# Patient Record
Sex: FEMALE | Race: WHITE | NOT HISPANIC OR LATINO | Employment: OTHER | URBAN - METROPOLITAN AREA
[De-identification: names, ages, dates, MRNs, and addresses within clinical notes are randomized per-mention and may not be internally consistent; named-entity substitution may affect disease eponyms.]

---

## 2017-01-19 ENCOUNTER — OUTPATIENT INFUSION SERVICES (OUTPATIENT)
Dept: ONCOLOGY | Facility: MEDICAL CENTER | Age: 72
End: 2017-01-19
Payer: MEDICARE

## 2017-01-19 VITALS
OXYGEN SATURATION: 91 % | SYSTOLIC BLOOD PRESSURE: 154 MMHG | TEMPERATURE: 98.3 F | WEIGHT: 102.07 LBS | RESPIRATION RATE: 18 BRPM | HEIGHT: 58 IN | BODY MASS INDEX: 21.43 KG/M2 | DIASTOLIC BLOOD PRESSURE: 66 MMHG | HEART RATE: 94 BPM

## 2017-01-19 LAB
CA-I BLD ISE-SCNC: 1.22 MMOL/L (ref 1.1–1.3)
CREAT BLD-MCNC: 0.8 MG/DL (ref 0.5–1.4)

## 2017-01-19 PROCEDURE — 96401 CHEMO ANTI-NEOPL SQ/IM: CPT

## 2017-01-19 PROCEDURE — 700111 HCHG RX REV CODE 636 W/ 250 OVERRIDE (IP): Performed by: INTERNAL MEDICINE

## 2017-01-19 PROCEDURE — 82330 ASSAY OF CALCIUM: CPT

## 2017-01-19 PROCEDURE — 82565 ASSAY OF CREATININE: CPT

## 2017-01-19 RX ADMIN — DENOSUMAB 60 MG: 60 INJECTION SUBCUTANEOUS at 13:50

## 2017-01-19 NOTE — PROGRESS NOTES
Pharmacy note  Cr = 0.8 mg/dL, CrCl ~ 46 ml/min  Ionized Ca = 1.22  Last Prolia 07/21/16   OK for denosumab (Prolia) 60 mg SQ today      Precious Ventura, PharmD

## 2017-01-19 NOTE — PROGRESS NOTES
"Pt arrived to [facility], ambulatory, for Prolia injection. Pt tearful today, stating \"my cat is at home and she's dying.\" Emotional support provided. Pt denies any recent or upcoming dental surgeries. Labs drawn with 23g butterfly needle in the R-AC, pt within parameters to treat today. Prolia injected into the R-upper arm with no s/sx of adverse reaction. Pt left [facility] with no s/sx of distress. Follow up appointment scheduled and confirmed.   "

## 2017-01-20 ENCOUNTER — HOSPITAL ENCOUNTER (OUTPATIENT)
Dept: RADIOLOGY | Facility: MEDICAL CENTER | Age: 72
End: 2017-01-20
Attending: INTERNAL MEDICINE
Payer: COMMERCIAL

## 2017-01-20 DIAGNOSIS — R94.30 ABNORMAL CARDIAC FUNCTION TEST: ICD-10-CM

## 2017-01-20 DIAGNOSIS — E78.5 DYSLIPIDEMIA: ICD-10-CM

## 2017-01-20 PROCEDURE — 4410556 CT-CARDIAC SCORING

## 2017-01-23 ENCOUNTER — TELEPHONE (OUTPATIENT)
Dept: MEDICAL GROUP | Facility: CLINIC | Age: 72
End: 2017-01-23

## 2017-01-23 NOTE — TELEPHONE ENCOUNTER
----- Message from Abdon Hilario D.O. sent at 1/20/2017  5:02 PM PST -----  Please call Olga I want her to see a cardiologist.   Regards, Abdon Hilario, DO

## 2017-02-19 ENCOUNTER — PATIENT OUTREACH (OUTPATIENT)
Dept: HEALTH INFORMATION MANAGEMENT | Facility: OTHER | Age: 72
End: 2017-02-19

## 2017-02-19 NOTE — PROGRESS NOTES
Attempt #:1     Annual Wellness Visit Scheduling  1. Scheduling Status:Scheduled          Care Gap Scheduling (Attempt to Schedule EACH Overdue Care Gap!)     Health Maintenance Due   Topic Date Due   • IMM DTaP/Tdap/Td Vaccine (1 - Tdap) SCHEDULED    • IMM ZOSTER VACCINE  SCHEDULED         MyChart Activation: declined

## 2017-02-24 ENCOUNTER — TELEPHONE (OUTPATIENT)
Dept: MEDICAL GROUP | Facility: CLINIC | Age: 72
End: 2017-02-24

## 2017-02-24 NOTE — Clinical Note
[facility]  Abdon Hilario D.O.  [address]  [place] [address]  Fax: [phone]   Authorization for Release/Disclosure of   Protected Health Information   Name: OLGA WATTS : 1945 SSN: [id number]   Address: [address] [place] [address] [address]  [place] [address] Phone:    [phone] (home)    I authorize the entity listed below to release/disclose the PHI below to:   [facility]/Abdon Hilario D.O. and Abdon Hilario D.O.   Provider or Entity Name: Vic Vargas M.D.        Address   City, [facility], [facility]   Phone:      Fax: [phone]     Reason for request: continuity of care   Information to be released:    [  ] LAST COLONOSCOPY,  including any PATH REPORT and follow-up  [  ] LAST FIT/COLOGUARD RESULT [  ] LAST DEXA  [  ] LAST MAMMOGRAM  [  ] LAST PAP  [  ] LAST LABS [ X ] RETINA EXAM REPORT  [  ] IMMUNIZATION RECORDS  [  ] Release all info      [  ] Check here and initial the line next to each item to release ALL health information INCLUDING  _____ Care and treatment for drug and / or alcohol abuse  _____ HIV testing, infection status, or AIDS  _____ Genetic Testing    DATES OF SERVICE OR TIME PERIOD TO BE DISCLOSED: Recent eye exam  I understand and acknowledge that:  * This Authorization may be revoked at any time by you in writing, except if your health information has already been used or disclosed.  * Your health information that will be used or disclosed as a result of you signing this authorization could be re-disclosed by the recipient. If this occurs, your re-disclosed health information may no longer be protected by State or Federal laws.  * You may refuse to sign this Authorization. Your refusal will not affect your ability to obtain treatment.  * This Authorization becomes effective upon signing and will  on (date) __________.      If no date is indicated, this Authorization will  one (1) year from the signature date.    Name: Olga AGUILERA  Hilario    Signature:   Date:     2/27/2017       PLEASE FAX REQUESTED RECORDS BACK TO: [phone]

## 2017-02-24 NOTE — Clinical Note
Request for Medical Records    Patient Name: Olga Rich    : 1945      Dear Doctor Vic Vargas M.D.      The above named patient receives primary care at the [facility] by Abdon Hilario D.O..  The patient informs us that you are her eye care Provider.    Please fax a copy of the most recent eye exam to [phone] or answer the  questions below and fax this sheet back to us at the above number.  Attached is a signed Release of Information.      Date of last eye exam: _____________    Retinal eye exam summary:        Please select the choice(s) that apply.    ____ No diabetic retinopathy    ____    Diabetic retinopathy present      Printed Name and Credentials: __________________________________    Signature of Eye Care Provider: _________________________________    We appreciate your assistance and collaboration in providing efficient patient care!    Kindest Regards,    [address] [place] [address] [phone]

## 2017-02-25 NOTE — TELEPHONE ENCOUNTER
ANNUAL WELLNESS VISIT PRE-VISIT PLANNING     1.  Reviewed last PCP office visit assessment and plan notes: Yes    2.  If any orders were placed last visit do we have Results/Consult Notes?        •  Labs? Yes Completed        •  Imaging? Yes Cardiac scoring       •  Referrals? Yes Cardiology    3.  Patient Care Coordination Note was updated with diagnosis information:  No comments    4.  Patient is due for these Health Maintenance Topics:   Health Maintenance Due   Topic Date Due   • IMM DTaP/Tdap/Td Vaccine (1 - Tdap) 03/07/1962   • IMM ZOSTER VACCINE  03/07/2003          •  ALKA letter was faxed to:  Vic Vargas M.D.   for Retinal exam records    5.  Immunizations were updated in [employer] using WebIZ?: Yes       •  Web Iz Recommendations:  Yes       •  Is patient due for Tdap/Shingles? Yes.  If yes, was patient alerted of copay? Yes    6.  Patient has No chronic diseases           7.  Updated Care Team with [employer] and all specialists?        •   Gait devices, O2, CPAP, etc: yes        •   Eye professional: yes       •   Other specialists (GYN, cardiology, endo, etc): yes    8.  Is patient in need of any refills prior to office visit? No       •    Separate refill encounter created?: N\A    9.  Patient was informed to arrive 15 min prior to their scheduled appointment and bring in their medication bottles? yes    10.  Patient was advised: “This is a free wellness visit. The provider will screen for medical conditions to help you stay healthy. If you have other concerns to address you may be asked to discuss these at a separate visit or there may be an additional fee.”  Yes    N/A L/M 2/24/17 4:35

## 2017-03-01 ENCOUNTER — OFFICE VISIT (OUTPATIENT)
Dept: MEDICAL GROUP | Facility: CLINIC | Age: 72
End: 2017-03-01
Payer: MEDICARE

## 2017-03-01 VITALS
TEMPERATURE: 98.7 F | DIASTOLIC BLOOD PRESSURE: 80 MMHG | OXYGEN SATURATION: 95 % | SYSTOLIC BLOOD PRESSURE: 140 MMHG | RESPIRATION RATE: 16 BRPM | HEART RATE: 73 BPM | WEIGHT: 99 LBS | HEIGHT: 57 IN | BODY MASS INDEX: 21.36 KG/M2

## 2017-03-01 DIAGNOSIS — E03.9 HYPOTHYROIDISM, UNSPECIFIED TYPE: ICD-10-CM

## 2017-03-01 DIAGNOSIS — Z91.09 ENVIRONMENTAL ALLERGIES: ICD-10-CM

## 2017-03-01 DIAGNOSIS — M81.0 OSTEOPOROSIS: ICD-10-CM

## 2017-03-01 DIAGNOSIS — G47.09 OTHER INSOMNIA: ICD-10-CM

## 2017-03-01 DIAGNOSIS — Z23 NEED FOR SHINGLES VACCINE: ICD-10-CM

## 2017-03-01 DIAGNOSIS — Z23 NEED FOR TDAP VACCINATION: ICD-10-CM

## 2017-03-01 DIAGNOSIS — E78.5 DYSLIPIDEMIA: ICD-10-CM

## 2017-03-01 DIAGNOSIS — J45.909 UNCOMPLICATED ASTHMA, UNSPECIFIED ASTHMA SEVERITY: ICD-10-CM

## 2017-03-01 DIAGNOSIS — R94.30 ABNORMAL CARDIAC FUNCTION TEST: ICD-10-CM

## 2017-03-01 PROCEDURE — 90736 HZV VACCINE LIVE SUBQ: CPT | Performed by: INTERNAL MEDICINE

## 2017-03-01 PROCEDURE — 1036F TOBACCO NON-USER: CPT | Performed by: INTERNAL MEDICINE

## 2017-03-01 PROCEDURE — G0439 PPPS, SUBSEQ VISIT: HCPCS | Mod: 25 | Performed by: INTERNAL MEDICINE

## 2017-03-01 PROCEDURE — G8432 DEP SCR NOT DOC, RNG: HCPCS | Performed by: INTERNAL MEDICINE

## 2017-03-01 PROCEDURE — 90472 IMMUNIZATION ADMIN EACH ADD: CPT | Performed by: INTERNAL MEDICINE

## 2017-03-01 PROCEDURE — 90715 TDAP VACCINE 7 YRS/> IM: CPT | Performed by: INTERNAL MEDICINE

## 2017-03-01 PROCEDURE — 90471 IMMUNIZATION ADMIN: CPT | Performed by: INTERNAL MEDICINE

## 2017-03-01 RX ORDER — ATORVASTATIN CALCIUM 10 MG/1
10 TABLET, FILM COATED ORAL
Qty: 90 TAB | Refills: 3 | Status: SHIPPED | OUTPATIENT
Start: 2017-03-01 | End: 2017-04-07

## 2017-03-01 ASSESSMENT — PAIN SCALES - GENERAL: PAINLEVEL: NO PAIN

## 2017-03-01 ASSESSMENT — PATIENT HEALTH QUESTIONNAIRE - PHQ9: CLINICAL INTERPRETATION OF PHQ2 SCORE: 0

## 2017-03-01 NOTE — PROGRESS NOTES
Chief Complaint   Patient presents with   • Annual Wellness Visit         HPI:  Ogla is a 71 y.o. female here for Medicare Annual Wellness Visit         Patient Active Problem List    Diagnosis Date Noted   • Insomnia 08/19/2016   • Dyslipidemia 04/26/2016   • Osteoporosis 07/18/2014   • Asthma 07/18/2014   • Hypothyroid 04/18/2014   • Screening for breast cancer 03/14/2014   • Environmental allergies 03/14/2014       Current Outpatient Prescriptions   Medication Sig Dispense Refill   • Plant Sterols and Stanols (CHOLEST OFF PO) Take  by mouth.     • fluticasone-salmeterol (ADVAIR) 250-50 MCG/DOSE AEROSOL POWDER, BREATH ACTIVATED Inhale 1 Puff by mouth every 12 hours. 1 Inhaler 11   • montelukast (SINGULAIR) 10 MG Tab Take 1 Tab by mouth every day. 30 Tab 11   • temazepam (RESTORIL) 15 MG Cap Take 2 Caps by mouth at bedtime as needed. 60 Cap 7   • magnesium oxide (MAG-OX) 400 MG Tab Take 400 mg by mouth every day.     • levothyroxine (SYNTHROID) 75 MCG Tab TAKE ONE TABLET BY MOUTH ONE TIME DAILY 30 Tab 11   • Cholecalciferol (VITAMIN D) 2000 UNITS CAPS Take  by mouth.     • denosumab (PROLIA) 60 MG/ML SOLN Inject 60 mg as instructed Once.     • calcium carbonate (OS-DARIEN 500) 1250 MG TABS Take 1,250 mg by mouth every day.     • multivitamin (THERAGRAN) TABS Take 1 Tab by mouth every day.     • vitamin e (VITAMIN E) 400 UNIT Cap Take 400 Units by mouth every day.       No current facility-administered medications for this visit.        The patient reports adherence to this regimen      Current supplements as per medication list.   Chronic narcotic pain medicines: no    Allergies: Demerol    Current social contact/activities: Olga visits with her son,goes out to lunch,  interacts with neighbors      Is patient current with immunizations?  no    If no, due for Tdap and Shingles, would like to receive both today     She  reports that she quit smoking about 3 years ago. Her smoking use included Cigarettes. She has a  10 pack-year smoking history. She has never used smokeless tobacco. She reports that she does not drink alcohol or use illicit drugs.  Counseling given: Not Answered        DPA/Advanced Directive:  Patient does have an advanced directive.      ROS:    Gait: Uses no assistive device    Ostomy: no    Other tubes: no    Amputations: no    Chronic oxygen use no    Last eye exam 2/2013    : Denies incontinence.         Depression Screening    Little interest or pleasure in doing things?  0 - not at all  Feeling down, depressed, or hopeless?  0 - not at all  Patient Health Questionnaire Score: 0    If depressive symptoms identified deferred to follow up visit unless specifically addressed in assessment and plan.    Screening for Cognitive Impairment    Three Minute Recall (banana, sunrise, fence)  3/3    Draw clock face with all 12 numbers set to the hand to show 10 minutes past 11 o'clock  1 5/5  Cognitive concerns identified deferred for follow up unless specifically addressed in assessment and plan.    Fall Risk Assessment    Has the patient had two or more falls in the last year or any fall with injury in the last year?  No    Safety Assessment    Throw rugs on floor.  Yes  Handrails on all stairs.  Yes  Good lighting in all hallways.  Yes  Difficulty hearing.  No  Patient counseled about all safety risks that were identified.    Functional Assessment ADLs    Are there any barriers preventing you from cooking for yourself or meeting nutritional needs?  No.    Are there any barriers preventing you from driving safely or obtaining transportation?  No.    Are there any barriers preventing you from using a telephone or calling for help?  No.    Are there any barriers preventing you from shopping?  No.    Are there any barriers preventing you from taking care of your own finances?  No.    Are there any barriers preventing you from managing your medications?  No.    Are currently engaging any exercise or physical  "activity?  Yes.  Stretching several times daily, jumping jacks    Health Maintenance Summary                IMM DTaP/Tdap/Td Vaccine Overdue 3/7/1962     IMM ZOSTER VACCINE Overdue 3/7/2003     MAMMOGRAM Next Due 4/14/2017      Done 4/14/2016 MA-SCREEN MAMMO W/CAD-BILAT     Patient has more history with this topic...    COLONOSCOPY Next Due 4/18/2019      Done 4/18/2009 [facility] GI consult    BONE DENSITY Next Due 7/21/2021      Done 7/21/2016 DS-BONE DENSITY STUDY (DEXA)     Patient has more history with this topic...          Patient Care Team:  Abdon Hilario D.O. as PCP - General (Internal Medicine)  PRUDENCE Foster as Consulting Physician (Family Medicine)  Rahel Thompson M.D. as Consulting Physician (Rheumatology)  Vic Vargas M.D. as Consulting Physician (Ophthalmology)  Bruno Hood M.D. as Consulting Physician (Cardiology)    Social History   Substance Use Topics   • Smoking status: Former Smoker -- 0.25 packs/day for 40 years     Types: Cigarettes     Quit date: 03/01/2014   • Smokeless tobacco: Never Used   • Alcohol Use: No      Comment: very rarely     Family History   Problem Relation Age of Onset   • Other Mother 97     old age.    • Heart Attack Father 56     MI   • Heart Disease Father    • Other Son      ETOH   • Other Son      ETOH   • Heart Disease Sister 53   • Heart Attack Sister 85     She  has a past medical history of Osteoporosis, unspecified; Hypothyroid; Asthma in adult; Hypothyroid (4/18/2014); Hypothyroid (4/18/2014); Dyslipidemia (4/26/2016); and Cataract (2/2012).   Past Surgical History   Procedure Laterality Date   • Cataract phaco with iol       Dr. Vargas   • Femur orif       titamium Dr. Mares.            Exam:     Blood pressure 140/80, pulse 73, temperature 37.1 °C (98.7 °F), resp. rate 16, height 1.46 m (4' 9.48\"), weight 44.906 kg (99 lb), SpO2 95 %. Body mass index is 21.07 kg/(m^2).    Hearing fair.    Dentition fair  Alert, " oriented in no acute distress.  Eye contact is good, speech goal directed, affect calm       Assessment and Plan. The following treatment and monitoring plan is recommended:      1. Other insomnia  Patient is doing well with current medical therapy recommended sleep hygiene    2. Dyslipidemia  No change in medical therapy continue Lipitor  - atorvastatin (LIPITOR) 10 MG Tab; Take 1 Tab by mouth every bedtime.  Dispense: 90 Tab; Refill: 3    3. Osteoporosis  History of osteoporosis stable    4. Uncomplicated asthma, unspecified asthma severity  Stable    5. Hypothyroidism, unspecified type  No change in medical therapy    6. Environmental allergies  Stable    7. Abnormal cardiac function test  Recommend patient undergo a cardiac stress test because of elevated cardiac calcium scoring  - CARDIAC STRESS TEST TREADMILL ONLY    8. Need for Tdap vaccination  Vaccination given  - TDAP VACCINE =>6YO IM    9. Need for shingles vaccine  Vaccination given  - VARICELLA ZOSTER VACCINE SQ      Services needed: No services needed at this time  Health Care Screening recommendations as per orders if indicated.  Referrals offered: PT/OT/Nutrition counseling/Behavioral Health/Smoking cessation as per orders if indicated.    Discussion today about general wellness and lifestyle habits:    · Prevent falls and reduce trip hazards; Cautioned about securing or removing rugs.  · Have a working fire alarm and carbon monoxide detector;   · Engage in regular physical activity and social activities   ·   · (goals)      Follow-up: No Follow-up on file.

## 2017-03-01 NOTE — MR AVS SNAPSHOT
"        Olga Rich   3/1/2017 3:00 PM   Office Visit   MRN: [id number]    Department:  [facility]   Dept Phone:  [phone]    Description:  Female : 1945   Provider[facility]  Abdon Hilario D.O.; [facility]            Reason for Visit     Annual Wellness Visit           Allergies as of 3/1/2017     Allergen Noted Reactions    Demerol 2014   Anaphylaxis      You were diagnosed with     Other insomnia   [364131]       Dyslipidemia   [127063]       Osteoporosis   [5751166]       Uncomplicated asthma, unspecified asthma severity   [8335874]       Hypothyroidism, unspecified type   [6039544]       Environmental allergies   [682676]         Vital Signs     Blood Pressure Pulse Temperature Respirations Height Weight    140/80 mmHg 73 37.1 °C (98.7 °F) 16 1.46 m (4' 9.48\") 44.906 kg (99 lb)    Body Mass Index Oxygen Saturation Smoking Status             21.07 kg/m2 95% Former Smoker         Basic Information     Date Of Birth Sex Race Ethnicity Preferred Language    1945 Female White Non- English      Your appointments     2017  3:45 PM   NEW PATIENT with Bruno Hood M.D.   [facility] (--)    [address], [address]  [place] [address] [phone]            2017 10:15 AM   Follow Up Visit with Rahel Thompson M.D.   [facility] (--)    [address], [address]  [place] [address] [phone]           You will be receiving a confirmation call a few days before your appointment from our automated call confirmation system.            2017 10:20 AM   Established Patient with Abdon Hilario D.O.   [facility]    [address]  [place] [address] [phone]           You will be receiving a confirmation call a few days before your appointment from our automated call confirmation system.            2017  1:30 PM   Est Injection with " INFUSION QUICK INJECT   [facility] ([facility])    [address]  [place] [address]   [phone]            Aug 21, 2017  9:00 AM   Established Patient Pul with PRUDENCE Foster   [facility] (--)    [address]  [address]  [place] [address]   [phone]              Problem List              ICD-10-CM Priority Class Noted - Resolved    Screening for breast cancer Z12.39   3/14/2014 - Present    Environmental allergies Z91.09   3/14/2014 - Present    Hypothyroid E03.9   4/18/2014 - Present    Osteoporosis M81.0   7/18/2014 - Present    Asthma J45.909   7/18/2014 - Present    Dyslipidemia E78.5   4/26/2016 - Present    Insomnia G47.00   8/19/2016 - Present      Health Maintenance        Date Due Completion Dates    IMM DTaP/Tdap/Td Vaccine (1 - Tdap) 3/7/1962 ---    IMM ZOSTER VACCINE 3/7/2003 ---    MAMMOGRAM 4/14/2017 4/14/2016, 7/11/2014, 7/10/2014    COLONOSCOPY 4/18/2019 4/18/2009 (Done)    Override on 4/18/2009: Done ([facility] GI consult)    BONE DENSITY 7/21/2021 7/21/2016, 7/11/2014            Current Immunizations     13-VALENT PCV PREVNAR 3/21/2016 10:58 AM    Influenza Vaccine Adult HD 12/16/2016, 11/2/2015  9:31 AM    Pneumococcal polysaccharide vaccine (PPSV-23) 4/1/2015      Below and/or attached are the medications your provider expects you to take. Review all of your home medications and newly ordered medications with your provider and/or pharmacist. Follow medication instructions as directed by your provider and/or pharmacist. Please keep your medication list with you and share with your provider. Update the information when medications are discontinued, doses are changed, or new medications (including over-the-counter products) are added; and carry medication information at all times in the event of emergency situations     Allergies:  DEMEROL - Anaphylaxis               Medications  Valid as of: March 01, 2017 -  3:51 PM    Generic  Name Brand Name Tablet Size Instructions for use    Calcium Carbonate (Tab) OS-DARIEN 500 1250 (500 CA) MG Take 1,250 mg by mouth every day.        Cholecalciferol (Cap) Vitamin D 2000 UNITS Take  by mouth.        Denosumab (Solution) PROLIA 60 MG/ML Inject 60 mg as instructed Once.        Fluticasone-Salmeterol (AEROSOL POWDER, BREATH ACTIVATED) ADVAIR 250-50 MCG/DOSE Inhale 1 Puff by mouth every 12 hours.        Levothyroxine Sodium (Tab) SYNTHROID 75 MCG TAKE ONE TABLET BY MOUTH ONE TIME DAILY        Magnesium Oxide (Tab) MAG- MG Take 400 mg by mouth every day.        Montelukast Sodium (Tab) SINGULAIR 10 MG Take 1 Tab by mouth every day.        Multiple Vitamin (Tab) THERAGRAN  Take 1 Tab by mouth every day.        Plant Sterols and Stanols   Take  by mouth.        Temazepam (Cap) RESTORIL 15 MG Take 2 Caps by mouth at bedtime as needed.        Vitamin E (Cap) VITAMIN E 400 UNIT Take 400 Units by mouth every day.        .                 Medicines prescribed today were sent to:     [id number] - [place], [place] - [address] [place] [address]    Phone: [phone] Fax: [phone]    Open 24 Hours?: No      Medication refill instructions:       If your prescription bottle indicates you have medication refills left, it is not necessary to call your provider’s office. Please contact your pharmacy and they will refill your medication.    If your prescription bottle indicates you do not have any refills left, you may request refills at any time through one of the following ways: The online [employer]Tat system (except Urgent Care), by calling your provider’s office, or by asking your pharmacy to contact your provider’s office with a refill request. Medication refills are processed only during regular business hours and may not be available until the next business day. Your provider may request additional information or to have a follow-up visit with you prior to refilling your  medication.   *Please Note: Medication refills are assigned a new Rx number when refilled electronically. Your pharmacy may indicate that no refills were authorized even though a new prescription for the same medication is available at the pharmacy. Please request the medicine by name with the pharmacy before contacting your provider for a refill.        Other Notes About Your Plan     This patient has an appointment on 03/21/2016. There are no new queries.           MyChart Status: Patient Declined

## 2017-03-03 DIAGNOSIS — Z23 NEED FOR SHINGLES VACCINE: ICD-10-CM

## 2017-04-07 ENCOUNTER — OFFICE VISIT (OUTPATIENT)
Dept: CARDIOLOGY | Facility: MEDICAL CENTER | Age: 72
End: 2017-04-07
Payer: MEDICARE

## 2017-04-07 ENCOUNTER — HOSPITAL ENCOUNTER (OUTPATIENT)
Dept: LAB | Facility: MEDICAL CENTER | Age: 72
End: 2017-04-07
Attending: INTERNAL MEDICINE
Payer: MEDICARE

## 2017-04-07 VITALS
HEART RATE: 96 BPM | DIASTOLIC BLOOD PRESSURE: 80 MMHG | HEIGHT: 57 IN | OXYGEN SATURATION: 86 % | WEIGHT: 102 LBS | SYSTOLIC BLOOD PRESSURE: 120 MMHG | BODY MASS INDEX: 22.01 KG/M2

## 2017-04-07 DIAGNOSIS — Z82.49 FAMILY HISTORY OF ATHEROSCLEROSIS: ICD-10-CM

## 2017-04-07 DIAGNOSIS — I25.10 ATHEROSCLEROSIS OF NATIVE CORONARY ARTERY OF NATIVE HEART WITHOUT ANGINA PECTORIS: ICD-10-CM

## 2017-04-07 DIAGNOSIS — E78.5 DYSLIPIDEMIA: ICD-10-CM

## 2017-04-07 LAB
ALBUMIN SERPL BCP-MCNC: 4.3 G/DL (ref 3.2–4.9)
ALBUMIN/GLOB SERPL: 1.2 G/DL
ALP SERPL-CCNC: 64 U/L (ref 30–99)
ALT SERPL-CCNC: 11 U/L (ref 2–50)
ANION GAP SERPL CALC-SCNC: 7 MMOL/L (ref 0–11.9)
AST SERPL-CCNC: 17 U/L (ref 12–45)
BILIRUB SERPL-MCNC: 0.5 MG/DL (ref 0.1–1.5)
BUN SERPL-MCNC: 16 MG/DL (ref 8–22)
CALCIUM SERPL-MCNC: 10 MG/DL (ref 8.5–10.5)
CHLORIDE SERPL-SCNC: 104 MMOL/L (ref 96–112)
CHOLEST SERPL-MCNC: 195 MG/DL (ref 100–199)
CO2 SERPL-SCNC: 28 MMOL/L (ref 20–33)
CREAT SERPL-MCNC: 0.87 MG/DL (ref 0.5–1.4)
GFR SERPL CREATININE-BSD FRML MDRD: >60 ML/MIN/1.73 M 2
GLOBULIN SER CALC-MCNC: 3.5 G/DL (ref 1.9–3.5)
GLUCOSE SERPL-MCNC: 91 MG/DL (ref 65–99)
HDLC SERPL-MCNC: 75 MG/DL
LDLC SERPL CALC-MCNC: 97 MG/DL
POTASSIUM SERPL-SCNC: 4.3 MMOL/L (ref 3.6–5.5)
PROT SERPL-MCNC: 7.8 G/DL (ref 6–8.2)
SODIUM SERPL-SCNC: 139 MMOL/L (ref 135–145)
TRIGL SERPL-MCNC: 114 MG/DL (ref 0–149)

## 2017-04-07 PROCEDURE — 80053 COMPREHEN METABOLIC PANEL: CPT

## 2017-04-07 PROCEDURE — 36415 COLL VENOUS BLD VENIPUNCTURE: CPT

## 2017-04-07 PROCEDURE — 1101F PT FALLS ASSESS-DOCD LE1/YR: CPT | Performed by: INTERNAL MEDICINE

## 2017-04-07 PROCEDURE — 80061 LIPID PANEL: CPT

## 2017-04-07 PROCEDURE — 99204 OFFICE O/P NEW MOD 45 MIN: CPT | Performed by: INTERNAL MEDICINE

## 2017-04-07 PROCEDURE — G8420 CALC BMI NORM PARAMETERS: HCPCS | Performed by: INTERNAL MEDICINE

## 2017-04-07 RX ORDER — ATORVASTATIN CALCIUM 40 MG/1
40 TABLET, FILM COATED ORAL DAILY
Qty: 90 TAB | Refills: 3 | Status: SHIPPED | OUTPATIENT
Start: 2017-04-07 | End: 2018-03-27 | Stop reason: SDUPTHER

## 2017-04-07 RX ORDER — LISINOPRIL 5 MG/1
5 TABLET ORAL DAILY
Qty: 90 TAB | Refills: 3 | Status: SHIPPED | OUTPATIENT
Start: 2017-04-07 | End: 2018-03-27 | Stop reason: SDUPTHER

## 2017-04-07 RX ORDER — METOPROLOL SUCCINATE 25 MG/1
25 TABLET, EXTENDED RELEASE ORAL DAILY
Qty: 90 TAB | Refills: 3 | Status: SHIPPED | OUTPATIENT
Start: 2017-04-07 | End: 2018-03-27 | Stop reason: SDUPTHER

## 2017-04-07 ASSESSMENT — ENCOUNTER SYMPTOMS
PND: 0
SENSORY CHANGE: 0
VOMITING: 0
COUGH: 0
ABDOMINAL PAIN: 0
NAUSEA: 0
SHORTNESS OF BREATH: 0
BLURRED VISION: 0
FEVER: 0
CLAUDICATION: 0
PALPITATIONS: 0
MYALGIAS: 0
EYE PAIN: 0
CHILLS: 0
FALLS: 0
HEADACHES: 0
BRUISES/BLEEDS EASILY: 0
WEIGHT LOSS: 0
DEPRESSION: 0
DOUBLE VISION: 0
DIZZINESS: 0
EYE DISCHARGE: 0
HALLUCINATIONS: 0
SPEECH CHANGE: 0
LOSS OF CONSCIOUSNESS: 0
BLOOD IN STOOL: 0
ORTHOPNEA: 0

## 2017-04-07 NOTE — MR AVS SNAPSHOT
"        Olga Rich   2017 9:00 AM   Office Visit   MRN: [id number]    Department:  [facility]   Dept Phone:  [phone]    Description:  Female : 1945   Provider:  Bruno Hood M.D.           Reason for Visit     New Patient           Allergies as of 2017     Allergen Noted Reactions    Demerol 2014   Anaphylaxis      You were diagnosed with     Atherosclerosis of native coronary artery of native heart without angina pectoris   [5098417]       Dyslipidemia   [143653]       Family history of atherosclerosis   [5569883]         Vital Signs     Blood Pressure Pulse Height Weight Body Mass Index Oxygen Saturation    120/80 mmHg 96 1.46 m (4' 9.48\") 46.267 kg (102 lb) 21.71 kg/m2 86%    Smoking Status                   Former Smoker           Basic Information     Date Of Birth Sex Race Ethnicity Preferred Language    1945 Female White Non- English      Your appointments     2017  9:00 AM   NEW PATIENT with Bruno Hood M.D.   [facility] (--)    [address], [address]  [place] [address]   [phone]            2017 10:15 AM   Follow Up Visit with Rahel Thompson M.D.   [facility] (--)    [address], [address]  [place] [id number] [phone]           You will be receiving a confirmation call a few days before your appointment from our automated call confirmation system.            2017 10:20 AM   Established Patient with Abdon Hilario D.O.   [facility] ([facility])    [address]  [place] [id number] [phone]           You will be receiving a confirmation call a few days before your appointment from our automated call confirmation system.            2017  1:30 PM   Est Injection with INFUSION QUICK INJECT   [facility] ([facility])    [address]  [place] [address]   [phone]            Aug 21, 2017  9:00 AM "   Established Patient Pul with PRUDENCE Foster   [facility] (--)    [address]  [address]  [place] [id number] [phone]              Problem List              ICD-10-CM Priority Class Noted - Resolved    Screening for breast cancer Z12.39   3/14/2014 - Present    Environmental allergies Z91.09   3/14/2014 - Present    Hypothyroid E03.9   4/18/2014 - Present    Osteoporosis M81.0   7/18/2014 - Present    Asthma J45.909   7/18/2014 - Present    Dyslipidemia E78.5   4/26/2016 - Present    Insomnia G47.00   8/19/2016 - Present      Health Maintenance        Date Due Completion Dates    MAMMOGRAM 4/14/2017 4/14/2016, 7/11/2014, 7/10/2014    COLONOSCOPY 4/18/2019 4/18/2009 (Done)    Override on 4/18/2009: Done ([facility] consult)    BONE DENSITY 7/21/2021 7/21/2016, 7/11/2014    IMM DTaP/Tdap/Td Vaccine (2 - Td) 3/1/2027 3/1/2017            Results       Current Immunizations     13-VALENT PCV PREVNAR 3/21/2016 10:58 AM    Influenza Vaccine Adult HD 12/16/2016, 11/2/2015  9:31 AM    Pneumococcal polysaccharide vaccine (PPSV-23) 4/1/2015    SHINGLES VACCINE 3/1/2017    Tdap Vaccine 3/1/2017  4:35 PM      Below and/or attached are the medications your provider expects you to take. Review all of your home medications and newly ordered medications with your provider and/or pharmacist. Follow medication instructions as directed by your provider and/or pharmacist. Please keep your medication list with you and share with your provider. Update the information when medications are discontinued, doses are changed, or new medications (including over-the-counter products) are added; and carry medication information at all times in the event of emergency situations     Allergies:  DEMEROL - Anaphylaxis               Medications  Valid as of: April 07, 2017 -  8:59 AM    Generic Name Brand Name Tablet Size Instructions for use    Atorvastatin Calcium (Tab) LIPITOR 40 MG Take 1 Tab by  mouth every day.        Calcium Carbonate (Tab) OS-DARIEN 500 1250 (500 CA) MG Take 1,250 mg by mouth every day.        Cholecalciferol (Cap) Vitamin D 2000 UNITS Take  by mouth.        Denosumab (Solution) PROLIA 60 MG/ML Inject 60 mg as instructed Once.        Fluticasone-Salmeterol (AEROSOL POWDER, BREATH ACTIVATED) ADVAIR 250-50 MCG/DOSE Inhale 1 Puff by mouth every 12 hours.        Levothyroxine Sodium (Tab) SYNTHROID 75 MCG TAKE ONE TABLET BY MOUTH ONE TIME DAILY        Lisinopril (Tab) PRINIVIL 5 MG Take 1 Tab by mouth every day.        Magnesium Oxide (Tab) MAG- MG Take 400 mg by mouth every day.        Metoprolol Succinate (TABLET SR 24 HR) TOPROL XL 25 MG Take 1 Tab by mouth every day.        Montelukast Sodium (Tab) SINGULAIR 10 MG Take 1 Tab by mouth every day.        Multiple Vitamin (Tab) THERAGRAN  Take 1 Tab by mouth every day.        Plant Sterols and Stanols   Take  by mouth.        Temazepam (Cap) RESTORIL 15 MG Take 2 Caps by mouth at bedtime as needed.        Vitamin E (Cap) VITAMIN E 400 UNIT Take 400 Units by mouth every day.        .                 Medicines prescribed today were sent to:     [id number] - [place], [address] [address]    Phone: [phone] Fax: [phone]    Open 24 Hours?: No      Medication refill instructions:       If your prescription bottle indicates you have medication refills left, it is not necessary to call your provider’s office. Please contact your pharmacy and they will refill your medication.    If your prescription bottle indicates you do not have any refills left, you may request refills at any time through one of the following ways: The online [employer] system (except Urgent Care), by calling your provider’s office, or by asking your pharmacy to contact your provider’s office with a refill request. Medication refills are processed only during regular business hours and may not be available until the next  business day. Your provider may request additional information or to have a follow-up visit with you prior to refilling your medication.   *Please Note: Medication refills are assigned a new Rx number when refilled electronically. Your pharmacy may indicate that no refills were authorized even though a new prescription for the same medication is available at the pharmacy. Please request the medicine by name with the pharmacy before contacting your provider for a refill.        Your To Do List     Future Labs/Procedures Complete By Expires    COMP METABOLIC PANEL  As directed 4/8/2018    ECHOCARDIOGRAM COMP W/O CONT  As directed 4/8/2018      Instructions    Will increase Atorvastatin to 40 mg once a day.    Start Toprol XL 25 mg once a day and Lisinopril 5 mg once a day.       Other Notes About Your Plan     This patient has an appointment on 03/21/2016. There are no new queries.           MyChart Status: Patient Declined

## 2017-04-07 NOTE — PATIENT INSTRUCTIONS
Will increase Atorvastatin to 40 mg once a day.    Start Toprol XL 25 mg once a day and Lisinopril 5 mg once a day.

## 2017-04-07 NOTE — Clinical Note
[facility]   [address]  [place] [address]  Phone: [phone]  Fax: [phone]              Olga Rich  1945    Encounter Date: 4/7/2017    Bruno Hood M.D.          PROGRESS NOTE:  Subjective:   Olga Rich is a 72 y.o. female who presents today for cardiac care and evaluation because of an abnormal calcium scan. Patient denies having any symptoms whatsoever. Olga Rich does not have any history of heart attack arrhythmias in the past. she never had transthoracic echocardiogram, cardiac catheterization or ablations procedure in the past. At this time, she denies having chest pain shortness of breath presyncopal syncopal episodes. she is able to exercise with walking for one to 2 miles without having problems of chest pain or shortness of breath. Patient is also able to climb up at least 2 flights of stairs without having symptoms.    I have reviewed patient's ECG, which shows normal sinus rhythm, normal IL, QT intervals. No evidence of acute coronary syndrome.    The calcium score is elevated. At this time she is asymptomatic.    Past Medical History   Diagnosis Date   • Osteoporosis, unspecified    • Hypothyroid    • Asthma in adult    • Hypothyroid 4/18/2014   • Hypothyroid 4/18/2014   • Dyslipidemia 4/26/2016   • Cataract 2/2012     Bilat      Past Surgical History   Procedure Laterality Date   • Cataract phaco with iol       Dr. Vargas   • Femur orif       titamium Dr. Mares.      Family History   Problem Relation Age of Onset   • Other Mother 97     old age.    • Heart Attack Father 56     MI   • Heart Disease Father    • Other Son      ETOH   • Other Son      ETOH   • Heart Disease Sister 53   • Heart Attack Sister 85     History   Smoking status   • Former Smoker -- 0.25 packs/day for 40 years   • Types: Cigarettes   • Quit date: 03/01/2014   Smokeless tobacco   • Never Used     Allergies      Allergen Reactions   • Demerol Anaphylaxis     Outpatient Encounter Prescriptions as of 4/7/2017   Medication Sig Dispense Refill   • atorvastatin (LIPITOR) 40 MG Tab Take 1 Tab by mouth every day. 90 Tab 3   • metoprolol SR (TOPROL XL) 25 MG TABLET SR 24 HR Take 1 Tab by mouth every day. 90 Tab 3   • lisinopril (PRINIVIL) 5 MG Tab Take 1 Tab by mouth every day. 90 Tab 3   • Plant Sterols and Stanols (CHOLEST OFF PO) Take  by mouth.     • fluticasone-salmeterol (ADVAIR) 250-50 MCG/DOSE AEROSOL POWDER, BREATH ACTIVATED Inhale 1 Puff by mouth every 12 hours. 1 Inhaler 11   • montelukast (SINGULAIR) 10 MG Tab Take 1 Tab by mouth every day. 30 Tab 11   • temazepam (RESTORIL) 15 MG Cap Take 2 Caps by mouth at bedtime as needed. 60 Cap 7   • magnesium oxide (MAG-OX) 400 MG Tab Take 400 mg by mouth every day.     • levothyroxine (SYNTHROID) 75 MCG Tab TAKE ONE TABLET BY MOUTH ONE TIME DAILY 30 Tab 11   • vitamin e (VITAMIN E) 400 UNIT Cap Take 400 Units by mouth every day.     • Cholecalciferol (VITAMIN D) 2000 UNITS CAPS Take  by mouth.     • denosumab (PROLIA) 60 MG/ML SOLN Inject 60 mg as instructed Once.     • calcium carbonate (OS-DARIEN 500) 1250 MG TABS Take 1,250 mg by mouth every day.     • multivitamin (THERAGRAN) TABS Take 1 Tab by mouth every day.     • [DISCONTINUED] atorvastatin (LIPITOR) 10 MG Tab Take 1 Tab by mouth every bedtime. 90 Tab 3     No facility-administered encounter medications on file as of 4/7/2017.     Review of Systems   Constitutional: Negative for fever, chills, weight loss and malaise/fatigue.   HENT: Negative for ear discharge, ear pain, hearing loss and nosebleeds.    Eyes: Negative for blurred vision, double vision, pain and discharge.   Respiratory: Negative for cough and shortness of breath.    Cardiovascular: Negative for chest pain, palpitations, orthopnea, claudication, leg swelling and PND.   Gastrointestinal: Negative for nausea, vomiting, abdominal pain, blood in stool and  "melena.   Genitourinary: Negative for dysuria and hematuria.   Musculoskeletal: Negative for myalgias, joint pain and falls.   Skin: Negative for itching and rash.   Neurological: Negative for dizziness, sensory change, speech change, loss of consciousness and headaches.   Endo/Heme/Allergies: Negative for environmental allergies. Does not bruise/bleed easily.   Psychiatric/Behavioral: Negative for depression, suicidal ideas and hallucinations.        Objective:   /80 mmHg  Pulse 96  Ht 1.46 m (4' 9.48\")  Wt 46.267 kg (102 lb)  BMI 21.71 kg/m2  SpO2 86%    Physical Exam   Constitutional: She is oriented to person, place, and time. She appears well-developed and well-nourished.   HENT:   Head: Normocephalic and atraumatic.   Eyes: EOM are normal.   Neck: No JVD present.   Cardiovascular: Normal rate, regular rhythm, normal heart sounds and intact distal pulses.  Exam reveals no gallop and no friction rub.    No murmur heard.  Pulmonary/Chest: No respiratory distress. She has no wheezes. She has no rales. She exhibits no tenderness.   Abdominal: She exhibits no distension. There is no tenderness. There is no rebound and no guarding.   Musculoskeletal: She exhibits no edema or tenderness.   Lymphadenopathy:     She has no cervical adenopathy.   Neurological: She is alert and oriented to person, place, and time.   Skin: Skin is dry.   Psychiatric: She has a normal mood and affect.   Nursing note and vitals reviewed.      Assessment:     1. Atherosclerosis of native coronary artery of native heart without angina pectoris  atorvastatin (LIPITOR) 40 MG Tab    ECHOCARDIOGRAM COMP W/O CONT    metoprolol SR (TOPROL XL) 25 MG TABLET SR 24 HR    lisinopril (PRINIVIL) 5 MG Tab    COMP METABOLIC PANEL    LIPID PANEL   2. Dyslipidemia  atorvastatin (LIPITOR) 40 MG Tab    ECHOCARDIOGRAM COMP W/O CONT    metoprolol SR (TOPROL XL) 25 MG TABLET SR 24 HR    lisinopril (PRINIVIL) 5 MG Tab    COMP METABOLIC PANEL    LIPID " PANEL   3. Family history of atherosclerosis  atorvastatin (LIPITOR) 40 MG Tab    ECHOCARDIOGRAM COMP W/O CONT    metoprolol SR (TOPROL XL) 25 MG TABLET SR 24 HR    lisinopril (PRINIVIL) 5 MG Tab    COMP METABOLIC PANEL    LIPID PANEL       Medical Decision Making:  Today's Assessment / Status / Plan:     At this time I will obtain a transthoracic echocardiogram to further assess cardiac functions and valvular functions.  Because of her asymptomatic nature, I am not inclined to do further invasive cardiac workup.  We did want to optimize primary prevention however. I will increase her atorvastatin to 40 mg by mouth once a day. I will start on Toprol-XL 25 mg by mouth once a day along with lisinopril 5 mg by mouth once a day.    I will see patient back in clinic with lab tests and studies results in 3 months.    I thank you Dr. Hilario for referring patient to our Cardiology Clinic today.        Abdon Hilario, D.O.  [address]  [place] [address]  VIA In Basket

## 2017-04-07 NOTE — PROGRESS NOTES
Subjective:   Olga Rich is a 72 y.o. female who presents today for cardiac care and evaluation because of an abnormal calcium scan. Patient denies having any symptoms whatsoever. Olga Rich does not have any history of heart attack arrhythmias in the past. she never had transthoracic echocardiogram, cardiac catheterization or ablations procedure in the past. At this time, she denies having chest pain shortness of breath presyncopal syncopal episodes. she is able to exercise with walking for one to 2 miles without having problems of chest pain or shortness of breath. Patient is also able to climb up at least 2 flights of stairs without having symptoms.    I have reviewed patient's ECG, which shows normal sinus rhythm, normal ME, QT intervals. No evidence of acute coronary syndrome.    The calcium score is elevated. At this time she is asymptomatic.    Past Medical History   Diagnosis Date   • Osteoporosis, unspecified    • Hypothyroid    • Asthma in adult    • Hypothyroid 4/18/2014   • Hypothyroid 4/18/2014   • Dyslipidemia 4/26/2016   • Cataract 2/2012     Bilat      Past Surgical History   Procedure Laterality Date   • Cataract phaco with iol       Dr. Vargas   • Femur orif       titamium Dr. Mares.      Family History   Problem Relation Age of Onset   • Other Mother 97     old age.    • Heart Attack Father 56     MI   • Heart Disease Father    • Other Son      ETOH   • Other Son      ETOH   • Heart Disease Sister 53   • Heart Attack Sister 85     History   Smoking status   • Former Smoker -- 0.25 packs/day for 40 years   • Types: Cigarettes   • Quit date: 03/01/2014   Smokeless tobacco   • Never Used     Allergies   Allergen Reactions   • Demerol Anaphylaxis     Outpatient Encounter Prescriptions as of 4/7/2017   Medication Sig Dispense Refill   • atorvastatin (LIPITOR) 40 MG Tab Take 1 Tab by mouth every day. 90 Tab 3   • metoprolol SR (TOPROL XL) 25 MG TABLET SR 24 HR Take 1 Tab by  mouth every day. 90 Tab 3   • lisinopril (PRINIVIL) 5 MG Tab Take 1 Tab by mouth every day. 90 Tab 3   • Plant Sterols and Stanols (CHOLEST OFF PO) Take  by mouth.     • fluticasone-salmeterol (ADVAIR) 250-50 MCG/DOSE AEROSOL POWDER, BREATH ACTIVATED Inhale 1 Puff by mouth every 12 hours. 1 Inhaler 11   • montelukast (SINGULAIR) 10 MG Tab Take 1 Tab by mouth every day. 30 Tab 11   • temazepam (RESTORIL) 15 MG Cap Take 2 Caps by mouth at bedtime as needed. 60 Cap 7   • magnesium oxide (MAG-OX) 400 MG Tab Take 400 mg by mouth every day.     • levothyroxine (SYNTHROID) 75 MCG Tab TAKE ONE TABLET BY MOUTH ONE TIME DAILY 30 Tab 11   • vitamin e (VITAMIN E) 400 UNIT Cap Take 400 Units by mouth every day.     • Cholecalciferol (VITAMIN D) 2000 UNITS CAPS Take  by mouth.     • denosumab (PROLIA) 60 MG/ML SOLN Inject 60 mg as instructed Once.     • calcium carbonate (OS-DARIEN 500) 1250 MG TABS Take 1,250 mg by mouth every day.     • multivitamin (THERAGRAN) TABS Take 1 Tab by mouth every day.     • [DISCONTINUED] atorvastatin (LIPITOR) 10 MG Tab Take 1 Tab by mouth every bedtime. 90 Tab 3     No facility-administered encounter medications on file as of 4/7/2017.     Review of Systems   Constitutional: Negative for fever, chills, weight loss and malaise/fatigue.   HENT: Negative for ear discharge, ear pain, hearing loss and nosebleeds.    Eyes: Negative for blurred vision, double vision, pain and discharge.   Respiratory: Negative for cough and shortness of breath.    Cardiovascular: Negative for chest pain, palpitations, orthopnea, claudication, leg swelling and PND.   Gastrointestinal: Negative for nausea, vomiting, abdominal pain, blood in stool and melena.   Genitourinary: Negative for dysuria and hematuria.   Musculoskeletal: Negative for myalgias, joint pain and falls.   Skin: Negative for itching and rash.   Neurological: Negative for dizziness, sensory change, speech change, loss of consciousness and headaches.  "  Endo/Heme/Allergies: Negative for environmental allergies. Does not bruise/bleed easily.   Psychiatric/Behavioral: Negative for depression, suicidal ideas and hallucinations.        Objective:   /80 mmHg  Pulse 96  Ht 1.46 m (4' 9.48\")  Wt 46.267 kg (102 lb)  BMI 21.71 kg/m2  SpO2 86%    Physical Exam   Constitutional: She is oriented to person, place, and time. She appears well-developed and well-nourished.   HENT:   Head: Normocephalic and atraumatic.   Eyes: EOM are normal.   Neck: No JVD present.   Cardiovascular: Normal rate, regular rhythm, normal heart sounds and intact distal pulses.  Exam reveals no gallop and no friction rub.    No murmur heard.  Pulmonary/Chest: No respiratory distress. She has no wheezes. She has no rales. She exhibits no tenderness.   Abdominal: She exhibits no distension. There is no tenderness. There is no rebound and no guarding.   Musculoskeletal: She exhibits no edema or tenderness.   Lymphadenopathy:     She has no cervical adenopathy.   Neurological: She is alert and oriented to person, place, and time.   Skin: Skin is dry.   Psychiatric: She has a normal mood and affect.   Nursing note and vitals reviewed.      Assessment:     1. Atherosclerosis of native coronary artery of native heart without angina pectoris  atorvastatin (LIPITOR) 40 MG Tab    ECHOCARDIOGRAM COMP W/O CONT    metoprolol SR (TOPROL XL) 25 MG TABLET SR 24 HR    lisinopril (PRINIVIL) 5 MG Tab    COMP METABOLIC PANEL    LIPID PANEL   2. Dyslipidemia  atorvastatin (LIPITOR) 40 MG Tab    ECHOCARDIOGRAM COMP W/O CONT    metoprolol SR (TOPROL XL) 25 MG TABLET SR 24 HR    lisinopril (PRINIVIL) 5 MG Tab    COMP METABOLIC PANEL    LIPID PANEL   3. Family history of atherosclerosis  atorvastatin (LIPITOR) 40 MG Tab    ECHOCARDIOGRAM COMP W/O CONT    metoprolol SR (TOPROL XL) 25 MG TABLET SR 24 HR    lisinopril (PRINIVIL) 5 MG Tab    COMP METABOLIC PANEL    LIPID PANEL       Medical Decision Making:  Today's " Assessment / Status / Plan:     At this time I will obtain a transthoracic echocardiogram to further assess cardiac functions and valvular functions.  Because of her asymptomatic nature, I am not inclined to do further invasive cardiac workup.  We did want to optimize primary prevention however. I will increase her atorvastatin to 40 mg by mouth once a day. I will start on Toprol-XL 25 mg by mouth once a day along with lisinopril 5 mg by mouth once a day.    I will see patient back in clinic with lab tests and studies results in 3 months.    I thank you Dr. Hilario for referring patient to our Cardiology Clinic today.

## 2017-04-08 LAB — EKG IMPRESSION: NORMAL

## 2017-04-12 ENCOUNTER — OFFICE VISIT (OUTPATIENT)
Dept: RHEUMATOLOGY | Facility: PHYSICIAN GROUP | Age: 72
End: 2017-04-12
Payer: MEDICARE

## 2017-04-12 VITALS
BODY MASS INDEX: 19.79 KG/M2 | HEART RATE: 70 BPM | RESPIRATION RATE: 16 BRPM | OXYGEN SATURATION: 95 % | TEMPERATURE: 98.8 F | SYSTOLIC BLOOD PRESSURE: 120 MMHG | DIASTOLIC BLOOD PRESSURE: 78 MMHG | WEIGHT: 93 LBS

## 2017-04-12 DIAGNOSIS — E03.9 HYPOTHYROIDISM, UNSPECIFIED TYPE: ICD-10-CM

## 2017-04-12 DIAGNOSIS — J45.909 UNCOMPLICATED ASTHMA, UNSPECIFIED ASTHMA SEVERITY: ICD-10-CM

## 2017-04-12 DIAGNOSIS — M81.0 OSTEOPOROSIS: ICD-10-CM

## 2017-04-12 DIAGNOSIS — E78.5 DYSLIPIDEMIA: ICD-10-CM

## 2017-04-12 PROCEDURE — 99214 OFFICE O/P EST MOD 30 MIN: CPT | Performed by: INTERNAL MEDICINE

## 2017-04-12 PROCEDURE — 4040F PNEUMOC VAC/ADMIN/RCVD: CPT | Performed by: INTERNAL MEDICINE

## 2017-04-12 PROCEDURE — 1101F PT FALLS ASSESS-DOCD LE1/YR: CPT | Performed by: INTERNAL MEDICINE

## 2017-04-12 PROCEDURE — G8432 DEP SCR NOT DOC, RNG: HCPCS | Performed by: INTERNAL MEDICINE

## 2017-04-12 PROCEDURE — G8420 CALC BMI NORM PARAMETERS: HCPCS | Performed by: INTERNAL MEDICINE

## 2017-04-12 PROCEDURE — 3014F SCREEN MAMMO DOC REV: CPT | Performed by: INTERNAL MEDICINE

## 2017-04-12 PROCEDURE — 1036F TOBACCO NON-USER: CPT | Performed by: INTERNAL MEDICINE

## 2017-04-12 PROCEDURE — G8599 NO ASA/ANTIPLAT THER USE RNG: HCPCS | Performed by: INTERNAL MEDICINE

## 2017-04-12 PROCEDURE — 3017F COLORECTAL CA SCREEN DOC REV: CPT | Performed by: INTERNAL MEDICINE

## 2017-04-12 NOTE — Clinical Note
[facility]   [address], [address]  [place] [address]  Phone: [phone]  Fax: [phone]              Encounter Date: 4/12/2017    Dear Dr. Brothers ref. provider found,    It was a pleasure seeing your patient, Olga Rich, on 4/12/2017. Diagnoses of Osteoporosis, Hypothyroidism, unspecified type, Uncomplicated asthma, unspecified asthma severity, and Dyslipidemia were pertinent to this visit.     Please find attached progress note which includes the history I obtained from Ms. Rich, my physical examination findings, my impression and recommendations.      Once again, it was a pleasure participating in your patient's care.  Please feel free to contact me if you have any questions or if I can be of any further assistance to your patients.      Sincerely,    Rahel Thompson M.D.  Electronically Signed          PROGRESS NOTE:  No notes on file

## 2017-04-12 NOTE — MR AVS SNAPSHOT
Olga Rich   2017 10:15 AM   Office Visit   MRN: [id number]    Department:  Rheumatology [facility]   Dept Phone:  [phone]    Description:  Female : 1945   Provider:  Rahel Thompson M.D.           Reason for Visit     Follow-Up           Allergies as of 2017     Allergen Noted Reactions    Demerol 2014   Anaphylaxis      You were diagnosed with     Osteoporosis   [6435163]       Hypothyroidism, unspecified type   [7137610]       Uncomplicated asthma, unspecified asthma severity   [6224043]       Dyslipidemia   [759362]         Vital Signs     Blood Pressure Pulse Temperature Respirations Weight Oxygen Saturation    120/78 mmHg 70 37.1 °C (98.8 °F) 16 42.185 kg (93 lb) 95%    Smoking Status                   Former Smoker           Basic Information     Date Of Birth Sex Race Ethnicity Preferred Language    1945 Female White Non- English      Your appointments     May 03, 2017  9:15 AM   ECHO with ECHO [facility], V EXAM 12   ECHOCARDIOLOGY [facility] ([facility])    [address] [place] [address]   [phone]           No prep            2017 10:20 AM   Established Patient with Abdon Hilario D.O.   [facility] ([facility])    [address]  [place] [address]   [phone]           You will be receiving a confirmation call a few days before your appointment from our automated call confirmation system.            Jul 10, 2017  9:40 AM   PREVIOUS PATIENT with PRUDENCE Bailey   [facility] (--)    [address], [address]  [place] [address]   [phone]            2017  1:30 PM   Est Injection with INFUSION QUICK INJECT   Infusion Services ([facility])    [address] [facility]  [place] [address]   [phone]            Aug 21, 2017  9:00 AM   Established Patient Pul with PRUDENCE Foster   [facility] (--)    [address]  [address]  [place]  [place] [address]   [phone]            Jan 03, 2018 10:45 AM   Follow Up Visit with Rahel Thompson M.D.   [facility] (--)    [address], [address]  [place] [address]   [phone]           You will be receiving a confirmation call a few days before your appointment from our automated call confirmation system.              Problem List              ICD-10-CM Priority Class Noted - Resolved    Screening for breast cancer Z12.39   3/14/2014 - Present    Environmental allergies Z91.09   3/14/2014 - Present    Hypothyroid E03.9   4/18/2014 - Present    Osteoporosis M81.0   7/18/2014 - Present    Asthma J45.909   7/18/2014 - Present    Dyslipidemia E78.5   4/26/2016 - Present    Insomnia G47.00   8/19/2016 - Present      Health Maintenance        Date Due Completion Dates    MAMMOGRAM 4/14/2017 4/14/2016, 7/11/2014, 7/10/2014    COLONOSCOPY 4/18/2019 4/18/2009 (Done)    Override on 4/18/2009: Done ([facility] consult)    BONE DENSITY 7/21/2021 7/21/2016, 7/11/2014    IMM DTaP/Tdap/Td Vaccine (2 - Td) 3/1/2027 3/1/2017            Current Immunizations     13-VALENT PCV PREVNAR 3/21/2016 10:58 AM    Influenza Vaccine Adult HD 12/16/2016, 11/2/2015  9:31 AM    Pneumococcal polysaccharide vaccine (PPSV-23) 4/1/2015    SHINGLES VACCINE 3/1/2017    Tdap Vaccine 3/1/2017  4:35 PM      Below and/or attached are the medications your provider expects you to take. Review all of your home medications and newly ordered medications with your provider and/or pharmacist. Follow medication instructions as directed by your provider and/or pharmacist. Please keep your medication list with you and share with your provider. Update the information when medications are discontinued, doses are changed, or new medications (including over-the-counter products) are added; and carry medication information at all times in the event of emergency situations     Allergies:  DEMEROL - Anaphylaxis                  Medications  Valid as of: April 12, 2017 - 10:31 AM    Generic Name Brand Name Tablet Size Instructions for use    Atorvastatin Calcium (Tab) LIPITOR 40 MG Take 1 Tab by mouth every day.        Calcium Carbonate (Tab) OS-DARIEN 500 1250 (500 CA) MG Take 1,250 mg by mouth every day.        Cholecalciferol (Cap) Vitamin D 2000 UNITS Take  by mouth.        Denosumab (Solution) PROLIA 60 MG/ML Inject 60 mg as instructed Once.        Fluticasone-Salmeterol (AEROSOL POWDER, BREATH ACTIVATED) ADVAIR 250-50 MCG/DOSE Inhale 1 Puff by mouth every 12 hours.        Levothyroxine Sodium (Tab) SYNTHROID 75 MCG TAKE ONE TABLET BY MOUTH ONE TIME DAILY        Lisinopril (Tab) PRINIVIL 5 MG Take 1 Tab by mouth every day.        Magnesium Oxide (Tab) MAG- MG Take 400 mg by mouth every day.        Metoprolol Succinate (TABLET SR 24 HR) TOPROL XL 25 MG Take 1 Tab by mouth every day.        Montelukast Sodium (Tab) SINGULAIR 10 MG Take 1 Tab by mouth every day.        Multiple Vitamin (Tab) THERAGRAN  Take 1 Tab by mouth every day.        Plant Sterols and Stanols   Take  by mouth.        Temazepam (Cap) RESTORIL 15 MG Take 2 Caps by mouth at bedtime as needed.        Vitamin E (Cap) VITAMIN E 400 UNIT Take 400 Units by mouth every day.        .                 Medicines prescribed today were sent to:     [id number] - [place], [place] - [address] [address]    Phone: [phone] Fax: [phone]    Open 24 Hours?: No      Medication refill instructions:       If your prescription bottle indicates you have medication refills left, it is not necessary to call your provider’s office. Please contact your pharmacy and they will refill your medication.    If your prescription bottle indicates you do not have any refills left, you may request refills at any time through one of the following ways: The online [employer] system (except Urgent Care), by calling your provider’s office, or by asking your  pharmacy to contact your provider’s office with a refill request. Medication refills are processed only during regular business hours and may not be available until the next business day. Your provider may request additional information or to have a follow-up visit with you prior to refilling your medication.   *Please Note: Medication refills are assigned a new Rx number when refilled electronically. Your pharmacy may indicate that no refills were authorized even though a new prescription for the same medication is available at the pharmacy. Please request the medicine by name with the pharmacy before contacting your provider for a refill.        Your To Do List     Future Labs/Procedures Complete By Expires    COMP METABOLIC PANEL  As directed 4/12/2018    VITAMIN D,25 HYDROXY  As directed 4/12/2018      Other Notes About Your Plan     This patient has an appointment on 03/21/2016. There are no new queries.           MyChart Status: Patient Declined

## 2017-04-12 NOTE — PROGRESS NOTES
Chief Complaint- joint pain    Subjective:   Olga Rich is a 72 y.o. female here today for follow up of rheumatological issues    This is a follow-up visit for this patient for osteoporosis, patient states her osteoporosis was discovered in 2008 when she suffered a right femur fracture, currently on Prolia every 6 months first dose December 2014, patient states she felt better within 2 weeks of getting the Prolia, states her back aches have improved significantly and continued to improve. Patient also continues to take calcium and vitamin D, patient also suffers from hypothyroidism for which she is managed by her primary care doctor, also has asthma, patient denies any unexplained weight loss, denies any FUO, denies any new neurological symptoms, denies any recurrent infections. Recent bone density scan done July 2016 indicates improvement of bone density. Patient denies any recent fractures      S/p fosamax for 4 years     DEXA 4/2014 T scores -4.4, -3.9  DEXA 7/2016 T scores -3.7, -4.0  CTX 6/2015 50 normal  Last Prolia 1/19/2017     Current medicines (including changes today)  Current Outpatient Prescriptions   Medication Sig Dispense Refill   • atorvastatin (LIPITOR) 40 MG Tab Take 1 Tab by mouth every day. 90 Tab 3   • metoprolol SR (TOPROL XL) 25 MG TABLET SR 24 HR Take 1 Tab by mouth every day. 90 Tab 3   • lisinopril (PRINIVIL) 5 MG Tab Take 1 Tab by mouth every day. 90 Tab 3   • Plant Sterols and Stanols (CHOLEST OFF PO) Take  by mouth.     • fluticasone-salmeterol (ADVAIR) 250-50 MCG/DOSE AEROSOL POWDER, BREATH ACTIVATED Inhale 1 Puff by mouth every 12 hours. 1 Inhaler 11   • montelukast (SINGULAIR) 10 MG Tab Take 1 Tab by mouth every day. 30 Tab 11   • temazepam (RESTORIL) 15 MG Cap Take 2 Caps by mouth at bedtime as needed. 60 Cap 7   • magnesium oxide (MAG-OX) 400 MG Tab Take 400 mg by mouth every day.     • levothyroxine (SYNTHROID) 75 MCG Tab TAKE ONE TABLET BY MOUTH ONE TIME DAILY 30 Tab  11   • vitamin e (VITAMIN E) 400 UNIT Cap Take 400 Units by mouth every day.     • Cholecalciferol (VITAMIN D) 2000 UNITS CAPS Take  by mouth.     • denosumab (PROLIA) 60 MG/ML SOLN Inject 60 mg as instructed Once.     • calcium carbonate (OS-DARIEN 500) 1250 MG TABS Take 1,250 mg by mouth every day.     • multivitamin (THERAGRAN) TABS Take 1 Tab by mouth every day.       No current facility-administered medications for this visit.     She  has a past medical history of Osteoporosis, unspecified; Hypothyroid; Asthma in adult; Hypothyroid (4/18/2014); Hypothyroid (4/18/2014); Dyslipidemia (4/26/2016); and Cataract (2/2012).    ROS   Other than what is mentioned in HPI or physical exam, there is no history of headaches, double vision or blurred vision. No temporal tenderness or jaw claudication. No history of cataracts or glaucoma. No trouble swallowing difficulties or sore throats. No history of thyroid disease. No chest complaints including chest pain, cough or sputum production. No shortness of breath. No GI complaints including nausea, vomiting, change in bowel habits, or past peptic ulcer disease. No history of blood in the stools. No urinary complaints including dysuria or frequency. No history of rash including psoriasis. No history of alopecia, photosensitivity, oral ulcerations, Raynaud's phenomena, or swollen joints. No history of gout. No back complaints. No history of low blood counts.       Objective:     Blood pressure 120/78, pulse 70, temperature 37.1 °C (98.8 °F), resp. rate 16, weight 42.185 kg (93 lb), SpO2 95 %. Body mass index is 19.79 kg/(m^2).   Physical Exam:  General:Alert, oriented X 3 in no acute distress, small and thin framed  Eye: contact is good, speech goal directed, affect calm  HEENT: conjunctiva non-injected, sclera non-icteric.  Pinna normal. TM pearly gray.   Oral mucous membranes pink and moist with no lesions.  Neck: No thyromegaly, trachea midline  Lymph: No supraclavicular  lymphadenopathy, no cervical lymphadenopathy, no axillary lymphadenopathy  Lungs: clear to auscultation bilaterally with good excursion, the patient has slight increased expiration compatible with asthma although no wheezes, patient does have moderate kyphosis of upper back  CV: regular rate and rhythm, did not appreciate any murmurs rubs or gallops  Abdomen: soft, nontender, No CVAT, no HSM  Neuro Cranial nerves 2-12 are grossly intact  Skin: No vasculitic lesions, no bruising, no petechial lesions  Ext: There are mild Heberden nodes on bilateral index DIP joints with mild valgus deformity. But no swan-neck, no bruit ears, no ulnar deviation, there are or mild bony hypertrophy bilateral knees, but without valgus or varus deformity, there is crepitus bilateral knees without redness without effusions. No Achilles tendon inflammation     Lab Results   Component Value Date/Time    SODIUM 139 04/07/2017 10:12 AM    POTASSIUM 4.3 04/07/2017 10:12 AM    CHLORIDE 104 04/07/2017 10:12 AM    CO2 28 04/07/2017 10:12 AM    GLUCOSE 91 04/07/2017 10:12 AM    BUN 16 04/07/2017 10:12 AM    CREATININE 0.87 04/07/2017 10:12 AM      Lab Results   Component Value Date/Time    WBC 7.0 04/20/2016 10:09 AM    RBC 4.99 04/20/2016 10:09 AM    HEMOGLOBIN 15.1 04/20/2016 10:09 AM    HEMATOCRIT 46.0 04/20/2016 10:09 AM    MCV 92.2 04/20/2016 10:09 AM    MCH 30.3 04/20/2016 10:09 AM    MCHC 32.8* 04/20/2016 10:09 AM    MPV 9.4 04/20/2016 10:09 AM    NEUTROPHILS-POLYS 59.30 04/20/2016 10:09 AM    LYMPHOCYTES 26.80 04/20/2016 10:09 AM    MONOCYTES 7.70 04/20/2016 10:09 AM    EOSINOPHILS 4.70 04/20/2016 10:09 AM    BASOPHILS 0.90 04/20/2016 10:09 AM      Lab Results   Component Value Date/Time    CALCIUM 10.0 04/07/2017 10:12 AM    AST(SGOT) 17 04/07/2017 10:12 AM    ALT(SGPT) 11 04/07/2017 10:12 AM    ALKALINE PHOSPHATASE 64 04/07/2017 10:12 AM    TOTAL BILIRUBIN 0.5 04/07/2017 10:12 AM    ALBUMIN 4.3 04/07/2017 10:12 AM    TOTAL PROTEIN 7.8  04/07/2017 10:12 AM     Lab Results   Component Value Date/Time    SED RATE ANDRE 22 12/04/2014 03:10 PM     Lab Results   Component Value Date/Time    C. TELOPEPTIDE B CROSS LINKED 50 06/09/2015 01:15 PM     Lab Results   Component Value Date/Time    PTH, INTACT 48.8 12/09/2016 10:20 AM     Results for orders placed during the hospital encounter of 07/21/16   DS-BONE DENSITY STUDY (DEXA)    Impression 1.  According to the World Health Organization classification, bone mineral density of this patient is osteoporotic.    2.  Slight increase in lumbar spine bone density. No statistically significant change in left proximal femur bone density.        10-year Probability of Fracture:  Major Osteoporotic     28.9%  Hip     11.1%  Population      [place] ()    Based on left femur neck BMD          INTERPRETING LOCATION:  [address] [place] [address], [id number]       Assessment and Plan:     1. Osteoporosis  Continue Prolene 60 mg subcutaneous every 6 months, last Prolia a injection January 19, 2017, patient scheduled with the infusion center to get a next Prolia injection July 2017. Today we wrote orders for a creatinine and calcium level to be done within 2 weeks of the next Prolia injection will also check a vitamin D level as well.  - COMP METABOLIC PANEL; Future  - VITAMIN D,25 HYDROXY; Future    2. Hypothyroidism, unspecified type  Can exacerbate joint pain, I recommend monitoring thyroid on a regular basis to assure it is not contributing to the patient's joint pain  - COMP METABOLIC PANEL; Future  - VITAMIN D,25 HYDROXY; Future    3. Uncomplicated asthma, unspecified asthma severity    4. Dyslipidemia  Followed by her primary care doctor    Followup: Return in about 9 months (around 1/12/2018). or sooner prn    Patient was seen 30 minutes face-to-face of which more than 50% of the time was spent counseling the patient (excluding time for procedures)  regarding  rheumatological condition and care. Therapy was  discussed in detail.    Please note that this dictation was created using voice recognition software. I have made every reasonable attempt to correct obvious errors, but I expect that there are errors of grammar and possibly content that I did not discover before finalizing the note.

## 2017-05-03 ENCOUNTER — HOSPITAL ENCOUNTER (OUTPATIENT)
Dept: CARDIOLOGY | Facility: MEDICAL CENTER | Age: 72
End: 2017-05-03
Attending: INTERNAL MEDICINE
Payer: MEDICARE

## 2017-05-03 DIAGNOSIS — E78.5 DYSLIPIDEMIA: ICD-10-CM

## 2017-05-03 DIAGNOSIS — I25.10 ATHEROSCLEROSIS OF NATIVE CORONARY ARTERY OF NATIVE HEART WITHOUT ANGINA PECTORIS: ICD-10-CM

## 2017-05-03 DIAGNOSIS — Z82.49 FAMILY HISTORY OF ATHEROSCLEROSIS: ICD-10-CM

## 2017-05-03 PROCEDURE — 93306 TTE W/DOPPLER COMPLETE: CPT | Mod: 26 | Performed by: INTERNAL MEDICINE

## 2017-05-03 PROCEDURE — 93306 TTE W/DOPPLER COMPLETE: CPT

## 2017-05-04 LAB
LV EJECT FRACT  99904: 65
LV EJECT FRACT MOD 2C 99903: 75.61
LV EJECT FRACT MOD 4C 99902: 66.59
LV EJECT FRACT MOD BP 99901: 67.51

## 2017-05-08 NOTE — PROGRESS NOTES
Quick Note:    Dear Roxanne,    Can you please let Olga Rich know that result is not entirely normal and I need to see patient ASAP to discuss for possible SANGEETA to look at the aortic vavle?    Thanks Vic Oliveira.    ______

## 2017-05-10 ENCOUNTER — TELEPHONE (OUTPATIENT)
Dept: CARDIOLOGY | Facility: MEDICAL CENTER | Age: 72
End: 2017-05-10

## 2017-05-11 NOTE — TELEPHONE ENCOUNTER
MIKE Brown L.P.N. to Paula                ECHOCARDIOGRAM COMP W/O CONT   Status: Final result     Visible to patient:  Not Released     Dx:  Dyslipidemia; Family history of ather...               Notes Recorded by Sanaz Emmanuel R.N. on 5/10/2017 at 11:00 AM  to Roxanne  Notes Recorded by Bruno Hood M.D. on 5/8/2017 at 11:45 AM  Dear Roxanne,    Can you please let [facility] know that result is not entirely normal and I need to see patient ASAP to discuss for possible SANGEETA to look at the aortic vavle?    Thanks Vic Oliveira.    Notes Recorded by Roxanne Frazier L.P.N. on 5/4/2017 at 6:15 PM  FU 7/10/17 with jason Arteaga Dr.     5/10/17:  Pt notified and scheduled with Dr. Hood for Friday 5/12/17 @ 3pm  ESTEPHANIA Frazier

## 2017-05-12 ENCOUNTER — OFFICE VISIT (OUTPATIENT)
Dept: CARDIOLOGY | Facility: MEDICAL CENTER | Age: 72
End: 2017-05-12
Payer: MEDICARE

## 2017-05-12 VITALS
DIASTOLIC BLOOD PRESSURE: 70 MMHG | BODY MASS INDEX: 20.57 KG/M2 | SYSTOLIC BLOOD PRESSURE: 130 MMHG | HEART RATE: 70 BPM | OXYGEN SATURATION: 94 % | WEIGHT: 98 LBS | HEIGHT: 58 IN

## 2017-05-12 DIAGNOSIS — E78.5 DYSLIPIDEMIA: ICD-10-CM

## 2017-05-12 PROCEDURE — 3017F COLORECTAL CA SCREEN DOC REV: CPT | Performed by: INTERNAL MEDICINE

## 2017-05-12 PROCEDURE — 1101F PT FALLS ASSESS-DOCD LE1/YR: CPT | Performed by: INTERNAL MEDICINE

## 2017-05-12 PROCEDURE — G8599 NO ASA/ANTIPLAT THER USE RNG: HCPCS | Performed by: INTERNAL MEDICINE

## 2017-05-12 PROCEDURE — 1036F TOBACCO NON-USER: CPT | Performed by: INTERNAL MEDICINE

## 2017-05-12 PROCEDURE — 3014F SCREEN MAMMO DOC REV: CPT | Performed by: INTERNAL MEDICINE

## 2017-05-12 PROCEDURE — G8420 CALC BMI NORM PARAMETERS: HCPCS | Performed by: INTERNAL MEDICINE

## 2017-05-12 PROCEDURE — 99214 OFFICE O/P EST MOD 30 MIN: CPT | Performed by: INTERNAL MEDICINE

## 2017-05-12 PROCEDURE — 4040F PNEUMOC VAC/ADMIN/RCVD: CPT | Performed by: INTERNAL MEDICINE

## 2017-05-12 PROCEDURE — G8432 DEP SCR NOT DOC, RNG: HCPCS | Performed by: INTERNAL MEDICINE

## 2017-05-12 RX ORDER — ATORVASTATIN CALCIUM 10 MG/1
TABLET, FILM COATED ORAL
COMMUNITY
Start: 2017-03-01 | End: 2017-05-18

## 2017-05-12 NOTE — Clinical Note
[facility]   [address]  [place] [address]  Phone: [phone]  Fax: [phone]              Olga Rich  1945    Encounter Date: 5/12/2017    Bruno Hood M.D.          PROGRESS NOTE:  Subjective:   Olga Rich is a 72 y.o. female who presents today for cardiac care and evaluation because of an abnormal calcium scan in the past. At the last visit, patient was deemed to be asymptomatic and no further cardiac workup was entertained. We optimized her medical therapy.     In the interim, patient has been doing well without having any symptoms. Patient denies having chest pain, dyspnea, palpitation, presyncope, syncope episodes. Able to climb up at least 2 flights of stairs.    Past Medical History   Diagnosis Date   • Osteoporosis, unspecified    • Hypothyroid    • Asthma in adult    • Hypothyroid 4/18/2014   • Hypothyroid 4/18/2014   • Dyslipidemia 4/26/2016   • Cataract 2/2012     Bilat      Past Surgical History   Procedure Laterality Date   • Cataract phaco with iol       Dr. Vargas   • Femur orif       titamium Dr. Mares.      Family History   Problem Relation Age of Onset   • Other Mother 97     old age.    • Heart Attack Father 56     MI   • Heart Disease Father    • Other Son      ETOH   • Other Son      ETOH   • Heart Disease Sister 53   • Heart Attack Sister 85     History   Smoking status   • Former Smoker -- 0.25 packs/day for 40 years   • Types: Cigarettes   • Quit date: 03/01/2014   Smokeless tobacco   • Never Used     Allergies   Allergen Reactions   • Demerol Anaphylaxis     Outpatient Encounter Prescriptions as of 5/12/2017   Medication Sig Dispense Refill   • atorvastatin (LIPITOR) 40 MG Tab Take 1 Tab by mouth every day. 90 Tab 3   • metoprolol SR (TOPROL XL) 25 MG TABLET SR 24 HR Take 1 Tab by mouth every day. 90 Tab 3   • lisinopril (PRINIVIL) 5 MG Tab Take 1 Tab by mouth every day. 90 Tab 3   •  Plant Sterols and Stanols (CHOLEST OFF PO) Take  by mouth.     • fluticasone-salmeterol (ADVAIR) 250-50 MCG/DOSE AEROSOL POWDER, BREATH ACTIVATED Inhale 1 Puff by mouth every 12 hours. 1 Inhaler 11   • montelukast (SINGULAIR) 10 MG Tab Take 1 Tab by mouth every day. 30 Tab 11   • magnesium oxide (MAG-OX) 400 MG Tab Take 400 mg by mouth every day.     • vitamin e (VITAMIN E) 400 UNIT Cap Take 400 Units by mouth every day.     • Cholecalciferol (VITAMIN D) 2000 UNITS CAPS Take  by mouth.     • denosumab (PROLIA) 60 MG/ML SOLN Inject 60 mg as instructed Once.     • calcium carbonate (OS-DARIEN 500) 1250 MG TABS Take 1,250 mg by mouth every day.     • atorvastatin (LIPITOR) 10 MG Tab      • temazepam (RESTORIL) 15 MG Cap Take 2 Caps by mouth at bedtime as needed. 60 Cap 7   • levothyroxine (SYNTHROID) 75 MCG Tab TAKE ONE TABLET BY MOUTH ONE TIME DAILY 30 Tab 11   • multivitamin (THERAGRAN) TABS Take 1 Tab by mouth every day.       No facility-administered encounter medications on file as of 5/12/2017.     Review of Systems   Constitutional: Negative for fever, chills, weight loss and malaise/fatigue.   HENT: Negative for ear discharge, ear pain, hearing loss and nosebleeds.    Eyes: Negative for blurred vision, double vision, pain and discharge.   Respiratory: Negative for cough and shortness of breath.    Cardiovascular: Negative for chest pain, palpitations, orthopnea, claudication, leg swelling and PND.   Gastrointestinal: Negative for nausea, vomiting, abdominal pain, blood in stool and melena.   Genitourinary: Negative for dysuria and hematuria.   Musculoskeletal: Negative for myalgias, joint pain and falls.   Skin: Negative for itching and rash.   Neurological: Negative for dizziness, sensory change, speech change, loss of consciousness and headaches.   Endo/Heme/Allergies: Negative for environmental allergies. Does not bruise/bleed easily.   Psychiatric/Behavioral: Negative for depression, suicidal ideas and  "hallucinations.        Objective:   /70 mmHg  Pulse 70  Ht 1.473 m (4' 9.99\")  Wt 44.453 kg (98 lb)  BMI 20.49 kg/m2  SpO2 94%    Physical Exam   Constitutional: She is oriented to person, place, and time. She appears well-developed and well-nourished.   HENT:   Head: Normocephalic and atraumatic.   Eyes: EOM are normal.   Neck: No JVD present.   Cardiovascular: Normal rate, regular rhythm, normal heart sounds and intact distal pulses.  Exam reveals no gallop and no friction rub.    No murmur heard.  Pulmonary/Chest: No respiratory distress. She has no wheezes. She has no rales. She exhibits no tenderness.   Abdominal: She exhibits no distension. There is no tenderness. There is no rebound and no guarding.   Musculoskeletal: She exhibits no edema or tenderness.   Lymphadenopathy:     She has no cervical adenopathy.   Neurological: She is alert and oriented to person, place, and time.   Skin: Skin is dry.   Psychiatric: She has a normal mood and affect.   Nursing note and vitals reviewed.      Assessment:     1. Dyslipidemia         Medical Decision Making:  Today's Assessment / Status / Plan:     At this time patient is clinically stable in terms of her cardiac standpoint.  Blood pressure is well controlled.  Cont current medications at current dose.     I will see patient back in clinic with lab tests and studies results in 12 months.    I thank you Dr. Hilario for referring patient to our Cardiology Clinic today.        Abdon Hilario D.O.  [address]  [place] [address]  VIA In Basket                   "

## 2017-05-12 NOTE — MR AVS SNAPSHOT
"Olga Rich   2017 3:00 PM   Office Visit   MRN: [id number]    Department:  [facility]   Dept Phone:  [phone]    Description:  Female : 1945   Provider:  Bruno Hood M.D.           Reason for Visit     Follow-Up           Allergies as of 2017     Allergen Noted Reactions    Demerol 2014   Anaphylaxis      Vital Signs     Blood Pressure Pulse Height Weight Body Mass Index Oxygen Saturation    130/70 mmHg 70 1.473 m (4' 9.99\") 44.453 kg (98 lb) 20.49 kg/m2 94%    Smoking Status                   Former Smoker           Basic Information     Date Of Birth Sex Race Ethnicity Preferred Language    1945 Female White Non- English      Your appointments     2017 10:20 AM   Established Patient with Abdon Hilario D.O.   [id number] [facility] [address]  [place] [id number] [phone]           You will be receiving a confirmation call a few days before your appointment from our automated call confirmation system.            Jul 10, 2017  9:20 AM   PREVIOUS PATIENT with PRUDENCE Bailey   [facility] (--)    [address], [address]  [place] [id number] [phone]            2017  1:30 PM   Est Injection with INFUSION QUICK INJECT   Infusion Services ([facility])    [address] [facility] [address]  [place] [id number] [phone]            Aug 21, 2017  9:00 AM   Established Patient Pul with PRUDENCE Foster   [facility] (--)    [address]  [address]  [place] [id number] [phone]            2018 10:45 AM   Follow Up Visit with Rahel Thompson M.D.   [facility] (--)    [address]  [place] [address] [phone]           You will be receiving a confirmation call a few days before your appointment from our automated call confirmation system.              Problem List          "    ICD-10-CM Priority Class Noted - Resolved    Screening for breast cancer Z12.39   3/14/2014 - Present    Environmental allergies Z91.09   3/14/2014 - Present    Hypothyroid E03.9   4/18/2014 - Present    Osteoporosis M81.0   7/18/2014 - Present    Asthma J45.909   7/18/2014 - Present    Dyslipidemia E78.5   4/26/2016 - Present    Insomnia G47.00   8/19/2016 - Present      Health Maintenance        Date Due Completion Dates    MAMMOGRAM 4/14/2017 4/14/2016, 7/11/2014    COLONOSCOPY 4/18/2019 4/18/2009 (Done)    Override on 4/18/2009: Done ([facility] GI consult)    BONE DENSITY 7/21/2021 7/21/2016, 7/11/2014    IMM DTaP/Tdap/Td Vaccine (2 - Td) 3/1/2027 3/1/2017            Current Immunizations     13-VALENT PCV PREVNAR 3/21/2016 10:58 AM    Influenza Vaccine Adult HD 12/16/2016, 11/2/2015  9:31 AM    Pneumococcal polysaccharide vaccine (PPSV-23) 4/1/2015    SHINGLES VACCINE 3/1/2017    Tdap Vaccine 3/1/2017  4:35 PM      Below and/or attached are the medications your provider expects you to take. Review all of your home medications and newly ordered medications with your provider and/or pharmacist. Follow medication instructions as directed by your provider and/or pharmacist. Please keep your medication list with you and share with your provider. Update the information when medications are discontinued, doses are changed, or new medications (including over-the-counter products) are added; and carry medication information at all times in the event of emergency situations     Allergies:  DEMEROL - Anaphylaxis               Medications  Valid as of: May 12, 2017 -  3:26 PM    Generic Name Brand Name Tablet Size Instructions for use    Atorvastatin Calcium (Tab) LIPITOR 40 MG Take 1 Tab by mouth every day.        Atorvastatin Calcium (Tab) LIPITOR 10 MG         Calcium Carbonate (Tab) OS-DARIEN 500 1250 (500 CA) MG Take 1,250 mg by mouth every day.        Cholecalciferol (Cap) Vitamin D 2000 UNITS Take  by mouth.         Denosumab (Solution) PROLIA 60 MG/ML Inject 60 mg as instructed Once.        Fluticasone-Salmeterol (AEROSOL POWDER, BREATH ACTIVATED) ADVAIR 250-50 MCG/DOSE Inhale 1 Puff by mouth every 12 hours.        Levothyroxine Sodium (Tab) SYNTHROID 75 MCG TAKE ONE TABLET BY MOUTH ONE TIME DAILY        Lisinopril (Tab) PRINIVIL 5 MG Take 1 Tab by mouth every day.        Magnesium Oxide (Tab) MAG- MG Take 400 mg by mouth every day.        Metoprolol Succinate (TABLET SR 24 HR) TOPROL XL 25 MG Take 1 Tab by mouth every day.        Montelukast Sodium (Tab) SINGULAIR 10 MG Take 1 Tab by mouth every day.        Multiple Vitamin (Tab) THERAGRAN  Take 1 Tab by mouth every day.        Plant Sterols and Stanols   Take  by mouth.        Temazepam (Cap) RESTORIL 15 MG Take 2 Caps by mouth at bedtime as needed.        Vitamin E (Cap) VITAMIN E 400 UNIT Take 400 Units by mouth every day.        .                 Medicines prescribed today were sent to:     [id number] - [place], [place] - [address] [place] [id number]    Phone: [phone] Fax: [phone]    Open 24 Hours?: No      Medication refill instructions:       If your prescription bottle indicates you have medication refills left, it is not necessary to call your provider’s office. Please contact your pharmacy and they will refill your medication.    If your prescription bottle indicates you do not have any refills left, you may request refills at any time through one of the following ways: The online [employer] system (except Urgent Care), by calling your provider’s office, or by asking your pharmacy to contact your provider’s office with a refill request. Medication refills are processed only during regular business hours and may not be available until the next business day. Your provider may request additional information or to have a follow-up visit with you prior to refilling your medication.   *Please Note: Medication refills are  assigned a new Rx number when refilled electronically. Your pharmacy may indicate that no refills were authorized even though a new prescription for the same medication is available at the pharmacy. Please request the medicine by name with the pharmacy before contacting your provider for a refill.        Other Notes About Your Plan     This patient has an appointment on 03/21/2016. There are no new queries.           MyChart Status: Patient Declined

## 2017-05-12 NOTE — PROGRESS NOTES
Subjective:   Olga Rich is a 72 y.o. female who presents today for cardiac care and evaluation because of an abnormal calcium scan in the past. At the last visit, patient was deemed to be asymptomatic and no further cardiac workup was entertained. We optimized her medical therapy.     In the interim, patient has been doing well without having any symptoms. Patient denies having chest pain, dyspnea, palpitation, presyncope, syncope episodes. Able to climb up at least 2 flights of stairs.    Past Medical History   Diagnosis Date   • Osteoporosis, unspecified    • Hypothyroid    • Asthma in adult    • Hypothyroid 4/18/2014   • Hypothyroid 4/18/2014   • Dyslipidemia 4/26/2016   • Cataract 2/2012     Bilat      Past Surgical History   Procedure Laterality Date   • Cataract phaco with iol       Dr. Vargas   • Femur orif       titamium Dr. Mares.      Family History   Problem Relation Age of Onset   • Other Mother 97     old age.    • Heart Attack Father 56     MI   • Heart Disease Father    • Other Son      ETOH   • Other Son      ETOH   • Heart Disease Sister 53   • Heart Attack Sister 85     History   Smoking status   • Former Smoker -- 0.25 packs/day for 40 years   • Types: Cigarettes   • Quit date: 03/01/2014   Smokeless tobacco   • Never Used     Allergies   Allergen Reactions   • Demerol Anaphylaxis     Outpatient Encounter Prescriptions as of 5/12/2017   Medication Sig Dispense Refill   • atorvastatin (LIPITOR) 40 MG Tab Take 1 Tab by mouth every day. 90 Tab 3   • metoprolol SR (TOPROL XL) 25 MG TABLET SR 24 HR Take 1 Tab by mouth every day. 90 Tab 3   • lisinopril (PRINIVIL) 5 MG Tab Take 1 Tab by mouth every day. 90 Tab 3   • Plant Sterols and Stanols (CHOLEST OFF PO) Take  by mouth.     • fluticasone-salmeterol (ADVAIR) 250-50 MCG/DOSE AEROSOL POWDER, BREATH ACTIVATED Inhale 1 Puff by mouth every 12 hours. 1 Inhaler 11   • montelukast (SINGULAIR) 10 MG Tab Take 1 Tab by mouth every day. 30 Tab  "11   • magnesium oxide (MAG-OX) 400 MG Tab Take 400 mg by mouth every day.     • vitamin e (VITAMIN E) 400 UNIT Cap Take 400 Units by mouth every day.     • Cholecalciferol (VITAMIN D) 2000 UNITS CAPS Take  by mouth.     • denosumab (PROLIA) 60 MG/ML SOLN Inject 60 mg as instructed Once.     • calcium carbonate (OS-DARIEN 500) 1250 MG TABS Take 1,250 mg by mouth every day.     • atorvastatin (LIPITOR) 10 MG Tab      • temazepam (RESTORIL) 15 MG Cap Take 2 Caps by mouth at bedtime as needed. 60 Cap 7   • levothyroxine (SYNTHROID) 75 MCG Tab TAKE ONE TABLET BY MOUTH ONE TIME DAILY 30 Tab 11   • multivitamin (THERAGRAN) TABS Take 1 Tab by mouth every day.       No facility-administered encounter medications on file as of 5/12/2017.     Review of Systems   Constitutional: Negative for fever, chills, weight loss and malaise/fatigue.   HENT: Negative for ear discharge, ear pain, hearing loss and nosebleeds.    Eyes: Negative for blurred vision, double vision, pain and discharge.   Respiratory: Negative for cough and shortness of breath.    Cardiovascular: Negative for chest pain, palpitations, orthopnea, claudication, leg swelling and PND.   Gastrointestinal: Negative for nausea, vomiting, abdominal pain, blood in stool and melena.   Genitourinary: Negative for dysuria and hematuria.   Musculoskeletal: Negative for myalgias, joint pain and falls.   Skin: Negative for itching and rash.   Neurological: Negative for dizziness, sensory change, speech change, loss of consciousness and headaches.   Endo/Heme/Allergies: Negative for environmental allergies. Does not bruise/bleed easily.   Psychiatric/Behavioral: Negative for depression, suicidal ideas and hallucinations.        Objective:   /70 mmHg  Pulse 70  Ht 1.473 m (4' 9.99\")  Wt 44.453 kg (98 lb)  BMI 20.49 kg/m2  SpO2 94%    Physical Exam   Constitutional: She is oriented to person, place, and time. She appears well-developed and well-nourished.   HENT:   Head: " Normocephalic and atraumatic.   Eyes: EOM are normal.   Neck: No JVD present.   Cardiovascular: Normal rate, regular rhythm, normal heart sounds and intact distal pulses.  Exam reveals no gallop and no friction rub.    No murmur heard.  Pulmonary/Chest: No respiratory distress. She has no wheezes. She has no rales. She exhibits no tenderness.   Abdominal: She exhibits no distension. There is no tenderness. There is no rebound and no guarding.   Musculoskeletal: She exhibits no edema or tenderness.   Lymphadenopathy:     She has no cervical adenopathy.   Neurological: She is alert and oriented to person, place, and time.   Skin: Skin is dry.   Psychiatric: She has a normal mood and affect.   Nursing note and vitals reviewed.      Assessment:     1. Dyslipidemia         Medical Decision Making:  Today's Assessment / Status / Plan:     At this time patient is clinically stable in terms of her cardiac standpoint.  Blood pressure is well controlled.  Cont current medications at current dose.     I will see patient back in clinic with lab tests and studies results in 12 months.    I thank you Dr. Hilario for referring patient to our Cardiology Clinic today.

## 2017-05-15 ASSESSMENT — ENCOUNTER SYMPTOMS
FALLS: 0
CHILLS: 0
HALLUCINATIONS: 0
COUGH: 0
BLURRED VISION: 0
PND: 0
SPEECH CHANGE: 0
DEPRESSION: 0
SHORTNESS OF BREATH: 0
EYE DISCHARGE: 0
VOMITING: 0
CLAUDICATION: 0
ABDOMINAL PAIN: 0
HEADACHES: 0
DIZZINESS: 0
WEIGHT LOSS: 0
PALPITATIONS: 0
DOUBLE VISION: 0
EYE PAIN: 0
SENSORY CHANGE: 0
BLOOD IN STOOL: 0
MYALGIAS: 0
LOSS OF CONSCIOUSNESS: 0
BRUISES/BLEEDS EASILY: 0
ORTHOPNEA: 0
FEVER: 0
NAUSEA: 0

## 2017-05-16 ENCOUNTER — TELEPHONE (OUTPATIENT)
Dept: CARDIOLOGY | Facility: MEDICAL CENTER | Age: 72
End: 2017-05-16

## 2017-05-16 NOTE — TELEPHONE ENCOUNTER
Patient scheduled for SANGEETA on 5-23-17 at [facility] with Dr. Hood at 2:00. SARAH Hood.

## 2017-05-18 ENCOUNTER — APPOINTMENT (OUTPATIENT)
Dept: ADMISSIONS | Facility: MEDICAL CENTER | Age: 72
End: 2017-05-18
Attending: INTERNAL MEDICINE
Payer: MEDICARE

## 2017-05-18 RX ORDER — ASPIRIN 325 MG
324 TABLET ORAL
COMMUNITY
End: 2022-04-06

## 2017-05-23 ENCOUNTER — HOSPITAL ENCOUNTER (OUTPATIENT)
Facility: MEDICAL CENTER | Age: 72
End: 2017-05-23
Attending: INTERNAL MEDICINE | Admitting: INTERNAL MEDICINE
Payer: MEDICARE

## 2017-05-23 VITALS
RESPIRATION RATE: 20 BRPM | SYSTOLIC BLOOD PRESSURE: 159 MMHG | WEIGHT: 93.25 LBS | TEMPERATURE: 97.4 F | HEART RATE: 65 BPM | OXYGEN SATURATION: 97 % | DIASTOLIC BLOOD PRESSURE: 70 MMHG | BODY MASS INDEX: 19.58 KG/M2 | HEIGHT: 58 IN

## 2017-05-23 PROBLEM — R93.1 ABNORMAL ECHOCARDIOGRAM: Status: ACTIVE | Noted: 2017-05-23

## 2017-05-23 PROCEDURE — 700111 HCHG RX REV CODE 636 W/ 250 OVERRIDE (IP)

## 2017-05-23 PROCEDURE — 93325 DOPPLER ECHO COLOR FLOW MAPG: CPT

## 2017-05-23 PROCEDURE — 700101 HCHG RX REV CODE 250

## 2017-05-23 PROCEDURE — 93312 ECHO TRANSESOPHAGEAL: CPT

## 2017-05-23 PROCEDURE — 160002 HCHG RECOVERY MINUTES (STAT)

## 2017-05-23 ASSESSMENT — PAIN SCALES - GENERAL
PAINLEVEL_OUTOF10: 0

## 2017-05-23 NOTE — PROGRESS NOTES
Report received and Pt admitted to [facility] s/p SANGEETA. Pt attached to all leads and monitors. VSS with no complaints of pain or nausea. Fluids and food offered. Orders reviewed.

## 2017-05-23 NOTE — PROGRESS NOTES
Pt. Meets discharge criteria. Pt. And responsible adult given discharge instructions. Pt. And responsible adult educated and all questions answered. Signed copy on chart. All lines and drains DC'd. Pt. Belongings with Pt and Pt. Transported via wheel chair to car with escort.

## 2017-05-23 NOTE — DISCHARGE INSTRUCTIONS
ACTIVITY: Rest and take it easy for the first 24 hours.  A responsible adult is recommended to remain with you during that time.  It is normal to feel sleepy.  We encourage you to not do anything that requires balance, judgment or coordination.    MILD FLU-LIKE SYMPTOMS ARE NORMAL. YOU MAY EXPERIENCE GENERALIZED MUSCLE ACHES, THROAT IRRITATION, HEADACHE AND/OR SOME NAUSEA.    FOR 24 HOURS DO NOT:  Drive, operate machinery or run household appliances.  Drink beer or alcoholic beverages.   Make important decisions or sign legal documents.    SPECIAL INSTRUCTIONS: Refer to SANGEETA information.     DIET: To avoid nausea, slowly advance diet as tolerated, avoiding spicy or greasy foods for the first day.  Add more substantial food to your diet according to your physician's instructions.  Babies can be fed formula or breast milk as soon as they are hungry.  INCREASE FLUIDS AND FIBER TO AVOID CONSTIPATION.    FOLLOW-UP APPOINTMENT:  A follow-up appointment should be arranged with your doctor; call to schedule.    You should CALL YOUR PHYSICIAN if you develop:  Fever greater than 101 degrees F.  Pain not relieved by medication, or persistent nausea or vomiting.  Excessive bleeding (blood soaking through dressing) or unexpected drainage from the wound.  Extreme redness or swelling around the incision site, drainage of pus or foul smelling drainage.  Inability to urinate or empty your bladder within 8 hours.  Problems with breathing or chest pain.    You should call 911 if you develop problems with breathing or chest pain.  If you are unable to contact your doctor or surgical center, you should go to the nearest emergency room or urgent care center.  Physician's telephone #: [phone]    If any questions arise, call your doctor.  If your doctor is not available, please feel free to call the Surgical Center at [phone].  The Center is open Monday through Friday from 7AM to 7PM.  You can also call the HEALTH HOTLINE open 13  hours/day, 7 days/week and speak to a nurse at [phone], or toll free at [phone].    A registered nurse may call you a few days after your surgery to see how you are doing after your procedure.    MEDICATIONS: Resume taking daily medication.  Take prescribed pain medication with food.  If no medication is prescribed, you may take non-aspirin pain medication if needed.  PAIN MEDICATION CAN BE VERY CONSTIPATING.  Take a stool softener or laxative such as senokot, pericolace, or milk of magnesia if needed.    If your physician has prescribed pain medication that includes Acetaminophen (Tylenol), do not take additional Acetaminophen (Tylenol) while taking the prescribed medication.    Depression / Suicide Risk    As you are discharged from this [facility] facility, it is important to learn how to keep safe from harming yourself.    Recognize the warning signs:  · Abrupt changes in personality, positive or negative- including increase in energy   · Giving away possessions  · Change in eating patterns- significant weight changes-  positive or negative  · Change in sleeping patterns- unable to sleep or sleeping all the time   · Unwillingness or inability to communicate  · Depression  · Unusual sadness, discouragement and loneliness  · Talk of wanting to die  · Neglect of personal appearance   · Rebelliousness- reckless behavior  · Withdrawal from people/activities they love  · Confusion- inability to concentrate     If you or a loved one observes any of these behaviors or has concerns about self-harm, here's what you can do:  · Talk about it- your feelings and reasons for harming yourself  · Remove any means that you might use to hurt yourself (examples: pills, rope, extension cords, firearm)  · Get professional help from the community (Mental Health, Substance Abuse, psychological counseling)  · Do not be alone:Call your Safe Contact- someone whom you trust who will be there for you.  · Call your local  CRISIS HOTLINE 717-9276 or 678-012-0169  · Call your local Children's Mobile Crisis Response Team [place] (282) 728-1291 or www.edo  · Call the toll free National Suicide Prevention Hotlines   · National Suicide Prevention Lifeline 381-911-POOC (1963)  · [place] [employer] [facility] 800-SUICIDE (170-1078)    Transesophageal Echocardiogram  Transesophageal echocardiography (SANGEETA) is a picture test of your heart using sound waves. The pictures taken can give very detailed pictures of your heart. This can help your doctor see if there are problems with your heart. SANGEETA can check:  · If your heart has blood clots in it.  · How well your heart valves are working.  · If you have an infection on the inside of your heart.  · Some of the major arteries of your heart.  · If your heart valve is working after a repair.  · Your heart before a procedure that uses a shock to your heart to get the rhythm back to normal.  BEFORE THE PROCEDURE  · Do not eat or drink for 6 hours before the procedure or as told by your doctor.  · Make plans to have someone drive you home after the procedure. Do not drive yourself home.  · An IV tube will be put in your arm.  PROCEDURE  · You will be given a medicine to help you relax (sedative). It will be given through the IV tube.  · A numbing medicine will be sprayed or gargled in the back of your throat to help numb it.  · The tip of the probe is placed into the back of your mouth. You will be asked to swallow. This helps to pass the probe into your esophagus.  · Once the tip of the probe is in the right place, your doctor can take pictures of your heart.  · You may feel pressure at the back of your throat.  AFTER THE PROCEDURE  · You will be taken to a recovery area so the sedative can wear off.  · Your throat may be sore and scratchy. This will go away slowly over time.  · You will go home when you are fully awake and able to swallow liquids.  · You should have someone stay with  you for the next 24 hours.  · Do not drive or operate machinery for the next 24 hours.     This information is not intended to replace advice given to you by your health care provider. Make sure you discuss any questions you have with your health care provider.     Document Released: 10/15/2010 Document Revised: 12/23/2014 Document Reviewed: 06/19/2014  [employer] Interactive Patient Education ©2016 [employer]

## 2017-05-23 NOTE — IP AVS SNAPSHOT
5/23/2017    Olga Argueta [address]  [place] [address]    Dear Olga:    [facility] wants to ensure your discharge home is safe and you or your loved ones have had all of your questions answered regarding your care after you leave the hospital.    Below is a list of resources and contact information should you have any questions regarding your hospital stay, follow-up instructions, or active medical symptoms.    Questions or Concerns Regarding… Contact   Medical Questions Related to Your Discharge  (7 days a week, 8am-5pm) Contact a Nurse Care Coordinator   [phone]   Medical Questions Not Related to Your Discharge  (24 hours a day / 7 days a week)  Contact the Nurse Health Line   [phone]    Medications or Discharge Instructions Refer to your discharge packet   or contact your [facility] Primary Care Provider   [phone]   Follow-up Appointment(s) Schedule your appointment via [employer]   or contact Scheduling [phone]   Billing Review your statement via [employer]  or contact Billing [phone]   Medical Records Review your records via [employer]   or contact Medical Records [phone]     You may receive a telephone call within two days of discharge. This call is to make certain you understand your discharge instructions and have the opportunity to have any questions answered. You can also easily access your medical information, test results and upcoming appointments via the [employer] free online health management tool. You can learn more and sign up at [employer]. For assistance setting up your [employer] account, please call [phone].    Once again, we want to ensure your discharge home is safe and that you have a clear understanding of any next steps in your care. If you have any questions or concerns, please do not hesitate to contact us, we are here for you. Thank you for choosing [facility] for your healthcare needs.    Sincerely,    Your [facility] Healthcare Team

## 2017-05-23 NOTE — IP AVS SNAPSHOT
" Home Care Instructions                                                                                                                Name:Olga Rich  Medical Record Number:[id number]  CSN: [id number]    YOB: 1945   Age: 72 y.o.  Sex: female  HT:1.473 m (4' 10\") WT: 42.3 kg (93 lb 4.1 oz)          Admit Date: 5/23/2017     Discharge Date:   Today's Date: 5/23/2017  Attending Doctor:  Bruno Hood M.D.                  Allergies:  Demerol                Discharge Instructions         ACTIVITY: Rest and take it easy for the first 24 hours.  A responsible adult is recommended to remain with you during that time.  It is normal to feel sleepy.  We encourage you to not do anything that requires balance, judgment or coordination.    MILD FLU-LIKE SYMPTOMS ARE NORMAL. YOU MAY EXPERIENCE GENERALIZED MUSCLE ACHES, THROAT IRRITATION, HEADACHE AND/OR SOME NAUSEA.    FOR 24 HOURS DO NOT:  Drive, operate machinery or run household appliances.  Drink beer or alcoholic beverages.   Make important decisions or sign legal documents.    SPECIAL INSTRUCTIONS: Refer to SANGEETA information.     DIET: To avoid nausea, slowly advance diet as tolerated, avoiding spicy or greasy foods for the first day.  Add more substantial food to your diet according to your physician's instructions.  Babies can be fed formula or breast milk as soon as they are hungry.  INCREASE FLUIDS AND FIBER TO AVOID CONSTIPATION.    FOLLOW-UP APPOINTMENT:  A follow-up appointment should be arranged with your doctor; call to schedule.    You should CALL YOUR PHYSICIAN if you develop:  Fever greater than 101 degrees F.  Pain not relieved by medication, or persistent nausea or vomiting.  Excessive bleeding (blood soaking through dressing) or unexpected drainage from the wound.  Extreme redness or swelling around the incision site, drainage of pus or foul smelling drainage.  Inability to urinate or empty your bladder within 8 hours.  Problems with " breathing or chest pain.    You should call 911 if you develop problems with breathing or chest pain.  If you are unable to contact your doctor or surgical center, you should go to the nearest emergency room or urgent care center.  Physician's telephone #: [phone]    If any questions arise, call your doctor.  If your doctor is not available, please feel free to call the Surgical Center at [phone].  The Center is open Monday through Friday from 7AM to 7PM.  You can also call the HEALTH HOTLINE open 24 hours/day, 7 days/week and speak to a nurse at [phone], or toll free at [phone].    A registered nurse may call you a few days after your surgery to see how you are doing after your procedure.    MEDICATIONS: Resume taking daily medication.  Take prescribed pain medication with food.  If no medication is prescribed, you may take non-aspirin pain medication if needed.  PAIN MEDICATION CAN BE VERY CONSTIPATING.  Take a stool softener or laxative such as senokot, pericolace, or milk of magnesia if needed.    If your physician has prescribed pain medication that includes Acetaminophen (Tylenol), do not take additional Acetaminophen (Tylenol) while taking the prescribed medication.    Depression / Suicide Risk    As you are discharged from this [facility] facility, it is important to learn how to keep safe from harming yourself.    Recognize the warning signs:  · Abrupt changes in personality, positive or negative- including increase in energy   · Giving away possessions  · Change in eating patterns- significant weight changes-  positive or negative  · Change in sleeping patterns- unable to sleep or sleeping all the time   · Unwillingness or inability to communicate  · Depression  · Unusual sadness, discouragement and loneliness  · Talk of wanting to die  · Neglect of personal appearance   · Rebelliousness- reckless behavior  · Withdrawal from people/activities they love  · Confusion- inability to  concentrate     If you or a loved one observes any of these behaviors or has concerns about self-harm, here's what you can do:  · Talk about it- your feelings and reasons for harming yourself  · Remove any means that you might use to hurt yourself (examples: pills, rope, extension cords, firearm)  · Get professional help from the community (Mental Health, Substance Abuse, psychological counseling)  · Do not be alone:Call your Safe Contact- someone whom you trust who will be there for you.  · Call your local CRISIS HOTLINE 171-5108 or 634-995-3566  · Call your local Children's Mobile Crisis Response Team [place] (656) 188-9420 or www.[employer] [place]  · Call the toll free National Suicide Prevention Hotlines   · National Suicide Prevention Lifeline 360-640-SPSF (2703)  · Protection [employer] Line Network 800-SUICIDE (184-0432)    Transesophageal Echocardiogram  Transesophageal echocardiography (SANGEETA) is a picture test of your heart using sound waves. The pictures taken can give very detailed pictures of your heart. This can help your doctor see if there are problems with your heart. SANGEETA can check:  · If your heart has blood clots in it.  · How well your heart valves are working.  · If you have an infection on the inside of your heart.  · Some of the major arteries of your heart.  · If your heart valve is working after a repair.  · Your heart before a procedure that uses a shock to your heart to get the rhythm back to normal.  BEFORE THE PROCEDURE  · Do not eat or drink for 6 hours before the procedure or as told by your doctor.  · Make plans to have someone drive you home after the procedure. Do not drive yourself home.  · An IV tube will be put in your arm.  PROCEDURE  · You will be given a medicine to help you relax (sedative). It will be given through the IV tube.  · A numbing medicine will be sprayed or gargled in the back of your throat to help numb it.  · The tip of the probe is placed into the back of your  mouth. You will be asked to swallow. This helps to pass the probe into your esophagus.  · Once the tip of the probe is in the right place, your doctor can take pictures of your heart.  · You may feel pressure at the back of your throat.  AFTER THE PROCEDURE  · You will be taken to a recovery area so the sedative can wear off.  · Your throat may be sore and scratchy. This will go away slowly over time.  · You will go home when you are fully awake and able to swallow liquids.  · You should have someone stay with you for the next 24 hours.  · Do not drive or operate machinery for the next 24 hours.     This information is not intended to replace advice given to you by your health care provider. Make sure you discuss any questions you have with your health care provider.     Document Released: 10/15/2010 Document Revised: 12/23/2014 Document Reviewed: 06/19/2014  [employer] Interactive Patient Education ©2016 [employer]         Medication List      ASK your doctor about these medications        Instructions    Morning Afternoon Evening Bedtime    aspirin 325 MG Tabs   Commonly known as:  ASA        Take 650 mg by mouth at bedtime as needed.   Dose:  650 mg                        atorvastatin 40 MG Tabs   Commonly known as:  LIPITOR        Take 1 Tab by mouth every day.   Dose:  40 mg                        calcium carbonate 500 MG Tabs   Commonly known as:  OS-DARIEN 500        Take 1,250 mg by mouth every day.   Dose:  1250 mg                        CHOLEST OFF PO        Take  by mouth.                        fluticasone-salmeterol 250-50 MCG/DOSE Aepb   Commonly known as:  ADVAIR        Inhale 1 Puff by mouth every 12 hours.   Dose:  1 Puff                        levothyroxine 75 MCG Tabs   Commonly known as:  SYNTHROID        TAKE ONE TABLET BY MOUTH ONE TIME DAILY                        lisinopril 5 MG Tabs   Commonly known as:  PRINIVIL        Take 1 Tab by mouth every day.   Dose:  5 mg                         magnesium oxide 400 MG Tabs   Commonly known as:  MAG-OX        Take 400 mg by mouth every day.   Dose:  400 mg                        metoprolol SR 25 MG Tb24   Commonly known as:  TOPROL XL        Take 1 Tab by mouth every day.   Dose:  25 mg                        montelukast 10 MG Tabs   Commonly known as:  SINGULAIR        Take 1 Tab by mouth every day.   Dose:  10 mg                        multivitamin Tabs        Take 1 Tab by mouth every day.   Dose:  1 Tab                        temazepam 15 MG Caps   Commonly known as:  RESTORIL        Take 2 Caps by mouth at bedtime as needed.   Dose:  30 mg                        Vitamin D 2000 UNITS Caps        Take  by mouth.                                Medication Information     Above and/or attached are the medications your physician expects you to take upon discharge. Review all of your home medications and newly ordered medications with your doctor and/or pharmacist. Follow medication instructions as directed by your doctor and/or pharmacist. Please keep your medication list with you and share with your physician. Update the information when medications are discontinued, doses are changed, or new medications (including over-the-counter products) are added; and carry medication information at all times in the event of emergency situations.        Resources     Quit Smoking / Tobacco Use:    I understand the use of any tobacco products increases my chance of suffering from future heart disease or stroke and could cause other illnesses which may shorten my life. Quitting the use of tobacco products is the single most important thing I can do to improve my health. For further information on smoking / tobacco cessation call a Toll Free Quit Line at 1-608.275.2687 (*National Cancer [place]) or 1-480.230.5371 (American Lung Association) or you can access the web based program at www.lungusa.org.    [place] Tobacco Users Help Line:  (795) 467-7543       Toll Free:  1-584-634-2844  Quit Tobacco Program [facility] (596)804-9329    Crisis Hotline:    National Crisis Hotline:  8-763-AQERSKX or 1-888.109.9440    [place] Crisis Hotline:    1-811.983.8242 or 291-421-7243    Discharge Survey:   Thank you for choosing [facility]. We hope we did everything we could to make your hospital stay a pleasant one. You may be receiving a survey and we would appreciate your time and participation in answering the questions. Your input is very valuable to us in our efforts to improve our service to our patients and their families.            Signatures     My signature on this form indicates that:    1. I acknowledge receipt and understanding of these Home Care Instruction.  2. My questions regarding this information have been answered to my satisfaction.  3. I have formulated a plan with my discharge nurse to obtain my prescribed medications for home.    __________________________________      __________________________________                   Patient Signature                                 Guardian/Responsible Adult Signature      __________________________________                 __________       ________                       Nurse Signature                                               Date                 Time

## 2017-06-16 ENCOUNTER — OFFICE VISIT (OUTPATIENT)
Dept: MEDICAL GROUP | Facility: CLINIC | Age: 72
End: 2017-06-16
Payer: MEDICARE

## 2017-06-16 VITALS
HEIGHT: 58 IN | TEMPERATURE: 97.9 F | OXYGEN SATURATION: 95 % | HEART RATE: 73 BPM | RESPIRATION RATE: 18 BRPM | DIASTOLIC BLOOD PRESSURE: 52 MMHG | WEIGHT: 94.2 LBS | BODY MASS INDEX: 19.77 KG/M2 | SYSTOLIC BLOOD PRESSURE: 96 MMHG

## 2017-06-16 DIAGNOSIS — E03.9 HYPOTHYROIDISM, UNSPECIFIED TYPE: ICD-10-CM

## 2017-06-16 DIAGNOSIS — Z12.39 SCREENING FOR BREAST CANCER: ICD-10-CM

## 2017-06-16 DIAGNOSIS — Z01.89 VISIT FOR LABORATORY TEST: ICD-10-CM

## 2017-06-16 PROCEDURE — 99212 OFFICE O/P EST SF 10 MIN: CPT | Performed by: INTERNAL MEDICINE

## 2017-06-16 RX ORDER — LEVOTHYROXINE SODIUM 0.07 MG/1
TABLET ORAL
Qty: 30 TAB | Refills: 11 | Status: SHIPPED | OUTPATIENT
Start: 2017-06-16 | End: 2017-12-15 | Stop reason: SDUPTHER

## 2017-06-16 NOTE — MR AVS SNAPSHOT
"Olga Abernathyer   2017 10:20 AM   Office Visit   MRN: [id number]    Department:  [facility]   Dept Phone:  [phone]    Description:  Female : 1945   Provider:  Abdon Hilario D.O.           Reason for Visit     Follow-Up Imaging and labwork     Medication Refill levothyroxine       Allergies as of 2017     Allergen Noted Reactions    Demerol 2014   Anaphylaxis      You were diagnosed with     Screening for breast cancer   [[id number]]         Vital Signs     Blood Pressure Pulse Temperature Respirations Height Weight    96/52 mmHg 73 36.6 °C (97.9 °F) 18 1.473 m (4' 9.99\") 42.729 kg (94 lb 3.2 oz)    Body Mass Index Oxygen Saturation Breastfeeding? Smoking Status          19.69 kg/m2 95% No Former Smoker        Basic Information     Date Of Birth Sex Race Ethnicity Preferred Language    1945 Female White Non- English      Your appointments     Jul 10, 2017  9:20 AM   PREVIOUS PATIENT with PRUDENCE Bailey   [facility] (--)    [address], [address]  [place] [address] [phone]            2017  1:30 PM   Est Injection with INFUSION QUICK INJECT   [facility] ([facility])    [address]  [place] [address] [phone]            Aug 21, 2017  9:20 AM   Established Patient Pul with PRUDENCE Foster   [facility] (--)    [address]  [address]  [place] [address] [phone]            Sep 18, 2017  9:20 AM   Established Patient with Abdon Hilario D.O.   [facility])    [address]  [place] [id number] [phone]           You will be receiving a confirmation call a few days before your appointment from our automated call confirmation system.            2018 10:45 AM   Follow Up Visit with Rahel Thompson M.D.   [facility] (--)    [address]  [place] " [address]   [phone]           You will be receiving a confirmation call a few days before your appointment from our automated call confirmation system.              Problem List              ICD-10-CM Priority Class Noted - Resolved    Screening for breast cancer Z12.39   3/14/2014 - Present    Environmental allergies Z91.09   3/14/2014 - Present    Hypothyroid E03.9   4/18/2014 - Present    Osteoporosis M81.0   7/18/2014 - Present    Asthma J45.909   7/18/2014 - Present    Dyslipidemia E78.5   4/26/2016 - Present    Insomnia G47.00   8/19/2016 - Present    Abnormal echocardiogram R93.1   5/23/2017 - Present      Health Maintenance        Date Due Completion Dates    MAMMOGRAM 4/14/2017 4/14/2016, 7/11/2014    COLONOSCOPY 4/18/2019 4/18/2009 (Done)    Override on 4/18/2009: Done ([facility] consult)    BONE DENSITY 7/21/2021 7/21/2016, 7/11/2014    IMM DTaP/Tdap/Td Vaccine (2 - Td) 3/1/2027 3/1/2017            Current Immunizations     13-VALENT PCV PREVNAR 3/21/2016 10:58 AM    Influenza Vaccine Adult HD 12/16/2016, 11/2/2015  9:31 AM    Pneumococcal polysaccharide vaccine (PPSV-23) 4/1/2015    SHINGLES VACCINE 3/1/2017    Tdap Vaccine 3/1/2017  4:35 PM      Below and/or attached are the medications your provider expects you to take. Review all of your home medications and newly ordered medications with your provider and/or pharmacist. Follow medication instructions as directed by your provider and/or pharmacist. Please keep your medication list with you and share with your provider. Update the information when medications are discontinued, doses are changed, or new medications (including over-the-counter products) are added; and carry medication information at all times in the event of emergency situations     Allergies:  DEMEROL - Anaphylaxis               Medications  Valid as of: June 16, 2017 - 10:37 AM    Generic Name Brand Name Tablet Size Instructions for use    Aspirin (Tab)  MG Take 650  mg by mouth at bedtime as needed.        Atorvastatin Calcium (Tab) LIPITOR 40 MG Take 1 Tab by mouth every day.        Calcium Carbonate (Tab) OS-DARIEN 500 1250 (500 CA) MG Take 1,250 mg by mouth every day.        Cholecalciferol (Cap) Vitamin D 2000 UNITS Take  by mouth.        Fluticasone-Salmeterol (AEROSOL POWDER, BREATH ACTIVATED) ADVAIR 250-50 MCG/DOSE Inhale 1 Puff by mouth every 12 hours.        Levothyroxine Sodium (Tab) SYNTHROID 75 MCG TAKE ONE TABLET BY MOUTH ONE TIME DAILY        Lisinopril (Tab) PRINIVIL 5 MG Take 1 Tab by mouth every day.        Magnesium Oxide (Tab) MAG- MG Take 400 mg by mouth every day.        Metoprolol Succinate (TABLET SR 24 HR) TOPROL XL 25 MG Take 1 Tab by mouth every day.        Montelukast Sodium (Tab) SINGULAIR 10 MG Take 1 Tab by mouth every day.        Multiple Vitamin (Tab) THERAGRAN  Take 1 Tab by mouth every day.        Plant Sterols and Stanols   Take  by mouth.        Temazepam (Cap) RESTORIL 15 MG Take 2 Caps by mouth at bedtime as needed.        .                 Medicines prescribed today were sent to:     [id number] - [place], [place] - [address] [address]    Phone: [phone] Fax: [phone]    Open 24 Hours?: No      Medication refill instructions:       If your prescription bottle indicates you have medication refills left, it is not necessary to call your provider’s office. Please contact your pharmacy and they will refill your medication.    If your prescription bottle indicates you do not have any refills left, you may request refills at any time through one of the following ways: The online [employer] system (except Urgent Care), by calling your provider’s office, or by asking your pharmacy to contact your provider’s office with a refill request. Medication refills are processed only during regular business hours and may not be available until the next business day. Your provider may request additional  information or to have a follow-up visit with you prior to refilling your medication.   *Please Note: Medication refills are assigned a new Rx number when refilled electronically. Your pharmacy may indicate that no refills were authorized even though a new prescription for the same medication is available at the pharmacy. Please request the medicine by name with the pharmacy before contacting your provider for a refill.        Other Notes About Your Plan     This patient has an appointment on 03/21/2016. There are no new queries.           SynGen Access Code: [email or web address]  Expires: 7/14/2017  4:15 AM    SynGen  A secure, online tool to manage your health information     [employer]’s SynGen® is a secure, online tool that connects you to your personalized health information from the privacy of your home -- day or night - making it very easy for you to manage your healthcare. Once the activation process is completed, you can even access your medical information using the SynGen eleanor, which is available for free in the Apple Eleanor store or Google Play store.     SynGen provides the following levels of access (as shown below):   My Chart Features   [facility] Primary [facility] Doctor [facility]  Specialists [facility]  Urgent  Care Non-[facility]  Primary Care  Doctor   Email your healthcare team securely and privately 24/7 X X X    Manage appointments: schedule your next appointment; view details of past/upcoming appointments X      Request prescription refills. X      View recent personal medical records, including lab and immunizations X X X X   View health record, including health history, allergies, medications X X X X   Read reports about your outpatient visits, procedures, consult and ER notes X X X X   See your discharge summary, which is a recap of your hospital and/or ER visit that includes your diagnosis, lab results, and care plan. X X       How to register for SynGen:  1. Go to  [email or web address].  2. Click  on the Sign Up Now box, which takes you to the New Member Sign Up page. You will need to provide the following information:  a. Enter your [employer] Access Code exactly as it appears at the top of this page. (You will not need to use this code after you’ve completed the sign-up process. If you do not sign up before the expiration date, you must request a new code.)   b. Enter your date of birth.   c. Enter your home email address.   d. Click Submit, and follow the next screen’s instructions.  3. Create a Independa ID. This will be your Independa login ID and cannot be changed, so think of one that is secure and easy to remember.  4. Create a Independa password. You can change your password at any time.  5. Enter your Password Reset Question and Answer. This can be used at a later time if you forget your password.   6. Enter your e-mail address. This allows you to receive e-mail notifications when new information is available in Independa.  7. Click Sign Up. You can now view your health information.    For assistance activating your Independa account, call [phone]

## 2017-06-17 NOTE — PROGRESS NOTES
CC: Olga Rich is a 72 y.o. female is suffering from   Chief Complaint   Patient presents with   • Follow-Up     Imaging and labwork    • Medication Refill     levothyroxine          SUBJECTIVE:  1. Visit for laboratory test  Olga is here for follow-up, we've discussed her lab work which has been done to date.     2. Screening for breast cancer  Patient's in need of screening for breast cancer orders written    3. Hypothyroidism, unspecified type  Patient with a history of hypothyroidism medication was rewritten        Past social, family, history:   Social History   Substance Use Topics   • Smoking status: Former Smoker -- 0.25 packs/day for 40 years     Types: Cigarettes     Quit date: 03/01/2014   • Smokeless tobacco: Never Used   • Alcohol Use: No      Comment: very rarely         MEDICATIONS:    Current outpatient prescriptions:   •  aspirin (ASA) 325 MG Tab, Take 650 mg by mouth at bedtime as needed., Disp: , Rfl:   •  atorvastatin (LIPITOR) 40 MG Tab, Take 1 Tab by mouth every day., Disp: 90 Tab, Rfl: 3  •  metoprolol SR (TOPROL XL) 25 MG TABLET SR 24 HR, Take 1 Tab by mouth every day., Disp: 90 Tab, Rfl: 3  •  lisinopril (PRINIVIL) 5 MG Tab, Take 1 Tab by mouth every day., Disp: 90 Tab, Rfl: 3  •  fluticasone-salmeterol (ADVAIR) 250-50 MCG/DOSE AEROSOL POWDER, BREATH ACTIVATED, Inhale 1 Puff by mouth every 12 hours., Disp: 1 Inhaler, Rfl: 11  •  montelukast (SINGULAIR) 10 MG Tab, Take 1 Tab by mouth every day., Disp: 30 Tab, Rfl: 11  •  temazepam (RESTORIL) 15 MG Cap, Take 2 Caps by mouth at bedtime as needed. (Patient taking differently: Take 15 mg by mouth at bedtime as needed.), Disp: 60 Cap, Rfl: 7  •  magnesium oxide (MAG-OX) 400 MG Tab, Take 400 mg by mouth every day., Disp: , Rfl:   •  levothyroxine (SYNTHROID) 75 MCG Tab, TAKE ONE TABLET BY MOUTH ONE TIME DAILY, Disp: 30 Tab, Rfl: 11  •  Cholecalciferol (VITAMIN D) 2000 UNITS CAPS, Take  by mouth., Disp: , Rfl:   •  calcium  "carbonate (OS-DARIEN 500) 1250 MG TABS, Take 1,250 mg by mouth every day., Disp: , Rfl:   •  multivitamin (THERAGRAN) TABS, Take 1 Tab by mouth every day., Disp: , Rfl:   •  Plant Sterols and Stanols (CHOLEST OFF PO), Take  by mouth., Disp: , Rfl:     PROBLEMS:  Patient Active Problem List    Diagnosis Date Noted   • Abnormal echocardiogram 05/23/2017   • Insomnia 08/19/2016   • Dyslipidemia 04/26/2016   • Osteoporosis 07/18/2014   • Asthma 07/18/2014   • Hypothyroid 04/18/2014   • Screening for breast cancer 03/14/2014   • Environmental allergies 03/14/2014       REVIEW OF SYSTEMS:  Gen.:  No Nausea, Vomiting, fever, Chills.  Heart: No chest pain.  Lungs:  No shortness of Breath.  Psychological: Vinay unusual Anxiety depression     PHYSICAL EXAM   Constitutional: Alert, cooperative, not in acute distress.  Cardiovascular:  Rate Rhythm is regular without murmurs rubs clicks.     Thorax & Lungs: Clear to auscultation, no wheezing, rhonchi, or rales  HENT: Normocephalic, Atraumatic.  Eyes: PERRLA, EOMI, Conjunctiva normal.   Neck: Trachia is midline no swelling of the thyroid.   Neurologic: Alert & oriented x 3, cranial nerves II through XII are intact, Normal motor function, Normal sensory function, No focal deficits noted.   Psychiatric: Affect normal, Judgment normal, Mood normal.     VITAL SIGNS:BP 96/52 mmHg  Pulse 73  Temp(Src) 36.6 °C (97.9 °F)  Resp 18  Ht 1.473 m (4' 9.99\")  Wt 42.729 kg (94 lb 3.2 oz)  BMI 19.69 kg/m2  SpO2 95%  Breastfeeding? No    Labs: Reviewed    Assessment:                                                     Plan:  1. Visit for laboratory test  We have reviewed today her laboratory tests which included an echocardiogram and SANGEETA which do show a small aortic valve, patient is asymptomatic is 4 feet 10 inches, my suspicion is that were using standardized values to assess for heart valve size which may be related to her small size and not relying on Doppler values to assess for true " aortic stenosis. Patient is being followed by cardiology    2. Screening for breast cancer  Patient need of screening for breast cancer orders written  - MA-SCREEN MAMMO W/CAD-BILAT    3. Hypothyroidism, unspecified type  Patient with a history of hypothyroidism medication refilled

## 2017-07-10 ENCOUNTER — HOSPITAL ENCOUNTER (OUTPATIENT)
Dept: LAB | Facility: MEDICAL CENTER | Age: 72
End: 2017-07-10
Attending: INTERNAL MEDICINE
Payer: MEDICARE

## 2017-07-10 DIAGNOSIS — M81.0 OSTEOPOROSIS: ICD-10-CM

## 2017-07-10 DIAGNOSIS — E03.9 HYPOTHYROIDISM, UNSPECIFIED TYPE: ICD-10-CM

## 2017-07-10 LAB
25(OH)D3 SERPL-MCNC: 65 NG/ML (ref 30–100)
ALBUMIN SERPL BCP-MCNC: 4.3 G/DL (ref 3.2–4.9)
ALBUMIN/GLOB SERPL: 1.4 G/DL
ALP SERPL-CCNC: 66 U/L (ref 30–99)
ALT SERPL-CCNC: 19 U/L (ref 2–50)
ANION GAP SERPL CALC-SCNC: 8 MMOL/L (ref 0–11.9)
AST SERPL-CCNC: 21 U/L (ref 12–45)
BILIRUB SERPL-MCNC: 0.6 MG/DL (ref 0.1–1.5)
BUN SERPL-MCNC: 17 MG/DL (ref 8–22)
CALCIUM SERPL-MCNC: 9.7 MG/DL (ref 8.5–10.5)
CHLORIDE SERPL-SCNC: 106 MMOL/L (ref 96–112)
CO2 SERPL-SCNC: 26 MMOL/L (ref 20–33)
CREAT SERPL-MCNC: 0.82 MG/DL (ref 0.5–1.4)
GFR SERPL CREATININE-BSD FRML MDRD: >60 ML/MIN/1.73 M 2
GLOBULIN SER CALC-MCNC: 3.1 G/DL (ref 1.9–3.5)
GLUCOSE SERPL-MCNC: 84 MG/DL (ref 65–99)
POTASSIUM SERPL-SCNC: 4.4 MMOL/L (ref 3.6–5.5)
PROT SERPL-MCNC: 7.4 G/DL (ref 6–8.2)
SODIUM SERPL-SCNC: 140 MMOL/L (ref 135–145)

## 2017-07-10 PROCEDURE — 82306 VITAMIN D 25 HYDROXY: CPT

## 2017-07-10 PROCEDURE — 36415 COLL VENOUS BLD VENIPUNCTURE: CPT

## 2017-07-10 PROCEDURE — 80053 COMPREHEN METABOLIC PANEL: CPT

## 2017-07-18 ENCOUNTER — OFFICE VISIT (OUTPATIENT)
Dept: CARDIOLOGY | Facility: MEDICAL CENTER | Age: 72
End: 2017-07-18
Payer: MEDICARE

## 2017-07-18 VITALS
SYSTOLIC BLOOD PRESSURE: 118 MMHG | OXYGEN SATURATION: 95 % | HEIGHT: 57 IN | HEART RATE: 72 BPM | WEIGHT: 95 LBS | DIASTOLIC BLOOD PRESSURE: 68 MMHG | BODY MASS INDEX: 20.49 KG/M2

## 2017-07-18 DIAGNOSIS — E78.5 DYSLIPIDEMIA: ICD-10-CM

## 2017-07-18 DIAGNOSIS — I25.10 ATHEROSCLEROSIS OF NATIVE CORONARY ARTERY OF NATIVE HEART WITHOUT ANGINA PECTORIS: ICD-10-CM

## 2017-07-18 DIAGNOSIS — I35.0 AORTIC VALVE STENOSIS, MODERATE: ICD-10-CM

## 2017-07-18 PROCEDURE — 99214 OFFICE O/P EST MOD 30 MIN: CPT | Performed by: NURSE PRACTITIONER

## 2017-07-18 ASSESSMENT — ENCOUNTER SYMPTOMS
CLAUDICATION: 0
COUGH: 0
PALPITATIONS: 0
ORTHOPNEA: 0
PND: 0
ABDOMINAL PAIN: 0
FEVER: 0
SHORTNESS OF BREATH: 0
DIZZINESS: 0
MYALGIAS: 0

## 2017-07-18 NOTE — PROGRESS NOTES
Subjective:   Olga Rich is a 72 y.o. female who presents today for follow up after testing    Patient of Dr. Hood. Patient was last seen 5/12/17. Pt has Moderate to severe Aortic stenosis without symptoms. Pt also has a coronary calcium score testing which pt has high risk CAD. Patient feels well, denies chest pain, shortness of breath, palpitations, dizziness/lightheadedness, orthopnea, PND, syncope, presyncope or Edema. Pt has been taking Toprol XL, lisinopril and Atorvastatin without problems.    Pt is able to verbalize understanding to report symptoms to the office if present.     Additonally, patient has the following medical problems:    -CAD and DLD: last LDL was 97 on 4/7/17. Atovastatin was increased to 40 mg nightly.    -Hypothyroid: on levothyroxine    -Arthritis and osteoporosis: followed by Dr. Thompson, taking Prolia, Calcium and Vit D and Aspirin for pain    -Asthma: On Advair and Singulair  Past Medical History   Diagnosis Date   • Osteoporosis, unspecified    • Hypothyroid    • Asthma in adult    • Hypothyroid 4/18/2014   • Hypothyroid 4/18/2014   • Dyslipidemia 4/26/2016   • Hypertension    • High cholesterol    • Pneumonia 2009   • Cataract 2/2012     Bilat    • Arthritis      fingers   • Dental disorder      partial upper     Past Surgical History   Procedure Laterality Date   • Cataract phaco with iol       Dr. Vargas   • Femur orif       titamium Dr. Mares.      Family History   Problem Relation Age of Onset   • Other Mother 97     old age.    • Heart Attack Father 56     MI   • Heart Disease Father    • Other Son      ETOH   • Other Son      ETOH   • Heart Disease Sister 53   • Heart Attack Sister 85   • No Known Problems Brother    • No Known Problems Sister    • No Known Problems Sister      History   Smoking status   • Former Smoker -- 0.25 packs/day for 40 years   • Types: Cigarettes   • Quit date: 03/01/2014   Smokeless tobacco   • Never Used     Allergies   Allergen  "Reactions   • Demerol Anaphylaxis     Outpatient Encounter Prescriptions as of 7/18/2017   Medication Sig Dispense Refill   • Denosumab (PROLIA SC) Inject 1 Dose as instructed every 6 months.     • levothyroxine (SYNTHROID) 75 MCG Tab TAKE ONE TABLET BY MOUTH ONE TIME DAILY 30 Tab 11   • aspirin (ASA) 325 MG Tab Take 650 mg by mouth at bedtime as needed.     • atorvastatin (LIPITOR) 40 MG Tab Take 1 Tab by mouth every day. 90 Tab 3   • metoprolol SR (TOPROL XL) 25 MG TABLET SR 24 HR Take 1 Tab by mouth every day. 90 Tab 3   • lisinopril (PRINIVIL) 5 MG Tab Take 1 Tab by mouth every day. 90 Tab 3   • fluticasone-salmeterol (ADVAIR) 250-50 MCG/DOSE AEROSOL POWDER, BREATH ACTIVATED Inhale 1 Puff by mouth every 12 hours. 1 Inhaler 11   • montelukast (SINGULAIR) 10 MG Tab Take 1 Tab by mouth every day. 30 Tab 11   • magnesium oxide (MAG-OX) 400 MG Tab Take 400 mg by mouth every day.     • Cholecalciferol (VITAMIN D) 2000 UNITS CAPS Take  by mouth.     • calcium carbonate (OS-DARIEN 500) 1250 MG TABS Take 1,250 mg by mouth every day.     • multivitamin (THERAGRAN) TABS Take 1 Tab by mouth every day.     • [DISCONTINUED] Plant Sterols and Stanols (CHOLEST OFF PO) Take  by mouth.     • temazepam (RESTORIL) 15 MG Cap Take 2 Caps by mouth at bedtime as needed. (Patient taking differently: Take 15 mg by mouth at bedtime as needed.) 60 Cap 7     No facility-administered encounter medications on file as of 7/18/2017.     Review of Systems   Constitutional: Negative for fever and malaise/fatigue.   Respiratory: Negative for cough and shortness of breath.    Cardiovascular: Negative for chest pain, palpitations, orthopnea, claudication, leg swelling and PND.   Gastrointestinal: Negative for abdominal pain.   Musculoskeletal: Negative for myalgias.   Neurological: Negative for dizziness.   All other systems reviewed and are negative.       Objective:   /68 mmHg  Pulse 72  Ht 1.448 m (4' 9.01\")  Wt 43.092 kg (95 lb)  BMI " 20.55 kg/m2  SpO2 95%    Physical Exam   Constitutional: She is oriented to person, place, and time. She appears well-developed and well-nourished.   HENT:   Head: Normocephalic and atraumatic.   Eyes: EOM are normal.   Neck: Normal range of motion. Neck supple. No JVD present.   Cardiovascular: Normal rate, regular rhythm and intact distal pulses.    Murmur heard.   Systolic murmur is present with a grade of 2/6   Pulmonary/Chest: Effort normal and breath sounds normal. No respiratory distress. She has no wheezes. She has no rales.   Abdominal: Soft. Bowel sounds are normal.   Musculoskeletal: Normal range of motion. She exhibits no edema.   Neurological: She is alert and oriented to person, place, and time.   Skin: Skin is warm and dry.   Psychiatric: She has a normal mood and affect.   Nursing note and vitals reviewed.        Lab Results   Component Value Date/Time    CHOLESTEROL, 04/07/2017 10:12 AM    LDL 97 04/07/2017 10:12 AM    HDL 75 04/07/2017 10:12 AM    TRIGLYCERIDES 114 04/07/2017 10:12 AM       Lab Results   Component Value Date/Time    SODIUM 140 07/10/2017 09:46 AM    POTASSIUM 4.4 07/10/2017 09:46 AM    CHLORIDE 106 07/10/2017 09:46 AM    CO2 26 07/10/2017 09:46 AM    GLUCOSE 84 07/10/2017 09:46 AM    BUN 17 07/10/2017 09:46 AM    CREATININE 0.82 07/10/2017 09:46 AM     Lab Results   Component Value Date/Time    ALKALINE PHOSPHATASE 66 07/10/2017 09:46 AM    AST(SGOT) 21 07/10/2017 09:46 AM    ALT(SGPT) 19 07/10/2017 09:46 AM    TOTAL BILIRUBIN 0.6 07/10/2017 09:46 AM      CT-Cardiac Scoring 1/20/17  FINDINGS:  Coronary calcification:  LMA - 179  LCX - 214  LAD - 233  RCA - 9  PDA - 14    Calcium score:  648    1.  There is extensive atherosclerotic plaque burden.    2.  There is a high likelihood (greater than 90%) of at least one significant coronary stenosis.    3.  The patient's cardiovascular risk is high.    4.  Aggressive risk factor reduction is strongly suggested.    5.  Exercise or  pharmacologic nuclear medicine stress testing is strongly suggested to evaluate for inducible ischemia.    6. Small pericardial effusion    Calcium score is worse than the 90th percentile for the patient's age and sex.      Transthoracic Echo Report 5/3/17  1. Suspect low gradient, severe AS.  Visually the aortic valve appears   severely stenotic with low gradients.  Recommend dobutamine stress echo   if clinical suspicious.     2. Left ventricular ejection fraction is visually estimated to be 65%.   Normal regional wall motion. Grade I diastolic dysfunction.    3.  Estimated right ventricular systolic pressure  is 30 mmHg.      Transesophageal Echo Report 5/23/17  There is evidence of restricted motion of the aortic leaflets. Based on   planimetry, there is is evidence of moderate to severe aortic stenosis   with valve area traced out from 0.7 to 1.7 cm2. Of note, there are   certainly limitations intrinsic to the study of SANGEETA and clinical   correlation has to be taken into consideration.      Assessment:     1. Dyslipidemia     2. Atherosclerosis of native coronary artery of native heart without angina pectoris     3. Aortic valve stenosis, moderate         Medical Decision Making:  Today's Assessment / Status / Plan:   1. DLD: Last LDL was 97 from 4/7/17.  -Continue Atorvastatin 40 mg daily  -Repeat Lipid panel in few months    2. Atherosclerosis of coronary arteries:  -Continue atorvastatin    3. Aortic Stenosis, Moderate to severe, asymptomatic:  -Continue to monitor symptoms  -RTC if present, s/sx reviewed with patient. She is able to verbalize understanding    FU in clinic in 10 months with Dr. Hood. Sooner if needed.    Patient verbalizes understanding and agrees with the plan of care.     Collaborating MD: Ronald Thorne MD

## 2017-07-18 NOTE — MR AVS SNAPSHOT
"        Olga Rich   2017 9:20 AM   Office Visit   MRN: [id number]    Department:  [facility]   Dept Phone:  [phone]    Description:  Female : 1945   Provider:  PRUDENCE Bailey           Reason for Visit     Follow-Up PP of TT       Allergies as of 2017     Allergen Noted Reactions    Demerol 2014   Anaphylaxis      You were diagnosed with     Dyslipidemia   [985644]       Atherosclerosis of native coronary artery of native heart without angina pectoris   [2908700]         Vital Signs     Blood Pressure Pulse Height Weight Body Mass Index Oxygen Saturation    118/68 mmHg 72 1.448 m (4' 9.01\") 43.092 kg (95 lb) 20.55 kg/m2 95%    Smoking Status                   Former Smoker           Basic Information     Date Of Birth Sex Race Ethnicity Preferred Language    1945 Female White Non- English      Your appointments     2017  1:30 PM   Est Injection with INFUSION QUICK INJECT   [facility] ([facility])    [address] [facility] [address]  [place] [id number] [phone]            Aug 21, 2017  9:20 AM   Established Patient Pul with PRUDENCE Foster   [facility] (--)    [address]  [address]  [place] [id number] [phone]            Sep 18, 2017  9:20 AM   Established Patient with Abdon Hilario D.O.   [facility] ([facility])    [address] [facility] [address]  [place] [id number] [phone]           You will be receiving a confirmation call a few days before your appointment from our automated call confirmation system.            2018 10:45 AM   Follow Up Visit with Rahel Thompson M.D.   [facility] (--)    [address]  [place] [id number] [phone]           You will be receiving a confirmation call a few days before your appointment from our automated call confirmation system.              Problem List              ICD-10-CM Priority Class Noted - " Resolved    Screening for breast cancer Z12.39   3/14/2014 - Present    Environmental allergies Z91.09   3/14/2014 - Present    Hypothyroid E03.9   4/18/2014 - Present    Osteoporosis M81.0   7/18/2014 - Present    Asthma J45.909   7/18/2014 - Present    Dyslipidemia E78.5   4/26/2016 - Present    Insomnia G47.00   8/19/2016 - Present    Abnormal echocardiogram R93.1   5/23/2017 - Present      Health Maintenance        Date Due Completion Dates    MAMMOGRAM 4/14/2017 4/14/2016, 7/11/2014    IMM INFLUENZA (1) 9/1/2017 12/16/2016, 11/2/2015    COLONOSCOPY 4/18/2019 4/18/2009 (Done)    Override on 4/18/2009: Done ([facility])    BONE DENSITY 7/21/2021 7/21/2016, 7/11/2014    IMM DTaP/Tdap/Td Vaccine (2 - Td) 3/1/2027 3/1/2017            Current Immunizations     13-VALENT PCV PREVNAR 3/21/2016 10:58 AM    Influenza Vaccine Adult HD 12/16/2016, 11/2/2015  9:31 AM    Pneumococcal polysaccharide vaccine (PPSV-23) 4/1/2015    SHINGLES VACCINE 3/1/2017    Tdap Vaccine 3/1/2017  4:35 PM      Below and/or attached are the medications your provider expects you to take. Review all of your home medications and newly ordered medications with your provider and/or pharmacist. Follow medication instructions as directed by your provider and/or pharmacist. Please keep your medication list with you and share with your provider. Update the information when medications are discontinued, doses are changed, or new medications (including over-the-counter products) are added; and carry medication information at all times in the event of emergency situations     Allergies:  DEMEROL - Anaphylaxis               Medications  Valid as of: July 18, 2017 -  9:49 AM    Generic Name Brand Name Tablet Size Instructions for use    Aspirin (Tab)  MG Take 650 mg by mouth at bedtime as needed.        Atorvastatin Calcium (Tab) LIPITOR 40 MG Take 1 Tab by mouth every day.        Calcium Carbonate (Tab) OS-DARIEN 500 1250 (500 CA) MG Take  1,250 mg by mouth every day.        Cholecalciferol (Cap) Vitamin D 2000 UNITS Take  by mouth.        Denosumab   Inject 1 Dose as instructed every 6 months.        Fluticasone-Salmeterol (AEROSOL POWDER, BREATH ACTIVATED) ADVAIR 250-50 MCG/DOSE Inhale 1 Puff by mouth every 12 hours.        Levothyroxine Sodium (Tab) SYNTHROID 75 MCG TAKE ONE TABLET BY MOUTH ONE TIME DAILY        Lisinopril (Tab) PRINIVIL 5 MG Take 1 Tab by mouth every day.        Magnesium Oxide (Tab) MAG- MG Take 400 mg by mouth every day.        Metoprolol Succinate (TABLET SR 24 HR) TOPROL XL 25 MG Take 1 Tab by mouth every day.        Montelukast Sodium (Tab) SINGULAIR 10 MG Take 1 Tab by mouth every day.        Multiple Vitamin (Tab) THERAGRAN  Take 1 Tab by mouth every day.        Temazepam (Cap) RESTORIL 15 MG Take 2 Caps by mouth at bedtime as needed.        .                 Medicines prescribed today were sent to:     [id number] - [place], [address] [address]    Phone: [phone] Fax: [phone]    Open 24 Hours?: No      Medication refill instructions:       If your prescription bottle indicates you have medication refills left, it is not necessary to call your provider’s office. Please contact your pharmacy and they will refill your medication.    If your prescription bottle indicates you do not have any refills left, you may request refills at any time through one of the following ways: The online [employer]acts innovations system (except Urgent Care), by calling your provider’s office, or by asking your pharmacy to contact your provider’s office with a refill request. Medication refills are processed only during regular business hours and may not be available until the next business day. Your provider may request additional information or to have a follow-up visit with you prior to refilling your medication.   *Please Note: Medication refills are assigned a new Rx number when refilled  electronically. Your pharmacy may indicate that no refills were authorized even though a new prescription for the same medication is available at the pharmacy. Please request the medicine by name with the pharmacy before contacting your provider for a refill.        Other Notes About Your Plan     This patient has an appointment on 03/21/2016. There are no new queries.           GreenDot Trans Access Code: [email or web address]  Expires: 8/17/2017  9:48 AM    GreenDot Trans  A secure, online tool to manage your health information     [employer]’s GreenDot Trans® is a secure, online tool that connects you to your personalized health information from the privacy of your home -- day or night - making it very easy for you to manage your healthcare. Once the activation process is completed, you can even access your medical information using the GreenDot Trans eleanor, which is available for free in the Apple Eleanor store or Google Play store.     GreenDot Trans provides the following levels of access (as shown below):   My Chart Features   [facility] Primary Care Doctor [facility]  Specialists [facility]  Urgent  Care Non-[facility]  Primary Care  Doctor   Email your healthcare team securely and privately 24/7 X X X    Manage appointments: schedule your next appointment; view details of past/upcoming appointments X      Request prescription refills. X      View recent personal medical records, including lab and immunizations X X X X   View health record, including health history, allergies, medications X X X X   Read reports about your outpatient visits, procedures, consult and ER notes X X X X   See your discharge summary, which is a recap of your hospital and/or ER visit that includes your diagnosis, lab results, and care plan. X X       How to register for GreenDot Trans:  1. Go to  [email or web address].  2. Click on the Sign Up Now box, which takes you to the New Member Sign Up page. You will need to provide the following information:  a. Enter your GreenDot Trans Access Code  exactly as it appears at the top of this page. (You will not need to use this code after you’ve completed the sign-up process. If you do not sign up before the expiration date, you must request a new code.)   b. Enter your date of birth.   c. Enter your home email address.   d. Click Submit, and follow the next screen’s instructions.  3. Create a Pruffi ID. This will be your Pruffi login ID and cannot be changed, so think of one that is secure and easy to remember.  4. Create a Pruffi password. You can change your password at any time.  5. Enter your Password Reset Question and Answer. This can be used at a later time if you forget your password.   6. Enter your e-mail address. This allows you to receive e-mail notifications when new information is available in Pruffi.  7. Click Sign Up. You can now view your health information.    For assistance activating your Pruffi account, call [phone]

## 2017-07-20 ENCOUNTER — OUTPATIENT INFUSION SERVICES (OUTPATIENT)
Dept: ONCOLOGY | Facility: MEDICAL CENTER | Age: 72
End: 2017-07-20
Payer: MEDICARE

## 2017-07-20 VITALS
BODY MASS INDEX: 20.04 KG/M2 | HEIGHT: 58 IN | HEART RATE: 70 BPM | DIASTOLIC BLOOD PRESSURE: 68 MMHG | SYSTOLIC BLOOD PRESSURE: 141 MMHG | RESPIRATION RATE: 18 BRPM | OXYGEN SATURATION: 95 % | WEIGHT: 95.46 LBS | TEMPERATURE: 97 F

## 2017-07-20 PROCEDURE — 700111 HCHG RX REV CODE 636 W/ 250 OVERRIDE (IP): Performed by: INTERNAL MEDICINE

## 2017-07-20 PROCEDURE — 96401 CHEMO ANTI-NEOPL SQ/IM: CPT

## 2017-07-20 RX ADMIN — DENOSUMAB 60 MG: 60 INJECTION SUBCUTANEOUS at 13:55

## 2017-07-20 ASSESSMENT — PAIN SCALES - GENERAL: PAINLEVEL: NO PAIN

## 2017-07-20 NOTE — PROGRESS NOTES
Pt arrived to [facility], ambulatory, for Prolia injection. Pt denies any recent or upcoming dental surgeries. Labs reviewed from 7/10, pt within parameters to treat today. Prolia injected into the L-upper arm with no s/sx of adverse reaction. Pt left [facility] with no s/sx of distress. Follow up appointment scheduled and confirmed.

## 2017-07-20 NOTE — PROGRESS NOTES
Pharmacy Prolia Note  Labs 7/10/17  Calcium = 9.7  Cr = 0.82 est CrCl~ 41 ml/min  Last Prolia dose = 1/19/17  Ritika Edgar, LeleD

## 2017-08-14 VITALS
BODY MASS INDEX: 20.57 KG/M2 | WEIGHT: 98 LBS | SYSTOLIC BLOOD PRESSURE: 102 MMHG | HEART RATE: 97 BPM | RESPIRATION RATE: 15 BRPM | DIASTOLIC BLOOD PRESSURE: 54 MMHG | TEMPERATURE: 97.7 F | HEIGHT: 58 IN

## 2017-08-18 ENCOUNTER — OFFICE VISIT (OUTPATIENT)
Dept: PULMONOLOGY | Facility: HOSPICE | Age: 72
End: 2017-08-18
Payer: MEDICARE

## 2017-08-18 VITALS
HEIGHT: 58 IN | BODY MASS INDEX: 19.73 KG/M2 | SYSTOLIC BLOOD PRESSURE: 110 MMHG | WEIGHT: 94 LBS | DIASTOLIC BLOOD PRESSURE: 70 MMHG | RESPIRATION RATE: 16 BRPM | TEMPERATURE: 97.5 F | HEART RATE: 71 BPM | OXYGEN SATURATION: 92 %

## 2017-08-18 DIAGNOSIS — G47.00 INSOMNIA, UNSPECIFIED TYPE: ICD-10-CM

## 2017-08-18 DIAGNOSIS — J44.9 CHRONIC OBSTRUCTIVE PULMONARY DISEASE, UNSPECIFIED COPD TYPE (HCC): ICD-10-CM

## 2017-08-18 DIAGNOSIS — R06.02 SOB (SHORTNESS OF BREATH): ICD-10-CM

## 2017-08-18 PROCEDURE — 99213 OFFICE O/P EST LOW 20 MIN: CPT | Performed by: NURSE PRACTITIONER

## 2017-08-18 RX ORDER — TEMAZEPAM 15 MG/1
15 CAPSULE ORAL NIGHTLY PRN
Qty: 30 CAP | Refills: 4 | Status: SHIPPED | OUTPATIENT
Start: 2017-08-18 | End: 2018-07-30 | Stop reason: SDUPTHER

## 2017-08-18 NOTE — PATIENT INSTRUCTIONS
1) Continue Advair 250/50 daily. Dulera sample as substitute provided  2) Continue Singulair at night  3) Vaccines: Up to date with Prevnar 13 and Pneumococcal 23  4) Continue Restoril 15mg nightly as needed. 1/2 pill encouraged  5) Return in about 1 year (around 8/18/2018) for review of symptoms, if not sooner, follow up with [facility]ATEL[facility] Cleaning.

## 2017-08-18 NOTE — MR AVS SNAPSHOT
"        Olga Rich   2017 9:40 AM   Office Visit   MRN: [id number]    Department:  [facility]   Dept Phone:  [phone]    Description:  Female : 1945   Provider:  PRUDENCE Foster           Reason for Visit     Asthma Last seen 16      Allergies as of 2017     Allergen Noted Reactions    Demerol 2014   Anaphylaxis      You were diagnosed with     Insomnia, unspecified type   [6577782]       SOB (shortness of breath)   [820459]         Vital Signs     Blood Pressure Pulse Temperature Respirations Height Weight    110/70 mmHg 71 36.4 °C (97.5 °F) 16 1.473 m (4' 9.99\") 42.638 kg (94 lb)    Body Mass Index Oxygen Saturation Smoking Status             19.65 kg/m2 92% Former Smoker         Basic Information     Date Of Birth Sex Race Ethnicity Preferred Language    1945 Female White Non- English      Your appointments     Sep 18, 2017  9:20 AM   Established Patient with Abdon Hilario D.O.   [address]  [facility] [id number] [phone]           You will be receiving a confirmation call a few days before your appointment from our automated call confirmation system.            2018 10:45 AM   Follow Up Visit with Rahel Thompson M.D.   [facility] (--)    [address], [address]  [place] [id number] [phone]           You will be receiving a confirmation call a few days before your appointment from our automated call confirmation system.            2018  1:30 PM   Est Injection with INFUSION QUICK INJECT   [facility] ([facility])    [address] [facility] [address]  [place] [id number] [phone]              Problem List              ICD-10-CM Priority Class Noted - Resolved    Screening for breast cancer Z12.39   3/14/2014 - Present    Environmental allergies Z91.09   3/14/2014 - Present    Hypothyroid E03.9   2014 - Present    Osteoporosis M81.0   " 7/18/2014 - Present    Asthma J45.909   7/18/2014 - Present    Dyslipidemia E78.5   4/26/2016 - Present    Insomnia G47.00   8/19/2016 - Present    Abnormal echocardiogram R93.1   5/23/2017 - Present    Aortic valve stenosis, moderate I35.0   7/18/2017 - Present      Health Maintenance        Date Due Completion Dates    MAMMOGRAM 4/14/2017 4/14/2016, 7/11/2014    IMM INFLUENZA (1) 9/1/2017 12/16/2016, 11/2/2015    COLONOSCOPY 4/18/2019 4/18/2009 (Done)    Override on 4/18/2009: Done ([facility] GI consult)    BONE DENSITY 7/21/2021 7/21/2016, 7/11/2014    IMM DTaP/Tdap/Td Vaccine (2 - Td) 3/1/2027 3/1/2017            Current Immunizations     13-VALENT PCV PREVNAR 3/21/2016 10:58 AM    Influenza Vaccine Adult HD 12/16/2016, 11/2/2015  9:31 AM    Pneumococcal polysaccharide vaccine (PPSV-23) 4/1/2015    SHINGLES VACCINE 3/1/2017    Tdap Vaccine 3/1/2017  4:35 PM      Below and/or attached are the medications your provider expects you to take. Review all of your home medications and newly ordered medications with your provider and/or pharmacist. Follow medication instructions as directed by your provider and/or pharmacist. Please keep your medication list with you and share with your provider. Update the information when medications are discontinued, doses are changed, or new medications (including over-the-counter products) are added; and carry medication information at all times in the event of emergency situations     Allergies:  DEMEROL - Anaphylaxis               Medications  Valid as of: August 18, 2017 - 10:21 AM    Generic Name Brand Name Tablet Size Instructions for use    Aspirin (Tab)  MG Take 650 mg by mouth at bedtime as needed.        Atorvastatin Calcium (Tab) LIPITOR 40 MG Take 1 Tab by mouth every day.        Calcium Carbonate (Tab) OS-DARIEN 500 1250 (500 Ca) MG Take 1,250 mg by mouth every day.        Cholecalciferol (Cap) Vitamin D 2000 units Take  by mouth.        Denosumab   Inject 1 Dose  as instructed every 6 months. Indications: twice a year        Fluticasone-Salmeterol (AEROSOL POWDER, BREATH ACTIVATED) ADVAIR 250-50 MCG/DOSE Inhale 1 Puff by mouth every 12 hours.        Levothyroxine Sodium (Tab) SYNTHROID 75 MCG TAKE ONE TABLET BY MOUTH ONE TIME DAILY        Lisinopril (Tab) PRINIVIL 5 MG Take 1 Tab by mouth every day.        Magnesium Oxide (Tab) MAG- MG Take 400 mg by mouth every day.        Metoprolol Succinate (TABLET SR 24 HR) TOPROL XL 25 MG Take 1 Tab by mouth every day.        Montelukast Sodium (Tab) SINGULAIR 10 MG Take 1 Tab by mouth every day.        Multiple Vitamin (Tab) THERAGRAN  Take 1 Tab by mouth every day.        Temazepam (Cap) RESTORIL 15 MG Take 1 Cap by mouth at bedtime as needed for Sleep.        .                 Medicines prescribed today were sent to:     [id number] - [address] [address]    Phone: [phone] Fax: [phone]    Open 24 Hours?: No    [facility] - [address]    [address] [place] [address]    Phone: [phone] Fax: [phone]    Open 24 Hours?: No      Medication refill instructions:       If your prescription bottle indicates you have medication refills left, it is not necessary to call your provider’s office. Please contact your pharmacy and they will refill your medication.    If your prescription bottle indicates you do not have any refills left, you may request refills at any time through one of the following ways: The online [employer] system (except Urgent Care), by calling your provider’s office, or by asking your pharmacy to contact your provider’s office with a refill request. Medication refills are processed only during regular business hours and may not be available until the next business day. Your provider may request additional information or to have a follow-up visit with you prior to refilling your medication.   *Please Note: Medication  refills are assigned a new Rx number when refilled electronically. Your pharmacy may indicate that no refills were authorized even though a new prescription for the same medication is available at the pharmacy. Please request the medicine by name with the pharmacy before contacting your provider for a refill.        Instructions    1) Continue Advair 250/50   2) Continue Singulair at night  3) Vaccines: Up to date with Prevnar 13 and Pneumococcal 23  4) Continue Restoril 15mg nightly as needed. 1/2 pill encouraged  5) Return in about 1 year (around 8/18/2018) for review of symptoms, if not sooner, follow up with KATELIN Cleaning.           Other Notes About Your Plan     This patient has an appointment on 03/21/2016. There are no new queries.           MyChart Status: Patient Declined

## 2017-08-18 NOTE — PROGRESS NOTES
CC:  Here for f/u pulmonary issues as listed below    HPI:   Ms. Rich comes in today for her one-year followup of her COPD and asthma. Her last PFTs in 2007 indicate FVC 79% predicted FEV1 55% predicted FEV1/FVC C 57% predicted DLCO 99% without a bronchodilator response. She wishes not to repeat PFTs again. She is compliant taking Advair discus 250-50 microgram per dose, 1 puff, twice daily only if she plans to attend events the following day for appx 1x/week. Rinsing mouth after each use. Singulair 10 mg tablets one tablet at bedtime, Restoril 15 mg caps one at bedtime when necessary for insomnia approximately 2-3 times per week. She has a hx of severe insomnia for 48-72 hours and thus Restoril is beneficial. She has been doing well this past year without exacerbation, respiratory infection or hospital visit. She occasionally has sinus pain with seasonal allergies but denies any current symptoms. Denies SOB or dyspnea, wheezing, chest pain or pressure, fever or chills. She reports she has 2 birds a cockatiel and cockatoo that give her allergies, in her home over 20 years. She uses a mask to clean cage. She has tried over-the-counter allergy medications in the past without improvement.       Patient Active Problem List    Diagnosis Date Noted   • SOB (shortness of breath) 08/18/2017   • Chronic obstructive pulmonary disease (CMS-[facility]) 08/18/2017   • Aortic valve stenosis, moderate 07/18/2017   • Abnormal echocardiogram 05/23/2017   • Insomnia 08/19/2016   • Dyslipidemia 04/26/2016   • Osteoporosis 07/18/2014   • Asthma 07/18/2014   • Hypothyroid 04/18/2014   • Screening for breast cancer 03/14/2014   • Environmental allergies 03/14/2014       Past Medical History   Diagnosis Date   • Osteoporosis, unspecified    • Hypothyroid    • Asthma in adult    • Hypothyroid 4/18/2014   • Hypothyroid 4/18/2014   • Dyslipidemia 4/26/2016   • Hypertension    • High cholesterol    • Pneumonia 2009   • Cataract 2/2012     Bilat     • Arthritis      fingers   • Dental disorder      partial upper   • Allergic rhinitis        Past Surgical History   Procedure Laterality Date   • Cataract phaco with iol       Dr. Vargas   • Femur orif       titamium Dr. Mares.        Family History   Problem Relation Age of Onset   • Other Mother 97     old age.    • Heart Attack Father 56     MI   • Heart Disease Father    • Other Son      ETOH   • Other Son      ETOH   • Heart Disease Sister 53   • Heart Attack Sister 85   • No Known Problems Brother    • No Known Problems Sister    • No Known Problems Sister        Social History   Substance Use Topics   • Smoking status: Former Smoker -- 0.25 packs/day for 40 years     Types: Cigarettes     Quit date: 03/01/2014   • Smokeless tobacco: Never Used   • Alcohol Use: No      Comment: very rarely       Current Outpatient Prescriptions   Medication Sig Dispense Refill   • temazepam (RESTORIL) 15 MG Cap Take 1 Cap by mouth at bedtime as needed for Sleep. 30 Cap 4   • fluticasone-salmeterol (ADVAIR) 250-50 MCG/DOSE AEROSOL POWDER, BREATH ACTIVATED Inhale 1 Puff by mouth every 12 hours. 1 Inhaler 11   • Mometasone Furo-Formoterol Fum (DULERA) 100-5 MCG/ACT Aerosol Inhale 2 Puffs by mouth 2 Times a Day. Use spacer. Rinse mouth after use. 1 Inhaler 0   • Denosumab (PROLIA SC) Inject 1 Dose as instructed every 6 months. Indications: twice a year     • levothyroxine (SYNTHROID) 75 MCG Tab TAKE ONE TABLET BY MOUTH ONE TIME DAILY 30 Tab 11   • aspirin (ASA) 325 MG Tab Take 650 mg by mouth at bedtime as needed.     • atorvastatin (LIPITOR) 40 MG Tab Take 1 Tab by mouth every day. 90 Tab 3   • metoprolol SR (TOPROL XL) 25 MG TABLET SR 24 HR Take 1 Tab by mouth every day. 90 Tab 3   • lisinopril (PRINIVIL) 5 MG Tab Take 1 Tab by mouth every day. 90 Tab 3   • montelukast (SINGULAIR) 10 MG Tab Take 1 Tab by mouth every day. 30 Tab 11   • magnesium oxide (MAG-OX) 400 MG Tab Take 400 mg by mouth every day.     •  "Cholecalciferol (VITAMIN D) 2000 UNITS CAPS Take  by mouth.     • calcium carbonate (OS-DARIEN 500) 1250 MG TABS Take 1,250 mg by mouth every day.     • multivitamin (THERAGRAN) TABS Take 1 Tab by mouth every day.       No current facility-administered medications for this visit.          Allergies: Demerol          ROS   Gen: Denies fever, chills, unintentional weight loss, fatigue, night sweats  E/N/T: Denies nasal congestion, ear pain  Resp:Denies Dyspnea, wheezing, cough, sputum production, hemoptysis  CV: Denies chest pain, chest tightness, palpitations  Sleep:Denies morning headache  Neuro: Denies frequent headaches, weakness, dizziness  GI: Denies acid reflux, N/V  See HPI.  All other systems reviewed and negative          Vital signs for this encounter:  Filed Vitals:    08/18/17 0930   Height: 1.473 m (4' 9.99\")   Weight: 42.638 kg (94 lb)   Weight % change since last entry.: 0 %   BP: 110/70   Pulse: 71   BMI (Calculated): 19.65   Resp: 16   Temp: 36.4 °C (97.5 °F)   O2 sat % room air: 92 %                 Physical Exam:   Appearance: well developed, well nourished, no acute distress.   Eyes: PERRL, EOM intact, sclere white, conjunctiva moist.  Ears: no lesions or deformities.  Hearing: grossly intact.  Nose: no lesions or deformities.  Dentition: good dentition.   Oropharynx: tongue normal, posterior pharynx without erythema or exudate.  Neck: supple, trachea midline, no masses.  Respiratory effort: no intercostal retractions or use of accessory muscles.  Lung auscultation: Bilateral diminished   Heart auscultation: no murmur, rub, or gallop   Extremities: no cyanosis or edema.  Abdomen: soft, non-tender, no masses.  Gait and station: without difficulty   Digits and Nails: no clubbing, cyanosis, petechiae, or nodes.  Cranial nerves: grossly normal.  Motor: no focal deficits observed.   Skin: no rashes, lesions, or ulcers noted.  Orientation: oriented to time, place, and person.  Mood and affect: mood and " affect appropriate, normal interaction with examiner.      Assessment   1. Insomnia, unspecified type  temazepam (RESTORIL) 15 MG Cap    Mometasone Furo-Formoterol Fum (DULERA) 100-5 MCG/ACT Aerosol   2. SOB (shortness of breath)  fluticasone-salmeterol (ADVAIR) 250-50 MCG/DOSE AEROSOL POWDER, BREATH ACTIVATED    Mometasone Furo-Formoterol Fum (DULERA) 100-5 MCG/ACT Aerosol   3. Chronic obstructive pulmonary disease, unspecified COPD type (CMS-HCC)  Mometasone Furo-Formoterol Fum (DULERA) 100-5 MCG/ACT Aerosol       Patient was seen for 25 minutes, more than 50% of time spent in face to face review, counseling, and arranging future evaluation and follow up of medical conditions and care.     PLAN:     Patient Instructions   1) Continue Advair 250/50 daily. Dulera sample as substitute provided as requested  2) Continue Singulair at night  3) Vaccines: Up to date with Prevnar 13 and Pneumococcal 23  4) Continue Restoril 15mg nightly as needed. 1/2 pill encouraged  5) Return in about 1 year (around 8/18/2018) for review of symptoms, if not sooner, follow up with KATELIN Cleaning.

## 2017-08-21 ENCOUNTER — APPOINTMENT (OUTPATIENT)
Dept: PULMONOLOGY | Facility: HOSPICE | Age: 72
End: 2017-08-21
Payer: MEDICARE

## 2017-08-29 DIAGNOSIS — R06.02 SOB (SHORTNESS OF BREATH): ICD-10-CM

## 2017-08-30 RX ORDER — MONTELUKAST SODIUM 10 MG/1
TABLET ORAL
Qty: 30 TAB | Refills: 10 | Status: SHIPPED | OUTPATIENT
Start: 2017-08-30 | End: 2020-07-08 | Stop reason: SDUPTHER

## 2017-09-18 ENCOUNTER — OFFICE VISIT (OUTPATIENT)
Dept: MEDICAL GROUP | Facility: CLINIC | Age: 72
End: 2017-09-18
Payer: MEDICARE

## 2017-09-18 VITALS
RESPIRATION RATE: 18 BRPM | HEIGHT: 58 IN | TEMPERATURE: 97.7 F | WEIGHT: 94.1 LBS | BODY MASS INDEX: 19.75 KG/M2 | HEART RATE: 64 BPM | SYSTOLIC BLOOD PRESSURE: 117 MMHG | DIASTOLIC BLOOD PRESSURE: 65 MMHG | OXYGEN SATURATION: 95 %

## 2017-09-18 DIAGNOSIS — R06.02 SOB (SHORTNESS OF BREATH): ICD-10-CM

## 2017-09-18 DIAGNOSIS — Z23 NEED FOR IMMUNIZATION AGAINST INFLUENZA: ICD-10-CM

## 2017-09-18 PROCEDURE — 90662 IIV NO PRSV INCREASED AG IM: CPT | Performed by: INTERNAL MEDICINE

## 2017-09-18 PROCEDURE — 99213 OFFICE O/P EST LOW 20 MIN: CPT | Mod: 25 | Performed by: INTERNAL MEDICINE

## 2017-09-18 PROCEDURE — G0008 ADMIN INFLUENZA VIRUS VAC: HCPCS | Performed by: INTERNAL MEDICINE

## 2017-09-19 NOTE — PROGRESS NOTES
CC: Olga Rich is a 72 y.o. female is suffering from   Chief Complaint   Patient presents with   • Follow-Up         SUBJECTIVE:  1. SOB (shortness of breath)  Olga is here for follow-up, continues to struggle with her asthma, is doing well otherwise..     2. Need for immunization against influenza  Patient's Tessa that her influenza vaccination which was given today        Past social, family, history:   Social History   Substance Use Topics   • Smoking status: Former Smoker     Packs/day: 0.25     Years: 40.00     Types: Cigarettes     Quit date: 3/1/2014   • Smokeless tobacco: Never Used   • Alcohol use No      Comment: very rarely         MEDICATIONS:    Current Outpatient Prescriptions:   •  montelukast (SINGULAIR) 10 MG Tab, TAKE ONE TABLET BY MOUTH ONE TIME DAILY, Disp: 30 Tab, Rfl: 10  •  fluticasone-salmeterol (ADVAIR) 250-50 MCG/DOSE AEROSOL POWDER, BREATH ACTIVATED, Inhale 1 Puff by mouth every 12 hours., Disp: 1 Inhaler, Rfl: 11  •  levothyroxine (SYNTHROID) 75 MCG Tab, TAKE ONE TABLET BY MOUTH ONE TIME DAILY, Disp: 30 Tab, Rfl: 11  •  aspirin (ASA) 325 MG Tab, Take 650 mg by mouth at bedtime as needed., Disp: , Rfl:   •  atorvastatin (LIPITOR) 40 MG Tab, Take 1 Tab by mouth every day., Disp: 90 Tab, Rfl: 3  •  metoprolol SR (TOPROL XL) 25 MG TABLET SR 24 HR, Take 1 Tab by mouth every day., Disp: 90 Tab, Rfl: 3  •  lisinopril (PRINIVIL) 5 MG Tab, Take 1 Tab by mouth every day., Disp: 90 Tab, Rfl: 3  •  magnesium oxide (MAG-OX) 400 MG Tab, Take 400 mg by mouth every day., Disp: , Rfl:   •  Cholecalciferol (VITAMIN D) 2000 UNITS CAPS, Take  by mouth., Disp: , Rfl:   •  calcium carbonate (OS-DARIEN 500) 1250 MG TABS, Take 1,250 mg by mouth every day., Disp: , Rfl:   •  multivitamin (THERAGRAN) TABS, Take 1 Tab by mouth every day., Disp: , Rfl:   •  temazepam (RESTORIL) 15 MG Cap, Take 1 Cap by mouth at bedtime as needed for Sleep., Disp: 30 Cap, Rfl: 4  •  Mometasone Furo-Formoterol Fum  "(DULERA) 100-5 MCG/ACT Aerosol, Inhale 2 Puffs by mouth 2 Times a Day. Use spacer. Rinse mouth after use., Disp: 1 Inhaler, Rfl: 0  •  Denosumab (PROLIA SC), Inject 1 Dose as instructed every 6 months. Indications: twice a year, Disp: , Rfl:     PROBLEMS:  Patient Active Problem List    Diagnosis Date Noted   • SOB (shortness of breath) 08/18/2017   • Chronic obstructive pulmonary disease (CMS-HCC) 08/18/2017   • Aortic valve stenosis, moderate 07/18/2017   • Abnormal echocardiogram 05/23/2017   • Insomnia 08/19/2016   • Dyslipidemia 04/26/2016   • Osteoporosis 07/18/2014   • Asthma 07/18/2014   • Hypothyroid 04/18/2014   • Screening for breast cancer 03/14/2014   • Environmental allergies 03/14/2014       REVIEW OF SYSTEMS:  Gen.:  No Nausea, Vomiting, fever, Chills.  Heart: No chest pain.  Lungs:  No shortness of Breath.  Psychological: Vinay unusual Anxiety depression     PHYSICAL EXAM   Constitutional: Alert, cooperative, not in acute distress.  Cardiovascular:  Rate Rhythm is regular without murmurs rubs clicks.     Thorax & Lungs: Clear to auscultationPoor inspiratory expiratory excursion, no wheezing, rhonchi, or rales  HENT: Normocephalic, Atraumatic.  Eyes: PERRLA, EOMI, Conjunctiva normal.   Neck: Trachia is midline no swelling of the thyroid.   Lymphatic: No lymphadenopathy noted.   Neurologic: Alert & oriented x 3, cranial nerves II through XII are intact, Normal motor function, Normal sensory function, No focal deficits noted.   Psychiatric: Affect normal, Judgment normal, Mood normal.     VITAL SIGNS:/65   Pulse 64   Temp 36.5 °C (97.7 °F)   Resp 18   Ht 1.473 m (4' 10\")   Wt 42.7 kg (94 lb 1.6 oz)   SpO2 95%   Breastfeeding? No   BMI 19.67 kg/m²     Labs: Reviewed    Assessment:                                                     Plan:    1. SOB (shortness of breath)  Stable    2. Need for immunization against influenza  Vaccination given  - INFLUENZA VACCINE, HIGH DOSE (65+ " ONLY)

## 2017-10-05 ENCOUNTER — HOSPITAL ENCOUNTER (OUTPATIENT)
Dept: RADIOLOGY | Facility: MEDICAL CENTER | Age: 72
End: 2017-10-05
Attending: INTERNAL MEDICINE
Payer: MEDICARE

## 2017-10-05 PROCEDURE — G0202 SCR MAMMO BI INCL CAD: HCPCS

## 2017-11-11 NOTE — TELEPHONE ENCOUNTER
Caller Name: Olga Rich                 Call Back Number: [phone] (home)         Patient approves a detailed voicemail message: N\A    Have we ever prescribed this med? Yes.  If yes, what date? 8/19/16    Last OV: 8/18/17 with KATELIN Cleaning    Next OV: No pending appointment    DX: SOB (shortness of breath) (R06.02)    Medications: montelukast (SINGULAIR) 10 MG Tab    Current Outpatient Prescriptions   Medication Sig Dispense Refill   • temazepam (RESTORIL) 15 MG Cap Take 1 Cap by mouth at bedtime as needed for Sleep. 30 Cap 4   • fluticasone-salmeterol (ADVAIR) 250-50 MCG/DOSE AEROSOL POWDER, BREATH ACTIVATED Inhale 1 Puff by mouth every 12 hours. 1 Inhaler 11   • Mometasone Furo-Formoterol Fum (DULERA) 100-5 MCG/ACT Aerosol Inhale 2 Puffs by mouth 2 Times a Day. Use spacer. Rinse mouth after use. 1 Inhaler 0   • Denosumab (PROLIA SC) Inject 1 Dose as instructed every 6 months. Indications: twice a year     • levothyroxine (SYNTHROID) 75 MCG Tab TAKE ONE TABLET BY MOUTH ONE TIME DAILY 30 Tab 11   • aspirin (ASA) 325 MG Tab Take 650 mg by mouth at bedtime as needed.     • atorvastatin (LIPITOR) 40 MG Tab Take 1 Tab by mouth every day. 90 Tab 3   • metoprolol SR (TOPROL XL) 25 MG TABLET SR 24 HR Take 1 Tab by mouth every day. 90 Tab 3   • lisinopril (PRINIVIL) 5 MG Tab Take 1 Tab by mouth every day. 90 Tab 3   • montelukast (SINGULAIR) 10 MG Tab Take 1 Tab by mouth every day. 30 Tab 11   • magnesium oxide (MAG-OX) 400 MG Tab Take 400 mg by mouth every day.     • Cholecalciferol (VITAMIN D) 2000 UNITS CAPS Take  by mouth.     • calcium carbonate (OS-DARIEN 500) 1250 MG TABS Take 1,250 mg by mouth every day.     • multivitamin (THERAGRAN) TABS Take 1 Tab by mouth every day.       No current facility-administered medications for this visit.      
Due in a year. I informed the patient RX sent and due in a year   
DC instructions

## 2017-12-15 ENCOUNTER — HOSPITAL ENCOUNTER (OUTPATIENT)
Dept: LAB | Facility: MEDICAL CENTER | Age: 72
End: 2017-12-15
Attending: INTERNAL MEDICINE
Payer: MEDICARE

## 2017-12-15 ENCOUNTER — OFFICE VISIT (OUTPATIENT)
Dept: MEDICAL GROUP | Facility: CLINIC | Age: 72
End: 2017-12-15
Payer: MEDICARE

## 2017-12-15 VITALS
HEIGHT: 58 IN | TEMPERATURE: 97.5 F | BODY MASS INDEX: 20.05 KG/M2 | OXYGEN SATURATION: 98 % | DIASTOLIC BLOOD PRESSURE: 62 MMHG | HEART RATE: 70 BPM | RESPIRATION RATE: 16 BRPM | WEIGHT: 95.5 LBS | SYSTOLIC BLOOD PRESSURE: 112 MMHG

## 2017-12-15 DIAGNOSIS — E03.9 HYPOTHYROIDISM, UNSPECIFIED TYPE: ICD-10-CM

## 2017-12-15 DIAGNOSIS — I35.0 AORTIC VALVE STENOSIS, MODERATE: ICD-10-CM

## 2017-12-15 DIAGNOSIS — J45.909 PERSISTENT ASTHMA WITHOUT COMPLICATION, UNSPECIFIED ASTHMA SEVERITY: ICD-10-CM

## 2017-12-15 DIAGNOSIS — M15.9 GENERALIZED OA: ICD-10-CM

## 2017-12-15 LAB
T4 FREE SERPL-MCNC: 1.3 NG/DL (ref 0.53–1.43)
TSH SERPL DL<=0.005 MIU/L-ACNC: 0.14 UIU/ML (ref 0.38–5.33)

## 2017-12-15 PROCEDURE — 84439 ASSAY OF FREE THYROXINE: CPT

## 2017-12-15 PROCEDURE — 99214 OFFICE O/P EST MOD 30 MIN: CPT | Performed by: INTERNAL MEDICINE

## 2017-12-15 PROCEDURE — 84443 ASSAY THYROID STIM HORMONE: CPT

## 2017-12-15 PROCEDURE — 36415 COLL VENOUS BLD VENIPUNCTURE: CPT

## 2017-12-15 RX ORDER — LEVOTHYROXINE SODIUM 0.07 MG/1
TABLET ORAL
Qty: 30 TAB | Refills: 11 | Status: SHIPPED | OUTPATIENT
Start: 2017-12-15 | End: 2018-06-18

## 2017-12-15 NOTE — PROGRESS NOTES
CC: Olga Rich is a 72 y.o. female is suffering from No chief complaint on file.        SUBJECTIVE:  1. Persistent asthma without complication, unspecified asthma severity  Patient's here for follow-up has a history of asthma which is stable states that she is using Advair Diskus once daily    2. Hypothyroidism, unspecified type  Patient with ongoing hypothyroidism we'll recheck her levels    3. Generalized OA  Patient with generalized osteoarthritis especially distal interphalangeal joints    4. Aortic valve stenosis, moderate  Patient additionally has undergone a transesophageal echocardiogram results been reviewed shows that she has at least moderate aortic valve stenosis        Past social, family, history:   Social History   Substance Use Topics   • Smoking status: Former Smoker     Packs/day: 0.25     Years: 40.00     Types: Cigarettes     Quit date: 3/1/2014   • Smokeless tobacco: Never Used   • Alcohol use No      Comment: very rarely         MEDICATIONS:    Current Outpatient Prescriptions:   •  levothyroxine (SYNTHROID) 75 MCG Tab, TAKE ONE TABLET BY MOUTH ONE TIME DAILY, Disp: 30 Tab, Rfl: 11  •  montelukast (SINGULAIR) 10 MG Tab, TAKE ONE TABLET BY MOUTH ONE TIME DAILY, Disp: 30 Tab, Rfl: 10  •  temazepam (RESTORIL) 15 MG Cap, Take 1 Cap by mouth at bedtime as needed for Sleep., Disp: 30 Cap, Rfl: 4  •  fluticasone-salmeterol (ADVAIR) 250-50 MCG/DOSE AEROSOL POWDER, BREATH ACTIVATED, Inhale 1 Puff by mouth every 12 hours., Disp: 1 Inhaler, Rfl: 11  •  Mometasone Furo-Formoterol Fum (DULERA) 100-5 MCG/ACT Aerosol, Inhale 2 Puffs by mouth 2 Times a Day. Use spacer. Rinse mouth after use., Disp: 1 Inhaler, Rfl: 0  •  aspirin (ASA) 325 MG Tab, Take 650 mg by mouth at bedtime as needed., Disp: , Rfl:   •  atorvastatin (LIPITOR) 40 MG Tab, Take 1 Tab by mouth every day., Disp: 90 Tab, Rfl: 3  •  metoprolol SR (TOPROL XL) 25 MG TABLET SR 24 HR, Take 1 Tab by mouth every day., Disp: 90 Tab,  Rfl: 3  •  lisinopril (PRINIVIL) 5 MG Tab, Take 1 Tab by mouth every day., Disp: 90 Tab, Rfl: 3  •  magnesium oxide (MAG-OX) 400 MG Tab, Take 400 mg by mouth every day., Disp: , Rfl:   •  Cholecalciferol (VITAMIN D) 2000 UNITS CAPS, Take  by mouth., Disp: , Rfl:   •  calcium carbonate (OS-DARIEN 500) 1250 MG TABS, Take 1,250 mg by mouth every day., Disp: , Rfl:   •  multivitamin (THERAGRAN) TABS, Take 1 Tab by mouth every day., Disp: , Rfl:   •  Denosumab (PROLIA SC), Inject 1 Dose as instructed every 6 months. Indications: twice a year, Disp: , Rfl:     PROBLEMS:  Patient Active Problem List    Diagnosis Date Noted   • SOB (shortness of breath) 08/18/2017   • Chronic obstructive pulmonary disease (CMS-HCC) 08/18/2017   • Aortic valve stenosis, moderate 07/18/2017   • Abnormal echocardiogram 05/23/2017   • Insomnia 08/19/2016   • Dyslipidemia 04/26/2016   • Osteoporosis 07/18/2014   • Asthma 07/18/2014   • Hypothyroid 04/18/2014   • Screening for breast cancer 03/14/2014   • Environmental allergies 03/14/2014       REVIEW OF SYSTEMS:  Gen.:  No Nausea, Vomiting, fever, Chills.  Heart: No chest pain.  Lungs:  No shortness of Breath.  Psychological: Vinay unusual Anxiety depression     PHYSICAL EXAM   Constitutional: Alert, cooperative, not in acute distress.  Cardiovascular:  Rate Rhythm is regularWith a 2/6 heart murmurs best heard at the right second interspace, no rubs clicks.     Thorax & Lungs: Clear to auscultation, no wheezing, rhonchi, or rales  HENT: Normocephalic, Atraumatic.  Eyes: PERRLA, EOMI, Conjunctiva normal.   Neck: Trachia is midline no swelling of the thyroid.   Lymphatic: No lymphadenopathy noted.   Neurologic: Alert & oriented x 3, cranial nerves II through XII are intact, Normal motor function, Normal sensory function, No focal deficits noted.   Psychiatric: Affect normal, Judgment normal, Mood normal.     VITAL SIGNS:/62   Pulse 70   Temp 36.4 °C (97.5 °F)   Resp 16   Ht 1.473 m (4'  "10\")   Wt 43.3 kg (95 lb 8 oz)   SpO2 98%   Breastfeeding? No   BMI 19.96 kg/m²     Labs: Reviewed    Assessment:                                                     Plan:    1. Persistent asthma without complication, unspecified asthma severity  Asthma is clinically stable    2. Hypothyroidism, unspecified type  Recheck thyroid function  - levothyroxine (SYNTHROID) 75 MCG Tab; TAKE ONE TABLET BY MOUTH ONE TIME DAILY  Dispense: 30 Tab; Refill: 11  - TSH; Future  - FREE THYROXINE; Future  - TSH; Future  - FREE THYROXINE; Future    3. Generalized OA  Generalized osteoarthritis no change in medical therapy    4. Aortic valve stenosis, moderate  Patient will need yearly follow-up for aortic valve stenosis        "

## 2017-12-21 ENCOUNTER — TELEPHONE (OUTPATIENT)
Dept: MEDICAL GROUP | Facility: CLINIC | Age: 72
End: 2017-12-21

## 2017-12-21 NOTE — TELEPHONE ENCOUNTER
1. Caller Name: Olga Rich                                           Call Back Number: [phone] (home)         Patient approves a detailed voicemail message: N\A    Patient called and is not sure what her recent lab results mean. Her TSH is low but she is not sure if that means anything. Please review labs and contact patient for further explanation or advise, thank you.

## 2017-12-22 NOTE — TELEPHONE ENCOUNTER
Telephone call returned, answering machine only explained to the patient that I decreased her Synthroid from 88-75 µg this should correct her thyroid function

## 2018-01-03 ENCOUNTER — OFFICE VISIT (OUTPATIENT)
Dept: RHEUMATOLOGY | Facility: PHYSICIAN GROUP | Age: 73
End: 2018-01-03
Payer: MEDICARE

## 2018-01-03 VITALS
WEIGHT: 94 LBS | DIASTOLIC BLOOD PRESSURE: 58 MMHG | TEMPERATURE: 97.7 F | OXYGEN SATURATION: 95 % | HEART RATE: 82 BPM | RESPIRATION RATE: 12 BRPM | SYSTOLIC BLOOD PRESSURE: 120 MMHG | BODY MASS INDEX: 19.65 KG/M2

## 2018-01-03 DIAGNOSIS — E03.9 HYPOTHYROIDISM, UNSPECIFIED TYPE: ICD-10-CM

## 2018-01-03 DIAGNOSIS — J44.9 CHRONIC OBSTRUCTIVE PULMONARY DISEASE, UNSPECIFIED COPD TYPE (HCC): ICD-10-CM

## 2018-01-03 DIAGNOSIS — M81.0 AGE-RELATED OSTEOPOROSIS WITHOUT CURRENT PATHOLOGICAL FRACTURE: ICD-10-CM

## 2018-01-03 PROCEDURE — 99214 OFFICE O/P EST MOD 30 MIN: CPT | Performed by: INTERNAL MEDICINE

## 2018-01-03 NOTE — PROGRESS NOTES
Chief Complaint- joint pain    Subjective:   Olga Rich is a 72 y.o. female here today for follow up of rheumatological issues    This is a follow-up visit for this patient for osteoporosis, patient states her osteoporosis was discovered in 2008 when she suffered a right femur fracture, currently on Prolia every 6 months first dose December 2014, patient states she felt better within 2 weeks of getting the Prolia, states her back aches have improved significantly and continued to improve. Patient denies any bone pain post Prolia injection, denies any kidney stones, denies any teeth problems, denies any recent fractures.    Additional comorbidities include  hypothyroidism for which she is managed by her primary care doctor, also has asthma, patient denies any unexplained weight loss, denies any FUO, denies any new neurological symptoms, denies any recurrent infections.     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      S/p fosamax for 4 years      DEXA 4/2014 T scores -4.4, -3.9  DEXA 7/2016 T scores -3.7, -4.0  FRAX Major Osteoporotic 28.9%, Hip fx risk 11.1%  CTX 6/2015 50 normal  Next Prolia scheduled for January 16, 2018      Current medicines (including changes today)  Current Outpatient Prescriptions   Medication Sig Dispense Refill   • levothyroxine (SYNTHROID) 75 MCG Tab TAKE ONE TABLET BY MOUTH ONE TIME DAILY 30 Tab 11   • montelukast (SINGULAIR) 10 MG Tab TAKE ONE TABLET BY MOUTH ONE TIME DAILY 30 Tab 10   • temazepam (RESTORIL) 15 MG Cap Take 1 Cap by mouth at bedtime as needed for Sleep. 30 Cap 4   • fluticasone-salmeterol (ADVAIR) 250-50 MCG/DOSE AEROSOL POWDER, BREATH ACTIVATED Inhale 1 Puff by mouth every 12 hours. 1 Inhaler 11   • Mometasone Furo-Formoterol Fum (DULERA) 100-5 MCG/ACT Aerosol Inhale 2 Puffs by mouth 2 Times a Day. Use spacer. Rinse mouth after use. 1 Inhaler 0   • aspirin (ASA) 325 MG Tab Take 650 mg by mouth at bedtime as  needed.     • atorvastatin (LIPITOR) 40 MG Tab Take 1 Tab by mouth every day. 90 Tab 3   • metoprolol SR (TOPROL XL) 25 MG TABLET SR 24 HR Take 1 Tab by mouth every day. 90 Tab 3   • lisinopril (PRINIVIL) 5 MG Tab Take 1 Tab by mouth every day. 90 Tab 3   • magnesium oxide (MAG-OX) 400 MG Tab Take 400 mg by mouth every day.     • Cholecalciferol (VITAMIN D) 2000 UNITS CAPS Take  by mouth.     • calcium carbonate (OS-DARIEN 500) 1250 MG TABS Take 1,250 mg by mouth every day.     • multivitamin (THERAGRAN) TABS Take 1 Tab by mouth every day.     • Denosumab (PROLIA SC) Inject 1 Dose as instructed every 6 months. Indications: twice a year       No current facility-administered medications for this visit.      She  has a past medical history of Allergic rhinitis; Arthritis; Asthma in adult; Cataract (2/2012); Dental disorder; Dyslipidemia (4/26/2016); High cholesterol; Hypertension; Hypothyroid; Hypothyroid (4/18/2014); Hypothyroid (4/18/2014); Osteoporosis, unspecified; and Pneumonia (2009).    ROS   Other than what is mentioned in HPI or physical exam, there is no history of headaches, double vision or blurred vision. No temporal tenderness or jaw claudication. No history of cataracts or glaucoma. No trouble swallowing difficulties or sore throats. No history of thyroid disease. No chest complaints including chest pain, cough or sputum production. No shortness of breath. No GI complaints including nausea, vomiting, change in bowel habits, or past peptic ulcer disease. No history of blood in the stools. No urinary complaints including dysuria or frequency. No history of rash including psoriasis. No history of alopecia, photosensitivity, oral ulcerations, Raynaud's phenomena, or swollen joints. No history of gout. No back complaints. No history of low blood counts.       Objective:     Blood pressure 120/58, pulse 82, temperature 36.5 °C (97.7 °F), resp. rate 12, weight 42.6 kg (94 lb), SpO2 95 %. Body mass index is 19.65  kg/m².   Physical Exam:  General:Alert, oriented X 3 in no acute distress, patient in good spirits and talkative, small and thin framed  Eye: contact is good, speech goal directed, affect calm  HEENT: conjunctiva non-injected, sclera non-icteric.  Pinna normal. TM pearly gray.   Oral mucous membranes pink and moist with no lesions.  Neck: No thyromegaly, trachea midline  Lymph: No supraclavicular lymphadenopathy, no cervical lymphadenopathy, no axillary lymphadenopathy  Lungs: clear to auscultation bilaterally with good excursion,  patient does have moderate kyphosis of upper back  CV: regular rate and rhythm, did not appreciate any murmurs rubs or gallops  Abdomen: soft, nontender, No CVAT, no HSM  Neuro Cranial nerves 2-12 are grossly intact  Skin: No vasculitic lesions, no bruising, no petechial lesions  Ext: There are mild Heberden nodes on bilateral index DIP joints with mild valgus deformity. But no swan-neck, no bruit ears, no ulnar deviation, there are or mild bony hypertrophy bilateral knees, but without valgus or varus deformity, there is crepitus bilateral knees without redness without effusions. No Achilles tendon inflammation      Lab Results   Component Value Date/Time    SODIUM 140 07/10/2017 09:46 AM    POTASSIUM 4.4 07/10/2017 09:46 AM    CHLORIDE 106 07/10/2017 09:46 AM    CO2 26 07/10/2017 09:46 AM    GLUCOSE 84 07/10/2017 09:46 AM    BUN 17 07/10/2017 09:46 AM    CREATININE 0.82 07/10/2017 09:46 AM      Lab Results   Component Value Date/Time    WBC 7.0 04/20/2016 10:09 AM    RBC 4.99 04/20/2016 10:09 AM    HEMOGLOBIN 15.1 04/20/2016 10:09 AM    HEMATOCRIT 46.0 04/20/2016 10:09 AM    MCV 92.2 04/20/2016 10:09 AM    MCH 30.3 04/20/2016 10:09 AM    MCHC 32.8 (L) 04/20/2016 10:09 AM    MPV 9.4 04/20/2016 10:09 AM    NEUTSPOLYS 59.30 04/20/2016 10:09 AM    LYMPHOCYTES 26.80 04/20/2016 10:09 AM    MONOCYTES 7.70 04/20/2016 10:09 AM    EOSINOPHILS 4.70 04/20/2016 10:09 AM    BASOPHILS 0.90 04/20/2016  10:09 AM      Lab Results   Component Value Date/Time    CALCIUM 9.7 07/10/2017 09:46 AM    ASTSGOT 21 07/10/2017 09:46 AM    ALTSGPT 19 07/10/2017 09:46 AM    ALKPHOSPHAT 66 07/10/2017 09:46 AM    TBILIRUBIN 0.6 07/10/2017 09:46 AM    ALBUMIN 4.3 07/10/2017 09:46 AM    ALBUMIN 5.77 (H) 12/04/2014 03:10 PM    TOTPROTEIN 7.4 07/10/2017 09:46 AM    TOTPROTEIN 9.50 (H) 12/04/2014 03:10 PM     Lab Results   Component Value Date/Time    SEDRATEWES 22 12/04/2014 03:10 PM     Lab Results   Component Value Date/Time    CTELOPEP 50 06/09/2015 01:15 PM     Lab Results   Component Value Date/Time    PTHINTACT 48.8 12/09/2016 10:20 AM     Results for orders placed during the hospital encounter of 07/21/16   DS-BONE DENSITY STUDY (DEXA)    Impression 1.  According to the World Health Organization classification, bone mineral density of this patient is osteoporotic.    2.  Slight increase in lumbar spine bone density. No statistically significant change in left proximal femur bone density.        10-year Probability of Fracture:  Major Osteoporotic     28.9%  Hip     11.1%  Population      [place] ()    Based on left femur neck BMD          INTERPRETING LOCATION:  [address] [place] [address], [id number]        Assessment and Plan:     1. Age-related osteoporosis without current pathological fracture  Last DEXA July 2016, next DEXA July 2018  Currently on Prolia, next Prolia injection January 16, 2018  We'll do labs within 2 weeks of Prolia injection  - COMP METABOLIC PANEL; Future    2. Hypothyroidism, unspecified type  Can exacerbate osteoporosis, I recommend monitoring thyroid on a regular basis to assure it is not contributing to the patient's joint pain    - COMP METABOLIC PANEL; Future    3. Chronic obstructive pulmonary disease, unspecified COPD type (CMS-HCC)  Clinically stable  - COMP METABOLIC PANEL; Future    Followup: Return in about 1 year (around 1/3/2019). or sooner prn    Patient was seen 30 minutes face-to-face of  which more than 50% of the time was spent counseling the patient (excluding time for procedures)  regarding  rheumatological condition and care. Therapy was discussed in detail.    Please note that this dictation was created using voice recognition software. I have made every reasonable attempt to correct obvious errors, but I expect that there are errors of grammar and possibly content that I did not discover before finalizing the note.

## 2018-01-18 ENCOUNTER — OUTPATIENT INFUSION SERVICES (OUTPATIENT)
Dept: ONCOLOGY | Facility: MEDICAL CENTER | Age: 73
End: 2018-01-18
Payer: MEDICARE

## 2018-01-18 VITALS
SYSTOLIC BLOOD PRESSURE: 124 MMHG | HEIGHT: 59 IN | OXYGEN SATURATION: 95 % | TEMPERATURE: 98 F | HEART RATE: 74 BPM | WEIGHT: 96.34 LBS | DIASTOLIC BLOOD PRESSURE: 66 MMHG | RESPIRATION RATE: 18 BRPM | BODY MASS INDEX: 19.42 KG/M2

## 2018-01-18 DIAGNOSIS — M81.0 AGE-RELATED OSTEOPOROSIS WITHOUT CURRENT PATHOLOGICAL FRACTURE: ICD-10-CM

## 2018-01-18 LAB
ALBUMIN SERPL BCP-MCNC: 4.1 G/DL (ref 3.2–4.9)
ALBUMIN/GLOB SERPL: 1.5 G/DL
ALP SERPL-CCNC: 51 U/L (ref 30–99)
ALT SERPL-CCNC: 23 U/L (ref 2–50)
ANION GAP SERPL CALC-SCNC: 6 MMOL/L (ref 0–11.9)
AST SERPL-CCNC: 24 U/L (ref 12–45)
BILIRUB SERPL-MCNC: 0.5 MG/DL (ref 0.1–1.5)
BUN SERPL-MCNC: 16 MG/DL (ref 8–22)
CA-I BLD ISE-SCNC: 1.2 MMOL/L (ref 1.1–1.3)
CALCIUM SERPL-MCNC: 9.4 MG/DL (ref 8.5–10.5)
CHLORIDE SERPL-SCNC: 109 MMOL/L (ref 96–112)
CO2 SERPL-SCNC: 24 MMOL/L (ref 20–33)
CREAT BLD-MCNC: 0.8 MG/DL (ref 0.5–1.4)
CREAT SERPL-MCNC: 0.78 MG/DL (ref 0.5–1.4)
GLOBULIN SER CALC-MCNC: 2.7 G/DL (ref 1.9–3.5)
GLUCOSE SERPL-MCNC: 90 MG/DL (ref 65–99)
POTASSIUM SERPL-SCNC: 4.3 MMOL/L (ref 3.6–5.5)
PROT SERPL-MCNC: 6.8 G/DL (ref 6–8.2)
SODIUM SERPL-SCNC: 139 MMOL/L (ref 135–145)

## 2018-01-18 PROCEDURE — 82565 ASSAY OF CREATININE: CPT

## 2018-01-18 PROCEDURE — 80053 COMPREHEN METABOLIC PANEL: CPT

## 2018-01-18 PROCEDURE — 700111 HCHG RX REV CODE 636 W/ 250 OVERRIDE (IP): Mod: JG | Performed by: INTERNAL MEDICINE

## 2018-01-18 PROCEDURE — 96401 CHEMO ANTI-NEOPL SQ/IM: CPT

## 2018-01-18 PROCEDURE — 82330 ASSAY OF CALCIUM: CPT

## 2018-01-18 RX ADMIN — DENOSUMAB 60 MG: 60 INJECTION SUBCUTANEOUS at 14:16

## 2018-01-18 ASSESSMENT — PAIN SCALES - GENERAL: PAINLEVEL: NO PAIN

## 2018-01-18 NOTE — PROGRESS NOTES
Pharmacy note  Cr = 0.8, CrCl ~ 42 ml/min  Ionized Ca = 1.2  Last dose = 7/20/2017  OK for denosumab (Prolia) 60 mg SQ today    Dori Mcintosh, PharmD, BCOP

## 2018-01-18 NOTE — PROGRESS NOTES
Patient presents ambulatory for prolia injection.  Patient reports feeling well and denies any s/s of infection at this time.  Patient educated about possible healing complications of the jaw bone with any invasive dental work 8 weeks before or after receiving this medication, patient verbalized understanding, and denied having had any dental work done in the last 8 weeks and none planned in the coming 8 weeks.  Labs collected via 25 g butterfly per MD order, gauze, and coban applied to site.  Lab reviewed and is within parameters to proceed with treatment.  Prolia given subcutaeously per MD order with no complications or adverse reactions, band aid applied to site.  Patient to see MD prior to next appt, phone number for [facility] given to patient.  Patient left ambulatory in no distress.

## 2018-03-02 ENCOUNTER — PATIENT OUTREACH (OUTPATIENT)
Dept: HEALTH INFORMATION MANAGEMENT | Facility: OTHER | Age: 73
End: 2018-03-02

## 2018-03-02 NOTE — PROGRESS NOTES
1. Attempt #: Final    2. [facility] Verified: yes    3. Verify PCP: yes    4. Care Team Updated:       •   DME Company (gait device, O2, CPAP, etc.): NO       •   Other Specialists (eye doctor, derm, GYN, cardiology, endo, etc): NO    5.  Reviewed/Updated the following with patient:       •   Communication Preference Obtained? YES       •   Preferred Pharmacy? YES       •   Preferred Lab? YES       •   Family History (document living status of immediate family members and if + hx of cancer, diabetes, hypertension, hyperlipidemia, heart attack, stroke) YES. Was Abstract Encounter opened and chart updated? NO    6. [employer] Activation: declined    7. [employer] Eleanor: no    8. Annual Wellness Visit Scheduling  Scheduling Status:Scheduled      9. Care Gap Scheduling (Attempt to Schedule EACH Overdue Care Gap!)     Health Maintenance Due   Topic Date Due   • Annual Wellness Visit  1945   • PFT SCREENING-FEV1 AND FEV/FVC RATIO / SPIROMETRY SHOULD BE PERFORMED ANNUALLY  03/07/1961        Scheduled patient for Annual Wellness Visit    10. Patient was advised: “This is a free wellness visit. The provider will screen for medical conditions to help you stay healthy. If you have other concerns to address you may be asked to discuss these at a separate visit or there may be an additional fee.”     11. Patient was informed to arrive 15 min prior to their scheduled appointment and bring in their medication bottles.

## 2018-03-08 ENCOUNTER — OFFICE VISIT (OUTPATIENT)
Dept: MEDICAL GROUP | Facility: CLINIC | Age: 73
End: 2018-03-08
Payer: MEDICARE

## 2018-03-08 ENCOUNTER — HOSPITAL ENCOUNTER (OUTPATIENT)
Dept: LAB | Facility: MEDICAL CENTER | Age: 73
End: 2018-03-08
Attending: INTERNAL MEDICINE
Payer: MEDICARE

## 2018-03-08 VITALS
TEMPERATURE: 96.4 F | HEIGHT: 59 IN | SYSTOLIC BLOOD PRESSURE: 122 MMHG | WEIGHT: 97 LBS | OXYGEN SATURATION: 93 % | DIASTOLIC BLOOD PRESSURE: 70 MMHG | BODY MASS INDEX: 19.56 KG/M2 | HEART RATE: 72 BPM | RESPIRATION RATE: 16 BRPM

## 2018-03-08 DIAGNOSIS — E03.9 HYPOTHYROIDISM, UNSPECIFIED TYPE: ICD-10-CM

## 2018-03-08 DIAGNOSIS — J45.909 PERSISTENT ASTHMA WITHOUT COMPLICATION, UNSPECIFIED ASTHMA SEVERITY: ICD-10-CM

## 2018-03-08 DIAGNOSIS — M81.0 AGE-RELATED OSTEOPOROSIS WITHOUT CURRENT PATHOLOGICAL FRACTURE: ICD-10-CM

## 2018-03-08 DIAGNOSIS — J44.9 CHRONIC OBSTRUCTIVE PULMONARY DISEASE, UNSPECIFIED COPD TYPE (HCC): ICD-10-CM

## 2018-03-08 DIAGNOSIS — E78.5 DYSLIPIDEMIA: ICD-10-CM

## 2018-03-08 LAB
T4 FREE SERPL-MCNC: 1.24 NG/DL (ref 0.53–1.43)
TSH SERPL DL<=0.005 MIU/L-ACNC: 0.15 UIU/ML (ref 0.38–5.33)

## 2018-03-08 PROCEDURE — 84443 ASSAY THYROID STIM HORMONE: CPT

## 2018-03-08 PROCEDURE — 36415 COLL VENOUS BLD VENIPUNCTURE: CPT

## 2018-03-08 PROCEDURE — G0439 PPPS, SUBSEQ VISIT: HCPCS | Performed by: INTERNAL MEDICINE

## 2018-03-08 PROCEDURE — 84439 ASSAY OF FREE THYROXINE: CPT

## 2018-03-08 ASSESSMENT — PAIN SCALES - GENERAL: PAINLEVEL: NO PAIN

## 2018-03-08 ASSESSMENT — PATIENT HEALTH QUESTIONNAIRE - PHQ9: CLINICAL INTERPRETATION OF PHQ2 SCORE: 0

## 2018-03-08 ASSESSMENT — ACTIVITIES OF DAILY LIVING (ADL): BATHING_REQUIRES_ASSISTANCE: 0

## 2018-03-08 NOTE — PROGRESS NOTES
Chief Complaint   Patient presents with   • Annual Wellness Visit         HPI:  Olga is a 73 y.o. here for Medicare Annual Wellness Visit          Patient Active Problem List    Diagnosis Date Noted   • SOB (shortness of breath) 08/18/2017   • Chronic obstructive pulmonary disease (CMS-HCC) 08/18/2017   • Aortic valve stenosis, moderate 07/18/2017   • Abnormal echocardiogram 05/23/2017   • Insomnia 08/19/2016   • Dyslipidemia 04/26/2016   • Osteoporosis 07/18/2014   • Asthma 07/18/2014   • Hypothyroid 04/18/2014   • Screening for breast cancer 03/14/2014   • Environmental allergies 03/14/2014       Current Outpatient Prescriptions   Medication Sig Dispense Refill   • levothyroxine (SYNTHROID) 75 MCG Tab TAKE ONE TABLET BY MOUTH ONE TIME DAILY 30 Tab 11   • montelukast (SINGULAIR) 10 MG Tab TAKE ONE TABLET BY MOUTH ONE TIME DAILY 30 Tab 10   • fluticasone-salmeterol (ADVAIR) 250-50 MCG/DOSE AEROSOL POWDER, BREATH ACTIVATED Inhale 1 Puff by mouth every 12 hours. 1 Inhaler 11   • Mometasone Furo-Formoterol Fum (DULERA) 100-5 MCG/ACT Aerosol Inhale 2 Puffs by mouth 2 Times a Day. Use spacer. Rinse mouth after use. 1 Inhaler 0   • aspirin (ASA) 325 MG Tab Take 650 mg by mouth at bedtime as needed.     • atorvastatin (LIPITOR) 40 MG Tab Take 1 Tab by mouth every day. 90 Tab 3   • metoprolol SR (TOPROL XL) 25 MG TABLET SR 24 HR Take 1 Tab by mouth every day. 90 Tab 3   • lisinopril (PRINIVIL) 5 MG Tab Take 1 Tab by mouth every day. 90 Tab 3   • magnesium oxide (MAG-OX) 400 MG Tab Take 400 mg by mouth every day.     • Cholecalciferol (VITAMIN D) 2000 UNITS CAPS Take  by mouth.     • calcium carbonate (OS-DARIEN 500) 1250 MG TABS Take 1,250 mg by mouth every day.     • multivitamin (THERAGRAN) TABS Take 1 Tab by mouth every day.     • temazepam (RESTORIL) 15 MG Cap Take 1 Cap by mouth at bedtime as needed for Sleep. 30 Cap 4   • Denosumab (PROLIA SC) Inject 1 Dose as instructed every 6 months. Indications: twice a year        No current facility-administered medications for this visit.         Patient is taking medications as noted in medication list.  Current supplements as per medication list.     Allergies: Demerol    Current social contact/activities: she spends time with neighbors, sports, games     Patient's perception of their health: good    Is patient current with immunizations? Yes.    She  reports that she quit smoking about 4 years ago. Her smoking use included Cigarettes. She has a 10.00 pack-year smoking history. She has never used smokeless tobacco. She reports that she uses drugs, including Marijuana. She reports that she does not drink alcohol.  Counseling given: Not Answered        DPA/Advanced directive: Patient has Advanced Directive on file.     ROS:    Gait: Uses no assistive device    Ostomy: no    Other tubes: no    Amputations: no    Chronic oxygen use no    Last eye exam four years ago    Wears hearing aids: no    : Denies any urinary leakage during the last 6 months       Screening:    COPD    1.  Is patient under the care of a pulmonologist? No.  2.  Has patient ever completed a PFT or spirometry? No.  3.  Is patient on oxygen or CPAP? No.  4.  Has patient ever had instruction on inhaler technique by health care professional? No  5.  Is patient interested in a referral to respiratory therapy for more information on COPD, inhaler technique, and/or information on establishing an action plan?  No, patient is NOT interested.    Depression Screening    Little interest or pleasure in doing things?  0 - not at all  Feeling down, depressed, or hopeless? 0 - not at all  Patient Health Questionnaire Score: 0    If depressive symptoms identified deferred to follow up visit unless specifically addressed in assessment and plan.    Interpretation of PHQ-9 Total Score   Score Severity   1-4 No Depression   5-9 Mild Depression   10-14 Moderate Depression   15-19 Moderately Severe Depression   20-27 Severe  Depression    Screening for Cognitive Impairment    Three Minute Recall (apple, watch, naveen)  3/3  village, kitchen, baby  Draw clock face with all 12 numbers set to the hand to show 10 minutes past 11 o'clock  1 4/4 3:40   If cognitive concerns identified, deferred for follow up unless specifically addressed in assessment and plan.    Fall Risk Assessment    Has the patient had two or more falls in the last year or any fall with injury in the last year?  No  If fall risk identified, deferred for follow up unless specifically addressed in assessment and plan.    Safety Assessment    Throw rugs on floor.  No  Handrails on all stairs.  Yes  Good lighting in all hallways.  Yes  Difficulty hearing.  No  Patient counseled about all safety risks that were identified.    Functional Assessment ADLs    Are there any barriers preventing you from cooking for yourself or meeting nutritional needs?  No.    Are there any barriers preventing you from driving safely or obtaining transportation?  No.    Are there any barriers preventing you from using a telephone or calling for help?  No.    Are there any barriers preventing you from shopping?  No.    Are there any barriers preventing you from taking care of your own finances?  No.    Are there any barriers preventing you from managing your medications?  No.    Are there any barriers preventing you from showering, bathing or dressing yourself?  No.    Are you currently engaging any exercise or physical activity?  Yes.  Pt states she exercises in different sets every couple hours daily, Walks 1-2 miles a day     Health Maintenance Summary                Annual Wellness Visit Overdue 1945     PFT SCREENING-FEV1 AND FEV/FVC RATIO / SPIROMETRY SHOULD BE PERFORMED ANNUALLY Overdue 3/7/1961     MAMMOGRAM Next Due 10/5/2018      Done 10/5/2017 MA-MAMMO SCREENING BILAT W/TOMOSYNTHESIS W/CAD     Patient has more history with this topic...    COLONOSCOPY Next Due 4/18/2019      Done  "4/18/2009 [facility] consult    BONE DENSITY Next Due 7/21/2021      Done 7/21/2016 DS-BONE DENSITY STUDY (DEXA)     Patient has more history with this topic...    IMM DTaP/Tdap/Td Vaccine Next Due 3/1/2027      Done 3/1/2017 Imm Admin: Tdap Vaccine          Patient Care Team:  Abdon Hilario D.O. as PCP - General (Internal Medicine)  PRUDENCE Foster as Consulting Physician (Family Medicine)  Rahel Thompson M.D. as Consulting Physician (Rheumatology)  Vic Vargas M.D. as Consulting Physician (Ophthalmology)  Bruno Hood M.D. as Consulting Physician (Cardiology)    Social History   Substance Use Topics   • Smoking status: Former Smoker     Packs/day: 0.25     Years: 40.00     Types: Cigarettes     Quit date: 3/1/2014   • Smokeless tobacco: Never Used   • Alcohol use No      Comment: very rarely     Family History   Problem Relation Age of Onset   • Other Mother 97     old age.    • Heart Attack Father 56     MI   • Heart Disease Father    • Other Son      ETOH   • Other Son      ETOH   • Heart Disease Sister 53   • Heart Attack Sister 85   • No Known Problems Brother    • No Known Problems Sister    • No Known Problems Sister      She  has a past medical history of Allergic rhinitis; Arthritis; Asthma in adult; Cataract (2/2012); Dental disorder; Dyslipidemia (4/26/2016); High cholesterol; Hypertension; Hypothyroid; Hypothyroid (4/18/2014); Hypothyroid (4/18/2014); Osteoporosis, unspecified; and Pneumonia (2009).   Past Surgical History:   Procedure Laterality Date   • CATARACT PHACO WITH IOL      Dr. Vargas   • FEMUR ORIF      titamium Dr. Mares.            Exam:     Blood pressure 122/70, pulse 72, temperature (!) 35.8 °C (96.4 °F), resp. rate 16, height 1.49 m (4' 10.66\"), weight 44 kg (97 lb), SpO2 93 %. Body mass index is 19.82 kg/m².    Hearing good.    Dentition good  Alert, oriented in no acute distress.  Eye contact is good, speech goal directed, affect calm   "     Assessment and Plan. The following treatment and monitoring plan is recommended:     1. Chronic obstructive pulmonary disease, unspecified COPD type (CMS-HCC)  Patient's old pulmonary function tests were reviewed. Patient refuses addition pulmonary function test because of claustrophobia    2. Hypothyroidism, unspecified type  Recheck free T4 TSH  - FREE THYROXINE; Future  - TSH; Future    3. Age-related osteoporosis without current pathological fracture  Clinically stable    4. Persistent asthma without complication, unspecified asthma severity  Ongoing followed by pulmonology    5. Dyslipidemia  Stable      Services suggested: No services needed at this time  Health Care Screening: Age-appropriate preventive services recommended by USPTF and ACIP covered by Medicare were discussed today. Services ordered if indicated and agreed upon by the patient.  Referrals offered: PT/OT/Nutrition counseling/Behavioral Health/Smoking cessation as per orders if indicated.    Discussion today about general wellness and lifestyle habits:    · Prevent falls and reduce trip hazards; Cautioned about securing or removing rugs.  · Have a working fire alarm and carbon monoxide detector;   · Engage in regular physical activity and social activities       Follow-up: No Follow-up on file.

## 2018-03-27 DIAGNOSIS — Z82.49 FAMILY HISTORY OF ATHEROSCLEROSIS: ICD-10-CM

## 2018-03-27 DIAGNOSIS — E78.5 DYSLIPIDEMIA: ICD-10-CM

## 2018-03-27 DIAGNOSIS — I25.10 ATHEROSCLEROSIS OF NATIVE CORONARY ARTERY OF NATIVE HEART WITHOUT ANGINA PECTORIS: ICD-10-CM

## 2018-03-27 RX ORDER — METOPROLOL SUCCINATE 25 MG/1
25 TABLET, EXTENDED RELEASE ORAL DAILY
Qty: 90 TAB | Refills: 0 | Status: SHIPPED | OUTPATIENT
Start: 2018-03-27 | End: 2018-06-14 | Stop reason: SDUPTHER

## 2018-03-27 RX ORDER — ATORVASTATIN CALCIUM 40 MG/1
40 TABLET, FILM COATED ORAL DAILY
Qty: 90 TAB | Refills: 0 | Status: SHIPPED | OUTPATIENT
Start: 2018-03-27 | End: 2018-06-14 | Stop reason: SDUPTHER

## 2018-03-27 RX ORDER — LISINOPRIL 5 MG/1
5 TABLET ORAL DAILY
Qty: 90 TAB | Refills: 0 | Status: SHIPPED | OUTPATIENT
Start: 2018-03-27 | End: 2018-06-14 | Stop reason: SDUPTHER

## 2018-06-14 ENCOUNTER — OFFICE VISIT (OUTPATIENT)
Dept: CARDIOLOGY | Facility: MEDICAL CENTER | Age: 73
End: 2018-06-14
Payer: MEDICARE

## 2018-06-14 VITALS
OXYGEN SATURATION: 95 % | HEIGHT: 58 IN | HEART RATE: 75 BPM | SYSTOLIC BLOOD PRESSURE: 122 MMHG | DIASTOLIC BLOOD PRESSURE: 66 MMHG | WEIGHT: 97 LBS | BODY MASS INDEX: 20.36 KG/M2

## 2018-06-14 DIAGNOSIS — I35.0 AORTIC VALVE STENOSIS, MODERATE: ICD-10-CM

## 2018-06-14 DIAGNOSIS — E78.5 DYSLIPIDEMIA: ICD-10-CM

## 2018-06-14 DIAGNOSIS — I25.10 ATHEROSCLEROSIS OF NATIVE CORONARY ARTERY OF NATIVE HEART WITHOUT ANGINA PECTORIS: ICD-10-CM

## 2018-06-14 DIAGNOSIS — Z82.49 FAMILY HISTORY OF ATHEROSCLEROSIS: ICD-10-CM

## 2018-06-14 DIAGNOSIS — E03.9 HYPOTHYROIDISM, UNSPECIFIED TYPE: ICD-10-CM

## 2018-06-14 PROCEDURE — 99213 OFFICE O/P EST LOW 20 MIN: CPT | Performed by: NURSE PRACTITIONER

## 2018-06-14 RX ORDER — ATORVASTATIN CALCIUM 40 MG/1
40 TABLET, FILM COATED ORAL DAILY
Qty: 90 TAB | Refills: 3 | Status: SHIPPED | OUTPATIENT
Start: 2018-06-14 | End: 2019-03-22 | Stop reason: SDUPTHER

## 2018-06-14 RX ORDER — METOPROLOL SUCCINATE 25 MG/1
25 TABLET, EXTENDED RELEASE ORAL DAILY
Qty: 90 TAB | Refills: 3 | Status: SHIPPED | OUTPATIENT
Start: 2018-06-14 | End: 2019-04-19 | Stop reason: SDUPTHER

## 2018-06-14 RX ORDER — LISINOPRIL 5 MG/1
5 TABLET ORAL DAILY
Qty: 90 TAB | Refills: 3 | Status: SHIPPED | OUTPATIENT
Start: 2018-06-14 | End: 2019-04-16 | Stop reason: SDUPTHER

## 2018-06-14 ASSESSMENT — ENCOUNTER SYMPTOMS
CLAUDICATION: 0
COUGH: 0
PND: 0
ORTHOPNEA: 0
SHORTNESS OF BREATH: 0
DIZZINESS: 0
FEVER: 0
MYALGIAS: 0
PALPITATIONS: 0
ABDOMINAL PAIN: 0

## 2018-06-14 NOTE — PROGRESS NOTES
Chief Complaint   Patient presents with   • Follow-Up       Subjective:   Olga Rich is a 73 y.o. female who presents today for follow-up on her hypertension, hyperlipidemia and aortic stenosis.    Patient Dr. Hood.  Patient was last seen in clinic on 7/18/2017.    Over the past year, Patient feels well, denies chest pain, any shortness of breath, palpitations, dizziness/lightheadedness, orthopnea, PND or Edema.  Patient also denies syncope or presyncope.    Additonally, patient has the following medical problems:     -CAD and DLD: last LDL was 97 on 4/7/17. Atovastatin was increased to 40 mg nightly.     -Hypothyroid: on levothyroxine     -Arthritis and osteoporosis: followed by Dr. Thompson, taking Prolia, Calcium and Vit D and Aspirin for pain     -Asthma: On Advair and Singulair  Past Medical History:   Diagnosis Date   • Allergic rhinitis    • Arthritis     fingers   • Asthma in adult    • Cataract 2/2012    Bilat    • Dental disorder     partial upper   • Dyslipidemia 4/26/2016   • High cholesterol    • Hypertension    • Hypothyroid    • Hypothyroid 4/18/2014   • Hypothyroid 4/18/2014   • Osteoporosis, unspecified    • Pneumonia 2009     Past Surgical History:   Procedure Laterality Date   • CATARACT PHACO WITH IOL      Dr. Vargas   • FEMUR ORIF      titamium Dr. Mares.      Family History   Problem Relation Age of Onset   • Other Mother 97     old age.    • Heart Attack Father 56     MI   • Heart Disease Father    • Other Son      ETOH   • Other Son      ETOH   • Heart Disease Sister 53   • Heart Attack Sister 85   • No Known Problems Brother    • No Known Problems Sister    • No Known Problems Sister      Social History     Social History   • Marital status:      Spouse name: N/A   • Number of children: N/A   • Years of education: N/A     Occupational History   • Not on file.     Social History Main Topics   • Smoking status: Former Smoker     Packs/day: 0.25     Years: 40.00     Types:  Cigarettes     Quit date: 3/1/2014   • Smokeless tobacco: Never Used   • Alcohol use No      Comment: very rarely   • Drug use: Yes     Types: Marijuana      Comment: occasionally for glaucoma which runs in her family/ per pt no    • Sexual activity: No     Other Topics Concern   • Not on file     Social History Narrative   • No narrative on file     Allergies   Allergen Reactions   • Demerol Anaphylaxis     Outpatient Encounter Prescriptions as of 6/14/2018   Medication Sig Dispense Refill   • atorvastatin (LIPITOR) 40 MG Tab Take 1 Tab by mouth every day. 90 Tab 3   • lisinopril (PRINIVIL) 5 MG Tab Take 1 Tab by mouth every day. 90 Tab 3   • metoprolol SR (TOPROL XL) 25 MG TABLET SR 24 HR Take 1 Tab by mouth every day. 90 Tab 3   • levothyroxine (SYNTHROID) 75 MCG Tab TAKE ONE TABLET BY MOUTH ONE TIME DAILY 30 Tab 11   • montelukast (SINGULAIR) 10 MG Tab TAKE ONE TABLET BY MOUTH ONE TIME DAILY 30 Tab 10   • fluticasone-salmeterol (ADVAIR) 250-50 MCG/DOSE AEROSOL POWDER, BREATH ACTIVATED Inhale 1 Puff by mouth every 12 hours. (Patient taking differently: Inhale 1 Puff by mouth every 12 hours. ONCE A DAY) 1 Inhaler 11   • aspirin (ASA) 325 MG Tab Take 324 mg by mouth at bedtime as needed.     • magnesium oxide (MAG-OX) 400 MG Tab Take 400 mg by mouth every day.     • Cholecalciferol (VITAMIN D) 2000 UNITS CAPS Take  by mouth.     • calcium carbonate (OS-DARIEN 500) 1250 MG TABS Take 1,250 mg by mouth every day.     • multivitamin (THERAGRAN) TABS Take 1 Tab by mouth every day.     • [DISCONTINUED] lisinopril (PRINIVIL) 5 MG Tab Take 1 Tab by mouth every day. 90 Tab 0   • [DISCONTINUED] metoprolol SR (TOPROL XL) 25 MG TABLET SR 24 HR Take 1 Tab by mouth every day. 90 Tab 0   • [DISCONTINUED] atorvastatin (LIPITOR) 40 MG Tab Take 1 Tab by mouth every day. 90 Tab 0   • temazepam (RESTORIL) 15 MG Cap Take 1 Cap by mouth at bedtime as needed for Sleep. 30 Cap 4   • [DISCONTINUED] Mometasone Furo-Formoterol Fum (DULERA)  "100-5 MCG/ACT Aerosol Inhale 2 Puffs by mouth 2 Times a Day. Use spacer. Rinse mouth after use. 1 Inhaler 0   • Denosumab (PROLIA SC) Inject 1 Dose as instructed every 6 months. Indications: twice a year       No facility-administered encounter medications on file as of 6/14/2018.      Review of Systems   Constitutional: Negative for fever and malaise/fatigue.   Respiratory: Negative for cough and shortness of breath.    Cardiovascular: Negative for chest pain, palpitations, orthopnea, claudication, leg swelling and PND.   Gastrointestinal: Negative for abdominal pain.   Musculoskeletal: Negative for myalgias.   Neurological: Negative for dizziness.   All other systems reviewed and are negative.       Objective:   /66   Pulse 75   Ht 1.473 m (4' 10\")   Wt 44 kg (97 lb)   SpO2 95%   BMI 20.27 kg/m²     Physical Exam   Constitutional: She is oriented to person, place, and time. She appears well-developed and well-nourished.   HENT:   Head: Normocephalic and atraumatic.   Eyes: EOM are normal. Pupils are equal, round, and reactive to light.   Neck: Normal range of motion. Neck supple. No JVD present.   Cardiovascular: Normal rate and regular rhythm.    Murmur heard.  Pulmonary/Chest: Effort normal and breath sounds normal. No respiratory distress. She has no wheezes. She has no rales.   Musculoskeletal: She exhibits no edema.   Neurological: She is alert and oriented to person, place, and time.   Skin: Skin is warm and dry.   Psychiatric: She has a normal mood and affect. Her behavior is normal.     Lab Results   Component Value Date/Time    CHOLSTRLTOT 195 04/07/2017 10:12 AM    LDL 97 04/07/2017 10:12 AM    HDL 75 04/07/2017 10:12 AM    TRIGLYCERIDE 114 04/07/2017 10:12 AM       Lab Results   Component Value Date/Time    SODIUM 139 01/18/2018 01:40 PM    POTASSIUM 4.3 01/18/2018 01:40 PM    CHLORIDE 109 01/18/2018 01:40 PM    CO2 24 01/18/2018 01:40 PM    GLUCOSE 90 01/18/2018 01:40 PM    BUN 16 01/18/2018 " 01:40 PM    CREATININE 0.78 01/18/2018 01:40 PM     Lab Results   Component Value Date/Time    ALKPHOSPHAT 51 01/18/2018 01:40 PM    ASTSGOT 24 01/18/2018 01:40 PM    ALTSGPT 23 01/18/2018 01:40 PM    TBILIRUBIN 0.5 01/18/2018 01:40 PM      CT-Cardiac Scoring 1/20/17  FINDINGS:  Coronary calcification:  LMA - 179  LCX - 214  LAD - 233  RCA - 9  PDA - 14    Calcium score:  648     1.  There is extensive atherosclerotic plaque burden.    2.  There is a high likelihood (greater than 90%) of at least one significant coronary stenosis.    3.  The patient's cardiovascular risk is high.    4.  Aggressive risk factor reduction is strongly suggested.    5.  Exercise or pharmacologic nuclear medicine stress testing is strongly suggested to evaluate for inducible ischemia.    6. Small pericardial effusion    Calcium score is worse than the 90th percentile for the patient's age and sex.        Transthoracic Echo Report 5/3/17  1. Suspect low gradient, severe AS.  Visually the aortic valve appears   severely stenotic with low gradients.  Recommend dobutamine stress echo   if clinical suspicious.     2. Left ventricular ejection fraction is visually estimated to be 65%.   Normal regional wall motion. Grade I diastolic dysfunction.    3.  Estimated right ventricular systolic pressure  is 30 mmHg.        Transesophageal Echo Report 5/23/17  There is evidence of restricted motion of the aortic leaflets. Based on   planimetry, there is is evidence of moderate to severe aortic stenosis   with valve area traced out from 0.7 to 1.7 cm2. Of note, there are   certainly limitations intrinsic to the study of SANGEETA and clinical   correlation has to be taken into consideration.    Assessment:     1. Atherosclerosis of native coronary artery of native heart without angina pectoris  atorvastatin (LIPITOR) 40 MG Tab    lisinopril (PRINIVIL) 5 MG Tab    metoprolol SR (TOPROL XL) 25 MG TABLET SR 24 HR    LIPID PROFILE    COMP METABOLIC PANEL   2.  Dyslipidemia  atorvastatin (LIPITOR) 40 MG Tab    lisinopril (PRINIVIL) 5 MG Tab    metoprolol SR (TOPROL XL) 25 MG TABLET SR 24 HR    LIPID PROFILE    COMP METABOLIC PANEL   3. Family history of atherosclerosis  atorvastatin (LIPITOR) 40 MG Tab    lisinopril (PRINIVIL) 5 MG Tab    metoprolol SR (TOPROL XL) 25 MG TABLET SR 24 HR   4. Hypothyroidism, unspecified type     5. Aortic valve stenosis, moderate         Medical Decision Making:  Today's Assessment / Status / Plan:   1. DLD: Last LDL was 97 from 4/7/17.  -Continue Atorvastatin 40 mg daily  -Repeat Lipid panel, CMP     2. Atherosclerosis of coronary arteries:  -Continue atorvastatin     3. Aortic Stenosis, Moderate to severe, asymptomatic:  -Continue to monitor symptoms  -RTC if present, s/sx reviewed with patient. She is able to verbalize understanding     FU in clinic in 4 months with Dr. Hood. Sooner if needed.     Patient verbalizes understanding and agrees with the plan of care.      Collaborating MD: Krishna Baldwin MD

## 2018-06-18 ENCOUNTER — HOSPITAL ENCOUNTER (OUTPATIENT)
Dept: LAB | Facility: MEDICAL CENTER | Age: 73
End: 2018-06-18
Attending: NURSE PRACTITIONER
Payer: MEDICARE

## 2018-06-18 ENCOUNTER — HOSPITAL ENCOUNTER (OUTPATIENT)
Dept: LAB | Facility: MEDICAL CENTER | Age: 73
End: 2018-06-18
Attending: INTERNAL MEDICINE
Payer: MEDICARE

## 2018-06-18 ENCOUNTER — OFFICE VISIT (OUTPATIENT)
Dept: MEDICAL GROUP | Facility: CLINIC | Age: 73
End: 2018-06-18
Payer: MEDICARE

## 2018-06-18 VITALS
BODY MASS INDEX: 20.36 KG/M2 | HEART RATE: 71 BPM | WEIGHT: 97 LBS | TEMPERATURE: 98.3 F | HEIGHT: 58 IN | SYSTOLIC BLOOD PRESSURE: 130 MMHG | RESPIRATION RATE: 16 BRPM | OXYGEN SATURATION: 95 % | DIASTOLIC BLOOD PRESSURE: 88 MMHG

## 2018-06-18 DIAGNOSIS — E78.5 DYSLIPIDEMIA: ICD-10-CM

## 2018-06-18 DIAGNOSIS — E03.9 HYPOTHYROIDISM, UNSPECIFIED TYPE: ICD-10-CM

## 2018-06-18 DIAGNOSIS — I25.10 ATHEROSCLEROSIS OF NATIVE CORONARY ARTERY OF NATIVE HEART WITHOUT ANGINA PECTORIS: ICD-10-CM

## 2018-06-18 LAB
ALBUMIN SERPL BCP-MCNC: 4.4 G/DL (ref 3.2–4.9)
ALBUMIN/GLOB SERPL: 1.4 G/DL
ALP SERPL-CCNC: 68 U/L (ref 30–99)
ALT SERPL-CCNC: 16 U/L (ref 2–50)
ANION GAP SERPL CALC-SCNC: 8 MMOL/L (ref 0–11.9)
AST SERPL-CCNC: 23 U/L (ref 12–45)
BILIRUB SERPL-MCNC: 0.6 MG/DL (ref 0.1–1.5)
BUN SERPL-MCNC: 21 MG/DL (ref 8–22)
CALCIUM SERPL-MCNC: 9.8 MG/DL (ref 8.5–10.5)
CHLORIDE SERPL-SCNC: 106 MMOL/L (ref 96–112)
CHOLEST SERPL-MCNC: 192 MG/DL (ref 100–199)
CO2 SERPL-SCNC: 27 MMOL/L (ref 20–33)
CREAT SERPL-MCNC: 0.84 MG/DL (ref 0.5–1.4)
GLOBULIN SER CALC-MCNC: 3.2 G/DL (ref 1.9–3.5)
GLUCOSE SERPL-MCNC: 88 MG/DL (ref 65–99)
HDLC SERPL-MCNC: 82 MG/DL
LDLC SERPL CALC-MCNC: 95 MG/DL
POTASSIUM SERPL-SCNC: 4.3 MMOL/L (ref 3.6–5.5)
PROT SERPL-MCNC: 7.6 G/DL (ref 6–8.2)
SODIUM SERPL-SCNC: 141 MMOL/L (ref 135–145)
T4 FREE SERPL-MCNC: 1.05 NG/DL (ref 0.53–1.43)
TRIGL SERPL-MCNC: 75 MG/DL (ref 0–149)
TSH SERPL DL<=0.005 MIU/L-ACNC: 0.67 UIU/ML (ref 0.38–5.33)

## 2018-06-18 PROCEDURE — 36415 COLL VENOUS BLD VENIPUNCTURE: CPT

## 2018-06-18 PROCEDURE — 84443 ASSAY THYROID STIM HORMONE: CPT

## 2018-06-18 PROCEDURE — 99213 OFFICE O/P EST LOW 20 MIN: CPT | Performed by: INTERNAL MEDICINE

## 2018-06-18 PROCEDURE — 80053 COMPREHEN METABOLIC PANEL: CPT

## 2018-06-18 PROCEDURE — 84439 ASSAY OF FREE THYROXINE: CPT

## 2018-06-18 PROCEDURE — 80061 LIPID PANEL: CPT

## 2018-06-18 RX ORDER — LEVOTHYROXINE SODIUM 0.07 MG/1
TABLET ORAL
Qty: 30 TAB | Refills: 11 | Status: SHIPPED | OUTPATIENT
Start: 2018-06-18 | End: 2018-12-18 | Stop reason: SDUPTHER

## 2018-06-18 RX ORDER — LEVOTHYROXINE SODIUM 0.05 MG/1
50 TABLET ORAL
Qty: 30 TAB | Refills: 11 | Status: SHIPPED | OUTPATIENT
Start: 2018-06-18 | End: 2018-06-18

## 2018-06-18 NOTE — PROGRESS NOTES
CC: Olga Rich is a 73 y.o. female is suffering from   Chief Complaint   Patient presents with   • Follow-Up     6 months         SUBJECTIVE:  1. Hypothyroidism, unspecified type  Patient is here for follow-up has a history of hypothyroidism, has been treated for elevated cholesterol states that her medication is well-tolerated lab work is ordered        Past social, family, history:   Social History   Substance Use Topics   • Smoking status: Former Smoker     Packs/day: 0.25     Years: 40.00     Types: Cigarettes     Quit date: 3/1/2014   • Smokeless tobacco: Never Used   • Alcohol use No      Comment: very rarely         MEDICATIONS:    Current Outpatient Prescriptions:   •  lisinopril (PRINIVIL) 5 MG Tab, Take 1 Tab by mouth every day., Disp: 90 Tab, Rfl: 3  •  metoprolol SR (TOPROL XL) 25 MG TABLET SR 24 HR, Take 1 Tab by mouth every day., Disp: 90 Tab, Rfl: 3  •  montelukast (SINGULAIR) 10 MG Tab, TAKE ONE TABLET BY MOUTH ONE TIME DAILY, Disp: 30 Tab, Rfl: 10  •  aspirin (ASA) 325 MG Tab, Take 324 mg by mouth at bedtime as needed., Disp: , Rfl:   •  magnesium oxide (MAG-OX) 400 MG Tab, Take 400 mg by mouth every day., Disp: , Rfl:   •  Cholecalciferol (VITAMIN D) 2000 UNITS CAPS, Take  by mouth., Disp: , Rfl:   •  calcium carbonate (OS-DARIEN 500) 1250 MG TABS, Take 1,250 mg by mouth every day., Disp: , Rfl:   •  multivitamin (THERAGRAN) TABS, Take 1 Tab by mouth every day., Disp: , Rfl:   •  atorvastatin (LIPITOR) 40 MG Tab, Take 1 Tab by mouth every day., Disp: 90 Tab, Rfl: 3  •  temazepam (RESTORIL) 15 MG Cap, Take 1 Cap by mouth at bedtime as needed for Sleep., Disp: 30 Cap, Rfl: 4  •  fluticasone-salmeterol (ADVAIR) 250-50 MCG/DOSE AEROSOL POWDER, BREATH ACTIVATED, Inhale 1 Puff by mouth every 12 hours. (Patient taking differently: Inhale 1 Puff by mouth every 12 hours. ONCE A DAY), Disp: 1 Inhaler, Rfl: 11  •  Denosumab (PROLIA SC), Inject 1 Dose as instructed every 6 months. Indications:  "twice a year, Disp: , Rfl:     PROBLEMS:  Patient Active Problem List    Diagnosis Date Noted   • SOB (shortness of breath) 08/18/2017   • Chronic obstructive pulmonary disease (HCC) 08/18/2017   • Aortic valve stenosis, moderate 07/18/2017   • Abnormal echocardiogram 05/23/2017   • Insomnia 08/19/2016   • Dyslipidemia 04/26/2016   • Osteoporosis 07/18/2014   • Asthma 07/18/2014   • Hypothyroid 04/18/2014   • Screening for breast cancer 03/14/2014   • Environmental allergies 03/14/2014       REVIEW OF SYSTEMS:  Gen.:  No Nausea, Vomiting, fever, Chills.  Heart: No chest pain.  Lungs:  No shortness of Breath.  Psychological: Vinay unusual Anxiety depression     PHYSICAL EXAM   Constitutional: Alert, cooperative, not in acute distress.  Cardiovascular:  Rate Rhythm is regular without murmurs rubs clicks.     Thorax & Lungs: Clear to auscultation, no wheezing, rhonchi, or rales  HENT: Normocephalic, Atraumatic.  Eyes: PERRLA, EOMI, Conjunctiva normal.   Neck: Trachia is midline no swelling of the thyroid.   Lymphatic: No lymphadenopathy noted.   Neurologic: Alert & oriented x 3, cranial nerves II through XII are intact, Normal motor function, Normal sensory function, No focal deficits noted.   Psychiatric: Affect normal, Judgment normal, Mood normal.     VITAL SIGNS:/88   Pulse 71   Temp 36.8 °C (98.3 °F)   Resp 16   Ht 1.473 m (4' 10\")   Wt 44 kg (97 lb)   SpO2 95%   BMI 20.27 kg/m²     Labs: Reviewed    Assessment:                                                     Plan:    1. Hypothyroidism, unspecified type  Recheck free T4 TSH orders written additional blood work orders are currently available for completion today.  - TSH; Future  - FREE THYROXINE; Future        "

## 2018-07-19 ENCOUNTER — OUTPATIENT INFUSION SERVICES (OUTPATIENT)
Dept: ONCOLOGY | Facility: MEDICAL CENTER | Age: 73
End: 2018-07-19
Payer: MEDICARE

## 2018-07-19 VITALS
BODY MASS INDEX: 20.5 KG/M2 | DIASTOLIC BLOOD PRESSURE: 63 MMHG | HEIGHT: 58 IN | SYSTOLIC BLOOD PRESSURE: 135 MMHG | TEMPERATURE: 97.4 F | OXYGEN SATURATION: 93 % | WEIGHT: 97.66 LBS | HEART RATE: 91 BPM | RESPIRATION RATE: 18 BRPM

## 2018-07-19 LAB
CA-I BLD ISE-SCNC: 1.15 MMOL/L (ref 1.1–1.3)
CREAT BLD-MCNC: 0.9 MG/DL (ref 0.5–1.4)

## 2018-07-19 PROCEDURE — 82330 ASSAY OF CALCIUM: CPT

## 2018-07-19 PROCEDURE — 700111 HCHG RX REV CODE 636 W/ 250 OVERRIDE (IP): Mod: JG | Performed by: INTERNAL MEDICINE

## 2018-07-19 PROCEDURE — 96401 CHEMO ANTI-NEOPL SQ/IM: CPT

## 2018-07-19 PROCEDURE — 82565 ASSAY OF CREATININE: CPT

## 2018-07-19 RX ADMIN — DENOSUMAB 60 MG: 60 INJECTION SUBCUTANEOUS at 14:13

## 2018-07-19 ASSESSMENT — PAIN SCALES - GENERAL: PAINLEVEL_OUTOF10: 0

## 2018-07-19 NOTE — PROGRESS NOTES
Pharmacy note  Labs 7/19/18 Cr = 0.9, CrCl ~ 38 ml/min  Ionized Ca = 1.15  Last dose = 1/18/18  OK for denosumab (Prolia) 60 mg SQ today  Ritika Edgar, PharmD

## 2018-07-30 ENCOUNTER — OFFICE VISIT (OUTPATIENT)
Dept: PULMONOLOGY | Facility: HOSPICE | Age: 73
End: 2018-07-30
Payer: MEDICARE

## 2018-07-30 VITALS
DIASTOLIC BLOOD PRESSURE: 68 MMHG | WEIGHT: 98 LBS | SYSTOLIC BLOOD PRESSURE: 110 MMHG | RESPIRATION RATE: 16 BRPM | BODY MASS INDEX: 20.57 KG/M2 | HEART RATE: 75 BPM | HEIGHT: 58 IN | OXYGEN SATURATION: 95 % | TEMPERATURE: 97.3 F

## 2018-07-30 DIAGNOSIS — J45.20 MILD INTERMITTENT ASTHMA WITHOUT COMPLICATION: ICD-10-CM

## 2018-07-30 DIAGNOSIS — J45.909 UNCOMPLICATED ASTHMA, UNSPECIFIED ASTHMA SEVERITY, UNSPECIFIED WHETHER PERSISTENT: ICD-10-CM

## 2018-07-30 DIAGNOSIS — G47.00 INSOMNIA, UNSPECIFIED TYPE: ICD-10-CM

## 2018-07-30 DIAGNOSIS — J44.9 CHRONIC OBSTRUCTIVE PULMONARY DISEASE, UNSPECIFIED COPD TYPE (HCC): ICD-10-CM

## 2018-07-30 PROCEDURE — 99212 OFFICE O/P EST SF 10 MIN: CPT | Performed by: NURSE PRACTITIONER

## 2018-07-30 RX ORDER — TEMAZEPAM 15 MG/1
15 CAPSULE ORAL NIGHTLY PRN
Qty: 30 CAP | Refills: 4 | Status: SHIPPED | OUTPATIENT
Start: 2018-07-30 | End: 2018-12-27

## 2018-07-30 RX ORDER — MONTELUKAST SODIUM 10 MG/1
10 TABLET ORAL EVERY EVENING
Qty: 30 TAB | Refills: 11 | Status: SHIPPED | OUTPATIENT
Start: 2018-07-30 | End: 2020-02-12

## 2018-07-30 NOTE — PATIENT INSTRUCTIONS
1) Continue Advair 250/50 daily   2) Continue Singulair at night  3) Vaccines: Up to date with Prevnar 13 and Pneumococcal 23  4) Continue Restoril 15mg nightly as needed.   5) Return in about 1 year (around 7/30/2019) for review of symptoms, if not sooner, follow up with [facility].

## 2018-07-30 NOTE — PROGRESS NOTES
CC:  Here for f/u pulmonary issues as listed below    HPI:   Ms. Rich comes in today for her one-year followup of her COPD and asthma.     Her last PFTs in  indicate FVC 79% predicted FEV1 55% predicted FEV1/FVC C 57% predicted DLCO 99% without a bronchodilator response. She wishes not to repeat PFTs again.  She is a former remote smoker quitting in  with a 10 pack year history.      She is compliant taking Advair 250-50, 1 puff, once a daily. Rinsing mouth after each use. Singulair 10 mg tablets one tablet at bedtime, Restoril 15 mg caps one at bedtime when necessary for insomnia approximately 2-3 times per week. She has a hx of severe insomnia for 48-72 hours and thus Restoril is beneficial.     She has been doing well this past year without exacerbation, respiratory infection or hospital visit. She occasionally has sinus pain with seasonal allergies but denies any current symptoms. Denies SOB or dyspnea, wheezing, chest pain or pressure, fever or chills. She reports she has 1 bird and cockatoo that give her allergies, in her home over 20 years. One of her birds  this past year. She uses a mask to clean cage. She has tried over-the-counter allergy medications in the past without improvement.              Patient Active Problem List    Diagnosis Date Noted   • Mild intermittent asthma without complication 2018   • SOB (shortness of breath) 2017   • Chronic obstructive pulmonary disease (HCC) 2017   • Aortic valve stenosis, moderate 2017   • Abnormal echocardiogram 2017   • Insomnia 2016   • Dyslipidemia 2016   • Osteoporosis 2014   • Asthma 2014   • Hypothyroid 2014   • Screening for breast cancer 2014   • Environmental allergies 2014       Past Medical History:   Diagnosis Date   • Allergic rhinitis    • Arthritis     fingers   • Asthma in adult    • Cataract 2012    Bilat    • Dental disorder     partial upper   •  Dyslipidemia 4/26/2016   • High cholesterol    • Hypertension    • Hypothyroid    • Hypothyroid 4/18/2014   • Hypothyroid 4/18/2014   • Osteoporosis, unspecified    • Pneumonia 2009       Past Surgical History:   Procedure Laterality Date   • CATARACT PHACO WITH IOL      Dr. Vargas   • FEMUR ORIF      titamium Dr. Mares.        Family History   Problem Relation Age of Onset   • Other Mother 97        old age.    • Heart Attack Father 56        MI   • Heart Disease Father    • Other Son         ETOH   • Other Son         ETOH   • Heart Disease Sister 53   • Heart Attack Sister 85   • No Known Problems Brother    • No Known Problems Sister    • No Known Problems Sister        Social History   Substance Use Topics   • Smoking status: Former Smoker     Packs/day: 0.25     Years: 40.00     Types: Cigarettes     Quit date: 3/1/2014   • Smokeless tobacco: Never Used   • Alcohol use No      Comment: very rarely       Current Outpatient Prescriptions   Medication Sig Dispense Refill   • fluticasone-salmeterol (ADVAIR DISKUS) 250-50 MCG/DOSE AEROSOL POWDER, BREATH ACTIVATED Inhale 1 Puff by mouth 2 times a day. Rinse mouth after each use 1 Inhaler 11   • montelukast (SINGULAIR) 10 MG Tab Take 1 Tab by mouth every evening. 30 Tab 11   • temazepam (RESTORIL) 15 MG Cap Take 1 Cap by mouth at bedtime as needed for Sleep for up to 150 days. 30 Cap 4   • levothyroxine (SYNTHROID) 75 MCG Tab TAKE ONE TABLET BY MOUTH ONE TIME DAILY 30 Tab 11   • atorvastatin (LIPITOR) 40 MG Tab Take 1 Tab by mouth every day. 90 Tab 3   • lisinopril (PRINIVIL) 5 MG Tab Take 1 Tab by mouth every day. 90 Tab 3   • metoprolol SR (TOPROL XL) 25 MG TABLET SR 24 HR Take 1 Tab by mouth every day. 90 Tab 3   • montelukast (SINGULAIR) 10 MG Tab TAKE ONE TABLET BY MOUTH ONE TIME DAILY 30 Tab 10   • fluticasone-salmeterol (ADVAIR) 250-50 MCG/DOSE AEROSOL POWDER, BREATH ACTIVATED Inhale 1 Puff by mouth every 12 hours. (Patient taking differently: Inhale 1  "Puff by mouth every 12 hours. ONCE A DAY) 1 Inhaler 11   • Denosumab (PROLIA SC) Inject 1 Dose as instructed every 6 months. Indications: twice a year     • aspirin (ASA) 325 MG Tab Take 324 mg by mouth at bedtime as needed.     • magnesium oxide (MAG-OX) 400 MG Tab Take 400 mg by mouth every day.     • Cholecalciferol (VITAMIN D) 2000 UNITS CAPS Take  by mouth.     • calcium carbonate (OS-DARIEN 500) 1250 MG TABS Take 1,250 mg by mouth every day.     • multivitamin (THERAGRAN) TABS Take 1 Tab by mouth every day.       No current facility-administered medications for this visit.           Allergies: Demerol          ROS   Gen: Denies fever, chills, unintentional weight loss, fatigue, night sweats  E/N/T: Denies nasal congestion, ear pain  Resp:Denies Dyspnea, wheezing, cough, sputum production, hemoptysis  CV: Denies chest pain, chest tightness, palpitations  Sleep:Denies morning headache  Neuro: Denies frequent headaches, weakness, dizziness  GI: Denies acid reflux, N/V  See HPI.  All other systems reviewed and negative          Vital signs for this encounter:  Vitals:    07/30/18 1543   Height: 1.473 m (4' 10\")   Weight: 44.5 kg (98 lb)   Weight % change since last entry.: 0 %   BP: 110/68   Pulse: 75   BMI (Calculated): 20.48   Resp: 16   Temp: 36.3 °C (97.3 °F)                 Physical Exam:   Appearance: well developed, well nourished, no acute distress.   Eyes: PERRL, EOM intact, sclere white, conjunctiva moist.  Ears: no lesions or deformities.  Hearing: grossly intact.  Nose: no lesions or deformities.  Dentition: good dentition.   Oropharynx: tongue normal, posterior pharynx without erythema or exudate.  Neck: supple, trachea midline, no masses.  Respiratory effort: no intercostal retractions or use of accessory muscles.  Lung auscultation: Bilateral diminished   Heart auscultation: no murmur, rub, or gallop   Extremities: no cyanosis or edema.  Abdomen: soft, non-tender, no masses.  Gait and station: without " difficulty   Digits and Nails: no clubbing, cyanosis, petechiae, or nodes.  Cranial nerves: grossly normal.  Motor: no focal deficits observed.   Skin: no rashes, lesions, or ulcers noted.  Orientation: oriented to time, place, and person.  Mood and affect: mood and affect appropriate, normal interaction with examiner.      Assessment   1. Uncomplicated asthma, unspecified asthma severity, unspecified whether persistent  fluticasone-salmeterol (ADVAIR DISKUS) 250-50 MCG/DOSE AEROSOL POWDER, BREATH ACTIVATED    montelukast (SINGULAIR) 10 MG Tab   2. Insomnia, unspecified type  temazepam (RESTORIL) 15 MG Cap   3. Mild intermittent asthma without complication     4. Chronic obstructive pulmonary disease, unspecified COPD type (HCC)         Patient was seen for 20 minutes, more than 50% of time spent in face to face review, counseling, and arranging future evaluation and follow up of medical conditions and care relating to asthma over the last year. Patient is clinically stable and will have the following changes per plan.     PLAN:   Patient Instructions   1) Continue Advair 250/50 daily. Dulera sample as substitute provided as requested  2) Continue Singulair at night  3) Vaccines: Up to date with Prevnar 13 and Pneumococcal 23  4) Continue Restoril 15mg nightly as needed. 1/2 pill encouraged  5) Return in about 1 year (around 7/30/2019) for review of symptoms, if not sooner, follow up with [facility].

## 2018-10-17 ENCOUNTER — OFFICE VISIT (OUTPATIENT)
Dept: CARDIOLOGY | Facility: MEDICAL CENTER | Age: 73
End: 2018-10-17
Payer: MEDICARE

## 2018-10-17 VITALS
DIASTOLIC BLOOD PRESSURE: 70 MMHG | HEART RATE: 74 BPM | OXYGEN SATURATION: 94 % | WEIGHT: 96 LBS | BODY MASS INDEX: 20.15 KG/M2 | HEIGHT: 58 IN | SYSTOLIC BLOOD PRESSURE: 106 MMHG

## 2018-10-17 DIAGNOSIS — I25.10 ATHEROSCLEROSIS OF NATIVE CORONARY ARTERY OF NATIVE HEART WITHOUT ANGINA PECTORIS: ICD-10-CM

## 2018-10-17 DIAGNOSIS — Z82.49 FAMILY HISTORY OF ATHEROSCLEROSIS: ICD-10-CM

## 2018-10-17 DIAGNOSIS — E78.5 DYSLIPIDEMIA: ICD-10-CM

## 2018-10-17 DIAGNOSIS — I35.0 AORTIC VALVE STENOSIS, MODERATE: ICD-10-CM

## 2018-10-17 PROCEDURE — 99214 OFFICE O/P EST MOD 30 MIN: CPT | Performed by: INTERNAL MEDICINE

## 2018-10-17 ASSESSMENT — ENCOUNTER SYMPTOMS
DEPRESSION: 0
LOSS OF CONSCIOUSNESS: 0
PND: 0
FALLS: 0
CHILLS: 0
EYE DISCHARGE: 0
HALLUCINATIONS: 0
EYE PAIN: 0
BRUISES/BLEEDS EASILY: 0
SHORTNESS OF BREATH: 0
PALPITATIONS: 0
NAUSEA: 0
ABDOMINAL PAIN: 0
VOMITING: 0
HEADACHES: 0
ORTHOPNEA: 0
SPEECH CHANGE: 0
BLURRED VISION: 0
CLAUDICATION: 0
SENSORY CHANGE: 0
DOUBLE VISION: 0
FEVER: 0
WEIGHT LOSS: 0
MYALGIAS: 0
BLOOD IN STOOL: 0
COUGH: 0
DIZZINESS: 0

## 2018-10-17 NOTE — LETTER
[facility]   [address] [place] [address] [address]  [place] [address]  Phone: [phone]  Fax: [phone]              Olga Rich  1945    Encounter Date: 10/17/2018    Bruno Hood M.D.          PROGRESS NOTE:  Chief Complaint   Patient presents with   • Hyperlipidemia     F/V: 4 MO       Subjective:   Olga Rich is a 73 y.o. female who presents today for cardiac care and evaluation because of an abnormal calcium scan in the past. At the last visit, patient was deemed to be asymptomatic and no further cardiac workup was entertained. We optimized her medical therapy.      In the interim, patient has been doing well without having any symptoms. Patient denies having chest pain, dyspnea, palpitation, presyncope, syncope episodes. Able to climb up at least 2 flights of stairs.     She was found to have aortic stenosis for which she underwent TTE and SANGEETA.    Past Medical History:   Diagnosis Date   • Allergic rhinitis    • Arthritis     fingers   • Asthma in adult    • Cataract 2/2012    Bilat    • Dental disorder     partial upper   • Dyslipidemia 4/26/2016   • High cholesterol    • Hypertension    • Hypothyroid    • Hypothyroid 4/18/2014   • Hypothyroid 4/18/2014   • Osteoporosis, unspecified    • Pneumonia 2009     Past Surgical History:   Procedure Laterality Date   • CATARACT PHACO WITH IOL      Dr. Vargas   • FEMUR ORIF      titamium Dr. Mares.      Family History   Problem Relation Age of Onset   • Other Mother 97        old age.    • Heart Attack Father 56        MI   • Heart Disease Father    • Other Son         ETOH   • Other Son         ETOH   • Heart Disease Sister 53   • Heart Attack Sister 85   • No Known Problems Brother    • No Known Problems Sister    • No Known Problems Sister      Social History     Social History   • Marital status:      Spouse name: N/A   • Number of children: N/A   • Years of education: N/A          Occupational History   • Not on file.     Social History Main Topics   • Smoking status: Former Smoker     Packs/day: 0.25     Years: 40.00     Types: Cigarettes     Quit date: 3/1/2014   • Smokeless tobacco: Never Used   • Alcohol use No      Comment: very rarely   • Drug use: Yes     Types: Marijuana      Comment: occasionally for glaucoma which runs in her family/ per pt no    • Sexual activity: No     Other Topics Concern   • Not on file     Social History Narrative   • No narrative on file     Allergies   Allergen Reactions   • Demerol Anaphylaxis     Outpatient Encounter Prescriptions as of 10/17/2018   Medication Sig Dispense Refill   • levothyroxine (SYNTHROID) 75 MCG Tab TAKE ONE TABLET BY MOUTH ONE TIME DAILY 30 Tab 11   • atorvastatin (LIPITOR) 40 MG Tab Take 1 Tab by mouth every day. 90 Tab 3   • lisinopril (PRINIVIL) 5 MG Tab Take 1 Tab by mouth every day. 90 Tab 3   • metoprolol SR (TOPROL XL) 25 MG TABLET SR 24 HR Take 1 Tab by mouth every day. 90 Tab 3   • montelukast (SINGULAIR) 10 MG Tab TAKE ONE TABLET BY MOUTH ONE TIME DAILY 30 Tab 10   • fluticasone-salmeterol (ADVAIR) 250-50 MCG/DOSE AEROSOL POWDER, BREATH ACTIVATED Inhale 1 Puff by mouth every 12 hours. (Patient taking differently: Inhale 1-2 Puffs by mouth every 12 hours. ONCE A DAY) 1 Inhaler 11   • Denosumab (PROLIA SC) Inject 1 Dose as instructed every 6 months. Indications: twice a year     • aspirin (ASA) 325 MG Tab Take 324 mg by mouth every day.     • magnesium oxide (MAG-OX) 400 MG Tab Take 400 mg by mouth every day.     • Cholecalciferol (VITAMIN D) 2000 UNITS CAPS Take  by mouth.     • calcium carbonate (OS-DARIEN 500) 1250 MG TABS Take 1,250 mg by mouth every day.     • multivitamin (THERAGRAN) TABS Take 1 Tab by mouth every day.     • fluticasone-salmeterol (ADVAIR DISKUS) 250-50 MCG/DOSE AEROSOL POWDER, BREATH ACTIVATED Inhale 1 Puff by mouth 2 times a day. Rinse mouth after each use 1 Inhaler 11   • montelukast (SINGULAIR) 10  "MG Tab Take 1 Tab by mouth every evening. 30 Tab 11   • temazepam (RESTORIL) 15 MG Cap Take 1 Cap by mouth at bedtime as needed for Sleep for up to 150 days. 30 Cap 4     No facility-administered encounter medications on file as of 10/17/2018.      Review of Systems   Constitutional: Negative for chills, fever, malaise/fatigue and weight loss.   HENT: Negative for ear discharge, ear pain, hearing loss and nosebleeds.    Eyes: Negative for blurred vision, double vision, pain and discharge.   Respiratory: Negative for cough and shortness of breath.    Cardiovascular: Negative for chest pain, palpitations, orthopnea, claudication, leg swelling and PND.   Gastrointestinal: Negative for abdominal pain, blood in stool, melena, nausea and vomiting.   Genitourinary: Negative for dysuria and hematuria.   Musculoskeletal: Negative for falls, joint pain and myalgias.   Skin: Negative for itching and rash.   Neurological: Negative for dizziness, sensory change, speech change, loss of consciousness and headaches.   Endo/Heme/Allergies: Negative for environmental allergies. Does not bruise/bleed easily.   Psychiatric/Behavioral: Negative for depression, hallucinations and suicidal ideas.        Objective:   /70 (BP Location: Right arm, Patient Position: Sitting, BP Cuff Size: Adult)   Pulse 74   Ht 1.473 m (4' 10\")   Wt 43.5 kg (96 lb)   SpO2 94%   BMI 20.06 kg/m²      Physical Exam   Constitutional: She is oriented to person, place, and time. No distress.   HENT:   Head: Normocephalic and atraumatic.   Right Ear: External ear normal.   Left Ear: External ear normal.   Eyes: Right eye exhibits no discharge. Left eye exhibits no discharge.   Neck: No JVD present. No thyromegaly present.   Cardiovascular: Normal rate, regular rhythm and intact distal pulses.  Exam reveals no gallop and no friction rub.    Murmur heard.  there is 2-3/6 systolic murmur heard best at the right border of the aortic valve area. The murmur " does not radiate to the carotids.     Pulmonary/Chest: Breath sounds normal. No respiratory distress.   Abdominal: Bowel sounds are normal. She exhibits no distension. There is no tenderness.   Musculoskeletal: She exhibits no edema or tenderness.   Neurological: She is alert and oriented to person, place, and time. No cranial nerve deficit.   Skin: Skin is warm and dry. She is not diaphoretic.   Psychiatric: She has a normal mood and affect. Her behavior is normal.   Nursing note and vitals reviewed.      Assessment:     1. Atherosclerosis of native coronary artery of native heart without angina pectoris     2. Family history of atherosclerosis     3. Dyslipidemia     4. Aortic valve stenosis, moderate         Medical Decision Making:  Today's Assessment / Status / Plan:   At this time patient is clinically stable in terms of her cardiac standpoint.  Cont current medications at current dose.   Will refer to valve program for monitoring as well. She prefers to see me at this time.    I will see patient back in clinic with lab tests and studies results in 6 months.    I thank you Dr. Flor for referring patient to our Cardiology Clinic today.        Abdon Hilario, [place].  [id number] [address]  VIA In Basket

## 2018-10-17 NOTE — PROGRESS NOTES
Chief Complaint   Patient presents with   • Hyperlipidemia     F/V: 4 MO       Subjective:   Olga Rich is a 73 y.o. female who presents today for cardiac care and evaluation because of an abnormal calcium scan in the past. At the last visit, patient was deemed to be asymptomatic and no further cardiac workup was entertained. We optimized her medical therapy.      In the interim, patient has been doing well without having any symptoms. Patient denies having chest pain, dyspnea, palpitation, presyncope, syncope episodes. Able to climb up at least 2 flights of stairs.     She was found to have aortic stenosis for which she underwent TTE and SANGEETA.    Past Medical History:   Diagnosis Date   • Allergic rhinitis    • Arthritis     fingers   • Asthma in adult    • Cataract 2/2012    Bilat    • Dental disorder     partial upper   • Dyslipidemia 4/26/2016   • High cholesterol    • Hypertension    • Hypothyroid    • Hypothyroid 4/18/2014   • Hypothyroid 4/18/2014   • Osteoporosis, unspecified    • Pneumonia 2009     Past Surgical History:   Procedure Laterality Date   • CATARACT PHACO WITH IOL      Dr. Vargas   • FEMUR ORIF      titamium Dr. Mares.      Family History   Problem Relation Age of Onset   • Other Mother 97        old age.    • Heart Attack Father 56        MI   • Heart Disease Father    • Other Son         ETOH   • Other Son         ETOH   • Heart Disease Sister 53   • Heart Attack Sister 85   • No Known Problems Brother    • No Known Problems Sister    • No Known Problems Sister      Social History     Social History   • Marital status:      Spouse name: N/A   • Number of children: N/A   • Years of education: N/A     Occupational History   • Not on file.     Social History Main Topics   • Smoking status: Former Smoker     Packs/day: 0.25     Years: 40.00     Types: Cigarettes     Quit date: 3/1/2014   • Smokeless tobacco: Never Used   • Alcohol use No      Comment: very rarely   • Drug  use: Yes     Types: Marijuana      Comment: occasionally for glaucoma which runs in her family/ per pt no    • Sexual activity: No     Other Topics Concern   • Not on file     Social History Narrative   • No narrative on file     Allergies   Allergen Reactions   • Demerol Anaphylaxis     Outpatient Encounter Prescriptions as of 10/17/2018   Medication Sig Dispense Refill   • levothyroxine (SYNTHROID) 75 MCG Tab TAKE ONE TABLET BY MOUTH ONE TIME DAILY 30 Tab 11   • atorvastatin (LIPITOR) 40 MG Tab Take 1 Tab by mouth every day. 90 Tab 3   • lisinopril (PRINIVIL) 5 MG Tab Take 1 Tab by mouth every day. 90 Tab 3   • metoprolol SR (TOPROL XL) 25 MG TABLET SR 24 HR Take 1 Tab by mouth every day. 90 Tab 3   • montelukast (SINGULAIR) 10 MG Tab TAKE ONE TABLET BY MOUTH ONE TIME DAILY 30 Tab 10   • fluticasone-salmeterol (ADVAIR) 250-50 MCG/DOSE AEROSOL POWDER, BREATH ACTIVATED Inhale 1 Puff by mouth every 12 hours. (Patient taking differently: Inhale 1-2 Puffs by mouth every 12 hours. ONCE A DAY) 1 Inhaler 11   • Denosumab (PROLIA SC) Inject 1 Dose as instructed every 6 months. Indications: twice a year     • aspirin (ASA) 325 MG Tab Take 324 mg by mouth every day.     • magnesium oxide (MAG-OX) 400 MG Tab Take 400 mg by mouth every day.     • Cholecalciferol (VITAMIN D) 2000 UNITS CAPS Take  by mouth.     • calcium carbonate (OS-DARIEN 500) 1250 MG TABS Take 1,250 mg by mouth every day.     • multivitamin (THERAGRAN) TABS Take 1 Tab by mouth every day.     • fluticasone-salmeterol (ADVAIR DISKUS) 250-50 MCG/DOSE AEROSOL POWDER, BREATH ACTIVATED Inhale 1 Puff by mouth 2 times a day. Rinse mouth after each use 1 Inhaler 11   • montelukast (SINGULAIR) 10 MG Tab Take 1 Tab by mouth every evening. 30 Tab 11   • temazepam (RESTORIL) 15 MG Cap Take 1 Cap by mouth at bedtime as needed for Sleep for up to 150 days. 30 Cap 4     No facility-administered encounter medications on file as of 10/17/2018.      Review of Systems  "  Constitutional: Negative for chills, fever, malaise/fatigue and weight loss.   HENT: Negative for ear discharge, ear pain, hearing loss and nosebleeds.    Eyes: Negative for blurred vision, double vision, pain and discharge.   Respiratory: Negative for cough and shortness of breath.    Cardiovascular: Negative for chest pain, palpitations, orthopnea, claudication, leg swelling and PND.   Gastrointestinal: Negative for abdominal pain, blood in stool, melena, nausea and vomiting.   Genitourinary: Negative for dysuria and hematuria.   Musculoskeletal: Negative for falls, joint pain and myalgias.   Skin: Negative for itching and rash.   Neurological: Negative for dizziness, sensory change, speech change, loss of consciousness and headaches.   Endo/Heme/Allergies: Negative for environmental allergies. Does not bruise/bleed easily.   Psychiatric/Behavioral: Negative for depression, hallucinations and suicidal ideas.        Objective:   /70 (BP Location: Right arm, Patient Position: Sitting, BP Cuff Size: Adult)   Pulse 74   Ht 1.473 m (4' 10\")   Wt 43.5 kg (96 lb)   SpO2 94%   BMI 20.06 kg/m²     Physical Exam   Constitutional: She is oriented to person, place, and time. No distress.   HENT:   Head: Normocephalic and atraumatic.   Right Ear: External ear normal.   Left Ear: External ear normal.   Eyes: Right eye exhibits no discharge. Left eye exhibits no discharge.   Neck: No JVD present. No thyromegaly present.   Cardiovascular: Normal rate, regular rhythm and intact distal pulses.  Exam reveals no gallop and no friction rub.    Murmur heard.  there is 2-3/6 systolic murmur heard best at the right border of the aortic valve area. The murmur does not radiate to the carotids.     Pulmonary/Chest: Breath sounds normal. No respiratory distress.   Abdominal: Bowel sounds are normal. She exhibits no distension. There is no tenderness.   Musculoskeletal: She exhibits no edema or tenderness.   Neurological: She is " alert and oriented to person, place, and time. No cranial nerve deficit.   Skin: Skin is warm and dry. She is not diaphoretic.   Psychiatric: She has a normal mood and affect. Her behavior is normal.   Nursing note and vitals reviewed.      Assessment:     1. Atherosclerosis of native coronary artery of native heart without angina pectoris     2. Family history of atherosclerosis     3. Dyslipidemia     4. Aortic valve stenosis, moderate         Medical Decision Making:  Today's Assessment / Status / Plan:   At this time patient is clinically stable in terms of her cardiac standpoint.  Cont current medications at current dose.   Will refer to valve program for monitoring as well. She prefers to see me at this time.    I will see patient back in clinic with lab tests and studies results in 6 months.    I thank you Dr. Flor for referring patient to our Cardiology Clinic today.

## 2018-12-17 ENCOUNTER — TELEPHONE (OUTPATIENT)
Dept: MEDICAL GROUP | Facility: LAB | Age: 73
End: 2018-12-17

## 2018-12-17 NOTE — TELEPHONE ENCOUNTER
ESTABLISHED PATIENT PRE-VISIT PLANNING     Patient was NOT contacted to complete PVP.     Note: Patient will not be contacted if there is no indication to call.     1.  Reviewed notes from the last few office visits within the medical group: Yes    2.  If any orders were placed at last visit or intended to be done for this visit (i.e. 6 mos follow-up), do we have Results/Consult Notes?        •  Labs - Labs ordered, completed on 6/18/18 and results are in chart.       •  Imaging - Imaging was not ordered at last office visit.       •  Referrals - No referrals were ordered at last office visit.    3. Is this appointment scheduled as a Hospital Follow-Up? No    4.  Immunizations were updated in Epic using WebIZ?: Epic matches WebIZ       •  Web Iz Recommendations: FLU and SHINGRIX (Shingles)    5.  Patient is due for the following Health Maintenance Topics:   Health Maintenance Due   Topic Date Due   • Annual Wellness Visit  1945   • PFT SCREENING-FEV1 AND FEV/FVC RATIO / SPIROMETRY SHOULD BE PERFORMED ANNUALLY  03/07/1961   • IMM ZOSTER VACCINES (2 of 3) 04/26/2017   • IMM INFLUENZA (1) 09/01/2018   • MAMMOGRAM  10/05/2018       - Patient has completed PNEUMOVAX (PPSV23), PREVNAR (PCV13)  and TDAP Immunization(s) per WebIZ. Chart has been updated.    6.  MDX printed for Provider? NO    7.  Patient was NOT informed to arrive 15 min prior to their scheduled appointment and bring in their medication bottles.

## 2018-12-18 ENCOUNTER — OFFICE VISIT (OUTPATIENT)
Dept: MEDICAL GROUP | Facility: LAB | Age: 73
End: 2018-12-18
Payer: MEDICARE

## 2018-12-18 VITALS
DIASTOLIC BLOOD PRESSURE: 85 MMHG | BODY MASS INDEX: 20.36 KG/M2 | TEMPERATURE: 97.7 F | SYSTOLIC BLOOD PRESSURE: 125 MMHG | RESPIRATION RATE: 12 BRPM | WEIGHT: 97 LBS | HEART RATE: 74 BPM | HEIGHT: 58 IN | OXYGEN SATURATION: 95 %

## 2018-12-18 DIAGNOSIS — Z23 NEED FOR IMMUNIZATION AGAINST INFLUENZA: ICD-10-CM

## 2018-12-18 DIAGNOSIS — E03.9 HYPOTHYROIDISM, UNSPECIFIED TYPE: ICD-10-CM

## 2018-12-18 DIAGNOSIS — Z13.220 SCREENING CHOLESTEROL LEVEL: ICD-10-CM

## 2018-12-18 DIAGNOSIS — Z13.21 ENCOUNTER FOR VITAMIN DEFICIENCY SCREENING: ICD-10-CM

## 2018-12-18 PROCEDURE — G0008 ADMIN INFLUENZA VIRUS VAC: HCPCS | Performed by: INTERNAL MEDICINE

## 2018-12-18 PROCEDURE — 90662 IIV NO PRSV INCREASED AG IM: CPT | Performed by: INTERNAL MEDICINE

## 2018-12-18 PROCEDURE — 99214 OFFICE O/P EST MOD 30 MIN: CPT | Mod: 25 | Performed by: INTERNAL MEDICINE

## 2018-12-18 RX ORDER — LEVOTHYROXINE SODIUM 0.07 MG/1
TABLET ORAL
Qty: 90 TAB | Refills: 11 | Status: SHIPPED | OUTPATIENT
Start: 2018-12-18 | End: 2019-01-08 | Stop reason: SDUPTHER

## 2018-12-18 NOTE — PROGRESS NOTES
CC: Olga Rich is a 73 y.o. female is suffering from   Chief Complaint   Patient presents with   • Follow-Up     6 months         SUBJECTIVE:  1. Need for immunization against influenza  Olga is here for follow-up, needs immunization against influenza which was given.     2. Hypothyroidism, unspecified type  Patient has a history of hypothyroidism I recommended we recheck her free T4 TSH in 6 months    3. Encounter for vitamin deficiency screening  Recheck vitamin D    4. Screening cholesterol level  Screening cholesterol level ordered        Past social, family, history:   Social History   Substance Use Topics   • Smoking status: Former Smoker     Packs/day: 0.25     Years: 40.00     Types: Cigarettes     Quit date: 3/1/2014   • Smokeless tobacco: Never Used   • Alcohol use No      Comment: very rarely         MEDICATIONS:    Current Outpatient Prescriptions:   •  levothyroxine (SYNTHROID) 75 MCG Tab, TAKE ONE TABLET BY MOUTH ONE TIME DAILY, Disp: 90 Tab, Rfl: 11  •  temazepam (RESTORIL) 15 MG Cap, Take 1 Cap by mouth at bedtime as needed for Sleep for up to 150 days., Disp: 30 Cap, Rfl: 4  •  atorvastatin (LIPITOR) 40 MG Tab, Take 1 Tab by mouth every day., Disp: 90 Tab, Rfl: 3  •  lisinopril (PRINIVIL) 5 MG Tab, Take 1 Tab by mouth every day., Disp: 90 Tab, Rfl: 3  •  metoprolol SR (TOPROL XL) 25 MG TABLET SR 24 HR, Take 1 Tab by mouth every day., Disp: 90 Tab, Rfl: 3  •  montelukast (SINGULAIR) 10 MG Tab, TAKE ONE TABLET BY MOUTH ONE TIME DAILY, Disp: 30 Tab, Rfl: 10  •  fluticasone-salmeterol (ADVAIR) 250-50 MCG/DOSE AEROSOL POWDER, BREATH ACTIVATED, Inhale 1 Puff by mouth every 12 hours. (Patient taking differently: Inhale 1-2 Puffs by mouth every 12 hours. ONCE A DAY), Disp: 1 Inhaler, Rfl: 11  •  Denosumab (PROLIA SC), Inject 1 Dose as instructed every 6 months. Indications: twice a year, Disp: , Rfl:   •  aspirin (ASA) 325 MG Tab, Take 324 mg by mouth every day., Disp: , Rfl:   •   magnesium oxide (MAG-OX) 400 MG Tab, Take 400 mg by mouth every day., Disp: , Rfl:   •  Cholecalciferol (VITAMIN D) 2000 UNITS CAPS, Take  by mouth., Disp: , Rfl:   •  calcium carbonate (OS-DARIEN 500) 1250 MG TABS, Take 1,250 mg by mouth every day., Disp: , Rfl:   •  multivitamin (THERAGRAN) TABS, Take 1 Tab by mouth every day., Disp: , Rfl:   •  fluticasone-salmeterol (ADVAIR DISKUS) 250-50 MCG/DOSE AEROSOL POWDER, BREATH ACTIVATED, Inhale 1 Puff by mouth 2 times a day. Rinse mouth after each use, Disp: 1 Inhaler, Rfl: 11  •  montelukast (SINGULAIR) 10 MG Tab, Take 1 Tab by mouth every evening., Disp: 30 Tab, Rfl: 11    PROBLEMS:  Patient Active Problem List    Diagnosis Date Noted   • Mild intermittent asthma without complication 07/30/2018   • SOB (shortness of breath) 08/18/2017   • Chronic obstructive pulmonary disease (HCC) 08/18/2017   • Aortic valve stenosis, moderate 07/18/2017   • Abnormal echocardiogram 05/23/2017   • Insomnia 08/19/2016   • Dyslipidemia 04/26/2016   • Osteoporosis 07/18/2014   • Asthma 07/18/2014   • Hypothyroid 04/18/2014   • Screening for breast cancer 03/14/2014   • Environmental allergies 03/14/2014       REVIEW OF SYSTEMS:  Gen.:  No Nausea, Vomiting, fever, Chills.  Heart: No chest pain.  Lungs:  No shortness of Breath.  Psychological: Vinay unusual Anxiety depression     PHYSICAL EXAM   Constitutional: Alert, cooperative, not in acute distress.  Cardiovascular:  Rate Rhythm is regular without murmurs rubs clicks.     Thorax & Lungs: Clear to auscultation, no wheezing, rhonchi, or rales  HENT: Normocephalic, Atraumatic.  Eyes: PERRLA, EOMI, Conjunctiva normal.   Neck: Trachia is midline no swelling of the thyroid.   Lymphatic: No lymphadenopathy noted.   Neurologic: Alert & oriented x 3, cranial nerves II through XII are intact, Normal motor function, Normal sensory function, No focal deficits noted.   Psychiatric: Affect normal, Judgment normal, Mood normal.     VITAL SIGNS:BP  "125/85   Pulse 74   Temp 36.5 °C (97.7 °F) (Temporal)   Resp 12   Ht 1.473 m (4' 10\")   Wt 44 kg (97 lb)   SpO2 95%   BMI 20.27 kg/m²     Labs: Reviewed    Assessment:                                                     Plan:    1. Need for immunization against influenza  Vaccination given  - INFLUENZA VACCINE, HIGH DOSE (65+ ONLY)    2. Hypothyroidism, unspecified type  Continue Synthroid recheck level 6 months  - levothyroxine (SYNTHROID) 75 MCG Tab; TAKE ONE TABLET BY MOUTH ONE TIME DAILY  Dispense: 90 Tab; Refill: 11  - FREE THYROXINE; Future  - TSH; Future    3. Encounter for vitamin deficiency screening  Recheck vitamin D  - VITAMIN D,25 HYDROXY; Future    4. Screening cholesterol level  Recheck lipid profile also 6 months  - Lipid Profile; Future  - COMP METABOLIC PANEL; Future  - CBC WITH DIFFERENTIAL; Future        "

## 2019-01-03 ENCOUNTER — OFFICE VISIT (OUTPATIENT)
Dept: RHEUMATOLOGY | Facility: MEDICAL CENTER | Age: 74
End: 2019-01-03
Payer: MEDICARE

## 2019-01-03 VITALS
SYSTOLIC BLOOD PRESSURE: 140 MMHG | OXYGEN SATURATION: 94 % | BODY MASS INDEX: 20.06 KG/M2 | DIASTOLIC BLOOD PRESSURE: 80 MMHG | HEART RATE: 78 BPM | TEMPERATURE: 97.5 F | RESPIRATION RATE: 12 BRPM | WEIGHT: 96 LBS

## 2019-01-03 DIAGNOSIS — R06.02 SOB (SHORTNESS OF BREATH): ICD-10-CM

## 2019-01-03 DIAGNOSIS — E03.9 ACQUIRED HYPOTHYROIDISM: ICD-10-CM

## 2019-01-03 DIAGNOSIS — Z51.81 ENCOUNTER FOR MONITORING DENOSUMAB THERAPY: ICD-10-CM

## 2019-01-03 DIAGNOSIS — M81.0 AGE-RELATED OSTEOPOROSIS WITHOUT CURRENT PATHOLOGICAL FRACTURE: ICD-10-CM

## 2019-01-03 DIAGNOSIS — Z79.899 ENCOUNTER FOR MONITORING DENOSUMAB THERAPY: ICD-10-CM

## 2019-01-03 PROCEDURE — 99213 OFFICE O/P EST LOW 20 MIN: CPT | Performed by: INTERNAL MEDICINE

## 2019-01-03 NOTE — PROGRESS NOTES
Chief Complaint- joint pain    Subjective:   Olga Rich is a 73 y.o. female here today for follow up of rheumatological issues    This is a follow-up visit for this patient who we see for osteoporosis, osteoporosis was discovered in 2008 with patient suffered a right femur fracture.  Patient is currently on Prolia 60 mg subcu every 6 months, patient scheduled for her next Prolia injection January 18, 2019 through the infusion center.   Patient denies any side effects from the medication, denies any unexplained weight loss, denies any fevers of unknown etiology, denies any GI upset, denies any rashes, denies any new joint swelling, denies recurrent infections.  Patient denies any dental problems, denies any recent fractures.  Of note patient is due for a bone density scan.     Additional comorbidities include  hypothyroidism for which she is managed by her primary care doctor, also has asthma, patient denies any unexplained weight loss, denies any FUO, denies any new neurological symptoms, denies any recurrent infections.     Additional psychosocial comorbidities include alcoholism in the family but patient is quite happy with her son who is now 4 months sober, patient has a daughter who is 26 years sober.     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      S/p fosamax for 4 years      DEXA 4/2014 T scores -4.4, -3.9  DEXA 7/2016 T scores -3.7, -4.0  FRAX Major Osteoporotic 28.9%, Hip fx risk 11.1%  CTX 6/2015 50 normal  Next Prolia scheduled for January 18, 2019     Current medicines (including changes today)  Current Outpatient Prescriptions   Medication Sig Dispense Refill   • levothyroxine (SYNTHROID) 75 MCG Tab TAKE ONE TABLET BY MOUTH ONE TIME DAILY 90 Tab 11   • fluticasone-salmeterol (ADVAIR DISKUS) 250-50 MCG/DOSE AEROSOL POWDER, BREATH ACTIVATED Inhale 1 Puff by mouth 2 times a day. Rinse mouth after each use 1 Inhaler 11   • montelukast (SINGULAIR)  10 MG Tab Take 1 Tab by mouth every evening. 30 Tab 11   • atorvastatin (LIPITOR) 40 MG Tab Take 1 Tab by mouth every day. 90 Tab 3   • lisinopril (PRINIVIL) 5 MG Tab Take 1 Tab by mouth every day. 90 Tab 3   • metoprolol SR (TOPROL XL) 25 MG TABLET SR 24 HR Take 1 Tab by mouth every day. 90 Tab 3   • montelukast (SINGULAIR) 10 MG Tab TAKE ONE TABLET BY MOUTH ONE TIME DAILY 30 Tab 10   • fluticasone-salmeterol (ADVAIR) 250-50 MCG/DOSE AEROSOL POWDER, BREATH ACTIVATED Inhale 1 Puff by mouth every 12 hours. (Patient taking differently: Inhale 1-2 Puffs by mouth every 12 hours. ONCE A DAY) 1 Inhaler 11   • aspirin (ASA) 325 MG Tab Take 324 mg by mouth every day.     • magnesium oxide (MAG-OX) 400 MG Tab Take 400 mg by mouth every day.     • Cholecalciferol (VITAMIN D) 2000 UNITS CAPS Take  by mouth.     • calcium carbonate (OS-DARIEN 500) 1250 MG TABS Take 1,250 mg by mouth every day.     • multivitamin (THERAGRAN) TABS Take 1 Tab by mouth every day.     • Denosumab (PROLIA SC) Inject 1 Dose as instructed every 6 months. Indications: twice a year       No current facility-administered medications for this visit.      She  has a past medical history of Allergic rhinitis; Arthritis; Asthma in adult; Cataract (2/2012); Dental disorder; Dyslipidemia (4/26/2016); High cholesterol; Hypertension; Hypothyroid; Hypothyroid (4/18/2014); Hypothyroid (4/18/2014); Osteoporosis, unspecified; and Pneumonia (2009).    ROS   Other than what is mentioned in HPI or physical exam, there is no history of headaches, double vision or blurred vision. No temporal tenderness or jaw claudication. No history of cataracts or glaucoma. No trouble swallowing difficulties or sore throats.  No chest complaints including chest pain, cough or sputum production. No GI complaints including nausea, vomiting, change in bowel habits, or past peptic ulcer disease. No history of blood in the stools. No urinary complaints including dysuria or frequency. No history  of alopecia, photosensitivity, oral ulcerations, Raynaud's phenomena.       Objective:     Blood pressure 140/80, pulse 78, temperature 36.4 °C (97.5 °F), temperature source Temporal, resp. rate 12, weight 43.5 kg (96 lb), SpO2 94 %, not currently breastfeeding. Body mass index is 20.06 kg/m².   Physical Exam:    Constitutional: Alert and oriented X3, patient is talkative with good eye contact.Skin: Warm, dry, good turgor, no rashes in visible areas, no psoriatic lesions, no petechial lesions, no malar rashes  .Eye: Equal, round and reactive, conjunctiva clear, lids normal EOM intactENMT: Lips without lesions, good dentition, no oropharyngeal ulcers, moist buccal mucosa, pinna without deformityNeck: Trachea midline, no masses, no thyromegaly.Lymph:  No cervical lymphadenopathy, no axillary lymphadenopathy, no supraclavicular lymphadenopathyRespiratory: Unlabored respiratory effort, lungs clear to auscultation, no wheezes, no ronchi.Cardiovascular: Normal S1, S2, no murmur, no edema.Abdomen: Soft, non-tender, no masses, no hepatosplenomegaly.Psych: Alert and oriented x3, normal affect and mood.Neuro: Cranial nerves 2-12 are grossly intact, no loss of sensation LEExt:no joint laxity noted in bilateral arms, no joint laxity noted in bilateral legs, mild kyphosis of the upper back, joints without any synovitis, mild bony hypertrophy bilateral knees with crepitus but no effusions, no Achilles inflammation    Lab Results   Component Value Date/Time    SODIUM 141 06/18/2018 10:35 AM    POTASSIUM 4.3 06/18/2018 10:35 AM    CHLORIDE 106 06/18/2018 10:35 AM    CO2 27 06/18/2018 10:35 AM    GLUCOSE 88 06/18/2018 10:35 AM    BUN 21 06/18/2018 10:35 AM    CREATININE 0.84 06/18/2018 10:35 AM      Lab Results   Component Value Date/Time    WBC 7.0 04/20/2016 10:09 AM    RBC 4.99 04/20/2016 10:09 AM    HEMOGLOBIN 15.1 04/20/2016 10:09 AM    HEMATOCRIT 46.0 04/20/2016 10:09 AM    MCV 92.2 04/20/2016 10:09 AM    MCH 30.3 04/20/2016  10:09 AM    MCHC 32.8 (L) 04/20/2016 10:09 AM    MPV 9.4 04/20/2016 10:09 AM    NEUTSPOLYS 59.30 04/20/2016 10:09 AM    LYMPHOCYTES 26.80 04/20/2016 10:09 AM    MONOCYTES 7.70 04/20/2016 10:09 AM    EOSINOPHILS 4.70 04/20/2016 10:09 AM    BASOPHILS 0.90 04/20/2016 10:09 AM      Lab Results   Component Value Date/Time    CALCIUM 9.8 06/18/2018 10:35 AM    ASTSGOT 23 06/18/2018 10:35 AM    ALTSGPT 16 06/18/2018 10:35 AM    ALKPHOSPHAT 68 06/18/2018 10:35 AM    TBILIRUBIN 0.6 06/18/2018 10:35 AM    ALBUMIN 4.4 06/18/2018 10:35 AM    ALBUMIN 5.77 (H) 12/04/2014 03:10 PM    TOTPROTEIN 7.6 06/18/2018 10:35 AM    TOTPROTEIN 9.50 (H) 12/04/2014 03:10 PM     Lab Results   Component Value Date/Time    SEDRATEWES 22 12/04/2014 03:10 PM     Lab Results   Component Value Date/Time    CTELOPEP 50 06/09/2015 01:15 PM     Lab Results   Component Value Date/Time    PTHINTACT 48.8 12/09/2016 10:20 AM     Results for orders placed during the hospital encounter of 07/21/16   DS-BONE DENSITY STUDY (DEXA)    Impression 1.  According to the World Health Organization classification, bone mineral density of this patient is osteoporotic.    2.  Slight increase in lumbar spine bone density. No statistically significant change in left proximal femur bone density.        10-year Probability of Fracture:  Major Osteoporotic     28.9%  Hip     11.1%  Population      [place] ()    Based on left femur neck BMD          INTERPRETING LOCATION:  [address] [place] [address], [id number]      Assessment and Plan:     1. Age-related osteoporosis without current pathological fracture  Currently on Prolia 60 mg subcu every 6 months next shot due January 2018.  Last DEXA July 2016 will schedule DEXA for patient   continue calcium 1200 mg by mouth daily and vitamin D about 1000 units by mouth daily and magnesium 400 mg by mouth daily  - DS-BONE DENSITY STUDY (DEXA); Future    2. Encounter for monitoring denosumab therapy   Patient denies any side effects from  the medication, denies any unexplained weight loss, denies any fevers of unknown etiology, denies any GI upset, denies any rashes, denies any new joint swelling, denies recurrent infections.   - DS-BONE DENSITY STUDY (DEXA); Future    3. Acquired hypothyroidism  Can exacerbate joint pain, I recommend monitoring thyroid on a regular basis to assure it is not contributing to the patient's joint pain  - DS-BONE DENSITY STUDY (DEXA); Future    4. SOB (shortness of breath)  Secondary to asthma followed by pulmonology    Followup: Return in about 1 year (around 1/3/2020). or sooner hilton Ortezen Hialrio was seen 30 minutes face-to-face of which more than 50% of the time was spent counseling the patient (excluding time for procedures)  regarding  rheumatological condition and care. Therapy was discussed in detail.    Please note that this dictation was created using voice recognition software. I have made every reasonable attempt to correct obvious errors, but I expect that there are errors of grammar and possibly content that I did not discover before finalizing the note.

## 2019-01-03 NOTE — LETTER
[facility]   [address] [place] [address], [address]  [place] [address]  Phone: [phone]  Fax: [phone]              Encounter Date: 1/3/2019    Dear Dr. Brothers ref. provider found,    It was a pleasure seeing your patient, Olga Rich, on 1/3/2019. Diagnoses of Age-related osteoporosis without current pathological fracture, Encounter for monitoring denosumab therapy, Acquired hypothyroidism, and SOB (shortness of breath) were pertinent to this visit.     Please find attached progress note which includes the history I obtained from Ms. Rich, my physical examination findings, my impression and recommendations.      Once again, it was a pleasure participating in your patient's care.  Please feel free to contact me if you have any questions or if I can be of any further assistance to your patients.      Sincerely,    Rahel Thompson M.D.  Electronically Signed          PROGRESS NOTE:  No notes on file

## 2019-01-08 DIAGNOSIS — E03.9 HYPOTHYROIDISM, UNSPECIFIED TYPE: ICD-10-CM

## 2019-01-08 RX ORDER — LEVOTHYROXINE SODIUM 0.07 MG/1
TABLET ORAL
Qty: 30 TAB | Refills: 10 | Status: SHIPPED | OUTPATIENT
Start: 2019-01-08 | End: 2019-11-05 | Stop reason: SDUPTHER

## 2019-01-18 ENCOUNTER — HOSPITAL ENCOUNTER (OUTPATIENT)
Dept: RADIOLOGY | Facility: MEDICAL CENTER | Age: 74
End: 2019-01-18
Attending: INTERNAL MEDICINE
Payer: MEDICARE

## 2019-01-18 DIAGNOSIS — Z79.899 ENCOUNTER FOR MONITORING DENOSUMAB THERAPY: ICD-10-CM

## 2019-01-18 DIAGNOSIS — E03.9 ACQUIRED HYPOTHYROIDISM: ICD-10-CM

## 2019-01-18 DIAGNOSIS — Z51.81 ENCOUNTER FOR MONITORING DENOSUMAB THERAPY: ICD-10-CM

## 2019-01-18 DIAGNOSIS — M81.0 AGE-RELATED OSTEOPOROSIS WITHOUT CURRENT PATHOLOGICAL FRACTURE: ICD-10-CM

## 2019-01-18 DIAGNOSIS — Z12.31 VISIT FOR SCREENING MAMMOGRAM: ICD-10-CM

## 2019-01-18 PROCEDURE — 77063 BREAST TOMOSYNTHESIS BI: CPT

## 2019-01-18 PROCEDURE — 77080 DXA BONE DENSITY AXIAL: CPT

## 2019-01-21 ENCOUNTER — OUTPATIENT INFUSION SERVICES (OUTPATIENT)
Dept: ONCOLOGY | Facility: MEDICAL CENTER | Age: 74
End: 2019-01-21
Attending: INTERNAL MEDICINE
Payer: MEDICARE

## 2019-01-21 VITALS
BODY MASS INDEX: 20.55 KG/M2 | SYSTOLIC BLOOD PRESSURE: 138 MMHG | HEART RATE: 75 BPM | TEMPERATURE: 97.8 F | HEIGHT: 57 IN | DIASTOLIC BLOOD PRESSURE: 63 MMHG | OXYGEN SATURATION: 92 % | RESPIRATION RATE: 18 BRPM | WEIGHT: 95.24 LBS

## 2019-01-21 LAB
CA-I BLD ISE-SCNC: 1.06 MMOL/L (ref 1.1–1.3)
CREAT BLD-MCNC: 0.7 MG/DL (ref 0.5–1.4)

## 2019-01-21 PROCEDURE — 96401 CHEMO ANTI-NEOPL SQ/IM: CPT

## 2019-01-21 PROCEDURE — 82565 ASSAY OF CREATININE: CPT

## 2019-01-21 PROCEDURE — 82330 ASSAY OF CALCIUM: CPT

## 2019-01-21 PROCEDURE — 700111 HCHG RX REV CODE 636 W/ 250 OVERRIDE (IP): Mod: JG | Performed by: INTERNAL MEDICINE

## 2019-01-21 RX ADMIN — DENOSUMAB 60 MG: 60 INJECTION SUBCUTANEOUS at 17:04

## 2019-01-21 ASSESSMENT — PAIN SCALES - GENERAL: PAINLEVEL_OUTOF10: 0

## 2019-01-22 NOTE — PROGRESS NOTES
Pt arrived ambulatory to [facility] for q 6 month Prolia injection.  POC discussed, pt denies active infections or recent/upcoming invasive dental procedures.  Labs drawn per policy from [facility], results reviewed, parameters met for treatment today.  Pt confirms taking recommended amounts of daily vitamin d and calcium supplements.  Prolia given as ordered to back of L arm, site covered with adhesive bandage.  Pt tolerated well.  Pt given phone number to schedule next appointment.  Pt discharged from [facility] in NAD under self care.

## 2019-01-22 NOTE — PROGRESS NOTES
Pharmacy note  Cr = 0.7, CrCl ~ 47 ml/min  Ionized Ca = 1.06  No s/sx of hypocalcemia today. Patient to maintain supplemental calcium 1000 mg daily and vitamin D 400 units daily  OK for denosumab (Prolia) 60 mg SQ today

## 2019-03-22 DIAGNOSIS — E78.5 DYSLIPIDEMIA: ICD-10-CM

## 2019-03-22 DIAGNOSIS — Z82.49 FAMILY HISTORY OF ATHEROSCLEROSIS: ICD-10-CM

## 2019-03-22 DIAGNOSIS — I25.10 ATHEROSCLEROSIS OF NATIVE CORONARY ARTERY OF NATIVE HEART WITHOUT ANGINA PECTORIS: ICD-10-CM

## 2019-03-22 RX ORDER — ATORVASTATIN CALCIUM 40 MG/1
40 TABLET, FILM COATED ORAL DAILY
Qty: 100 TAB | Refills: 2 | Status: SHIPPED | OUTPATIENT
Start: 2019-03-22 | End: 2019-04-19 | Stop reason: SDUPTHER

## 2019-04-16 DIAGNOSIS — Z82.49 FAMILY HISTORY OF ATHEROSCLEROSIS: ICD-10-CM

## 2019-04-16 DIAGNOSIS — I25.10 ATHEROSCLEROSIS OF NATIVE CORONARY ARTERY OF NATIVE HEART WITHOUT ANGINA PECTORIS: ICD-10-CM

## 2019-04-16 DIAGNOSIS — E78.5 DYSLIPIDEMIA: ICD-10-CM

## 2019-04-16 RX ORDER — LISINOPRIL 5 MG/1
5 TABLET ORAL DAILY
Qty: 100 TAB | Refills: 3 | Status: SHIPPED | OUTPATIENT
Start: 2019-04-16 | End: 2019-04-19 | Stop reason: SDUPTHER

## 2019-04-19 ENCOUNTER — OFFICE VISIT (OUTPATIENT)
Dept: CARDIOLOGY | Facility: MEDICAL CENTER | Age: 74
End: 2019-04-19
Payer: MEDICARE

## 2019-04-19 VITALS
HEIGHT: 57 IN | HEART RATE: 75 BPM | WEIGHT: 98.6 LBS | SYSTOLIC BLOOD PRESSURE: 122 MMHG | BODY MASS INDEX: 21.27 KG/M2 | OXYGEN SATURATION: 95 % | DIASTOLIC BLOOD PRESSURE: 80 MMHG

## 2019-04-19 DIAGNOSIS — E78.5 DYSLIPIDEMIA: ICD-10-CM

## 2019-04-19 DIAGNOSIS — Z82.49 FAMILY HISTORY OF ATHEROSCLEROSIS: ICD-10-CM

## 2019-04-19 DIAGNOSIS — I25.10 ATHEROSCLEROSIS OF NATIVE CORONARY ARTERY OF NATIVE HEART WITHOUT ANGINA PECTORIS: ICD-10-CM

## 2019-04-19 DIAGNOSIS — I35.0 AORTIC VALVE STENOSIS, MODERATE: ICD-10-CM

## 2019-04-19 PROCEDURE — 99214 OFFICE O/P EST MOD 30 MIN: CPT | Performed by: INTERNAL MEDICINE

## 2019-04-19 RX ORDER — LISINOPRIL 5 MG/1
5 TABLET ORAL DAILY
Qty: 100 TAB | Refills: 3 | Status: SHIPPED | OUTPATIENT
Start: 2019-04-19 | End: 2020-02-26 | Stop reason: SDUPTHER

## 2019-04-19 RX ORDER — METOPROLOL SUCCINATE 25 MG/1
25 TABLET, EXTENDED RELEASE ORAL DAILY
Qty: 90 TAB | Refills: 3 | Status: SHIPPED | OUTPATIENT
Start: 2019-04-19 | End: 2020-02-26 | Stop reason: SDUPTHER

## 2019-04-19 RX ORDER — ATORVASTATIN CALCIUM 40 MG/1
40 TABLET, FILM COATED ORAL DAILY
Qty: 100 TAB | Refills: 2 | Status: SHIPPED | OUTPATIENT
Start: 2019-04-19 | End: 2020-02-26 | Stop reason: SDUPTHER

## 2019-04-19 ASSESSMENT — ENCOUNTER SYMPTOMS
SPEECH CHANGE: 0
EYE DISCHARGE: 0
ABDOMINAL PAIN: 0
HEADACHES: 0
COUGH: 0
ORTHOPNEA: 0
VOMITING: 0
DEPRESSION: 0
PND: 0
HALLUCINATIONS: 0
MYALGIAS: 0
BLOOD IN STOOL: 0
PALPITATIONS: 0
CLAUDICATION: 0
SENSORY CHANGE: 0
BLURRED VISION: 0
BRUISES/BLEEDS EASILY: 0
DOUBLE VISION: 0
FEVER: 0
DIZZINESS: 0
NAUSEA: 0
EYE PAIN: 0
SHORTNESS OF BREATH: 0
LOSS OF CONSCIOUSNESS: 0
WEIGHT LOSS: 0
CHILLS: 0
FALLS: 0

## 2019-04-19 NOTE — PROGRESS NOTES
Chief Complaint   Patient presents with   • Follow-Up     6 M       Subjective:   Olga Rich is a 74 y.o. female who presents today for cardiac care and evaluation because of an abnormal calcium scan in the past. At the last visit, patient was deemed to be asymptomatic and no further cardiac workup was entertained. We optimized her medical therapy.     In the interim, patient has been doing well without having any symptoms. Patient denies having chest pain, dyspnea, palpitation, presyncope, syncope episodes. Able to climb up at least 2 flights of stairs.     She was found to have aortic stenosis for which she underwent TTE and SANGEETA in 05/2017.    Past Medical History:   Diagnosis Date   • Allergic rhinitis    • Arthritis     fingers   • Asthma in adult    • Cataract 2/2012    Bilat    • Dental disorder     partial upper   • Dyslipidemia 4/26/2016   • High cholesterol    • Hypertension    • Hypothyroid    • Hypothyroid 4/18/2014   • Hypothyroid 4/18/2014   • Osteoporosis, unspecified    • Pneumonia 2009     Past Surgical History:   Procedure Laterality Date   • CATARACT PHACO WITH IOL      Dr. Vargas   • FEMUR ORIF      titamium Dr. Mares.      Family History   Problem Relation Age of Onset   • Other Mother 97        old age.    • Heart Attack Father 56        MI   • Heart Disease Father    • Other Son         ETOH   • Other Son         ETOH   • Heart Disease Sister 53   • Heart Attack Sister 85   • No Known Problems Brother    • No Known Problems Sister    • No Known Problems Sister      Social History     Social History   • Marital status:      Spouse name: N/A   • Number of children: N/A   • Years of education: N/A     Occupational History   • Not on file.     Social History Main Topics   • Smoking status: Former Smoker     Packs/day: 0.25     Years: 40.00     Types: Cigarettes     Quit date: 3/1/2014   • Smokeless tobacco: Never Used   • Alcohol use No      Comment: very rarely   • Drug  use: Yes     Types: Marijuana      Comment: occasionally for glaucoma which runs in her family/ per pt no    • Sexual activity: No     Other Topics Concern   • Not on file     Social History Narrative   • No narrative on file     Allergies   Allergen Reactions   • Demerol Anaphylaxis     Outpatient Encounter Prescriptions as of 4/19/2019   Medication Sig Dispense Refill   • lisinopril (PRINIVIL) 5 MG Tab Take 1 Tab by mouth every day. 100 Tab 3   • atorvastatin (LIPITOR) 40 MG Tab Take 1 Tab by mouth every day. 100 Tab 2   • levothyroxine (SYNTHROID) 75 MCG Tab TAKE ONE TABLET BY MOUTH ONE TIME DAILY 30 Tab 10   • metoprolol SR (TOPROL XL) 25 MG TABLET SR 24 HR Take 1 Tab by mouth every day. 90 Tab 3   • montelukast (SINGULAIR) 10 MG Tab TAKE ONE TABLET BY MOUTH ONE TIME DAILY 30 Tab 10   • fluticasone-salmeterol (ADVAIR) 250-50 MCG/DOSE AEROSOL POWDER, BREATH ACTIVATED Inhale 1 Puff by mouth every 12 hours. (Patient taking differently: Inhale 1-2 Puffs by mouth every 12 hours. ONCE A DAY) 1 Inhaler 11   • Denosumab (PROLIA SC) Inject 1 Dose as instructed every 6 months. Indications: twice a year     • aspirin (ASA) 325 MG Tab Take 324 mg by mouth every day.     • magnesium oxide (MAG-OX) 400 MG Tab Take 400 mg by mouth every day.     • Cholecalciferol (VITAMIN D) 2000 UNITS CAPS Take  by mouth.     • calcium carbonate (OS-DARIEN 500) 1250 MG TABS Take 1,250 mg by mouth every day.     • multivitamin (THERAGRAN) TABS Take 1 Tab by mouth every day.     • fluticasone-salmeterol (ADVAIR DISKUS) 250-50 MCG/DOSE AEROSOL POWDER, BREATH ACTIVATED Inhale 1 Puff by mouth 2 times a day. Rinse mouth after each use (Patient not taking: Reported on 4/19/2019) 1 Inhaler 11   • montelukast (SINGULAIR) 10 MG Tab Take 1 Tab by mouth every evening. (Patient not taking: Reported on 4/19/2019) 30 Tab 11     No facility-administered encounter medications on file as of 4/19/2019.      Review of Systems   Constitutional: Negative for chills,  "fever, malaise/fatigue and weight loss.   HENT: Negative for ear discharge, ear pain, hearing loss and nosebleeds.    Eyes: Negative for blurred vision, double vision, pain and discharge.   Respiratory: Negative for cough and shortness of breath.    Cardiovascular: Negative for chest pain, palpitations, orthopnea, claudication, leg swelling and PND.   Gastrointestinal: Negative for abdominal pain, blood in stool, melena, nausea and vomiting.   Genitourinary: Negative for dysuria and hematuria.   Musculoskeletal: Negative for falls, joint pain and myalgias.   Skin: Negative for itching and rash.   Neurological: Negative for dizziness, sensory change, speech change, loss of consciousness and headaches.   Endo/Heme/Allergies: Negative for environmental allergies. Does not bruise/bleed easily.   Psychiatric/Behavioral: Negative for depression, hallucinations and suicidal ideas.        Objective:   /80 (BP Location: Left arm, Patient Position: Sitting, BP Cuff Size: Adult)   Pulse 75   Ht 1.46 m (4' 9.48\")   Wt 44.7 kg (98 lb 9.6 oz)   SpO2 95%   BMI 20.98 kg/m²     Physical Exam   Constitutional: She is oriented to person, place, and time. No distress.   HENT:   Head: Normocephalic and atraumatic.   Right Ear: External ear normal.   Left Ear: External ear normal.   Eyes: Right eye exhibits no discharge. Left eye exhibits no discharge.   Neck: No JVD present. No thyromegaly present.   Cardiovascular: Normal rate, regular rhythm and intact distal pulses.  Exam reveals no gallop and no friction rub.    Murmur heard.  there is 2-3/6 systolic murmur heard best at the right border of the aortic valve area. The murmur does not radiate to the carotids.     Pulmonary/Chest: Breath sounds normal. No respiratory distress.   Abdominal: Bowel sounds are normal. She exhibits no distension. There is no tenderness.   Musculoskeletal: She exhibits no edema or tenderness.   Neurological: She is alert and oriented to person, " place, and time. No cranial nerve deficit.   Skin: Skin is warm and dry. She is not diaphoretic.   Psychiatric: She has a normal mood and affect. Her behavior is normal.   Nursing note and vitals reviewed.      Assessment:     1. Atherosclerosis of native coronary artery of native heart without angina pectoris  Comp Metabolic Panel    LIPID PANEL   2. Dyslipidemia  Comp Metabolic Panel    LIPID PANEL   3. Family history of atherosclerosis  Comp Metabolic Panel    LIPID PANEL   4. Aortic valve stenosis, moderate  Comp Metabolic Panel    LIPID PANEL       Medical Decision Making:  Today's Assessment / Status / Plan:   At this time patient is clinically stable in terms of her cardiac standpoint.  Cont current medications at current dose.   Does not want to be referred to valve program as she is feeling well. She prefers to see me at this time.     I will see patient back in clinic with lab tests and studies results in 6 months.     I thank you Dr. Flor for referring patient to our Cardiology Clinic today.

## 2019-04-19 NOTE — LETTER
[facility]   [address], [facility] [address]  [place] [address]  Phone: [phone]  Fax: [phone]              Olga Rich  1945    Encounter Date: 4/19/2019    Bruno Hood M.D.          PROGRESS NOTE:  Chief Complaint   Patient presents with   • Follow-Up     6 M       Subjective:   Olga Rich is a 74 y.o. female who presents today for cardiac care and evaluation because of an abnormal calcium scan in the past. At the last visit, patient was deemed to be asymptomatic and no further cardiac workup was entertained. We optimized her medical therapy.     In the interim, patient has been doing well without having any symptoms. Patient denies having chest pain, dyspnea, palpitation, presyncope, syncope episodes. Able to climb up at least 2 flights of stairs.     She was found to have aortic stenosis for which she underwent TTE and SANGEETA in 05/2017.    Past Medical History:   Diagnosis Date   • Allergic rhinitis    • Arthritis     fingers   • Asthma in adult    • Cataract 2/2012    Bilat    • Dental disorder     partial upper   • Dyslipidemia 4/26/2016   • High cholesterol    • Hypertension    • Hypothyroid    • Hypothyroid 4/18/2014   • Hypothyroid 4/18/2014   • Osteoporosis, unspecified    • Pneumonia 2009     Past Surgical History:   Procedure Laterality Date   • CATARACT PHACO WITH IOL      Dr. Vargas   • FEMUR ORIF      titamium Dr. Mares.      Family History   Problem Relation Age of Onset   • Other Mother 97        old age.    • Heart Attack Father 56        MI   • Heart Disease Father    • Other Son         ETOH   • Other Son         ETOH   • Heart Disease Sister 53   • Heart Attack Sister 85   • No Known Problems Brother    • No Known Problems Sister    • No Known Problems Sister      Social History     Social History   • Marital status:      Spouse name: N/A   • Number of children: N/A   • Years of education: N/A          Occupational History   • Not on file.     Social History Main Topics   • Smoking status: Former Smoker     Packs/day: 0.25     Years: 40.00     Types: Cigarettes     Quit date: 3/1/2014   • Smokeless tobacco: Never Used   • Alcohol use No      Comment: very rarely   • Drug use: Yes     Types: Marijuana      Comment: occasionally for glaucoma which runs in her family/ per pt no    • Sexual activity: No     Other Topics Concern   • Not on file     Social History Narrative   • No narrative on file     Allergies   Allergen Reactions   • Demerol Anaphylaxis     Outpatient Encounter Prescriptions as of 4/19/2019   Medication Sig Dispense Refill   • lisinopril (PRINIVIL) 5 MG Tab Take 1 Tab by mouth every day. 100 Tab 3   • atorvastatin (LIPITOR) 40 MG Tab Take 1 Tab by mouth every day. 100 Tab 2   • levothyroxine (SYNTHROID) 75 MCG Tab TAKE ONE TABLET BY MOUTH ONE TIME DAILY 30 Tab 10   • metoprolol SR (TOPROL XL) 25 MG TABLET SR 24 HR Take 1 Tab by mouth every day. 90 Tab 3   • montelukast (SINGULAIR) 10 MG Tab TAKE ONE TABLET BY MOUTH ONE TIME DAILY 30 Tab 10   • fluticasone-salmeterol (ADVAIR) 250-50 MCG/DOSE AEROSOL POWDER, BREATH ACTIVATED Inhale 1 Puff by mouth every 12 hours. (Patient taking differently: Inhale 1-2 Puffs by mouth every 12 hours. ONCE A DAY) 1 Inhaler 11   • Denosumab (PROLIA SC) Inject 1 Dose as instructed every 6 months. Indications: twice a year     • aspirin (ASA) 325 MG Tab Take 324 mg by mouth every day.     • magnesium oxide (MAG-OX) 400 MG Tab Take 400 mg by mouth every day.     • Cholecalciferol (VITAMIN D) 2000 UNITS CAPS Take  by mouth.     • calcium carbonate (OS-DARIEN 500) 1250 MG TABS Take 1,250 mg by mouth every day.     • multivitamin (THERAGRAN) TABS Take 1 Tab by mouth every day.     • fluticasone-salmeterol (ADVAIR DISKUS) 250-50 MCG/DOSE AEROSOL POWDER, BREATH ACTIVATED Inhale 1 Puff by mouth 2 times a day. Rinse mouth after each use (Patient not taking: Reported on 4/19/2019)  "1 Inhaler 11   • montelukast (SINGULAIR) 10 MG Tab Take 1 Tab by mouth every evening. (Patient not taking: Reported on 4/19/2019) 30 Tab 11     No facility-administered encounter medications on file as of 4/19/2019.      Review of Systems   Constitutional: Negative for chills, fever, malaise/fatigue and weight loss.   HENT: Negative for ear discharge, ear pain, hearing loss and nosebleeds.    Eyes: Negative for blurred vision, double vision, pain and discharge.   Respiratory: Negative for cough and shortness of breath.    Cardiovascular: Negative for chest pain, palpitations, orthopnea, claudication, leg swelling and PND.   Gastrointestinal: Negative for abdominal pain, blood in stool, melena, nausea and vomiting.   Genitourinary: Negative for dysuria and hematuria.   Musculoskeletal: Negative for falls, joint pain and myalgias.   Skin: Negative for itching and rash.   Neurological: Negative for dizziness, sensory change, speech change, loss of consciousness and headaches.   Endo/Heme/Allergies: Negative for environmental allergies. Does not bruise/bleed easily.   Psychiatric/Behavioral: Negative for depression, hallucinations and suicidal ideas.        Objective:   /80 (BP Location: Left arm, Patient Position: Sitting, BP Cuff Size: Adult)   Pulse 75   Ht 1.46 m (4' 9.48\")   Wt 44.7 kg (98 lb 9.6 oz)   SpO2 95%   BMI 20.98 kg/m²      Physical Exam   Constitutional: She is oriented to person, place, and time. No distress.   HENT:   Head: Normocephalic and atraumatic.   Right Ear: External ear normal.   Left Ear: External ear normal.   Eyes: Right eye exhibits no discharge. Left eye exhibits no discharge.   Neck: No JVD present. No thyromegaly present.   Cardiovascular: Normal rate, regular rhythm and intact distal pulses.  Exam reveals no gallop and no friction rub.    Murmur heard.  there is 2-3/6 systolic murmur heard best at the right border of the aortic valve area. The murmur does not radiate to the " carotids.     Pulmonary/Chest: Breath sounds normal. No respiratory distress.   Abdominal: Bowel sounds are normal. She exhibits no distension. There is no tenderness.   Musculoskeletal: She exhibits no edema or tenderness.   Neurological: She is alert and oriented to person, place, and time. No cranial nerve deficit.   Skin: Skin is warm and dry. She is not diaphoretic.   Psychiatric: She has a normal mood and affect. Her behavior is normal.   Nursing note and vitals reviewed.      Assessment:     1. Atherosclerosis of native coronary artery of native heart without angina pectoris  Comp Metabolic Panel    LIPID PANEL   2. Dyslipidemia  Comp Metabolic Panel    LIPID PANEL   3. Family history of atherosclerosis  Comp Metabolic Panel    LIPID PANEL   4. Aortic valve stenosis, moderate  Comp Metabolic Panel    LIPID PANEL       Medical Decision Making:  Today's Assessment / Status / Plan:   At this time patient is clinically stable in terms of her cardiac standpoint.  Cont current medications at current dose.   Does not want to be referred to valve program as she is feeling well. She prefers to see me at this time.     I will see patient back in clinic with lab tests and studies results in 6 months.     I thank you Dr. Flor for referring patient to our Cardiology Clinic today.         Abdon Hilario, SALBAD[place].  [id number] [address]  VIA In Basket

## 2019-06-11 ENCOUNTER — TELEPHONE (OUTPATIENT)
Dept: MEDICAL GROUP | Facility: LAB | Age: 74
End: 2019-06-11

## 2019-06-11 NOTE — TELEPHONE ENCOUNTER
ESTABLISHED PATIENT PRE-VISIT PLANNING     Patient was NOT contacted to complete PVP.     Note: Patient will not be contacted if there is no indication to call.     1.  Reviewed notes from the last few office visits within the medical group: Yes    2.  If any orders were placed at last visit or intended to be done for this visit (i.e. 6 mos follow-up), do we have Results/Consult Notes?        •  Labs - Labs ordered, NOT completed. Patient advised to complete prior to next appointment.   Note: If patient appointment is for lab review and patient did not complete labs, check with provider if OK to reschedule patient until labs completed.       •  Imaging - Imaging was not ordered at last office visit.       •  Referrals - No referrals were ordered at last office visit.    3. Is this appointment scheduled as a Hospital Follow-Up? No    4.  Immunizations were updated in Epic using WebIZ?: Epic matches WebIZ       •  Web Iz Recommendations: TD and SHINGRIX (Shingles)    5.  Patient is due for the following Health Maintenance Topics:   Health Maintenance Due   Topic Date Due   • Annual Wellness Visit  1945   • PFT SCREENING-FEV1 AND FEV/FVC RATIO / SPIROMETRY SHOULD BE PERFORMED ANNUALLY  03/07/1961   • IMM ZOSTER VACCINES (2 of 3) 04/26/2017   • COLONOSCOPY  04/18/2019       - Patient has completed FLU, PNEUMOVAX (PPSV23), PREVNAR (PCV13) , TDAP and SHINGRIX (Shingles) Immunization(s) per WebIZ. Chart has been updated.    6. Orders for overdue Health Maintenance topics pended in Pre-Charting? N\A    7.  AHA (MDX) form printed for Provider? YES    8.  Patient was NOT informed to arrive 15 min prior to their scheduled appointment and bring in their medication bottles.

## 2019-06-12 ENCOUNTER — HOSPITAL ENCOUNTER (OUTPATIENT)
Dept: LAB | Facility: MEDICAL CENTER | Age: 74
End: 2019-06-12
Attending: INTERNAL MEDICINE
Payer: MEDICARE

## 2019-06-12 DIAGNOSIS — Z13.21 ENCOUNTER FOR VITAMIN DEFICIENCY SCREENING: ICD-10-CM

## 2019-06-12 DIAGNOSIS — E03.9 HYPOTHYROIDISM, UNSPECIFIED TYPE: ICD-10-CM

## 2019-06-12 DIAGNOSIS — Z13.220 SCREENING CHOLESTEROL LEVEL: ICD-10-CM

## 2019-06-12 LAB
ALBUMIN SERPL BCP-MCNC: 4.5 G/DL (ref 3.2–4.9)
ALBUMIN/GLOB SERPL: 1.3 G/DL
ALP SERPL-CCNC: 68 U/L (ref 30–99)
ALT SERPL-CCNC: 62 U/L (ref 2–50)
ANION GAP SERPL CALC-SCNC: 7 MMOL/L (ref 0–11.9)
AST SERPL-CCNC: 46 U/L (ref 12–45)
BASOPHILS # BLD AUTO: 0.8 % (ref 0–1.8)
BASOPHILS # BLD: 0.05 K/UL (ref 0–0.12)
BILIRUB SERPL-MCNC: 0.6 MG/DL (ref 0.1–1.5)
BUN SERPL-MCNC: 19 MG/DL (ref 8–22)
CALCIUM SERPL-MCNC: 9.9 MG/DL (ref 8.5–10.5)
CHLORIDE SERPL-SCNC: 105 MMOL/L (ref 96–112)
CHOLEST SERPL-MCNC: 192 MG/DL (ref 100–199)
CO2 SERPL-SCNC: 27 MMOL/L (ref 20–33)
CREAT SERPL-MCNC: 0.93 MG/DL (ref 0.5–1.4)
EOSINOPHIL # BLD AUTO: 0.19 K/UL (ref 0–0.51)
EOSINOPHIL NFR BLD: 3.2 % (ref 0–6.9)
ERYTHROCYTE [DISTWIDTH] IN BLOOD BY AUTOMATED COUNT: 49.7 FL (ref 35.9–50)
FASTING STATUS PATIENT QL REPORTED: NORMAL
GLOBULIN SER CALC-MCNC: 3.6 G/DL (ref 1.9–3.5)
GLUCOSE SERPL-MCNC: 89 MG/DL (ref 65–99)
HCT VFR BLD AUTO: 45.3 % (ref 37–47)
HDLC SERPL-MCNC: 77 MG/DL
HGB BLD-MCNC: 14.3 G/DL (ref 12–16)
IMM GRANULOCYTES # BLD AUTO: 0.01 K/UL (ref 0–0.11)
IMM GRANULOCYTES NFR BLD AUTO: 0.2 % (ref 0–0.9)
LDLC SERPL CALC-MCNC: 96 MG/DL
LYMPHOCYTES # BLD AUTO: 1.76 K/UL (ref 1–4.8)
LYMPHOCYTES NFR BLD: 29.2 % (ref 22–41)
MCH RBC QN AUTO: 30.1 PG (ref 27–33)
MCHC RBC AUTO-ENTMCNC: 31.6 G/DL (ref 33.6–35)
MCV RBC AUTO: 95.4 FL (ref 81.4–97.8)
MONOCYTES # BLD AUTO: 0.57 K/UL (ref 0–0.85)
MONOCYTES NFR BLD AUTO: 9.5 % (ref 0–13.4)
NEUTROPHILS # BLD AUTO: 3.45 K/UL (ref 2–7.15)
NEUTROPHILS NFR BLD: 57.1 % (ref 44–72)
NRBC # BLD AUTO: 0 K/UL
NRBC BLD-RTO: 0 /100 WBC
PLATELET # BLD AUTO: 366 K/UL (ref 164–446)
PMV BLD AUTO: 9.5 FL (ref 9–12.9)
POTASSIUM SERPL-SCNC: 4.1 MMOL/L (ref 3.6–5.5)
PROT SERPL-MCNC: 8.1 G/DL (ref 6–8.2)
RBC # BLD AUTO: 4.75 M/UL (ref 4.2–5.4)
SODIUM SERPL-SCNC: 139 MMOL/L (ref 135–145)
TRIGL SERPL-MCNC: 96 MG/DL (ref 0–149)
WBC # BLD AUTO: 6 K/UL (ref 4.8–10.8)

## 2019-06-12 PROCEDURE — 84439 ASSAY OF FREE THYROXINE: CPT

## 2019-06-12 PROCEDURE — 82306 VITAMIN D 25 HYDROXY: CPT

## 2019-06-12 PROCEDURE — 36415 COLL VENOUS BLD VENIPUNCTURE: CPT

## 2019-06-12 PROCEDURE — 80053 COMPREHEN METABOLIC PANEL: CPT

## 2019-06-12 PROCEDURE — 80061 LIPID PANEL: CPT

## 2019-06-12 PROCEDURE — 84443 ASSAY THYROID STIM HORMONE: CPT

## 2019-06-12 PROCEDURE — 85025 COMPLETE CBC W/AUTO DIFF WBC: CPT

## 2019-06-13 LAB
25(OH)D3 SERPL-MCNC: 54 NG/ML (ref 30–100)
T4 FREE SERPL-MCNC: 1.44 NG/DL (ref 0.53–1.43)
TSH SERPL DL<=0.005 MIU/L-ACNC: 0.07 UIU/ML (ref 0.38–5.33)

## 2019-06-19 ENCOUNTER — OFFICE VISIT (OUTPATIENT)
Dept: MEDICAL GROUP | Facility: LAB | Age: 74
End: 2019-06-19
Payer: MEDICARE

## 2019-06-19 VITALS
DIASTOLIC BLOOD PRESSURE: 60 MMHG | HEIGHT: 58 IN | WEIGHT: 98 LBS | RESPIRATION RATE: 14 BRPM | OXYGEN SATURATION: 92 % | SYSTOLIC BLOOD PRESSURE: 110 MMHG | BODY MASS INDEX: 20.57 KG/M2 | HEART RATE: 63 BPM | TEMPERATURE: 97.7 F

## 2019-06-19 DIAGNOSIS — I25.10 ATHEROSCLEROSIS OF NATIVE CORONARY ARTERY OF NATIVE HEART WITHOUT ANGINA PECTORIS: ICD-10-CM

## 2019-06-19 DIAGNOSIS — K43.9 HERNIA OF ANTERIOR ABDOMINAL WALL: ICD-10-CM

## 2019-06-19 DIAGNOSIS — J44.9 CHRONIC OBSTRUCTIVE PULMONARY DISEASE, UNSPECIFIED COPD TYPE (HCC): ICD-10-CM

## 2019-06-19 PROCEDURE — 8041 PR SCP AHA: Performed by: INTERNAL MEDICINE

## 2019-06-19 PROCEDURE — 99214 OFFICE O/P EST MOD 30 MIN: CPT | Mod: 25 | Performed by: INTERNAL MEDICINE

## 2019-06-19 ASSESSMENT — PATIENT HEALTH QUESTIONNAIRE - PHQ9: CLINICAL INTERPRETATION OF PHQ2 SCORE: 0

## 2019-06-19 NOTE — PROGRESS NOTES
Subjective:     Olga Rich is a 74 y.o. female here today for chronic obstructive pulmonary disease and Annual Health Assessment.    Patient and I have reviewed her historical records that show that she does have a history of COPD.  Patient does not want to undergo any additional testing.    Health Maintenance Summary                Annual Wellness Visit Overdue 1945     PFT SCREENING-FEV1 AND FEV/FVC RATIO / SPIROMETRY SHOULD BE PERFORMED ANNUALLY Overdue 3/7/1961     IMM ZOSTER VACCINES Overdue 4/26/2017      Done 3/1/2017 Imm Admin: Zoster Vaccine Live (ZVL) (Zostavax)    COLONOSCOPY Overdue 4/18/2019      Done 4/18/2009 [facility] GI consult    MAMMOGRAM Next Due 1/18/2020      Done 1/18/2019 MA-SCREENING MAMMO BILAT W/TOMOSYNTHESIS W/CAD     Patient has more history with this topic...    BONE DENSITY Next Due 1/18/2024      Done 1/18/2019 DS-BONE DENSITY STUDY (DEXA)     Patient has more history with this topic...    IMM DTaP/Tdap/Td Vaccine Next Due 3/1/2027      Done 3/1/2017 Imm Admin: Tdap Vaccine            Annual Health Assessment Questions:      1.  Are you currently engaging in any exercise or physical activity? Yes    2.  How would you describe your mood or emotional well-being today? good    3.  Have you had any falls in the last year? No    4.  Have you noticed any problems with your balance or had difficulty walking? No    5.  In the last six months have you experienced any leakage of urine? No    6. DPA/Advanced Directive: Patient has Advanced Directive on file.     Current medicines (including changes today)  Current Outpatient Prescriptions   Medication Sig Dispense Refill   • atorvastatin (LIPITOR) 40 MG Tab Take 1 Tab by mouth every day. 100 Tab 2   • metoprolol SR (TOPROL XL) 25 MG TABLET SR 24 HR Take 1 Tab by mouth every day. 90 Tab 3   • lisinopril (PRINIVIL) 5 MG Tab Take 1 Tab by mouth every day. 100 Tab 3   • levothyroxine (SYNTHROID) 75 MCG Tab TAKE ONE TABLET BY  MOUTH ONE TIME DAILY 30 Tab 10   • montelukast (SINGULAIR) 10 MG Tab TAKE ONE TABLET BY MOUTH ONE TIME DAILY 30 Tab 10   • fluticasone-salmeterol (ADVAIR) 250-50 MCG/DOSE AEROSOL POWDER, BREATH ACTIVATED Inhale 1 Puff by mouth every 12 hours. (Patient taking differently: Inhale 1-2 Puffs by mouth every 12 hours. ONCE A DAY) 1 Inhaler 11   • Denosumab (PROLIA SC) Inject 1 Dose as instructed every 6 months. Indications: twice a year     • aspirin (ASA) 325 MG Tab Take 324 mg by mouth every day.     • magnesium oxide (MAG-OX) 400 MG Tab Take 400 mg by mouth every day.     • Cholecalciferol (VITAMIN D) 2000 UNITS CAPS Take  by mouth.     • calcium carbonate (OS-DARIEN 500) 1250 MG TABS Take 1,250 mg by mouth every day.     • multivitamin (THERAGRAN) TABS Take 1 Tab by mouth every day.     • fluticasone-salmeterol (ADVAIR DISKUS) 250-50 MCG/DOSE AEROSOL POWDER, BREATH ACTIVATED Inhale 1 Puff by mouth 2 times a day. Rinse mouth after each use (Patient not taking: Reported on 4/19/2019) 1 Inhaler 11   • montelukast (SINGULAIR) 10 MG Tab Take 1 Tab by mouth every evening. (Patient not taking: Reported on 4/19/2019) 30 Tab 11     No current facility-administered medications for this visit.        She  has a past medical history of Allergic rhinitis; Arthritis; Asthma in adult; Cataract (2/2012); Dental disorder; Dyslipidemia (4/26/2016); High cholesterol; Hypertension; Hypothyroid; Hypothyroid (4/18/2014); Hypothyroid (4/18/2014); Osteoporosis, unspecified; and Pneumonia (2009).    Demerol    She  reports that she quit smoking about 5 years ago. Her smoking use included Cigarettes. She has a 10.00 pack-year smoking history. She has never used smokeless tobacco. She reports that she uses drugs, including Marijuana. She reports that she does not drink alcohol.  Counseling given: Not Answered      ROS    No chest pain, no shortness of breath, no abdominal pain.     Objective:     Physical Exam:  /60   Pulse 63   Temp 36.5  "°C (97.7 °F) (Temporal)   Resp 14   Ht 1.473 m (4' 10\")   Wt 44.5 kg (98 lb)   SpO2 92%  Body mass index is 20.48 kg/m².    Constitutional: Alert, no distress.  Skin: Warm, dry, good turgor, no rashes in visible areas.  Eye: Equal, round and reactive, conjunctiva clear, lids normal.  ENMT: Lips without lesions, good dentition, oropharynx clear.  Neck: Trachea midline, no masses, no thyromegaly. No cervical or supraclavicular lymphadenopathy.  Respiratory: Unlabored respiratory effort, lungs clear to auscultation, no wheezes, no rhonchi.  Cardiovascular: Normal S1, S2, no murmur, no edema.  Psych: Alert and oriented x3, normal affect and mood.    Assessment and Plan:     1. Chronic obstructive pulmonary disease, unspecified COPD type (HCC)  COPD based on pulmonary function test from 2007    2. Atherosclerosis of native coronary artery of native heart without angina pectoris  Verified on CT    3. Hernia of anterior abdominal wall  Clinically stable orders written, discussed with the patient if she experiences severe pain she will need to be seen in emergency room setting  [facility]      Discussion today about general wellness and lifestyle habits:    · Engage in regular physical activity and social activities.  · Prevent falls and reduce trip hazards; using ambulatory aides, hearing and vision testing if appropriate.  · Steps to improve urinary incontinence.  · Advanced care planning.    Follow-Up: No Follow-up on file.         PLEASE NOTE: This dictation was created using voice recognition software. I have made every reasonable attempt to correct obvious errors, but I expect that there are errors of grammar and possibly content that I did not discover before finalizing the note.    CC: Olga Rich is a 74 y.o. female is suffering from   Chief Complaint   Patient presents with   • Thyroid Problem     6 months follow up         SUBJECTIVE:  1. Chronic obstructive pulmonary " disease, unspecified COPD type (HCC)  Olga is here for follow-up has a history of documented COPD.    2. Atherosclerosis of native coronary artery of native heart without angina pectoris  Patient and I have discussed that she has atherosclerosis of her native coronary arteries is not having any chest pain or discomfort    3. Hernia of anterior abdominal wall  Patient has a hernia of her left lower quadrant abdominal wall this appears to be stable and asymptomatic        Past social, family, history:   Social History   Substance Use Topics   • Smoking status: Former Smoker     Packs/day: 0.25     Years: 40.00     Types: Cigarettes     Quit date: 3/1/2014   • Smokeless tobacco: Never Used   • Alcohol use No      Comment: very rarely         MEDICATIONS:    Current Outpatient Prescriptions:   •  atorvastatin (LIPITOR) 40 MG Tab, Take 1 Tab by mouth every day., Disp: 100 Tab, Rfl: 2  •  metoprolol SR (TOPROL XL) 25 MG TABLET SR 24 HR, Take 1 Tab by mouth every day., Disp: 90 Tab, Rfl: 3  •  lisinopril (PRINIVIL) 5 MG Tab, Take 1 Tab by mouth every day., Disp: 100 Tab, Rfl: 3  •  levothyroxine (SYNTHROID) 75 MCG Tab, TAKE ONE TABLET BY MOUTH ONE TIME DAILY, Disp: 30 Tab, Rfl: 10  •  montelukast (SINGULAIR) 10 MG Tab, TAKE ONE TABLET BY MOUTH ONE TIME DAILY, Disp: 30 Tab, Rfl: 10  •  fluticasone-salmeterol (ADVAIR) 250-50 MCG/DOSE AEROSOL POWDER, BREATH ACTIVATED, Inhale 1 Puff by mouth every 12 hours. (Patient taking differently: Inhale 1-2 Puffs by mouth every 12 hours. ONCE A DAY), Disp: 1 Inhaler, Rfl: 11  •  Denosumab (PROLIA SC), Inject 1 Dose as instructed every 6 months. Indications: twice a year, Disp: , Rfl:   •  aspirin (ASA) 325 MG Tab, Take 324 mg by mouth every day., Disp: , Rfl:   •  magnesium oxide (MAG-OX) 400 MG Tab, Take 400 mg by mouth every day., Disp: , Rfl:   •  Cholecalciferol (VITAMIN D) 2000 UNITS CAPS, Take  by mouth., Disp: , Rfl:   •  calcium carbonate (OS-DARIEN 500) 1250 MG TABS, Take  "1,250 mg by mouth every day., Disp: , Rfl:   •  multivitamin (THERAGRAN) TABS, Take 1 Tab by mouth every day., Disp: , Rfl:   •  fluticasone-salmeterol (ADVAIR DISKUS) 250-50 MCG/DOSE AEROSOL POWDER, BREATH ACTIVATED, Inhale 1 Puff by mouth 2 times a day. Rinse mouth after each use (Patient not taking: Reported on 4/19/2019), Disp: 1 Inhaler, Rfl: 11  •  montelukast (SINGULAIR) 10 MG Tab, Take 1 Tab by mouth every evening. (Patient not taking: Reported on 4/19/2019), Disp: 30 Tab, Rfl: 11    PROBLEMS:  Patient Active Problem List    Diagnosis Date Noted   • Mild intermittent asthma without complication 07/30/2018   • SOB (shortness of breath) 08/18/2017   • Chronic obstructive pulmonary disease (HCC) 08/18/2017   • Aortic valve stenosis, moderate 07/18/2017   • Insomnia 08/19/2016   • Dyslipidemia 04/26/2016   • Osteoporosis 07/18/2014   • Asthma 07/18/2014   • Hypothyroid 04/18/2014   • Environmental allergies 03/14/2014       REVIEW OF SYSTEMS:  Gen.:  No Nausea, Vomiting, fever, Chills.  Heart: No chest pain.  Lungs:  No shortness of Breath.  Psychological: Vinay unusual Anxiety depression     PHYSICAL EXAM   Constitutional: Alert, cooperative, not in acute distress.  Cardiovascular:  Rate Rhythm is regular without murmurs rubs clicks.     Thorax & Lungs: Clear to auscultation, no wheezing, rhonchi, or rales  HENT: Normocephalic, Atraumatic.  Eyes: PERRLA, EOMI, Conjunctiva normal.   Neck: Trachia is midline no swelling of the thyroid.   Musculoskeletal: Hernia to palpation left lower quadrant of the abdomen  Neurologic: Alert & oriented x 3, cranial nerves II through XII are intact, Normal motor function, Normal sensory function, No focal deficits noted.   Psychiatric: Affect normal, Judgment normal, Mood normal.     VITAL SIGNS:/60   Pulse 63   Temp 36.5 °C (97.7 °F) (Temporal)   Resp 14   Ht 1.473 m (4' 10\")   Wt 44.5 kg (98 lb)   SpO2 92%   BMI 20.48 kg/m²     Labs: Reviewed    Assessment:        "                                              Plan:    1. Chronic obstructive pulmonary disease, unspecified COPD type (HCC)  Stable patient requests no additional pulmonary function testing    2. Atherosclerosis of native coronary artery of native heart without angina pectoris  Documented CT cardiac scoring    3. Hernia of anterior abdominal wall  Ultrasound ordered  - US-ABDOMEN LTD (SOFT TISSUE); Future

## 2019-07-29 ENCOUNTER — OFFICE VISIT (OUTPATIENT)
Dept: PULMONOLOGY | Facility: HOSPICE | Age: 74
End: 2019-07-29
Payer: MEDICARE

## 2019-07-29 VITALS
SYSTOLIC BLOOD PRESSURE: 132 MMHG | BODY MASS INDEX: 20.57 KG/M2 | OXYGEN SATURATION: 93 % | HEIGHT: 58 IN | DIASTOLIC BLOOD PRESSURE: 74 MMHG | WEIGHT: 98 LBS | HEART RATE: 68 BPM

## 2019-07-29 DIAGNOSIS — J44.9 CHRONIC OBSTRUCTIVE PULMONARY DISEASE, UNSPECIFIED COPD TYPE (HCC): ICD-10-CM

## 2019-07-29 DIAGNOSIS — J45.20 MILD INTERMITTENT ASTHMA WITHOUT COMPLICATION: ICD-10-CM

## 2019-07-29 DIAGNOSIS — G47.09 OTHER INSOMNIA: ICD-10-CM

## 2019-07-29 PROCEDURE — 99214 OFFICE O/P EST MOD 30 MIN: CPT | Performed by: NURSE PRACTITIONER

## 2019-07-29 RX ORDER — TEMAZEPAM 15 MG/1
15 CAPSULE ORAL NIGHTLY PRN
Qty: 30 CAP | Refills: 4 | Status: SHIPPED
Start: 2019-07-29 | End: 2019-08-28

## 2019-07-29 NOTE — PATIENT INSTRUCTIONS
1) Continue Advair 250/50 daily. Dulera sample as substitute provided as requested  2) Continue Singulair at night  3) Vaccines: Up to date with Prevnar 13 and Pneumococcal 23  4) Continue Restoril 15mg nightly as needed. 1/2 pill encouraged  5) Continue smoking cessation   6) Return in about 1 year (around 7/29/2020) for follow up with [facility], if not sooner, review of symptoms.

## 2019-07-29 NOTE — PROGRESS NOTES
CC:  Here for f/u pulmonary issues as listed below    HPI:   Ms. Rich comes in today for her one-year followup of her COPD and asthma.  Past medical history aortic valve stenosis, dyslipidemia, hypothyroidism.     Her last PFTs in 2007 indicate FVC 79% predicted FEV1 55% predicted FEV1/FVC C 57% predicted DLCO 99% without a bronchodilator response. She wishes not to repeat PFTs again.  She is a former remote smoker quitting in 2014 with a 10 pack year history.    Echocardiogram from 2017 showed estimated ejection fraction to be 65%, grade 1 diastolic dysfunction, estimated RVSP is 30 mmHg.     She is compliant taking Advair 250-50, 1 puff, once a daily, Rinsing mouth after each use. Will increase to BID in a few weeks on vacation when she goes hiking. Singulair 10 mg tablets one tablet at bedtime, Restoril 15 mg caps one at bedtime when necessary for insomnia approximately 2-3 times per week. She has a hx of severe insomnia for 48-72 hours and thus Restoril is beneficial.      She has been doing well this past year without exacerbation, respiratory infection or hospital visit. She occasionally has sinus pain with seasonal allergies but denies any current symptoms. Denies SOB or dyspnea, wheezing, chest pain or pressure, fever or chills. She reports she has 1 bird and cockatoo that give her allergies, in her home over 20 years. She uses a mask to clean cage. She exercises daily with great benefit.           Patient Active Problem List    Diagnosis Date Noted   • Mild intermittent asthma without complication 07/30/2018   • SOB (shortness of breath) 08/18/2017   • Chronic obstructive pulmonary disease (HCC) 08/18/2017   • Aortic valve stenosis, moderate 07/18/2017   • Insomnia 08/19/2016   • Dyslipidemia 04/26/2016   • Osteoporosis 07/18/2014   • Hypothyroid 04/18/2014   • Environmental allergies 03/14/2014       Past Medical History:   Diagnosis Date   • Allergic rhinitis    • Arthritis     fingers   • Asthma in  adult    • Cataract 2/2012    Bilat    • Dental disorder     partial upper   • Dyslipidemia 4/26/2016   • High cholesterol    • Hypertension    • Hypothyroid    • Hypothyroid 4/18/2014   • Hypothyroid 4/18/2014   • Osteoporosis, unspecified    • Pneumonia 2009       Past Surgical History:   Procedure Laterality Date   • CATARACT PHACO WITH IOL      Dr. Vargas   • FEMUR ORIF      titamium Dr. Mares.        Family History   Problem Relation Age of Onset   • Other Mother 97        old age.    • Heart Attack Father 56        MI   • Heart Disease Father    • Other Son         ETOH   • Other Son         ETOH   • Heart Disease Sister 53   • Heart Attack Sister 85   • No Known Problems Brother    • No Known Problems Sister    • No Known Problems Sister        Social History   Substance Use Topics   • Smoking status: Former Smoker     Packs/day: 0.25     Years: 40.00     Types: Cigarettes     Quit date: 3/1/2014   • Smokeless tobacco: Never Used   • Alcohol use No      Comment: very rarely       Current Outpatient Prescriptions   Medication Sig Dispense Refill   • temazepam (RESTORIL) 15 MG Cap Take 1 Cap by mouth at bedtime as needed for Sleep for up to 30 days. 30 Cap 4   • atorvastatin (LIPITOR) 40 MG Tab Take 1 Tab by mouth every day. 100 Tab 2   • metoprolol SR (TOPROL XL) 25 MG TABLET SR 24 HR Take 1 Tab by mouth every day. 90 Tab 3   • lisinopril (PRINIVIL) 5 MG Tab Take 1 Tab by mouth every day. 100 Tab 3   • levothyroxine (SYNTHROID) 75 MCG Tab TAKE ONE TABLET BY MOUTH ONE TIME DAILY 30 Tab 10   • montelukast (SINGULAIR) 10 MG Tab TAKE ONE TABLET BY MOUTH ONE TIME DAILY 30 Tab 10   • fluticasone-salmeterol (ADVAIR) 250-50 MCG/DOSE AEROSOL POWDER, BREATH ACTIVATED Inhale 1 Puff by mouth every 12 hours. (Patient taking differently: Inhale 1-2 Puffs by mouth every 12 hours. ONCE A DAY) 1 Inhaler 11   • Denosumab (PROLIA SC) Inject 1 Dose as instructed every 6 months. Indications: twice a year     • aspirin (ASA)  "325 MG Tab Take 324 mg by mouth every day.     • magnesium oxide (MAG-OX) 400 MG Tab Take 400 mg by mouth every day.     • Cholecalciferol (VITAMIN D) 2000 UNITS CAPS Take  by mouth.     • calcium carbonate (OS-DARIEN 500) 1250 MG TABS Take 1,250 mg by mouth every day.     • multivitamin (THERAGRAN) TABS Take 1 Tab by mouth every day.     • fluticasone-salmeterol (ADVAIR DISKUS) 250-50 MCG/DOSE AEROSOL POWDER, BREATH ACTIVATED Inhale 1 Puff by mouth 2 times a day. Rinse mouth after each use (Patient not taking: Reported on 4/19/2019) 1 Inhaler 11   • montelukast (SINGULAIR) 10 MG Tab Take 1 Tab by mouth every evening. (Patient not taking: Reported on 4/19/2019) 30 Tab 11     No current facility-administered medications for this visit.           Allergies: Demerol          ROS   Gen: Denies fever, chills, unintentional weight loss, fatigue, night sweats  E/N/T: Denies nasal congestion, ear pain  Resp:Denies Dyspnea, wheezing, cough, sputum production, hemoptysis  CV: Denies chest pain, chest tightness, palpitations  Sleep:Denies morning headache  Neuro: Denies frequent headaches, weakness, dizziness  GI: Denies acid reflux, N/V  See HPI.  All other systems reviewed and negative          Vital signs for this encounter:  Vitals:    07/29/19 1006   Height: 1.473 m (4' 10\")   Weight: 44.5 kg (98 lb)   Weight % change since last entry.: 0 %   BP: 132/74   Pulse: 68   BMI (Calculated): 20.48                 Physical Exam:   Appearance: well developed, well nourished, no acute distress.   Eyes: PERRL, EOM intact, sclere white, conjunctiva moist.  Ears: no lesions or deformities.  Hearing: grossly intact.  Nose: no lesions or deformities.  Dentition: good dentition.   Oropharynx: tongue normal, posterior pharynx without erythema or exudate.  Neck: supple, trachea midline, no masses.  Respiratory effort: no intercostal retractions or use of accessory muscles.  Lung auscultation: Bilateral diminished   Heart auscultation: no " murmur, rub, or gallop   Extremities: no cyanosis or edema.  Abdomen: soft, non-tender, no masses.  Gait and station: without difficulty   Digits and Nails: no clubbing, cyanosis, petechiae, or nodes.  Cranial nerves: grossly normal.  Motor: no focal deficits observed.   Skin: no rashes, lesions, or ulcers noted.  Orientation: oriented to time, place, and person.  Mood and affect: mood and affect appropriate, normal interaction with examiner.      Assessment   1. Chronic obstructive pulmonary disease, unspecified COPD type (HCC)     2. Mild intermittent asthma without complication     3. Other insomnia  temazepam (RESTORIL) 15 MG Cap       Patient was seen for 25 minutes, more than 50% of time spent in face to face review, counseling, and arranging future evaluation and follow up of medical conditions and care relating to review of medication management, medical hx since last OV, discussion of vaccines and exercise. Patient is clinically stable and will have the following changes per plan.     PLAN:   Patient Instructions   1) Continue Advair 250/50 daily. Dulera sample as substitute provided as requested  2) Continue Singulair at night  3) Vaccines: Up to date with Prevnar 13 and Pneumococcal 23  4) Continue Restoril 15mg nightly as needed. 1/2 pill encouraged  5) Continue smoking cessation   6) Return in about 1 year (around 7/29/2020) for follow up with [facility]AT[facility], if not sooner, review of symptoms.

## 2019-07-31 DIAGNOSIS — G47.09 OTHER INSOMNIA: ICD-10-CM

## 2019-07-31 RX ORDER — MONTELUKAST SODIUM 10 MG/1
TABLET ORAL
Qty: 30 TAB | Refills: 11 | Status: SHIPPED | OUTPATIENT
Start: 2019-07-31 | End: 2020-02-12

## 2019-07-31 RX ORDER — TEMAZEPAM 15 MG/1
CAPSULE ORAL
Qty: 30 CAP | Refills: 0 | OUTPATIENT
Start: 2019-07-31 | End: 2019-08-30

## 2019-07-31 NOTE — TELEPHONE ENCOUNTER
Have we ever prescribed this med? Yes.  If yes, what date? 07/30/2018    Last OV: 07/29/2019 - Grace Downs    Next OV: 07/31/2020 - Grace Downs    DX: asthma    Medications: Montelukast, please deny restoril, this was just filled

## 2019-08-01 ENCOUNTER — OUTPATIENT INFUSION SERVICES (OUTPATIENT)
Dept: ONCOLOGY | Facility: MEDICAL CENTER | Age: 74
End: 2019-08-01
Attending: INTERNAL MEDICINE
Payer: MEDICARE

## 2019-08-01 VITALS
TEMPERATURE: 97 F | OXYGEN SATURATION: 92 % | SYSTOLIC BLOOD PRESSURE: 149 MMHG | RESPIRATION RATE: 18 BRPM | BODY MASS INDEX: 20.73 KG/M2 | HEIGHT: 58 IN | DIASTOLIC BLOOD PRESSURE: 72 MMHG | WEIGHT: 98.77 LBS | HEART RATE: 77 BPM

## 2019-08-01 LAB
CA-I BLD ISE-SCNC: 1.08 MMOL/L (ref 1.1–1.3)
CREAT BLD-MCNC: 0.8 MG/DL (ref 0.5–1.4)

## 2019-08-01 PROCEDURE — 82330 ASSAY OF CALCIUM: CPT

## 2019-08-01 PROCEDURE — 96372 THER/PROPH/DIAG INJ SC/IM: CPT

## 2019-08-01 PROCEDURE — 82565 ASSAY OF CREATININE: CPT

## 2019-08-01 PROCEDURE — 700111 HCHG RX REV CODE 636 W/ 250 OVERRIDE (IP): Mod: JG | Performed by: INTERNAL MEDICINE

## 2019-08-01 PROCEDURE — 36415 COLL VENOUS BLD VENIPUNCTURE: CPT

## 2019-08-01 RX ADMIN — DENOSUMAB 60 MG: 60 INJECTION SUBCUTANEOUS at 16:02

## 2019-08-01 NOTE — LETTER
Infusion Services   [address] [place] [address]  [place] [address]  Phone: [phone]  Fax: [phone]              Dear Dr. Thompson,    Your patient, Olga Rich (: 1945), was scheduled at [facility].  Olga's encounter diagnosis is:  No diagnosis found.  She arrived for her appointment, and  the scheduled treatment was   given. These medications were administered to the patient: We administered denosumab..  Olga Rich  tolerated treatment. In addition, the following labs were drawn    Recent Results (from the past 24 hour(s))   ISTAT CREATININE    Collection Time: 19  3:52 PM   Result Value Ref Range    Istat Creatinine 0.8 0.5 - 1.4 mg/dL   ISTAT IONIZED CA    Collection Time: 19  3:52 PM   Result Value Ref Range    Istat Ionized Calcium 1.08 (L) 1.10 - 1.30 mmol/L            Her next appointment is did not reschedule; because she must see her provider first.    For more information, you may review the nurse's progress notes in chart review under the notes section.       Sincerely,  Infusion Services

## 2019-08-01 NOTE — PROGRESS NOTES
Pharmacy Note:    Scr = 0.8 mg/dL    with est crcl ~ 42 ml/min  Calcium =   1.08 mmol/L    Patient is currently taking Calcium 1200 mg and Vitamin D 400 units daily. No s/sx of hypocalcemia noted.    Ok to proceed with prolia injection    Tanvir Armando, PharmD

## 2019-08-01 NOTE — PROGRESS NOTES
Pt to infusion services ambulatory per self.  Pt here for scheduled Prolia injection.  Plan of care reviewed.  Pt verbalizes understanding.  Pt reports no issues or concerns.  Pt denies any s/sx of infection today.  Pt denies any recent or upcoming invasive dental work or oral surgery.  Pt reports taking appropriate amounts of Calcium and Vitamin D daily.  Lab drawn from R-AC using #23G BD needle.  Pt tolerated well.  Gauze and paper tape applied to venipuncture site.  Blood sample to lab for iSTAT calcium and creatinine.  Ionized calcium = 1.08 today.  Pt denies any s/sx of hypocalcemia.  Chart to pharmacy.

## 2019-08-01 NOTE — PROGRESS NOTES
Prolia administered per MD orders to left back of arm via subcutaneous injection.  Pt tolerated injection well and without incident.  Band aid applied to injection site.  Pt left infusion services in no apparent distress after completion of treatment, ambulatory.  Pt to f/u with MD.

## 2019-08-22 DIAGNOSIS — J45.20 MILD INTERMITTENT ASTHMA WITHOUT COMPLICATION: ICD-10-CM

## 2019-08-22 NOTE — TELEPHONE ENCOUNTER
Have we ever prescribed this med? Yes.  If yes, what date? 7/30/18    Last OV: 07/29/2019 - Grace Downs    Next OV: 07/31/2020 - Grace Downs    DX: Uncomplicated asthma, unspecified asthma severity, unspecified whether     Medications: fluticasone-salmeterol (ADVAIR DISKUS) 250-50 MCG/DOSE AEROSOL POWDER, BREATH ACTIVATED

## 2019-08-26 DIAGNOSIS — R06.02 SOB (SHORTNESS OF BREATH): ICD-10-CM

## 2019-09-11 ENCOUNTER — HOSPITAL ENCOUNTER (OUTPATIENT)
Dept: RADIOLOGY | Facility: MEDICAL CENTER | Age: 74
End: 2019-09-11
Attending: INTERNAL MEDICINE
Payer: MEDICARE

## 2019-09-11 DIAGNOSIS — K43.9 HERNIA OF ANTERIOR ABDOMINAL WALL: ICD-10-CM

## 2019-09-11 PROCEDURE — 76705 ECHO EXAM OF ABDOMEN: CPT

## 2019-09-19 ENCOUNTER — OFFICE VISIT (OUTPATIENT)
Dept: MEDICAL GROUP | Facility: LAB | Age: 74
End: 2019-09-19
Payer: MEDICARE

## 2019-09-19 VITALS
WEIGHT: 97 LBS | RESPIRATION RATE: 12 BRPM | HEART RATE: 72 BPM | OXYGEN SATURATION: 92 % | TEMPERATURE: 97.9 F | BODY MASS INDEX: 20.36 KG/M2 | SYSTOLIC BLOOD PRESSURE: 140 MMHG | DIASTOLIC BLOOD PRESSURE: 82 MMHG | HEIGHT: 58 IN

## 2019-09-19 DIAGNOSIS — K40.90 NON-RECURRENT UNILATERAL INGUINAL HERNIA WITHOUT OBSTRUCTION OR GANGRENE: ICD-10-CM

## 2019-09-19 DIAGNOSIS — R89.9 ABNORMAL LABORATORY TEST: ICD-10-CM

## 2019-09-19 DIAGNOSIS — Z23 NEED FOR VACCINATION: ICD-10-CM

## 2019-09-19 DIAGNOSIS — Z12.11 SCREEN FOR COLON CANCER: ICD-10-CM

## 2019-09-19 PROCEDURE — 90662 IIV NO PRSV INCREASED AG IM: CPT | Performed by: INTERNAL MEDICINE

## 2019-09-19 PROCEDURE — G0008 ADMIN INFLUENZA VIRUS VAC: HCPCS | Performed by: INTERNAL MEDICINE

## 2019-09-19 PROCEDURE — 99214 OFFICE O/P EST MOD 30 MIN: CPT | Mod: 25 | Performed by: INTERNAL MEDICINE

## 2019-09-19 NOTE — PROGRESS NOTES
CC: Olga Rich is a 74 y.o. female is suffering from   Chief Complaint   Patient presents with   • Hernia     3 months follow up         SUBJECTIVE:  1. Need for vaccination  Olga is here for follow-up is in need of a influenza vaccination which was given    2. Non-recurrent unilateral inguinal hernia without obstruction or gangrene  Patient with a left-sided inguinal hernia, is asymptomatic.  Discussed hospital precautions    3. Screen for colon cancer  Fit test ordered    4. Abnormal laboratory test  Recheck labs        Past social, family, history:   Social History     Tobacco Use   • Smoking status: Former Smoker     Packs/day: 0.25     Years: 40.00     Pack years: 10.00     Types: Cigarettes     Last attempt to quit: 3/1/2014     Years since quittin.5   • Smokeless tobacco: Never Used   Substance Use Topics   • Alcohol use: No     Comment: very rarely   • Drug use: Yes     Types: Marijuana     Comment: occasionally for glaucoma which runs in her family/ per pt no          MEDICATIONS:    Current Outpatient Medications:   •  fluticasone-salmeterol (ADVAIR) 250-50 MCG/DOSE AEROSOL POWDER, BREATH ACTIVATED, INHALE 1 PUFF BY MOUTH 2 TIMES A DAY. RINSE MOUTH AFTEREACH USE, Disp: 1 Inhaler, Rfl: 5  •  montelukast (SINGULAIR) 10 MG Tab, TAKE ONE TABLET BY MOUTH ONCE DAILY IN THE EVENING, Disp: 30 Tab, Rfl: 11  •  atorvastatin (LIPITOR) 40 MG Tab, Take 1 Tab by mouth every day., Disp: 100 Tab, Rfl: 2  •  metoprolol SR (TOPROL XL) 25 MG TABLET SR 24 HR, Take 1 Tab by mouth every day., Disp: 90 Tab, Rfl: 3  •  lisinopril (PRINIVIL) 5 MG Tab, Take 1 Tab by mouth every day., Disp: 100 Tab, Rfl: 3  •  levothyroxine (SYNTHROID) 75 MCG Tab, TAKE ONE TABLET BY MOUTH ONE TIME DAILY, Disp: 30 Tab, Rfl: 10  •  montelukast (SINGULAIR) 10 MG Tab, TAKE ONE TABLET BY MOUTH ONE TIME DAILY, Disp: 30 Tab, Rfl: 10  •  Denosumab (PROLIA SC), Inject 1 Dose as instructed every 6 months. Indications: twice a year,  Disp: , Rfl:   •  aspirin (ASA) 325 MG Tab, Take 324 mg by mouth every day., Disp: , Rfl:   •  magnesium oxide (MAG-OX) 400 MG Tab, Take 400 mg by mouth every day., Disp: , Rfl:   •  Cholecalciferol (VITAMIN D) 2000 UNITS CAPS, Take  by mouth., Disp: , Rfl:   •  calcium carbonate (OS-DARIEN 500) 1250 MG TABS, Take 1,250 mg by mouth every day., Disp: , Rfl:   •  multivitamin (THERAGRAN) TABS, Take 1 Tab by mouth every day., Disp: , Rfl:   •  fluticasone-salmeterol (ADVAIR) 250-50 MCG/DOSE AEROSOL POWDER, BREATH ACTIVATED, Inhale 1 Puff by mouth every 12 hours., Disp: 1 Inhaler, Rfl: 11  •  montelukast (SINGULAIR) 10 MG Tab, Take 1 Tab by mouth every evening. (Patient not taking: Reported on 4/19/2019), Disp: 30 Tab, Rfl: 11    PROBLEMS:  Patient Active Problem List    Diagnosis Date Noted   • Non-recurrent unilateral inguinal hernia without obstruction or gangrene 09/19/2019   • Mild intermittent asthma without complication 07/30/2018   • SOB (shortness of breath) 08/18/2017   • Chronic obstructive pulmonary disease (HCC) 08/18/2017   • Aortic valve stenosis, moderate 07/18/2017   • Insomnia 08/19/2016   • Dyslipidemia 04/26/2016   • Osteoporosis 07/18/2014   • Hypothyroid 04/18/2014   • Environmental allergies 03/14/2014       REVIEW OF SYSTEMS:  Gen.:  No Nausea, Vomiting, fever, Chills.  Heart: No chest pain.  Lungs:  No shortness of Breath.  Psychological: Vinay unusual Anxiety depression     PHYSICAL EXAM   Constitutional: Alert, cooperative, not in acute distress.  Cardiovascular:  Rate Rhythm is regular without murmurs rubs clicks.     Thorax & Lungs: Clear to auscultation, no wheezing, rhonchi, or rales  HENT: Normocephalic, Atraumatic.  Eyes: PERRLA, EOMI, Conjunctiva normal.   Neck: Trachia is midline no swelling of the thyroid.   Lymphatic: No lymphadenopathy noted.   Neurologic: Alert & oriented x 3, cranial nerves II through XII are intact, Normal motor function, Normal sensory function, No focal deficits  "noted.   Psychiatric: Affect normal, Judgment normal, Mood normal.     VITAL SIGNS:/82   Pulse 72   Temp 36.6 °C (97.9 °F) (Temporal)   Resp 12   Ht 1.473 m (4' 10\")   Wt 44 kg (97 lb)   SpO2 92%   BMI 20.27 kg/m²     Labs: Reviewed    Assessment:                                                     Plan:    1. Need for vaccination  Vaccination given  - Influenza Vaccine, High Dose (65+ Only)    2. Non-recurrent unilateral inguinal hernia without obstruction or gangrene  Encourage patient to seek immediate medical care should she experience pain or discomfort associated with inguinal hernia.  Patient states she is asymptomatic and does not want surgical intervention unless necessary.    3. Screen for colon cancer  Fit test ordered  - COLOGUARD (FIT DNA)    4. Abnormal laboratory test  Labs ordered  - Comp Metabolic Panel; Future        "

## 2019-11-05 DIAGNOSIS — E03.9 HYPOTHYROIDISM, UNSPECIFIED TYPE: ICD-10-CM

## 2019-11-05 RX ORDER — LEVOTHYROXINE SODIUM 0.07 MG/1
TABLET ORAL
Qty: 30 TAB | Refills: 5 | Status: SHIPPED | OUTPATIENT
Start: 2019-11-05 | End: 2020-05-20

## 2019-11-05 NOTE — TELEPHONE ENCOUNTER
Was the patient seen in the last year in this department? Yes lov 9/19/19    Does patient have an active prescription for medications requested? No     Received Request Via: Pharmacy

## 2020-01-06 ENCOUNTER — HOSPITAL ENCOUNTER (OUTPATIENT)
Dept: LAB | Facility: MEDICAL CENTER | Age: 75
End: 2020-01-06
Attending: INTERNAL MEDICINE
Payer: MEDICARE

## 2020-01-06 ENCOUNTER — OFFICE VISIT (OUTPATIENT)
Dept: RHEUMATOLOGY | Facility: MEDICAL CENTER | Age: 75
End: 2020-01-06
Payer: MEDICARE

## 2020-01-06 VITALS
RESPIRATION RATE: 12 BRPM | HEART RATE: 72 BPM | SYSTOLIC BLOOD PRESSURE: 140 MMHG | DIASTOLIC BLOOD PRESSURE: 72 MMHG | BODY MASS INDEX: 19.44 KG/M2 | WEIGHT: 93 LBS | OXYGEN SATURATION: 94 % | TEMPERATURE: 97.1 F

## 2020-01-06 DIAGNOSIS — M81.0 AGE-RELATED OSTEOPOROSIS WITHOUT CURRENT PATHOLOGICAL FRACTURE: ICD-10-CM

## 2020-01-06 DIAGNOSIS — J44.9 CHRONIC OBSTRUCTIVE PULMONARY DISEASE, UNSPECIFIED COPD TYPE (HCC): ICD-10-CM

## 2020-01-06 DIAGNOSIS — Z51.81 ENCOUNTER FOR MONITORING DENOSUMAB THERAPY: ICD-10-CM

## 2020-01-06 DIAGNOSIS — E78.5 DYSLIPIDEMIA: ICD-10-CM

## 2020-01-06 DIAGNOSIS — I35.0 AORTIC VALVE STENOSIS, MODERATE: ICD-10-CM

## 2020-01-06 DIAGNOSIS — Z79.899 ENCOUNTER FOR MONITORING DENOSUMAB THERAPY: ICD-10-CM

## 2020-01-06 DIAGNOSIS — E03.9 ACQUIRED HYPOTHYROIDISM: ICD-10-CM

## 2020-01-06 DIAGNOSIS — I25.10 ATHEROSCLEROSIS OF NATIVE CORONARY ARTERY OF NATIVE HEART WITHOUT ANGINA PECTORIS: ICD-10-CM

## 2020-01-06 DIAGNOSIS — Z82.49 FAMILY HISTORY OF ATHEROSCLEROSIS: ICD-10-CM

## 2020-01-06 LAB
ALBUMIN SERPL BCP-MCNC: 4.4 G/DL (ref 3.2–4.9)
ALBUMIN/GLOB SERPL: 1.2 G/DL
ALP SERPL-CCNC: 62 U/L (ref 30–99)
ALT SERPL-CCNC: 29 U/L (ref 2–50)
ANION GAP SERPL CALC-SCNC: 13 MMOL/L (ref 0–11.9)
AST SERPL-CCNC: 28 U/L (ref 12–45)
BILIRUB SERPL-MCNC: 0.6 MG/DL (ref 0.1–1.5)
BUN SERPL-MCNC: 11 MG/DL (ref 8–22)
CALCIUM SERPL-MCNC: 9.9 MG/DL (ref 8.5–10.5)
CHLORIDE SERPL-SCNC: 104 MMOL/L (ref 96–112)
CHOLEST SERPL-MCNC: 189 MG/DL (ref 100–199)
CO2 SERPL-SCNC: 24 MMOL/L (ref 20–33)
CREAT SERPL-MCNC: 0.88 MG/DL (ref 0.5–1.4)
GLOBULIN SER CALC-MCNC: 3.6 G/DL (ref 1.9–3.5)
GLUCOSE SERPL-MCNC: 86 MG/DL (ref 65–99)
HDLC SERPL-MCNC: 94 MG/DL
LDLC SERPL CALC-MCNC: 79 MG/DL
POTASSIUM SERPL-SCNC: 3.9 MMOL/L (ref 3.6–5.5)
PROT SERPL-MCNC: 8 G/DL (ref 6–8.2)
SODIUM SERPL-SCNC: 141 MMOL/L (ref 135–145)
TRIGL SERPL-MCNC: 80 MG/DL (ref 0–149)

## 2020-01-06 PROCEDURE — 36415 COLL VENOUS BLD VENIPUNCTURE: CPT

## 2020-01-06 PROCEDURE — 80061 LIPID PANEL: CPT

## 2020-01-06 PROCEDURE — 99213 OFFICE O/P EST LOW 20 MIN: CPT | Performed by: INTERNAL MEDICINE

## 2020-01-06 PROCEDURE — 80053 COMPREHEN METABOLIC PANEL: CPT

## 2020-01-06 NOTE — PROGRESS NOTES
Chief Complaint- joint pain     Subjective:   Olga Rich is a 74 y.o. female here today for follow up of rheumatological issues    This is a follow-up visit for this patient who we see in this clinic for osteoporosis.  Patient was diagnosed with osteoporosis in 2008 status post a right femur fracture.  Patient is currently on Prolia 60 mg subcu every 6 months, next Prolia shot due February 2020, patient gets Prolia at the infusion center.   Patient denies any side effects from the medication, denies any unexplained weight loss, denies any fevers of unknown etiology, denies any GI upset, denies any rashes, denies any new joint swelling, denies recurrent infections.  Patient denies any recent fractures, denies any new dental issues.               Additional comorbidities include  hypothyroidism and asthma for which she is managed by her primary care doctor.    Additional psychosocial comorbidities include alcoholism in the family but patient is quite happy with her son who is now 4 months sober, patient has a daughter who is 26 years sober.     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      S/p fosamax for 4 years      DEXA 4/2014 T scores -4.4, -3.9  DEXA 7/2016 T scores -3.7, -4.0  FRAX Major Osteoporotic 28.9%, Hip fx risk 11.1%  DEXA 1/21/2019 T scores -4.0, -3.4  FRAX 1/21/2019 major osteoporotic fracture risk 31.3%, hip fracture risk 13.9%  Next Prolia scheduled for January 18, 2019         Current medicines (including changes today)  Current Outpatient Medications   Medication Sig Dispense Refill   • levothyroxine (SYNTHROID) 75 MCG Tab TAKE ONE TABLET BY MOUTH ONE TIME DAILY 30 Tab 5   • fluticasone-salmeterol (ADVAIR) 250-50 MCG/DOSE AEROSOL POWDER, BREATH ACTIVATED INHALE 1 PUFF BY MOUTH 2 TIMES A DAY. RINSE MOUTH AFTEREACH USE 1 Inhaler 5   • fluticasone-salmeterol (ADVAIR) 250-50 MCG/DOSE AEROSOL POWDER, BREATH ACTIVATED Inhale 1 Puff by mouth  every 12 hours. 1 Inhaler 11   • montelukast (SINGULAIR) 10 MG Tab TAKE ONE TABLET BY MOUTH ONCE DAILY IN THE EVENING 30 Tab 11   • atorvastatin (LIPITOR) 40 MG Tab Take 1 Tab by mouth every day. 100 Tab 2   • metoprolol SR (TOPROL XL) 25 MG TABLET SR 24 HR Take 1 Tab by mouth every day. 90 Tab 3   • lisinopril (PRINIVIL) 5 MG Tab Take 1 Tab by mouth every day. 100 Tab 3   • montelukast (SINGULAIR) 10 MG Tab TAKE ONE TABLET BY MOUTH ONE TIME DAILY 30 Tab 10   • Denosumab (PROLIA SC) Inject 1 Dose as instructed every 6 months. Indications: twice a year     • aspirin (ASA) 325 MG Tab Take 324 mg by mouth every day.     • magnesium oxide (MAG-OX) 400 MG Tab Take 400 mg by mouth every day.     • Cholecalciferol (VITAMIN D) 2000 UNITS CAPS Take  by mouth.     • calcium carbonate (OS-DARIEN 500) 1250 MG TABS Take 1,250 mg by mouth every day.     • multivitamin (THERAGRAN) TABS Take 1 Tab by mouth every day.     • montelukast (SINGULAIR) 10 MG Tab Take 1 Tab by mouth every evening. (Patient not taking: Reported on 4/19/2019) 30 Tab 11     No current facility-administered medications for this visit.      She  has a past medical history of Allergic rhinitis, Arthritis, Asthma in adult, Cataract (2/2012), Dental disorder, Dyslipidemia (4/26/2016), High cholesterol, Hypertension, Hypothyroid, Hypothyroid (4/18/2014), Hypothyroid (4/18/2014), Osteoporosis, unspecified, and Pneumonia (2009).    ROS   Other than what is mentioned in HPI or physical exam, there is no history of headaches, double vision or blurred vision. No temporal tenderness or jaw claudication. No trouble swallowing difficulties or sore throats.  No chest complaints including chest pain, cough or sputum production. No GI complaints including nausea, vomiting, change in bowel habits, or past peptic ulcer disease. No history of blood in the stools. No urinary complaints including dysuria or frequency. No history of alopecia, photosensitivity, oral ulcerations,  Raynaud's phenomena.       Objective:     /72   Pulse 72   Temp 36.2 °C (97.1 °F) (Temporal)   Resp 12   Wt 42.2 kg (93 lb)   SpO2 94%  Body mass index is 19.44 kg/m².   Physical Exam:    Constitutional: Alert and oriented X3, patient is talkative with good eye contact.Skin: Warm, dry, good turgor, no rashes in visible areas.Eye: Equal, round and reactive, conjunctiva clear, lids normal EOM intactENMT: Lips without lesions, good dentition, no oropharyngeal ulcers, moist buccal mucosa, pinna without deformityNeck: Trachea midline, no masses, no thyromegaly.Lymph:  No cervical lymphadenopathy, no axillary lymphadenopathy, no supraclavicular lymphadenopathyRespiratory: Unlabored respiratory effort, lungs clear to auscultation, no wheezes, no ronchi.Cardiovascular: Normal S1, S2, no murmur, no edema.Abdomen: Soft, non-tender, no masses, no hepatosplenomegaly.Psych: Alert and oriented x3, normal affect and mood.Neuro: Cranial nerves 2-12 are grossly intact, no loss of sensation LEExt:no joint laxity noted in bilateral arms, no joint laxity noted in bilateral legs, crepitus in knees but no effusions no enthesitis, mild kyphosis of the upper back,    Lab Results   Component Value Date/Time    SODIUM 139 06/12/2019 10:02 AM    POTASSIUM 4.1 06/12/2019 10:02 AM    CHLORIDE 105 06/12/2019 10:02 AM    CO2 27 06/12/2019 10:02 AM    GLUCOSE 89 06/12/2019 10:02 AM    BUN 19 06/12/2019 10:02 AM    CREATININE 0.93 06/12/2019 10:02 AM      Lab Results   Component Value Date/Time    WBC 6.0 06/12/2019 10:02 AM    RBC 4.75 06/12/2019 10:02 AM    HEMOGLOBIN 14.3 06/12/2019 10:02 AM    HEMATOCRIT 45.3 06/12/2019 10:02 AM    MCV 95.4 06/12/2019 10:02 AM    MCH 30.1 06/12/2019 10:02 AM    MCHC 31.6 (L) 06/12/2019 10:02 AM    MPV 9.5 06/12/2019 10:02 AM    NEUTSPOLYS 57.10 06/12/2019 10:02 AM    LYMPHOCYTES 29.20 06/12/2019 10:02 AM    MONOCYTES 9.50 06/12/2019 10:02 AM    EOSINOPHILS 3.20 06/12/2019 10:02 AM    BASOPHILS 0.80  06/12/2019 10:02 AM      Lab Results   Component Value Date/Time    CALCIUM 9.9 06/12/2019 10:02 AM    ASTSGOT 46 (H) 06/12/2019 10:02 AM    ALTSGPT 62 (H) 06/12/2019 10:02 AM    ALKPHOSPHAT 68 06/12/2019 10:02 AM    TBILIRUBIN 0.6 06/12/2019 10:02 AM    ALBUMIN 4.5 06/12/2019 10:02 AM    ALBUMIN 5.77 (H) 12/04/2014 03:10 PM    TOTPROTEIN 8.1 06/12/2019 10:02 AM    TOTPROTEIN 9.50 (H) 12/04/2014 03:10 PM     Lab Results   Component Value Date/Time    SEDRATEWES 22 12/04/2014 03:10 PM     Lab Results   Component Value Date/Time    CTELOPEP 50 06/09/2015 01:15 PM     Lab Results   Component Value Date/Time    PTHINTACT 48.8 12/09/2016 10:20 AM     Results for orders placed during the hospital encounter of 01/18/19   DS-BONE DENSITY STUDY (DEXA)    Impression According to the World Health Organization classification, bone mineral density of this patient is osteoporosis with high fracture risk.        10-year Probability of Fracture:  Major Osteoporotic     31.3%  Hip     13.9%  Population      [place] ()    Based on left femur neck BMD          INTERPRETING LOCATION:  [address] [place] [address], [id number]      Assessment and Plan:     1. Age-related osteoporosis without current pathological fracture  Last DEXA January 2019 Next DEXA January 2021, currently on Prolia 60 mg subcu every 6 months of note vitamin D level June 2019 normal at 54   continue calcium citrate 1200 mg by mouth daily and vitamin D about 2000 units by mouth daily and magnesium 200 mg by mouth daily  - Comp Metabolic Panel; Future    2. Encounter for monitoring denosumab therapy  Patient needs calcium and renal function checked within 2 weeks of each Prolia shot, CMP ordered in the computer  - Comp Metabolic Panel; Future    3. Acquired hypothyroidism  Can exacerbate joint pain, I recommend monitoring thyroid on a regular basis to assure it is not contributing to the patient's joint pain    4. Chronic obstructive pulmonary disease, unspecified COPD  type (HCC)  Followed by patient's PCP managed with inhalers    Followup: Return in about 1 year (around 1/6/2021). or sooner hilton Rich  was seen 30 minutes face-to-face of which more than 50% of the time was spent counseling the patient (excluding time for procedures)  regarding  rheumatological condition and care. Therapy was discussed in detail.      Please note that this dictation was created using voice recognition software. I have made every reasonable attempt to correct obvious errors, but I expect that there are errors of grammar and possibly content that I did not discover before finalizing the note.                no abrasion/no bruising

## 2020-01-21 ENCOUNTER — TELEPHONE (OUTPATIENT)
Dept: RHEUMATOLOGY | Facility: MEDICAL CENTER | Age: 75
End: 2020-01-21

## 2020-01-22 NOTE — TELEPHONE ENCOUNTER
Pt called and left a voicemail message requesting that a new order for Prolia be sent over to the infusion center for her. Pt stated that she is due in February.

## 2020-02-12 ENCOUNTER — OUTPATIENT INFUSION SERVICES (OUTPATIENT)
Dept: ONCOLOGY | Facility: MEDICAL CENTER | Age: 75
End: 2020-02-12
Attending: INTERNAL MEDICINE
Payer: MEDICARE

## 2020-02-12 VITALS
HEIGHT: 57 IN | DIASTOLIC BLOOD PRESSURE: 60 MMHG | TEMPERATURE: 98.6 F | SYSTOLIC BLOOD PRESSURE: 130 MMHG | BODY MASS INDEX: 20.45 KG/M2 | OXYGEN SATURATION: 94 % | HEART RATE: 78 BPM | RESPIRATION RATE: 18 BRPM | WEIGHT: 94.8 LBS

## 2020-02-12 DIAGNOSIS — M81.0 AGE-RELATED OSTEOPOROSIS WITHOUT CURRENT PATHOLOGICAL FRACTURE: ICD-10-CM

## 2020-02-12 LAB
CA-I BLD ISE-SCNC: 1.28 MMOL/L (ref 1.1–1.3)
CREAT BLD-MCNC: 0.9 MG/DL (ref 0.5–1.4)

## 2020-02-12 PROCEDURE — 36415 COLL VENOUS BLD VENIPUNCTURE: CPT

## 2020-02-12 PROCEDURE — 82565 ASSAY OF CREATININE: CPT

## 2020-02-12 PROCEDURE — 82330 ASSAY OF CALCIUM: CPT

## 2020-02-12 PROCEDURE — 96372 THER/PROPH/DIAG INJ SC/IM: CPT

## 2020-02-12 PROCEDURE — 700111 HCHG RX REV CODE 636 W/ 250 OVERRIDE (IP): Mod: JG | Performed by: INTERNAL MEDICINE

## 2020-02-12 RX ORDER — TEMAZEPAM 15 MG/1
15 CAPSULE ORAL NIGHTLY PRN
COMMUNITY
Start: 2019-12-18 | End: 2020-07-08 | Stop reason: SDUPTHER

## 2020-02-12 RX ADMIN — DENOSUMAB 60 MG: 60 INJECTION SUBCUTANEOUS at 09:14

## 2020-02-12 NOTE — PROGRESS NOTES
[facility] for a Prolia injection for osteoporosis.  Pt denied having any new complaints, acute infections, or dental procedures in the last month or scheduled for the next month. RN reviewed medication handout on Prolia with Pt, including potential side effects and S/S of when to follow-up with MD, Pt acknowledged understanding. Denied handout. 23G butterfly needle used to draw blood from RAC, bleeding controlled with gauze and coban after. Ionized calcium/creatinine tested, WNL, pharmacist notified that Pt within parameters to treat.  Olga aware to continue taking vitamin D and calcium supplements. Prolia injection given in back of left arm SQ. Pt tolerated well, band-aid applied to injection site. Next appointment scheduled. Patient discharged home to self care.

## 2020-02-12 NOTE — PROGRESS NOTES
Pharmacy note  Cr = 0.9, CrCl ~ 35.6 mL/min    Ionized Ca = 1.28  OK for denosumab (Prolia) 60 mg SQ today (last dose 8/1/19)    Dori Mcintosh, PharmD, BCOP

## 2020-02-26 ENCOUNTER — OFFICE VISIT (OUTPATIENT)
Dept: CARDIOLOGY | Facility: MEDICAL CENTER | Age: 75
End: 2020-02-26
Payer: MEDICARE

## 2020-02-26 VITALS
DIASTOLIC BLOOD PRESSURE: 76 MMHG | SYSTOLIC BLOOD PRESSURE: 128 MMHG | BODY MASS INDEX: 19.9 KG/M2 | WEIGHT: 94.8 LBS | HEART RATE: 68 BPM | OXYGEN SATURATION: 95 % | HEIGHT: 58 IN | RESPIRATION RATE: 14 BRPM

## 2020-02-26 DIAGNOSIS — I35.0 AORTIC VALVE STENOSIS, MODERATE: ICD-10-CM

## 2020-02-26 DIAGNOSIS — Z82.49 FAMILY HISTORY OF ATHEROSCLEROSIS: ICD-10-CM

## 2020-02-26 DIAGNOSIS — E78.5 DYSLIPIDEMIA: ICD-10-CM

## 2020-02-26 DIAGNOSIS — I25.10 ATHEROSCLEROSIS OF NATIVE CORONARY ARTERY OF NATIVE HEART WITHOUT ANGINA PECTORIS: ICD-10-CM

## 2020-02-26 PROCEDURE — 99214 OFFICE O/P EST MOD 30 MIN: CPT | Performed by: INTERNAL MEDICINE

## 2020-02-26 RX ORDER — ATORVASTATIN CALCIUM 40 MG/1
40 TABLET, FILM COATED ORAL DAILY
Qty: 100 TAB | Refills: 2 | Status: SHIPPED | OUTPATIENT
Start: 2020-02-26 | End: 2020-10-22 | Stop reason: SDUPTHER

## 2020-02-26 RX ORDER — LISINOPRIL 5 MG/1
5 TABLET ORAL DAILY
Qty: 100 TAB | Refills: 3 | Status: SHIPPED
Start: 2020-02-26 | End: 2020-10-21

## 2020-02-26 RX ORDER — METOPROLOL SUCCINATE 25 MG/1
25 TABLET, EXTENDED RELEASE ORAL DAILY
Qty: 100 TAB | Refills: 3 | Status: SHIPPED | OUTPATIENT
Start: 2020-02-26 | End: 2020-10-22 | Stop reason: SDUPTHER

## 2020-02-26 ASSESSMENT — ENCOUNTER SYMPTOMS
PALPITATIONS: 0
SPEECH CHANGE: 0
SHORTNESS OF BREATH: 0
CHILLS: 0
EYE DISCHARGE: 0
ORTHOPNEA: 0
ABDOMINAL PAIN: 0
WEIGHT LOSS: 0
MYALGIAS: 0
FEVER: 0
DEPRESSION: 0
DOUBLE VISION: 0
FALLS: 0
DIZZINESS: 0
SENSORY CHANGE: 0
LOSS OF CONSCIOUSNESS: 0
BLURRED VISION: 0
PND: 0
BRUISES/BLEEDS EASILY: 0
HALLUCINATIONS: 0
VOMITING: 0
COUGH: 0
CLAUDICATION: 0
BLOOD IN STOOL: 0
NAUSEA: 0
EYE PAIN: 0
HEADACHES: 0

## 2020-02-26 NOTE — PROGRESS NOTES
Chief Complaint   Patient presents with   • Hyperlipidemia     F/V: 1 YR       Subjective:   Olga Rich is a 74 y.o. female who presents today for cardiac care and evaluation because of an abnormal calcium scan in the past. At the last visit, patient was deemed to be asymptomatic and no further cardiac workup was entertained. We optimized her medical therapy.     In the interim, patient has been doing well without having any symptoms. Patient denies having chest pain, dyspnea, palpitation, presyncope, syncope episodes. Able to climb up at least 2 flights of stairs.    I have independently interpreted and reviewed blood tests results with patient in clinic which shows normal LDL level 79, triglycerides 80, renal and liver function.       She was found to have aortic stenosis for which she underwent TTE and SANGEETA in 05/2017.    Past Medical History:   Diagnosis Date   • Allergic rhinitis    • Arthritis     fingers   • Asthma in adult    • Cataract 2/2012    Bilat    • Dental disorder     partial upper   • Dyslipidemia 4/26/2016   • High cholesterol    • Hypertension    • Hypothyroid    • Hypothyroid 4/18/2014   • Hypothyroid 4/18/2014   • Osteoporosis, unspecified    • Pneumonia 2009     Past Surgical History:   Procedure Laterality Date   • CATARACT PHACO WITH IOL      Dr. Vargas   • FEMUR ORIF      titamium Dr. Mares.      Family History   Problem Relation Age of Onset   • Other Mother 97        old age.    • Heart Attack Father 56        MI   • Heart Disease Father    • Other Son         ETOH   • Other Son         ETOH   • Heart Disease Sister 53   • Heart Attack Sister 85   • No Known Problems Brother    • No Known Problems Sister    • No Known Problems Sister      Social History     Socioeconomic History   • Marital status:      Spouse name: Not on file   • Number of children: Not on file   • Years of education: Not on file   • Highest education level: Not on file   Occupational History   •  Not on file   Social Needs   • Financial resource strain: Not on file   • Food insecurity     Worry: Not on file     Inability: Not on file   • Transportation needs     Medical: Not on file     Non-medical: Not on file   Tobacco Use   • Smoking status: Former Smoker     Packs/day: 0.25     Years: 40.00     Pack years: 10.00     Types: Cigarettes     Last attempt to quit: 3/1/2014     Years since quittin.9   • Smokeless tobacco: Never Used   Substance and Sexual Activity   • Alcohol use: No     Comment: very rarely   • Drug use: Yes     Types: Marijuana     Comment: occasionally for glaucoma which runs in her family/ per pt no    • Sexual activity: Never     Partners: Male   Lifestyle   • Physical activity     Days per week: Not on file     Minutes per session: Not on file   • Stress: Not on file   Relationships   • Social connections     Talks on phone: Not on file     Gets together: Not on file     Attends Denominational service: Not on file     Active member of club or organization: Not on file     Attends meetings of clubs or organizations: Not on file     Relationship status: Not on file   • Intimate partner violence     Fear of current or ex partner: Not on file     Emotionally abused: Not on file     Physically abused: Not on file     Forced sexual activity: Not on file   Other Topics Concern   • Not on file   Social History Narrative   • Not on file     Allergies   Allergen Reactions   • Demerol Anaphylaxis     Outpatient Encounter Medications as of 2020   Medication Sig Dispense Refill   • temazepam (RESTORIL) 15 MG Cap Take 15 mg by mouth at bedtime as needed.     • levothyroxine (SYNTHROID) 75 MCG Tab TAKE ONE TABLET BY MOUTH ONE TIME DAILY 30 Tab 5   • fluticasone-salmeterol (ADVAIR) 250-50 MCG/DOSE AEROSOL POWDER, BREATH ACTIVATED INHALE 1 PUFF BY MOUTH 2 TIMES A DAY. RINSE MOUTH AFTEREACH USE (Patient taking differently: Inhale 1 Puff by mouth every day.) 1 Inhaler 5   • atorvastatin (LIPITOR) 40 MG  "Tab Take 1 Tab by mouth every day. 100 Tab 2   • metoprolol SR (TOPROL XL) 25 MG TABLET SR 24 HR Take 1 Tab by mouth every day. 90 Tab 3   • lisinopril (PRINIVIL) 5 MG Tab Take 1 Tab by mouth every day. 100 Tab 3   • montelukast (SINGULAIR) 10 MG Tab TAKE ONE TABLET BY MOUTH ONE TIME DAILY 30 Tab 10   • Denosumab (PROLIA SC) Inject 1 Dose as instructed every 6 months. Indications: twice a year     • aspirin (ASA) 325 MG Tab Take 324 mg by mouth every day.     • magnesium oxide (MAG-OX) 400 MG Tab Take 400 mg by mouth every day.     • Cholecalciferol (VITAMIN D) 2000 UNITS CAPS Take  by mouth.     • calcium carbonate (OS-DARIEN 500) 1250 MG TABS Take 1,250 mg by mouth every day.     • multivitamin (THERAGRAN) TABS Take 1 Tab by mouth every day.       No facility-administered encounter medications on file as of 2/26/2020.      Review of Systems   Constitutional: Negative for chills, fever, malaise/fatigue and weight loss.   HENT: Negative for ear discharge, ear pain, hearing loss and nosebleeds.    Eyes: Negative for blurred vision, double vision, pain and discharge.   Respiratory: Negative for cough and shortness of breath.    Cardiovascular: Negative for chest pain, palpitations, orthopnea, claudication, leg swelling and PND.   Gastrointestinal: Negative for abdominal pain, blood in stool, melena, nausea and vomiting.   Genitourinary: Negative for dysuria and hematuria.   Musculoskeletal: Negative for falls, joint pain and myalgias.   Skin: Negative for itching and rash.   Neurological: Negative for dizziness, sensory change, speech change, loss of consciousness and headaches.   Endo/Heme/Allergies: Negative for environmental allergies. Does not bruise/bleed easily.   Psychiatric/Behavioral: Negative for depression, hallucinations and suicidal ideas.        Objective:   /76 (BP Location: Right arm, Patient Position: Sitting, BP Cuff Size: Adult)   Pulse 68   Resp 14   Ht 1.473 m (4' 10\")   Wt 43 kg (94 lb " 12.8 oz)   SpO2 95%   BMI 19.81 kg/m²     Physical Exam   Constitutional: She is oriented to person, place, and time. No distress.   HENT:   Head: Normocephalic and atraumatic.   Right Ear: External ear normal.   Left Ear: External ear normal.   Eyes: Right eye exhibits no discharge. Left eye exhibits no discharge.   Neck: No JVD present. No thyromegaly present.   Cardiovascular: Normal rate, regular rhythm and intact distal pulses. Exam reveals no gallop and no friction rub.   Murmur heard.  there is 2-3/6 systolic murmur heard best at the right border of the aortic valve area. The murmur does not radiate to the carotids.     Pulmonary/Chest: Breath sounds normal. No respiratory distress.   Abdominal: Bowel sounds are normal. She exhibits no distension. There is no abdominal tenderness.   Musculoskeletal:         General: No tenderness or edema.   Neurological: She is alert and oriented to person, place, and time. No cranial nerve deficit.   Skin: Skin is warm and dry. She is not diaphoretic.   Psychiatric: She has a normal mood and affect. Her behavior is normal.   Nursing note and vitals reviewed.      Assessment:     1. Dyslipidemia     2. Aortic valve stenosis, moderate     3. Atherosclerosis of native coronary artery of native heart without angina pectoris         Medical Decision Making:  Today's Assessment / Status / Plan:   At this time patient is clinically stable in terms of her cardiac standpoint.  Continue current medications at current dose.   Does not want to be referred to valve program as she is feeling well. She prefers to see me at this time.  Clinical monitor for AS.     I will see patient back in clinic with lab tests and studies results in 6 months.     I thank you Dr. Flor for referring patient to our Cardiology Clinic today.

## 2020-05-20 DIAGNOSIS — E03.9 HYPOTHYROIDISM, UNSPECIFIED TYPE: ICD-10-CM

## 2020-05-20 RX ORDER — LEVOTHYROXINE SODIUM 0.07 MG/1
TABLET ORAL
Qty: 30 TAB | Refills: 4 | Status: SHIPPED | OUTPATIENT
Start: 2020-05-20 | End: 2020-10-21 | Stop reason: SDUPTHER

## 2020-05-20 NOTE — TELEPHONE ENCOUNTER
Received request via: Pharmacy    Was the patient seen in the last year in this department? Yes9/19/19    Does the patient have an active prescription (recently filled or refills available) for medication(s) requested? No

## 2020-07-08 ENCOUNTER — OFFICE VISIT (OUTPATIENT)
Dept: PULMONOLOGY | Facility: HOSPICE | Age: 75
End: 2020-07-08
Payer: MEDICARE

## 2020-07-08 VITALS
WEIGHT: 94.25 LBS | SYSTOLIC BLOOD PRESSURE: 112 MMHG | BODY MASS INDEX: 19.78 KG/M2 | OXYGEN SATURATION: 93 % | DIASTOLIC BLOOD PRESSURE: 76 MMHG | HEIGHT: 58 IN | HEART RATE: 91 BPM

## 2020-07-08 DIAGNOSIS — G47.00 INSOMNIA, UNSPECIFIED TYPE: ICD-10-CM

## 2020-07-08 DIAGNOSIS — J45.20 MILD INTERMITTENT ASTHMA WITHOUT COMPLICATION: ICD-10-CM

## 2020-07-08 DIAGNOSIS — R06.02 SOB (SHORTNESS OF BREATH): ICD-10-CM

## 2020-07-08 DIAGNOSIS — J44.9 CHRONIC OBSTRUCTIVE PULMONARY DISEASE, UNSPECIFIED COPD TYPE (HCC): ICD-10-CM

## 2020-07-08 PROCEDURE — 99213 OFFICE O/P EST LOW 20 MIN: CPT | Performed by: INTERNAL MEDICINE

## 2020-07-08 RX ORDER — TEMAZEPAM 15 MG/1
15 CAPSULE ORAL NIGHTLY PRN
Qty: 30 CAP | Status: CANCELLED | OUTPATIENT
Start: 2020-07-08

## 2020-07-08 RX ORDER — TEMAZEPAM 15 MG/1
15 CAPSULE ORAL NIGHTLY PRN
Qty: 30 CAP | Refills: 4 | Status: SHIPPED | OUTPATIENT
Start: 2020-07-08 | End: 2020-08-07

## 2020-07-08 RX ORDER — MONTELUKAST SODIUM 10 MG/1
TABLET ORAL
Qty: 30 TAB | Refills: 10 | Status: SHIPPED | OUTPATIENT
Start: 2020-07-08 | End: 2021-08-10 | Stop reason: SDUPTHER

## 2020-07-08 ASSESSMENT — ENCOUNTER SYMPTOMS
PSYCHIATRIC NEGATIVE: 1
CARDIOVASCULAR NEGATIVE: 1
RESPIRATORY NEGATIVE: 1
EYES NEGATIVE: 1
MUSCULOSKELETAL NEGATIVE: 1
CONSTITUTIONAL NEGATIVE: 1
GASTROINTESTINAL NEGATIVE: 1
NEUROLOGICAL NEGATIVE: 1

## 2020-07-08 ASSESSMENT — FIBROSIS 4 INDEX: FIB4 SCORE: 1.09

## 2020-07-08 NOTE — ASSESSMENT & PLAN NOTE
" \"Restoril 15 mg caps one at bedtime when necessary for insomnia approximately 2-3 times per week. She has a hx of severe insomnia for 48-72 hours and thus Restoril is beneficial.\"  She has been on this for > 5 yrs  Refilled  "

## 2020-07-08 NOTE — ASSESSMENT & PLAN NOTE
Moderately severe FEV1 55%  Optimized on advair 250/50 and albuterol prn  Reviewed COPD management

## 2020-07-08 NOTE — PROGRESS NOTES
"Pulmonary Clinic follow up    Date of Service: 7/8/2020    Reason for follow up:  COPD (Last seen 07/29/2019); Asthma; and Medication Refill (Advair, montelukast and Temazepam)      Problem List Items Addressed This Visit     Insomnia      \"Restoril 15 mg caps one at bedtime when necessary for insomnia approximately 2-3 times per week. She has a hx of severe insomnia for 48-72 hours and thus Restoril is beneficial.\"  She has been on this for > 5 yrs  Refilled         Relevant Medications    temazepam (RESTORIL) 15 MG Cap    Chronic obstructive pulmonary disease (HCC)     Moderately severe FEV1 55%  Optimized on advair 250/50 and albuterol prn  Reviewed COPD management         Relevant Medications    fluticasone-salmeterol (ADVAIR) 250-50 MCG/DOSE AEROSOL POWDER, BREATH ACTIVATED    montelukast (SINGULAIR) 10 MG Tab    Mild intermittent asthma without complication    Relevant Medications    fluticasone-salmeterol (ADVAIR) 250-50 MCG/DOSE AEROSOL POWDER, BREATH ACTIVATED    montelukast (SINGULAIR) 10 MG Tab      Other Visit Diagnoses     SOB (shortness of breath)        Relevant Medications    montelukast (SINGULAIR) 10 MG Tab            History of Present Illness: Olga Rich is a 75 y.o. female with a past medical history of aortic valve stenosis, dyslipidemia, hypothyroidism and COPD moderately severe with FEV1 of 55%    Staying mainly indoors during this quarantine time    Inhalers:advair 250/50 once a day and singulair  Rescue inhaler use:rare need  Last urgent care or ER visit: 2 yrs ago  Last prednisone:2 yrs  Last Antibiotics:2 yrs  Night time symptoms:none  Cough:no  Hemoptysis:no  Walk flat ground: no issues  Walk up hills: no issues    For her insomnia she is on:   Restoril 15 mg caps one at bedtime when necessary for insomnia approximately 2-3 times per week. She has a hx of severe insomnia for 48-72 hours and thus Restoril is beneficial.  This has been for > 5 yrs      Review of Systems "   Constitutional: Negative.    HENT: Negative.    Eyes: Negative.    Respiratory: Negative.    Cardiovascular: Negative.    Gastrointestinal: Negative.    Genitourinary: Negative.    Musculoskeletal: Negative.    Skin: Negative.    Neurological: Negative.    Endo/Heme/Allergies: Negative.    Psychiatric/Behavioral: Negative.        Current Outpatient Medications on File Prior to Visit   Medication Sig Dispense Refill   • levothyroxine (SYNTHROID) 75 MCG Tab TAKE ONE TABLET BY MOUTH ONE TIME DAILY 30 Tab 4   • atorvastatin (LIPITOR) 40 MG Tab Take 1 Tab by mouth every day. 100 Tab 2   • lisinopril (PRINIVIL) 5 MG Tab Take 1 Tab by mouth every day. 100 Tab 3   • metoprolol SR (TOPROL XL) 25 MG TABLET SR 24 HR Take 1 Tab by mouth every day. 100 Tab 3   • Denosumab (PROLIA SC) Inject 1 Dose as instructed every 6 months. Indications: twice a year     • aspirin (ASA) 325 MG Tab Take 324 mg by mouth every day.     • magnesium oxide (MAG-OX) 400 MG Tab Take 400 mg by mouth every day.     • Cholecalciferol (VITAMIN D) 2000 UNITS CAPS Take  by mouth.     • calcium carbonate (OS-DARIEN 500) 1250 MG TABS Take 1,250 mg by mouth every day.     • multivitamin (THERAGRAN) TABS Take 1 Tab by mouth every day.       No current facility-administered medications on file prior to visit.        Social History     Tobacco Use   • Smoking status: Former Smoker     Packs/day: 0.25     Years: 40.00     Pack years: 10.00     Types: Cigarettes     Last attempt to quit: 3/1/2014     Years since quittin.3   • Smokeless tobacco: Never Used   Substance Use Topics   • Alcohol use: No     Comment: very rarely   • Drug use: Yes     Types: Marijuana     Comment: occasionally for glaucoma which runs in her family/ per pt no         Past Medical History:   Diagnosis Date   • Allergic rhinitis    • Arthritis     fingers   • Asthma in adult    • Cataract 2012    Bilat    • Dental disorder     partial upper   • Dyslipidemia 2016   • High cholesterol  "   • Hypertension    • Hypothyroid    • Hypothyroid 4/18/2014   • Hypothyroid 4/18/2014   • Osteoporosis, unspecified    • Pneumonia 2009       Past Surgical History:   Procedure Laterality Date   • CATARACT PHACO WITH IOL      Dr. Vargas   • FEMUR ORIF      titamium Dr. Mares.        Allergies: Demerol    Family History   Problem Relation Age of Onset   • Other Mother 97        old age.    • Heart Attack Father 56        MI   • Heart Disease Father    • Other Son         ETOH   • Other Son         ETOH   • Heart Disease Sister 53   • Heart Attack Sister 85   • No Known Problems Brother    • No Known Problems Sister    • No Known Problems Sister        Vitals:    07/08/20 1108   Height: 1.473 m (4' 10\")   Weight: 42.8 kg (94 lb 4 oz)   Weight % change since last entry.: 0 %   BP: 112/76   Pulse: 91   BMI (Calculated): 19.7       Physical Examination  Physical Exam   Constitutional: She is oriented to person, place, and time. No distress.   HENT:   Head: Normocephalic and atraumatic.   Right Ear: External ear normal.   Left Ear: External ear normal.   Mouth/Throat: Oropharynx is clear and moist.   Eyes: Pupils are equal, round, and reactive to light. Conjunctivae and EOM are normal.   Neck: Normal range of motion. Neck supple. No JVD present. No tracheal deviation present. No thyromegaly present.   Cardiovascular: Normal rate, regular rhythm and intact distal pulses. Exam reveals no gallop and no friction rub.   Murmur heard.  Pulmonary/Chest: No stridor. No respiratory distress. She has no wheezes. She has no rales. She exhibits no tenderness.   Abdominal: Soft. Bowel sounds are normal.   Musculoskeletal:         General: No tenderness, deformity or edema.   Lymphadenopathy:     She has no cervical adenopathy.   Neurological: She is alert and oriented to person, place, and time. No cranial nerve deficit. Gait normal. Coordination normal. GCS score is 15.   Skin: Skin is warm and dry. No rash noted. She is not " diaphoretic.   Psychiatric: Mood, affect and judgment normal.             Jarocho Hardy M.D., MD [facility] [place]  [facility]

## 2020-08-12 ENCOUNTER — OUTPATIENT INFUSION SERVICES (OUTPATIENT)
Dept: ONCOLOGY | Facility: MEDICAL CENTER | Age: 75
End: 2020-08-12
Attending: INTERNAL MEDICINE
Payer: MEDICARE

## 2020-08-12 VITALS
HEART RATE: 77 BPM | OXYGEN SATURATION: 93 % | SYSTOLIC BLOOD PRESSURE: 133 MMHG | WEIGHT: 95.24 LBS | HEIGHT: 58 IN | DIASTOLIC BLOOD PRESSURE: 68 MMHG | TEMPERATURE: 97.8 F | BODY MASS INDEX: 19.99 KG/M2 | RESPIRATION RATE: 18 BRPM

## 2020-08-12 DIAGNOSIS — M81.0 AGE-RELATED OSTEOPOROSIS WITHOUT CURRENT PATHOLOGICAL FRACTURE: ICD-10-CM

## 2020-08-12 LAB
CA-I BLD ISE-SCNC: 1.23 MMOL/L (ref 1.1–1.3)
CREAT BLD-MCNC: 0.9 MG/DL (ref 0.5–1.4)

## 2020-08-12 PROCEDURE — 36415 COLL VENOUS BLD VENIPUNCTURE: CPT

## 2020-08-12 PROCEDURE — 700111 HCHG RX REV CODE 636 W/ 250 OVERRIDE (IP): Mod: JG | Performed by: INTERNAL MEDICINE

## 2020-08-12 PROCEDURE — 82330 ASSAY OF CALCIUM: CPT

## 2020-08-12 PROCEDURE — 82565 ASSAY OF CREATININE: CPT

## 2020-08-12 PROCEDURE — 96372 THER/PROPH/DIAG INJ SC/IM: CPT

## 2020-08-12 RX ADMIN — DENOSUMAB 60 MG: 60 INJECTION SUBCUTANEOUS at 08:44

## 2020-08-12 ASSESSMENT — FIBROSIS 4 INDEX: FIB4 SCORE: 1.09

## 2020-08-12 NOTE — PROGRESS NOTES
Pt arrived ambulatory to [facility] for q 6 month Prolia.  Pt denies s/s current infection or illness, denies s/s hypocalcemia.  Pt verbalized taking calcium and vitamin D at home daily.  Labs collected with 23g btterfly to right AC, results reviewed.  Pt within parameters to receive Prolia today.  Prolia injection administered as ordered sub Q, band-aid to site.  Pt tolerated well.  Pt discharged to self care in [facility], Pt to follow up with MD.

## 2020-08-19 ENCOUNTER — OFFICE VISIT (OUTPATIENT)
Dept: CARDIOLOGY | Facility: MEDICAL CENTER | Age: 75
End: 2020-08-19
Payer: MEDICARE

## 2020-08-19 VITALS
WEIGHT: 95.8 LBS | OXYGEN SATURATION: 91 % | HEIGHT: 58 IN | BODY MASS INDEX: 20.11 KG/M2 | DIASTOLIC BLOOD PRESSURE: 70 MMHG | HEART RATE: 69 BPM | RESPIRATION RATE: 15 BRPM | SYSTOLIC BLOOD PRESSURE: 122 MMHG

## 2020-08-19 DIAGNOSIS — E78.5 DYSLIPIDEMIA: ICD-10-CM

## 2020-08-19 DIAGNOSIS — I25.10 ATHEROSCLEROSIS OF NATIVE CORONARY ARTERY OF NATIVE HEART WITHOUT ANGINA PECTORIS: ICD-10-CM

## 2020-08-19 DIAGNOSIS — Z82.49 FAMILY HISTORY OF ATHEROSCLEROSIS: ICD-10-CM

## 2020-08-19 DIAGNOSIS — I35.0 AORTIC VALVE STENOSIS, MODERATE: ICD-10-CM

## 2020-08-19 PROCEDURE — 99214 OFFICE O/P EST MOD 30 MIN: CPT | Performed by: INTERNAL MEDICINE

## 2020-08-19 ASSESSMENT — ENCOUNTER SYMPTOMS
DOUBLE VISION: 0
PND: 0
MYALGIAS: 0
ORTHOPNEA: 0
SHORTNESS OF BREATH: 0
FEVER: 0
NAUSEA: 0
ABDOMINAL PAIN: 0
SPEECH CHANGE: 0
HALLUCINATIONS: 0
CLAUDICATION: 0
DEPRESSION: 0
PALPITATIONS: 0
FALLS: 0
BRUISES/BLEEDS EASILY: 0
EYE PAIN: 0
CHILLS: 0
EYE DISCHARGE: 0
BLURRED VISION: 0
HEADACHES: 0
WEIGHT LOSS: 0
SENSORY CHANGE: 0
DIZZINESS: 0
BLOOD IN STOOL: 0
LOSS OF CONSCIOUSNESS: 0
COUGH: 0
VOMITING: 0

## 2020-08-19 ASSESSMENT — FIBROSIS 4 INDEX: FIB4 SCORE: 1.09

## 2020-08-19 NOTE — PROGRESS NOTES
Chief Complaint   Patient presents with   • Dyslipidemia       Subjective:   Olga Rich is a 75 y.o. female who presents today for cardiac care and evaluation because of an abnormal calcium scan in the past. At the last visit, patient was deemed to be asymptomatic and no further cardiac workup was entertained. We optimized her medical therapy.     In the interim, patient has been doing well without having any symptoms. Patient denies having chest pain, dyspnea, palpitation, presyncope, syncope episodes. Able to climb up at least 2 flights of stairs.    I have independently interpreted and reviewed blood tests results with patient in clinic which shows normal LDL level 79, triglycerides 80, renal and liver function.     She was found to have aortic stenosis for which she underwent TTE and SANGEETA in 05/2017.    I have independently interpreted and reviewed blood tests results with patient in clinic which shows normal LDL level 79, triglycerides 80, renal and liver function.      Past Medical History:   Diagnosis Date   • Allergic rhinitis    • Arthritis     fingers   • Asthma in adult    • Cataract 2/2012    Bilat    • Dental disorder     partial upper   • Dyslipidemia 4/26/2016   • High cholesterol    • Hypertension    • Hypothyroid    • Hypothyroid 4/18/2014   • Hypothyroid 4/18/2014   • Osteoporosis, unspecified    • Pneumonia 2009     Past Surgical History:   Procedure Laterality Date   • CATARACT PHACO WITH IOL      Dr. Vargas   • FEMUR ORIF      titamium Dr. Mares.      Family History   Problem Relation Age of Onset   • Other Mother 97        old age.    • Heart Attack Father 56        MI   • Heart Disease Father    • Other Son         ETOH   • Other Son         ETOH   • Heart Disease Sister 53   • Heart Attack Sister 85   • No Known Problems Brother    • No Known Problems Sister    • No Known Problems Sister      Social History     Socioeconomic History   • Marital status:      Spouse name:  Not on file   • Number of children: Not on file   • Years of education: Not on file   • Highest education level: Not on file   Occupational History   • Not on file   Social Needs   • Financial resource strain: Not on file   • Food insecurity     Worry: Not on file     Inability: Not on file   • Transportation needs     Medical: Not on file     Non-medical: Not on file   Tobacco Use   • Smoking status: Former Smoker     Packs/day: 0.25     Years: 40.00     Pack years: 10.00     Types: Cigarettes     Quit date: 3/1/2014     Years since quittin.4   • Smokeless tobacco: Never Used   Substance and Sexual Activity   • Alcohol use: No     Comment: very rarely   • Drug use: Yes     Types: Marijuana     Comment: occasionally for glaucoma which runs in her family/ per pt no    • Sexual activity: Never     Partners: Male   Lifestyle   • Physical activity     Days per week: Not on file     Minutes per session: Not on file   • Stress: Not on file   Relationships   • Social connections     Talks on phone: Not on file     Gets together: Not on file     Attends Cheondoism service: Not on file     Active member of club or organization: Not on file     Attends meetings of clubs or organizations: Not on file     Relationship status: Not on file   • Intimate partner violence     Fear of current or ex partner: Not on file     Emotionally abused: Not on file     Physically abused: Not on file     Forced sexual activity: Not on file   Other Topics Concern   • Not on file   Social History Narrative   • Not on file     Allergies   Allergen Reactions   • Demerol Anaphylaxis     Outpatient Encounter Medications as of 2020   Medication Sig Dispense Refill   • fluticasone-salmeterol (ADVAIR) 250-50 MCG/DOSE AEROSOL POWDER, BREATH ACTIVATED Inhale 1 Puff by mouth 2 times a day. 1 Inhaler 5   • montelukast (SINGULAIR) 10 MG Tab TAKE ONE TABLET BY MOUTH ONE TIME DAILY 30 Tab 10   • levothyroxine (SYNTHROID) 75 MCG Tab TAKE ONE TABLET BY  "MOUTH ONE TIME DAILY 30 Tab 4   • atorvastatin (LIPITOR) 40 MG Tab Take 1 Tab by mouth every day. 100 Tab 2   • lisinopril (PRINIVIL) 5 MG Tab Take 1 Tab by mouth every day. 100 Tab 3   • metoprolol SR (TOPROL XL) 25 MG TABLET SR 24 HR Take 1 Tab by mouth every day. 100 Tab 3   • Denosumab (PROLIA SC) Inject 1 Dose as instructed every 6 months. Indications: twice a year     • aspirin (ASA) 325 MG Tab Take 324 mg by mouth every day.     • magnesium oxide (MAG-OX) 400 MG Tab Take 400 mg by mouth every day.     • Cholecalciferol (VITAMIN D) 2000 UNITS CAPS Take  by mouth.     • calcium carbonate (OS-DARIEN 500) 1250 MG TABS Take 1,250 mg by mouth every day.     • multivitamin (THERAGRAN) TABS Take 1 Tab by mouth every day.       No facility-administered encounter medications on file as of 8/19/2020.      Review of Systems   Constitutional: Negative for chills, fever, malaise/fatigue and weight loss.   HENT: Negative for ear discharge, ear pain, hearing loss and nosebleeds.    Eyes: Negative for blurred vision, double vision, pain and discharge.   Respiratory: Negative for cough and shortness of breath.    Cardiovascular: Negative for chest pain, palpitations, orthopnea, claudication, leg swelling and PND.   Gastrointestinal: Negative for abdominal pain, blood in stool, melena, nausea and vomiting.   Genitourinary: Negative for dysuria and hematuria.   Musculoskeletal: Negative for falls, joint pain and myalgias.   Skin: Negative for itching and rash.   Neurological: Negative for dizziness, sensory change, speech change, loss of consciousness and headaches.   Endo/Heme/Allergies: Negative for environmental allergies. Does not bruise/bleed easily.   Psychiatric/Behavioral: Negative for depression, hallucinations and suicidal ideas.        Objective:   /70 (BP Location: Right arm, Patient Position: Sitting, BP Cuff Size: Adult)   Pulse 69   Resp 15   Ht 1.473 m (4' 10\")   Wt 43.5 kg (95 lb 12.8 oz)   SpO2 91%   " BMI 20.02 kg/m²     Physical Exam   Constitutional: She is oriented to person, place, and time. No distress.   HENT:   Head: Normocephalic and atraumatic.   Right Ear: External ear normal.   Left Ear: External ear normal.   Eyes: Right eye exhibits no discharge. Left eye exhibits no discharge.   Neck: No JVD present. No thyromegaly present.   Cardiovascular: Normal rate, regular rhythm and intact distal pulses. Exam reveals no gallop and no friction rub.   Murmur heard.  there is 3/6 systolic murmur heard best at the right border of the aortic valve area. The murmur does not radiate to the carotids.     Pulmonary/Chest: Breath sounds normal. No respiratory distress.   Abdominal: Bowel sounds are normal. She exhibits no distension. There is no abdominal tenderness.   Musculoskeletal:         General: No tenderness or edema.   Neurological: She is alert and oriented to person, place, and time. No cranial nerve deficit.   Skin: Skin is warm and dry. She is not diaphoretic.   Psychiatric: She has a normal mood and affect. Her behavior is normal.   Nursing note and vitals reviewed.      Assessment:     1. Dyslipidemia     2. Aortic valve stenosis, moderate     3. Atherosclerosis of native coronary artery of native heart without angina pectoris     4. Family history of atherosclerosis         Medical Decision Making:  Today's Assessment / Status / Plan:   At this time patient is clinically stable in terms of her cardiac standpoint.  Blood pressure is well controlled.  Continue current medications at current dose.   Does not want to be referred to valve program as she is feeling well. She prefers to see me at this time.  Clinical monitor for AS.     I will see patient back in clinic with lab tests and studies results in 6 months.     I thank you Dr. Flor for referring patient to our Cardiology Clinic today.

## 2020-10-21 ENCOUNTER — OFFICE VISIT (OUTPATIENT)
Dept: MEDICAL GROUP | Facility: LAB | Age: 75
End: 2020-10-21
Payer: MEDICARE

## 2020-10-21 ENCOUNTER — TELEPHONE (OUTPATIENT)
Dept: HEALTH INFORMATION MANAGEMENT | Facility: OTHER | Age: 75
End: 2020-10-21

## 2020-10-21 VITALS
HEART RATE: 74 BPM | TEMPERATURE: 98.8 F | DIASTOLIC BLOOD PRESSURE: 100 MMHG | RESPIRATION RATE: 12 BRPM | HEIGHT: 58 IN | BODY MASS INDEX: 19.69 KG/M2 | SYSTOLIC BLOOD PRESSURE: 165 MMHG | OXYGEN SATURATION: 95 % | WEIGHT: 93.8 LBS

## 2020-10-21 DIAGNOSIS — Z12.31 ENCOUNTER FOR SCREENING MAMMOGRAM FOR BREAST CANCER: ICD-10-CM

## 2020-10-21 DIAGNOSIS — Z23 NEED FOR VACCINATION: ICD-10-CM

## 2020-10-21 DIAGNOSIS — E03.9 HYPOTHYROIDISM, UNSPECIFIED TYPE: ICD-10-CM

## 2020-10-21 DIAGNOSIS — I15.9 SECONDARY HYPERTENSION: ICD-10-CM

## 2020-10-21 PROCEDURE — 99214 OFFICE O/P EST MOD 30 MIN: CPT | Mod: 25 | Performed by: INTERNAL MEDICINE

## 2020-10-21 PROCEDURE — 90662 IIV NO PRSV INCREASED AG IM: CPT | Performed by: INTERNAL MEDICINE

## 2020-10-21 PROCEDURE — G0008 ADMIN INFLUENZA VIRUS VAC: HCPCS | Performed by: INTERNAL MEDICINE

## 2020-10-21 RX ORDER — LEVOTHYROXINE SODIUM 0.07 MG/1
TABLET ORAL
Qty: 90 TAB | Refills: 3 | Status: SHIPPED | OUTPATIENT
Start: 2020-10-21 | End: 2021-02-23 | Stop reason: SDUPTHER

## 2020-10-21 RX ORDER — LISINOPRIL 10 MG/1
10 TABLET ORAL DAILY
Qty: 90 TAB | Refills: 3 | Status: SHIPPED | OUTPATIENT
Start: 2020-10-21 | End: 2020-10-22

## 2020-10-21 ASSESSMENT — PATIENT HEALTH QUESTIONNAIRE - PHQ9: CLINICAL INTERPRETATION OF PHQ2 SCORE: 0

## 2020-10-21 ASSESSMENT — FIBROSIS 4 INDEX: FIB4 SCORE: 1.09

## 2020-10-21 NOTE — TELEPHONE ENCOUNTER
Patient is calling because she wants to ask you if her blood pressure today could have been an isolated event? She is wondering if she should monitor her BP for a few days while taking the Lisinopril 5 mg still or should she start the 10 mg?  She is concerned about the 10 mg dose because of her body weight and is concerned about getting dizzy and falling.    We are also making an appointment with her cardiologist to follow up as well.

## 2020-10-21 NOTE — TELEPHONE ENCOUNTER
rola:  These have Olga start 10 mg benazepril as soon as possiblel.   Regards, Abdon Hilario, DO

## 2020-10-22 ENCOUNTER — OFFICE VISIT (OUTPATIENT)
Dept: CARDIOLOGY | Facility: MEDICAL CENTER | Age: 75
End: 2020-10-22
Payer: MEDICARE

## 2020-10-22 VITALS
HEIGHT: 58 IN | OXYGEN SATURATION: 95 % | DIASTOLIC BLOOD PRESSURE: 80 MMHG | SYSTOLIC BLOOD PRESSURE: 142 MMHG | HEART RATE: 95 BPM | WEIGHT: 94.6 LBS | RESPIRATION RATE: 14 BRPM | BODY MASS INDEX: 19.86 KG/M2

## 2020-10-22 DIAGNOSIS — I25.10 ATHEROSCLEROSIS OF NATIVE CORONARY ARTERY OF NATIVE HEART WITHOUT ANGINA PECTORIS: ICD-10-CM

## 2020-10-22 DIAGNOSIS — I15.9 SECONDARY HYPERTENSION: ICD-10-CM

## 2020-10-22 DIAGNOSIS — Z82.49 FAMILY HISTORY OF ATHEROSCLEROSIS: ICD-10-CM

## 2020-10-22 DIAGNOSIS — E78.5 DYSLIPIDEMIA: ICD-10-CM

## 2020-10-22 DIAGNOSIS — I35.0 AORTIC VALVE STENOSIS, MODERATE: ICD-10-CM

## 2020-10-22 PROCEDURE — 99214 OFFICE O/P EST MOD 30 MIN: CPT | Performed by: INTERNAL MEDICINE

## 2020-10-22 RX ORDER — TEMAZEPAM 15 MG/1
CAPSULE ORAL
COMMUNITY
Start: 2020-10-16 | End: 2021-08-10 | Stop reason: SDUPTHER

## 2020-10-22 RX ORDER — ATORVASTATIN CALCIUM 40 MG/1
40 TABLET, FILM COATED ORAL DAILY
Qty: 100 TAB | Refills: 2 | Status: SHIPPED | OUTPATIENT
Start: 2020-10-22 | End: 2021-05-26 | Stop reason: SDUPTHER

## 2020-10-22 RX ORDER — METOPROLOL SUCCINATE 25 MG/1
25 TABLET, EXTENDED RELEASE ORAL DAILY
Qty: 100 TAB | Refills: 3 | Status: SHIPPED | OUTPATIENT
Start: 2020-10-22 | End: 2021-03-23

## 2020-10-22 RX ORDER — LISINOPRIL 10 MG/1
10 TABLET ORAL DAILY
Qty: 90 TAB | Refills: 3 | Status: SHIPPED | OUTPATIENT
Start: 2020-10-22 | End: 2021-05-26 | Stop reason: SDUPTHER

## 2020-10-22 ASSESSMENT — ENCOUNTER SYMPTOMS
HALLUCINATIONS: 0
EYE DISCHARGE: 0
CHILLS: 0
NAUSEA: 0
PND: 0
CLAUDICATION: 0
VOMITING: 0
SHORTNESS OF BREATH: 0
HEADACHES: 0
LOSS OF CONSCIOUSNESS: 0
WEIGHT LOSS: 0
PALPITATIONS: 0
ABDOMINAL PAIN: 0
DOUBLE VISION: 0
SENSORY CHANGE: 0
ORTHOPNEA: 0
MYALGIAS: 0
DIZZINESS: 0
COUGH: 0
BLURRED VISION: 0
SPEECH CHANGE: 0
EYE PAIN: 0
FEVER: 0
BRUISES/BLEEDS EASILY: 0
DEPRESSION: 0
FALLS: 0
BLOOD IN STOOL: 0

## 2020-10-22 ASSESSMENT — FIBROSIS 4 INDEX: FIB4 SCORE: 1.09

## 2020-10-22 NOTE — TELEPHONE ENCOUNTER
Chrissy:  Message received okay to wait until she is evaluated by cardiology.   Regards, Abdon Hilario,

## 2020-10-22 NOTE — PROGRESS NOTES
CC: Olga Rich is a 75 y.o. female is suffering from   Chief Complaint   Patient presents with   • Thyroid Follow-up         SUBJECTIVE:  1. Need for vaccination  Olga is here for follow-up is in need of an influenza vaccination which was given    2. Hypothyroidism, unspecified type  History of hypothyroidism continue to follow    3. Secondary hypertension  Increase benazepril from 5 to 10 mg    4. Encounter for screening mammogram for breast cancer  Screening mammogram ordered        Past social, family, history: Single  Social History     Tobacco Use   • Smoking status: Former Smoker     Packs/day: 0.25     Years: 40.00     Pack years: 10.00     Types: Cigarettes     Quit date: 3/1/2014     Years since quittin.6   • Smokeless tobacco: Never Used   Substance Use Topics   • Alcohol use: No     Comment: very rarely   • Drug use: Yes     Types: Marijuana     Comment: occasionally for glaucoma which runs in her family/ per pt no          MEDICATIONS:    Current Outpatient Medications:   •  levothyroxine (SYNTHROID) 75 MCG Tab, TAKE ONE TABLET BY MOUTH ONE TIME DAILY, Disp: 90 Tab, Rfl: 3  •  lisinopril (PRINIVIL) 10 MG Tab, Take 1 Tab by mouth every day., Disp: 90 Tab, Rfl: 3  •  fluticasone-salmeterol (ADVAIR) 250-50 MCG/DOSE AEROSOL POWDER, BREATH ACTIVATED, Inhale 1 Puff by mouth 2 times a day., Disp: 1 Inhaler, Rfl: 5  •  montelukast (SINGULAIR) 10 MG Tab, TAKE ONE TABLET BY MOUTH ONE TIME DAILY, Disp: 30 Tab, Rfl: 10  •  atorvastatin (LIPITOR) 40 MG Tab, Take 1 Tab by mouth every day., Disp: 100 Tab, Rfl: 2  •  metoprolol SR (TOPROL XL) 25 MG TABLET SR 24 HR, Take 1 Tab by mouth every day., Disp: 100 Tab, Rfl: 3  •  Denosumab (PROLIA SC), Inject 1 Dose as instructed every 6 months. Indications: twice a year, Disp: , Rfl:   •  aspirin (ASA) 325 MG Tab, Take 324 mg by mouth every day., Disp: , Rfl:   •  magnesium oxide (MAG-OX) 400 MG Tab, Take 400 mg by mouth every day., Disp: , Rfl:   •   "Cholecalciferol (VITAMIN D) 2000 UNITS CAPS, Take  by mouth., Disp: , Rfl:   •  calcium carbonate (OS-DARIEN 500) 1250 MG TABS, Take 1,250 mg by mouth every day., Disp: , Rfl:   •  multivitamin (THERAGRAN) TABS, Take 1 Tab by mouth every day., Disp: , Rfl:     PROBLEMS:  Patient Active Problem List    Diagnosis Date Noted   • Non-recurrent unilateral inguinal hernia without obstruction or gangrene 09/19/2019   • Mild intermittent asthma without complication 07/30/2018   • SOB (shortness of breath) 08/18/2017   • Chronic obstructive pulmonary disease (HCC) 08/18/2017   • Aortic valve stenosis, moderate 07/18/2017   • Insomnia 08/19/2016   • Dyslipidemia 04/26/2016   • Osteoporosis 07/18/2014   • Hypothyroid 04/18/2014   • Environmental allergies 03/14/2014       REVIEW OF SYSTEMS:  Gen.:  No Nausea, Vomiting, fever, Chills.  Heart: No chest pain.  Lungs:  No shortness of Breath.  Psychological: Vinay unusual Anxiety depression     PHYSICAL EXAM   Constitutional: Alert, cooperative, not in acute distress.  Cardiovascular:  Rate Rhythm is regular without murmurs rubs clicks.     Thorax & Lungs: Clear to auscultation, no wheezing, rhonchi, or rales  HENT: Normocephalic, Atraumatic.  Eyes: PERRLA, EOMI, Conjunctiva normal.   Neck: Trachia is midline no swelling of the thyroid.   Lymphatic: No lymphadenopathy noted.   Neurologic: Alert & oriented x 3, cranial nerves II through XII are intact, Normal motor function, Normal sensory function, No focal deficits noted.   Psychiatric: Affect normal, Judgment normal, Mood normal.     VITAL SIGNS:BP (!) 165/100   Pulse 74   Temp 37.1 °C (98.8 °F) (Temporal)   Resp 12   Ht 1.473 m (4' 10\")   Wt 42.5 kg (93 lb 12.8 oz)   SpO2 95%   BMI 19.60 kg/m²     Labs: Reviewed    Assessment:                                                     Plan:    1. Need for vaccination  Vaccination given  - Influenza Vaccine, High Dose (65+ Only)    2. Hypothyroidism, unspecified type  Continue " Synthroid  - levothyroxine (SYNTHROID) 75 MCG Tab; TAKE ONE TABLET BY MOUTH ONE TIME DAILY  Dispense: 90 Tab; Refill: 3    3. Secondary hypertension  Increase lisinopril from 5 to 10 mg  - lisinopril (PRINIVIL) 10 MG Tab; Take 1 Tab by mouth every day.  Dispense: 90 Tab; Refill: 3    4. Encounter for screening mammogram for breast cancer  Screening mammogram ordered  - MA-SCREENING MAMMO BILAT W/TOMOSYNTHESIS W/CAD; Future

## 2020-10-22 NOTE — TELEPHONE ENCOUNTER
I spoke to pt.  She is concerned about the dizziness that the increased med could cause.  She is afraid to fall due to having osteoporosis.  She has had some situational stress recently.  She will be seeing her cardiologist in the morning.  She will wait to start the med.

## 2020-10-22 NOTE — PROGRESS NOTES
Chief Complaint   Patient presents with   • Dyslipidemia       Subjective:   Olga Rich is a 75 y.o. female who presents today for cardiac care and evaluation because of an abnormal calcium scan in the past. At the last visit, patient was deemed to be asymptomatic and no further cardiac workup was entertained. We optimized her medical therapy.     In the interim, patient has been doing well without having any symptoms. Patient denies having chest pain, dyspnea, palpitation, presyncope, syncope episodes. Able to climb up at least 2 flights of stairs.    I have independently interpreted and reviewed blood tests results with patient in clinic which shows normal LDL level 79, triglycerides 80, renal and liver function.     She was found to have aortic stenosis for which she underwent TTE and SANGEETA in 05/2017.    I have independently interpreted and reviewed blood tests results with patient in clinic which shows normal LDL level 79, triglycerides 80, renal and liver function.      Past Medical History:   Diagnosis Date   • Allergic rhinitis    • Arthritis     fingers   • Asthma in adult    • Cataract 2/2012    Bilat    • Dental disorder     partial upper   • Dyslipidemia 4/26/2016   • High cholesterol    • Hypertension    • Hypothyroid    • Hypothyroid 4/18/2014   • Hypothyroid 4/18/2014   • Osteoporosis, unspecified    • Pneumonia 2009     Past Surgical History:   Procedure Laterality Date   • CATARACT PHACO WITH IOL      Dr. Vargas   • FEMUR ORIF      titamium Dr. Mares.      Family History   Problem Relation Age of Onset   • Other Mother 97        old age.    • Heart Attack Father 56        MI   • Heart Disease Father    • Other Son         ETOH   • Other Son         ETOH   • Heart Disease Sister 53   • Heart Attack Sister 85   • No Known Problems Brother    • No Known Problems Sister    • No Known Problems Sister      Social History     Socioeconomic History   • Marital status:      Spouse name:  Not on file   • Number of children: Not on file   • Years of education: Not on file   • Highest education level: Not on file   Occupational History   • Not on file   Social Needs   • Financial resource strain: Not on file   • Food insecurity     Worry: Not on file     Inability: Not on file   • Transportation needs     Medical: Not on file     Non-medical: Not on file   Tobacco Use   • Smoking status: Former Smoker     Packs/day: 0.25     Years: 40.00     Pack years: 10.00     Types: Cigarettes     Quit date: 3/1/2014     Years since quittin.6   • Smokeless tobacco: Never Used   Substance and Sexual Activity   • Alcohol use: No     Comment: very rarely   • Drug use: Yes     Types: Marijuana     Comment: occasionally for glaucoma which runs in her family/ per pt no    • Sexual activity: Never     Partners: Male   Lifestyle   • Physical activity     Days per week: Not on file     Minutes per session: Not on file   • Stress: Not on file   Relationships   • Social connections     Talks on phone: Not on file     Gets together: Not on file     Attends Alevism service: Not on file     Active member of club or organization: Not on file     Attends meetings of clubs or organizations: Not on file     Relationship status: Not on file   • Intimate partner violence     Fear of current or ex partner: Not on file     Emotionally abused: Not on file     Physically abused: Not on file     Forced sexual activity: Not on file   Other Topics Concern   • Not on file   Social History Narrative   • Not on file     Allergies   Allergen Reactions   • Demerol Anaphylaxis     Outpatient Encounter Medications as of 10/22/2020   Medication Sig Dispense Refill   • temazepam (RESTORIL) 15 MG Cap      • lisinopril (PRINIVIL) 10 MG Tab Take 1 Tab by mouth every day. 90 Tab 3   • levothyroxine (SYNTHROID) 75 MCG Tab TAKE ONE TABLET BY MOUTH ONE TIME DAILY 90 Tab 3   • fluticasone-salmeterol (ADVAIR) 250-50 MCG/DOSE AEROSOL POWDER, BREATH  ACTIVATED Inhale 1 Puff by mouth 2 times a day. 1 Inhaler 5   • montelukast (SINGULAIR) 10 MG Tab TAKE ONE TABLET BY MOUTH ONE TIME DAILY 30 Tab 10   • atorvastatin (LIPITOR) 40 MG Tab Take 1 Tab by mouth every day. 100 Tab 2   • metoprolol SR (TOPROL XL) 25 MG TABLET SR 24 HR Take 1 Tab by mouth every day. 100 Tab 3   • Denosumab (PROLIA SC) Inject 1 Dose as instructed every 6 months. Indications: twice a year     • aspirin (ASA) 325 MG Tab Take 324 mg by mouth every day. Takes one or two depending on arthritis     • magnesium oxide (MAG-OX) 400 MG Tab Take 400 mg by mouth every day.     • Cholecalciferol (VITAMIN D) 2000 UNITS CAPS Take  by mouth.     • calcium carbonate (OS-DARIEN 500) 1250 MG TABS Take 1,250 mg by mouth every day.     • multivitamin (THERAGRAN) TABS Take 1 Tab by mouth every day.     • [DISCONTINUED] lisinopril (PRINIVIL) 10 MG Tab Take 1 Tab by mouth every day. (Patient taking differently: Take 10 mg by mouth every day. 5mg) 90 Tab 3     No facility-administered encounter medications on file as of 10/22/2020.      Review of Systems   Constitutional: Negative for chills, fever, malaise/fatigue and weight loss.   HENT: Negative for ear discharge, ear pain, hearing loss and nosebleeds.    Eyes: Negative for blurred vision, double vision, pain and discharge.   Respiratory: Negative for cough and shortness of breath.    Cardiovascular: Negative for chest pain, palpitations, orthopnea, claudication, leg swelling and PND.   Gastrointestinal: Negative for abdominal pain, blood in stool, melena, nausea and vomiting.   Genitourinary: Negative for dysuria and hematuria.   Musculoskeletal: Negative for falls, joint pain and myalgias.   Skin: Negative for itching and rash.   Neurological: Negative for dizziness, sensory change, speech change, loss of consciousness and headaches.   Endo/Heme/Allergies: Negative for environmental allergies. Does not bruise/bleed easily.   Psychiatric/Behavioral: Negative for  "depression, hallucinations and suicidal ideas.        Objective:   /80 (BP Location: Left arm, Patient Position: Sitting, BP Cuff Size: Adult)   Pulse 95   Resp 14   Ht 1.473 m (4' 10\")   Wt 42.9 kg (94 lb 9.6 oz)   SpO2 95%   BMI 19.77 kg/m²     Physical Exam   Constitutional: She is oriented to person, place, and time. No distress.   HENT:   Head: Normocephalic and atraumatic.   Right Ear: External ear normal.   Left Ear: External ear normal.   Eyes: Right eye exhibits no discharge. Left eye exhibits no discharge.   Neck: No JVD present. No thyromegaly present.   Cardiovascular: Normal rate, regular rhythm and intact distal pulses. Exam reveals no gallop and no friction rub.   Murmur heard.  there is 3/6 systolic murmur heard best at the right border of the aortic valve area. The murmur does not radiate to the carotids.     Pulmonary/Chest: Breath sounds normal. No respiratory distress.   Abdominal: Bowel sounds are normal. She exhibits no distension. There is no abdominal tenderness.   Musculoskeletal:         General: No tenderness or edema.   Neurological: She is alert and oriented to person, place, and time. No cranial nerve deficit.   Skin: Skin is warm and dry. She is not diaphoretic.   Psychiatric: She has a normal mood and affect. Her behavior is normal.   Nursing note and vitals reviewed.      Assessment:     1. Aortic valve stenosis, moderate     2. Dyslipidemia     3. Atherosclerosis of native coronary artery of native heart without angina pectoris     4. Family history of atherosclerosis     5. Secondary hypertension  lisinopril (PRINIVIL) 10 MG Tab       Medical Decision Making:  Today's Assessment / Status / Plan:   At this time patient is clinically stable in terms of her cardiac standpoint.  Blood pressure is not well controlled.  Will increase lisinopril to 10 mg daily.  Continue other current medications at current dose.   Does not want to be referred to valve program as she is " feeling well. She prefers to see me at this time.  Clinical monitor for AS.     I will see patient back in clinic with lab tests and studies results in 6 months.     I thank you Dr. Flor for referring patient to our Cardiology Clinic today.

## 2021-01-04 ENCOUNTER — OFFICE VISIT (OUTPATIENT)
Dept: RHEUMATOLOGY | Facility: MEDICAL CENTER | Age: 76
End: 2021-01-04
Payer: MEDICARE

## 2021-01-04 VITALS
WEIGHT: 92 LBS | RESPIRATION RATE: 16 BRPM | OXYGEN SATURATION: 92 % | BODY MASS INDEX: 19.23 KG/M2 | SYSTOLIC BLOOD PRESSURE: 132 MMHG | TEMPERATURE: 97 F | HEART RATE: 80 BPM | DIASTOLIC BLOOD PRESSURE: 68 MMHG

## 2021-01-04 DIAGNOSIS — Z79.899 ENCOUNTER FOR MONITORING DENOSUMAB THERAPY: ICD-10-CM

## 2021-01-04 DIAGNOSIS — J44.9 CHRONIC OBSTRUCTIVE PULMONARY DISEASE, UNSPECIFIED COPD TYPE (HCC): ICD-10-CM

## 2021-01-04 DIAGNOSIS — Z51.81 ENCOUNTER FOR MONITORING DENOSUMAB THERAPY: ICD-10-CM

## 2021-01-04 DIAGNOSIS — I35.0 AORTIC VALVE STENOSIS, ETIOLOGY OF CARDIAC VALVE DISEASE UNSPECIFIED: ICD-10-CM

## 2021-01-04 DIAGNOSIS — M81.0 AGE-RELATED OSTEOPOROSIS WITHOUT CURRENT PATHOLOGICAL FRACTURE: ICD-10-CM

## 2021-01-04 DIAGNOSIS — E03.9 ACQUIRED HYPOTHYROIDISM: ICD-10-CM

## 2021-01-04 PROCEDURE — 99214 OFFICE O/P EST MOD 30 MIN: CPT | Performed by: INTERNAL MEDICINE

## 2021-01-04 ASSESSMENT — FIBROSIS 4 INDEX: FIB4 SCORE: 1.09

## 2021-01-04 NOTE — PROGRESS NOTES
Chief Complaint- joint pain     Subjective:   Olga Rich is a 75 y.o. female here today for follow up of rheumatological issues    This is a follow-up visit for this patient who we see in this clinic for osteoporosis.  Patient was diagnosed with osteoporosis in 2008 status post a right femur fracture.  Patient currently on Prolia 60 mg subcu every 6 months.  Patient gets her Prolia injections at the infusion center.  Patient denies any side effects from the medication, denies any unexplained weight loss, denies any fevers of unknown etiology, denies any GI upset, denies any rashes, denies any new joint swelling, denies recurrent infections.  Patient denies any fractures or falls since last visit.     Additional comorbidities include  hypothyroidism and asthma for which she is managed by her primary care doctor.     Additional psychosocial comorbidities include alcoholism in the family but patient is quite happy with her son and daughter who continues to be sober.       S/p fosamax for 4 years      DEXA 4/2014 T scores -4.4, -3.9  DEXA 7/2016 T scores -3.7, -4.0  FRAX Major Osteoporotic 28.9%, Hip fx risk 11.1%  DEXA 1/21/2019 T scores -4.0, -3.4  FRAX 1/21/2019 major osteoporotic fracture risk 31.3%, hip fracture risk 13.9%  On Prolia 60 mg every 6 months      Addendum 3/11/2021  DEXA 3/11/2021 T scores -3.8, -3.6  FRAX 3/11/2021 major osteoporotic fracture risk 31.1%, hip fracture risk 13.8%     Current medicines (including changes today)  Current Outpatient Medications   Medication Sig Dispense Refill   • temazepam (RESTORIL) 15 MG Cap      • lisinopril (PRINIVIL) 10 MG Tab Take 1 Tab by mouth every day. 90 Tab 3   • metoprolol SR (TOPROL XL) 25 MG TABLET SR 24 HR Take 1 Tab by mouth every day. 100 Tab 3   • atorvastatin (LIPITOR) 40 MG Tab Take 1 Tab by mouth every day. 100 Tab 2   • levothyroxine (SYNTHROID) 75 MCG Tab TAKE ONE TABLET BY MOUTH ONE TIME DAILY 90 Tab 3   • fluticasone-salmeterol  (ADVAIR) 250-50 MCG/DOSE AEROSOL POWDER, BREATH ACTIVATED Inhale 1 Puff by mouth 2 times a day. 1 Inhaler 5   • montelukast (SINGULAIR) 10 MG Tab TAKE ONE TABLET BY MOUTH ONE TIME DAILY 30 Tab 10   • Denosumab (PROLIA SC) Inject 1 Dose as instructed every 6 months. Indications: twice a year     • aspirin (ASA) 325 MG Tab Take 324 mg by mouth every day. Takes one or two depending on arthritis     • magnesium oxide (MAG-OX) 400 MG Tab Take 400 mg by mouth every day.     • Cholecalciferol (VITAMIN D) 2000 UNITS CAPS Take  by mouth.     • calcium carbonate (OS-DARIEN 500) 1250 MG TABS Take 1,250 mg by mouth every day.     • multivitamin (THERAGRAN) TABS Take 1 Tab by mouth every day.       No current facility-administered medications for this visit.      She  has a past medical history of Allergic rhinitis, Arthritis, Asthma in adult, Cataract (2/2012), Dental disorder, Dyslipidemia (4/26/2016), High cholesterol, Hypertension, Hypothyroid, Hypothyroid (4/18/2014), Hypothyroid (4/18/2014), Osteoporosis, unspecified, and Pneumonia (2009).    ROS   Other than what is mentioned in HPI or physical exam, there is no history of headaches, double vision or blurred vision. No temporal tenderness or jaw claudication. No trouble swallowing difficulties or sore throats.  No chest complaints including chest pain, cough or sputum production. No GI complaints including nausea, vomiting, change in bowel habits, or past peptic ulcer disease. No history of blood in the stools. No urinary complaints including dysuria or frequency. No history of alopecia, photosensitivity, oral ulcerations, Raynaud's phenomena.       Objective:     /68   Pulse 80   Temp 36.1 °C (97 °F) (Temporal)   Resp 16   Wt 41.7 kg (92 lb)   SpO2 92%  Body mass index is 19.23 kg/m².   Physical Exam:    Constitutional: Alert and oriented X3, patient is talkative with good eye contact.Skin: Warm, dry, good turgor, no rashes in visible areas.Eye: Equal, round and  reactive, conjunctiva clear, lids normal EOM intactENMT: Lips without lesions, good dentition, no oropharyngeal ulcers, moist buccal mucosa, pinna without deformityNeck: Trachea midline, no masses, no thyromegaly.Lymph:  No cervical lymphadenopathy, no axillary lymphadenopathy, no supraclavicular lymphadenopathyRespiratory: Unlabored respiratory effort, lungs clear to auscultation, no wheezes, no ronchi.Cardiovascular: Normal S1, S2, positive murmur compatible with aortic stenosis, no edema.Abdomen: Soft, non-tender, no masses, no hepatosplenomegaly.Psych: Alert and oriented x3, normal affect and mood.Neuro: Cranial nerves 2-12 are grossly intact, no loss of sensation LEExt:no joint laxity noted in bilateral arms, no joint laxity noted in bilateral legs, mild kyphosis of the upper back, evidence of Heberden's nodes on the left index DIP joint,    Lab Results   Component Value Date/Time    SODIUM 141 01/06/2020 10:42 AM    POTASSIUM 3.9 01/06/2020 10:42 AM    CHLORIDE 104 01/06/2020 10:42 AM    CO2 24 01/06/2020 10:42 AM    GLUCOSE 86 01/06/2020 10:42 AM    BUN 11 01/06/2020 10:42 AM    CREATININE 0.88 01/06/2020 10:42 AM      Lab Results   Component Value Date/Time    WBC 6.0 06/12/2019 10:02 AM    RBC 4.75 06/12/2019 10:02 AM    HEMOGLOBIN 14.3 06/12/2019 10:02 AM    HEMATOCRIT 45.3 06/12/2019 10:02 AM    MCV 95.4 06/12/2019 10:02 AM    MCH 30.1 06/12/2019 10:02 AM    MCHC 31.6 (L) 06/12/2019 10:02 AM    MPV 9.5 06/12/2019 10:02 AM    NEUTSPOLYS 57.10 06/12/2019 10:02 AM    LYMPHOCYTES 29.20 06/12/2019 10:02 AM    MONOCYTES 9.50 06/12/2019 10:02 AM    EOSINOPHILS 3.20 06/12/2019 10:02 AM    BASOPHILS 0.80 06/12/2019 10:02 AM      Lab Results   Component Value Date/Time    CALCIUM 9.9 01/06/2020 10:42 AM    ASTSGOT 28 01/06/2020 10:42 AM    ALTSGPT 29 01/06/2020 10:42 AM    ALKPHOSPHAT 62 01/06/2020 10:42 AM    TBILIRUBIN 0.6 01/06/2020 10:42 AM    ALBUMIN 4.4 01/06/2020 10:42 AM    ALBUMIN 5.77 (H) 12/04/2014 03:10  PM    TOTPROTEIN 8.0 01/06/2020 10:42 AM    TOTPROTEIN 9.50 (H) 12/04/2014 03:10 PM     Lab Results   Component Value Date/Time    SEDRATEWES 22 12/04/2014 03:10 PM     Lab Results   Component Value Date/Time    CTELOPEP 50 06/09/2015 01:15 PM     Lab Results   Component Value Date/Time    PTHINTACT 48.8 12/09/2016 10:20 AM     Results for orders placed during the hospital encounter of 01/18/19   DS-BONE DENSITY STUDY (DEXA)    Impression According to the World Health Organization classification, bone mineral density of this patient is osteoporosis with high fracture risk.        10-year Probability of Fracture:  Major Osteoporotic     31.3%  Hip     13.9%  Population      [place] ()    Based on left femur neck BMD          INTERPRETING LOCATION:  [address] [place] [address], [id number]     Assessment and Plan:     1. Age-related osteoporosis without current pathological fracture  Patient due for DEXA DEXA ordered for patient, continue Prolia 60 mg subcu every 6 months  - DS-BONE DENSITY STUDY (DEXA); Future    2. Encounter for monitoring denosumab therapy  Patient will need CMP within 2 weeks of each Prolia shot, to monitor for renal function and calcium levels.  - DS-BONE DENSITY STUDY (DEXA); Future    3. Acquired hypothyroidism  Can exacerbate joint pain, I recommend monitoring thyroid on a regular basis to assure it is not contributing to the patient's joint pain    4. Chronic obstructive pulmonary disease, unspecified COPD type (HCC)  May impact the type of medications we can use for this patient's arthritis. We will have to keep this under advisement.    5. Aortic valve stenosis, etiology of cardiac valve disease unspecified  Jack has an aortic catheter pending through her cardiology coming up    Followup: Return in about 1 year (around 1/4/2022). or sooner hilton Rich  was seen 30 minutes face-to-face of which more than 50% of the time was spent counseling the patient (excluding time for  procedures)  regarding  rheumatological condition and care. Therapy was discussed in detail.      Please note that this dictation was created using voice recognition software. I have made every reasonable attempt to correct obvious errors, but I expect that there are errors of grammar and possibly content that I did not discover before finalizing the note.

## 2021-01-11 DIAGNOSIS — Z23 NEED FOR VACCINATION: ICD-10-CM

## 2021-01-22 ENCOUNTER — HOSPITAL ENCOUNTER (OUTPATIENT)
Dept: CARDIOLOGY | Facility: MEDICAL CENTER | Age: 76
End: 2021-01-22
Attending: INTERNAL MEDICINE
Payer: MEDICARE

## 2021-01-22 DIAGNOSIS — I35.0 AORTIC VALVE STENOSIS, MODERATE: ICD-10-CM

## 2021-01-22 PROCEDURE — 93306 TTE W/DOPPLER COMPLETE: CPT

## 2021-01-25 LAB
LV EJECT FRACT  99904: 70
LV EJECT FRACT MOD 2C 99903: 59.66
LV EJECT FRACT MOD 4C 99902: 75.8
LV EJECT FRACT MOD BP 99901: 70.32

## 2021-01-25 PROCEDURE — 93306 TTE W/DOPPLER COMPLETE: CPT | Mod: 26 | Performed by: INTERNAL MEDICINE

## 2021-02-10 ENCOUNTER — TELEPHONE (OUTPATIENT)
Dept: RHEUMATOLOGY | Facility: MEDICAL CENTER | Age: 76
End: 2021-02-10

## 2021-02-10 RX ORDER — METHYLPREDNISOLONE SODIUM SUCCINATE 125 MG/2ML
125 INJECTION, POWDER, LYOPHILIZED, FOR SOLUTION INTRAMUSCULAR; INTRAVENOUS PRN
Status: CANCELLED | OUTPATIENT
Start: 2021-02-10

## 2021-02-10 RX ORDER — EPINEPHRINE 1 MG/ML(1)
0.5 AMPUL (ML) INJECTION PRN
Status: CANCELLED | OUTPATIENT
Start: 2021-02-10

## 2021-02-10 RX ORDER — DIPHENHYDRAMINE HYDROCHLORIDE 50 MG/ML
50 INJECTION INTRAMUSCULAR; INTRAVENOUS PRN
Status: CANCELLED | OUTPATIENT
Start: 2021-02-10

## 2021-02-10 NOTE — TELEPHONE ENCOUNTER
Olga called stating she needs you to please send a Prolia order via [place] to [facility]. They cant schedule her next Prolia until an order has been placed by you. Thank you and please advise.

## 2021-02-15 ENCOUNTER — OUTPATIENT INFUSION SERVICES (OUTPATIENT)
Dept: ONCOLOGY | Facility: MEDICAL CENTER | Age: 76
End: 2021-02-15
Attending: INTERNAL MEDICINE
Payer: MEDICARE

## 2021-02-15 VITALS
RESPIRATION RATE: 18 BRPM | OXYGEN SATURATION: 93 % | SYSTOLIC BLOOD PRESSURE: 154 MMHG | HEIGHT: 58 IN | TEMPERATURE: 98 F | DIASTOLIC BLOOD PRESSURE: 88 MMHG | WEIGHT: 92.59 LBS | HEART RATE: 75 BPM | BODY MASS INDEX: 19.44 KG/M2

## 2021-02-15 DIAGNOSIS — M81.0 AGE-RELATED OSTEOPOROSIS WITHOUT CURRENT PATHOLOGICAL FRACTURE: ICD-10-CM

## 2021-02-15 LAB
CA-I BLD ISE-SCNC: 1.2 MMOL/L (ref 1.1–1.3)
CREAT BLD-MCNC: 0.7 MG/DL (ref 0.5–1.4)

## 2021-02-15 PROCEDURE — 96372 THER/PROPH/DIAG INJ SC/IM: CPT

## 2021-02-15 PROCEDURE — 700111 HCHG RX REV CODE 636 W/ 250 OVERRIDE (IP): Mod: JG | Performed by: INTERNAL MEDICINE

## 2021-02-15 PROCEDURE — 82330 ASSAY OF CALCIUM: CPT

## 2021-02-15 PROCEDURE — 82565 ASSAY OF CREATININE: CPT

## 2021-02-15 PROCEDURE — 36415 COLL VENOUS BLD VENIPUNCTURE: CPT

## 2021-02-15 RX ORDER — METHYLPREDNISOLONE SODIUM SUCCINATE 125 MG/2ML
125 INJECTION, POWDER, LYOPHILIZED, FOR SOLUTION INTRAMUSCULAR; INTRAVENOUS PRN
Status: CANCELLED | OUTPATIENT
Start: 2021-02-15

## 2021-02-15 RX ORDER — EPINEPHRINE 1 MG/ML(1)
0.5 AMPUL (ML) INJECTION PRN
Status: CANCELLED | OUTPATIENT
Start: 2021-02-15

## 2021-02-15 RX ORDER — DIPHENHYDRAMINE HYDROCHLORIDE 50 MG/ML
50 INJECTION INTRAMUSCULAR; INTRAVENOUS PRN
Status: CANCELLED | OUTPATIENT
Start: 2021-02-15

## 2021-02-15 RX ADMIN — DENOSUMAB 60 MG: 60 INJECTION SUBCUTANEOUS at 11:58

## 2021-02-15 ASSESSMENT — FIBROSIS 4 INDEX: FIB4 SCORE: 1.09

## 2021-02-15 NOTE — PROGRESS NOTES
Pt arrives to [facility] for Prolia.  Pt denies s/sx of infection or recent/planned dental procedures.  ISTAT calcium and creatinine drawn via 23g butterfly needle.  Labs reviewed and ok to give Prolia per pharmacy.  Prolia given SC to back of LUE.  Informed pt of next appt time.  Pt dc home to self care.

## 2021-02-23 ENCOUNTER — OFFICE VISIT (OUTPATIENT)
Dept: MEDICAL GROUP | Facility: LAB | Age: 76
End: 2021-02-23
Payer: MEDICARE

## 2021-02-23 VITALS
SYSTOLIC BLOOD PRESSURE: 105 MMHG | HEART RATE: 78 BPM | DIASTOLIC BLOOD PRESSURE: 65 MMHG | RESPIRATION RATE: 12 BRPM | TEMPERATURE: 96.8 F | WEIGHT: 93 LBS | HEIGHT: 58 IN | BODY MASS INDEX: 19.52 KG/M2 | OXYGEN SATURATION: 94 %

## 2021-02-23 DIAGNOSIS — I35.0 AORTIC VALVE STENOSIS, MODERATE: ICD-10-CM

## 2021-02-23 DIAGNOSIS — E55.9 VITAMIN D DEFICIENCY: ICD-10-CM

## 2021-02-23 DIAGNOSIS — J44.9 CHRONIC OBSTRUCTIVE PULMONARY DISEASE, UNSPECIFIED COPD TYPE (HCC): ICD-10-CM

## 2021-02-23 DIAGNOSIS — E03.9 HYPOTHYROIDISM, UNSPECIFIED TYPE: ICD-10-CM

## 2021-02-23 PROCEDURE — 8041 PR SCP AHA: Performed by: INTERNAL MEDICINE

## 2021-02-23 PROCEDURE — 99214 OFFICE O/P EST MOD 30 MIN: CPT | Mod: 25 | Performed by: INTERNAL MEDICINE

## 2021-02-23 RX ORDER — LEVOTHYROXINE SODIUM 0.07 MG/1
TABLET ORAL
Qty: 90 TABLET | Refills: 3 | Status: SHIPPED
Start: 2021-02-23 | End: 2021-08-19

## 2021-02-23 ASSESSMENT — PATIENT HEALTH QUESTIONNAIRE - PHQ9: CLINICAL INTERPRETATION OF PHQ2 SCORE: 0

## 2021-02-23 ASSESSMENT — FIBROSIS 4 INDEX: FIB4 SCORE: 1.09

## 2021-02-23 NOTE — PROGRESS NOTES
Subjective:     Olga Rich is a 75 y.o. female here today for aortic stenosis and Annual Health Assessment.    Patient and I have discussed her lab results, most recent echocardiogram shows that she is having problems with severe aortic stenosis.  However the patient at this time appears to be asymptomatic.  We have discussed that significant shortness of breath weakness lightheadedness needs to be evaluated rapidly    Health Maintenance Summary                Annual Wellness Visit Overdue 1945     Annual Pulmonary Function Test / Spirometry Overdue 3/7/1949     COVID-19 Vaccine Overdue 3/7/1959     IMM ZOSTER VACCINES Overdue 4/26/2017      Done 3/1/2017 Imm Admin: Zoster Vaccine Live (ZVL) (Zostavax) - HISTORICAL DATA    COLONOSCOPY Overdue 4/18/2019      Done 4/18/2009 [facility] consult    MAMMOGRAM Overdue 1/18/2020      Done 1/18/2019 MA-SCREENING MAMMO BILAT W/TOMOSYNTHESIS W/CAD     Patient has more history with this topic...    BONE DENSITY Next Due 1/18/2024      Done 1/18/2019 DS-BONE DENSITY STUDY (DEXA)     Patient has more history with this topic...    IMM DTaP/Tdap/Td Vaccine Next Due 3/1/2027      Done 3/1/2017 Imm Admin: Tdap Vaccine            Annual Health Assessment Questions:      1.  Are you currently engaging in any exercise or physical activity? Yes    2.  How would you describe your mood or emotional well-being today? fair    3.  Have you had any falls in the last year? No    4.  Have you noticed any problems with your balance or had difficulty walking? No    5.  In the last six months have you experienced any leakage of urine? No    6. DPA/Advanced Directive: Patient has Durable Power of  on file.     Current medicines (including changes today)  Current Outpatient Medications   Medication Sig Dispense Refill   • temazepam (RESTORIL) 15 MG Cap      • lisinopril (PRINIVIL) 10 MG Tab Take 1 Tab by mouth every day. 90 Tab 3   • metoprolol SR (TOPROL XL) 25 MG  "TABLET SR 24 HR Take 1 Tab by mouth every day. 100 Tab 3   • atorvastatin (LIPITOR) 40 MG Tab Take 1 Tab by mouth every day. 100 Tab 2   • levothyroxine (SYNTHROID) 75 MCG Tab TAKE ONE TABLET BY MOUTH ONE TIME DAILY 90 Tab 3   • fluticasone-salmeterol (ADVAIR) 250-50 MCG/DOSE AEROSOL POWDER, BREATH ACTIVATED Inhale 1 Puff by mouth 2 times a day. 1 Inhaler 5   • montelukast (SINGULAIR) 10 MG Tab TAKE ONE TABLET BY MOUTH ONE TIME DAILY 30 Tab 10   • Denosumab (PROLIA SC) Inject 1 Dose as instructed every 6 months. Indications: twice a year     • aspirin (ASA) 325 MG Tab Take 324 mg by mouth every day. Takes one or two depending on arthritis     • magnesium oxide (MAG-OX) 400 MG Tab Take 400 mg by mouth every day.     • Cholecalciferol (VITAMIN D) 2000 UNITS CAPS Take  by mouth.     • calcium carbonate (OS-DARIEN 500) 1250 MG TABS Take 1,250 mg by mouth every day.     • multivitamin (THERAGRAN) TABS Take 1 Tab by mouth every day.       No current facility-administered medications for this visit.       She  has a past medical history of Allergic rhinitis, Arthritis, Asthma in adult, Cataract (2/2012), Dental disorder, Dyslipidemia (4/26/2016), High cholesterol, Hypertension, Hypothyroid, Hypothyroid (4/18/2014), Hypothyroid (4/18/2014), Osteoporosis, unspecified, and Pneumonia (2009).    Demerol    She  reports that she quit smoking about 6 years ago. Her smoking use included cigarettes. She has a 10.00 pack-year smoking history. She has never used smokeless tobacco. She reports current drug use. Drug: Marijuana. She reports that she does not drink alcohol.  Counseling given: Not Answered      ROS    No chest pain, no shortness of breath, no abdominal pain.     Objective:     Physical Exam:  /70   Pulse 78   Temp 36 °C (96.8 °F) (Temporal)   Resp 12   Ht 1.473 m (4' 10\")   Wt 42.2 kg (93 lb)   SpO2 94%  Body mass index is 19.44 kg/m².    Constitutional: Alert, no distress.  Skin: Warm, dry, good turgor, no " rashes in visible areas.  Eye: Equal, round and reactive, conjunctiva clear, lids normal.  ENMT: Lips without lesions, good dentition, oropharynx clear.  Neck: Trachea midline, no masses, no thyromegaly. No cervical or supraclavicular lymphadenopathy.  Respiratory: Unlabored respiratory effort, lungs clear to auscultation, no wheezes, no rhonchi.  Cardiovascular: Normal S1, S2, no murmur, no edema.  Abdomen: Soft, non-tender, no masses, no hepatosplenomegaly.  Psych: Alert and oriented x3, normal affect and mood.    Assessment and Plan:     1. Chronic obstructive pulmonary disease, unspecified COPD type (HCC)  COPD Per office notes from Dr. Monreal    2. Hypothyroidism, unspecified type  Continue Synthroid recheck free T4 TSH  - levothyroxine (SYNTHROID) 75 MCG Tab; TAKE ONE TABLET BY MOUTH ONE TIME DAILY  Dispense: 90 tablet; Refill: 3  - FREE THYROXINE; Future  - TSH; Future    3. Aortic valve stenosis, moderate  Aortic stenosis with no symptoms read as severe on echocardiogram    4. Vitamin D deficiency  Recheck vitamin D  - VITAMIN D,25 HYDROXY; Future      Discussion today about general wellness and lifestyle habits:    · Engage in regular physical activity and social activities.  · Prevent falls and reduce trip hazards; using ambulatory aides, hearing and vision testing if appropriate.  · Steps to improve urinary incontinence.  · Advanced care planning.    Follow-Up: No follow-ups on file.         PLEASE NOTE: This dictation was created using voice recognition software. I have made every reasonable attempt to correct obvious errors, but I expect that there are errors of grammar and possibly content that I did not discover before finalizing the note.

## 2021-03-11 ENCOUNTER — HOSPITAL ENCOUNTER (OUTPATIENT)
Dept: RADIOLOGY | Facility: MEDICAL CENTER | Age: 76
End: 2021-03-11
Attending: INTERNAL MEDICINE
Payer: MEDICARE

## 2021-03-11 DIAGNOSIS — M81.0 AGE-RELATED OSTEOPOROSIS WITHOUT CURRENT PATHOLOGICAL FRACTURE: ICD-10-CM

## 2021-03-11 DIAGNOSIS — Z79.899 ENCOUNTER FOR MONITORING DENOSUMAB THERAPY: ICD-10-CM

## 2021-03-11 DIAGNOSIS — Z12.31 ENCOUNTER FOR SCREENING MAMMOGRAM FOR BREAST CANCER: ICD-10-CM

## 2021-03-11 DIAGNOSIS — Z51.81 ENCOUNTER FOR MONITORING DENOSUMAB THERAPY: ICD-10-CM

## 2021-03-11 PROCEDURE — 77080 DXA BONE DENSITY AXIAL: CPT

## 2021-03-11 PROCEDURE — 77063 BREAST TOMOSYNTHESIS BI: CPT

## 2021-03-22 DIAGNOSIS — Z82.49 FAMILY HISTORY OF ATHEROSCLEROSIS: ICD-10-CM

## 2021-03-22 DIAGNOSIS — I25.10 ATHEROSCLEROSIS OF NATIVE CORONARY ARTERY OF NATIVE HEART WITHOUT ANGINA PECTORIS: ICD-10-CM

## 2021-03-22 DIAGNOSIS — E78.5 DYSLIPIDEMIA: ICD-10-CM

## 2021-03-23 RX ORDER — METOPROLOL SUCCINATE 25 MG/1
TABLET, EXTENDED RELEASE ORAL
Qty: 100 TABLET | Refills: 2 | Status: SHIPPED | OUTPATIENT
Start: 2021-03-23 | End: 2021-05-26 | Stop reason: SDUPTHER

## 2021-05-10 DIAGNOSIS — J45.20 MILD INTERMITTENT ASTHMA WITHOUT COMPLICATION: ICD-10-CM

## 2021-05-10 NOTE — TELEPHONE ENCOUNTER
Have we ever prescribed this med? Yes.  If yes, what date? 07/08/2020    Last OV: 07/08/2020 - Dr. Ryan     Next OV: none scheduled     DX: Asthma    Medications: Advair

## 2021-05-26 ENCOUNTER — OFFICE VISIT (OUTPATIENT)
Dept: CARDIOLOGY | Facility: MEDICAL CENTER | Age: 76
End: 2021-05-26
Payer: MEDICARE

## 2021-05-26 VITALS
OXYGEN SATURATION: 90 % | HEART RATE: 71 BPM | BODY MASS INDEX: 20.57 KG/M2 | WEIGHT: 98 LBS | RESPIRATION RATE: 14 BRPM | SYSTOLIC BLOOD PRESSURE: 122 MMHG | DIASTOLIC BLOOD PRESSURE: 70 MMHG | HEIGHT: 58 IN

## 2021-05-26 DIAGNOSIS — E78.5 DYSLIPIDEMIA: ICD-10-CM

## 2021-05-26 DIAGNOSIS — I35.0 MILD AORTIC STENOSIS: ICD-10-CM

## 2021-05-26 DIAGNOSIS — Z79.899 HIGH RISK MEDICATION USE: ICD-10-CM

## 2021-05-26 DIAGNOSIS — I10 HTN (HYPERTENSION), MALIGNANT: ICD-10-CM

## 2021-05-26 DIAGNOSIS — I15.9 SECONDARY HYPERTENSION: ICD-10-CM

## 2021-05-26 DIAGNOSIS — I25.10 ATHEROSCLEROSIS OF NATIVE CORONARY ARTERY OF NATIVE HEART WITHOUT ANGINA PECTORIS: ICD-10-CM

## 2021-05-26 DIAGNOSIS — Z82.49 FAMILY HISTORY OF ATHEROSCLEROSIS: ICD-10-CM

## 2021-05-26 PROCEDURE — 99214 OFFICE O/P EST MOD 30 MIN: CPT | Performed by: INTERNAL MEDICINE

## 2021-05-26 RX ORDER — METOPROLOL SUCCINATE 25 MG/1
TABLET, EXTENDED RELEASE ORAL
Qty: 100 TABLET | Refills: 3 | Status: SHIPPED | OUTPATIENT
Start: 2021-05-26 | End: 2022-01-12 | Stop reason: SDUPTHER

## 2021-05-26 RX ORDER — LISINOPRIL 10 MG/1
10 TABLET ORAL DAILY
Qty: 90 TABLET | Refills: 3 | Status: SHIPPED
Start: 2021-05-26 | End: 2021-08-23

## 2021-05-26 RX ORDER — ATORVASTATIN CALCIUM 40 MG/1
40 TABLET, FILM COATED ORAL DAILY
Qty: 100 TABLET | Refills: 3 | Status: SHIPPED | OUTPATIENT
Start: 2021-05-26 | End: 2022-01-12 | Stop reason: SDUPTHER

## 2021-05-26 ASSESSMENT — ENCOUNTER SYMPTOMS
SHORTNESS OF BREATH: 0
CLAUDICATION: 0
SPEECH CHANGE: 0
DIZZINESS: 0
FEVER: 0
SENSORY CHANGE: 0
ORTHOPNEA: 0
EYE DISCHARGE: 0
VOMITING: 0
HALLUCINATIONS: 0
COUGH: 0
PALPITATIONS: 0
PND: 0
HEADACHES: 0
WEIGHT LOSS: 0
MYALGIAS: 0
DEPRESSION: 0
EYE PAIN: 0
LOSS OF CONSCIOUSNESS: 0
DOUBLE VISION: 0
CHILLS: 0
NAUSEA: 0
BLOOD IN STOOL: 0
BRUISES/BLEEDS EASILY: 0
FALLS: 0
ABDOMINAL PAIN: 0
BLURRED VISION: 0

## 2021-05-26 ASSESSMENT — FIBROSIS 4 INDEX: FIB4 SCORE: 1.11
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
## 2021-06-11 DIAGNOSIS — J45.20 MILD INTERMITTENT ASTHMA WITHOUT COMPLICATION: ICD-10-CM

## 2021-06-11 NOTE — TELEPHONE ENCOUNTER
Have we ever prescribed this med? Yes.  If yes, what date? 05/10/2021    Last OV: 07/08/2020 - Dr. Ryan    Next OV: 08/10/2021 - Christiane Chambers    DX: Asthma    Medications: Advair

## 2021-07-28 DIAGNOSIS — R06.02 SOB (SHORTNESS OF BREATH): ICD-10-CM

## 2021-07-28 NOTE — TELEPHONE ENCOUNTER
Have we ever prescribed this med? Yes.  If yes, what date? 07/8/20    Last OV: 07/08/20 with Dr Ryan    Next OV: 08/10/21 with Christiane THOMASON    DX: sOB    Medications: Montelukast

## 2021-08-06 ENCOUNTER — PATIENT OUTREACH (OUTPATIENT)
Dept: HEALTH INFORMATION MANAGEMENT | Facility: OTHER | Age: 76
End: 2021-08-06

## 2021-08-06 NOTE — NON-PROVIDER
Outcome: Left Message to call back to schedule SHAQ    Please transfer to Patient Outreach Team at [phone] when patient returns call.    Attempt # 1

## 2021-08-09 NOTE — NON-PROVIDER
Scheduled on 8/19/21 at 10:00am with Dr. Johnson at [address] [place] [address]. Verified HIPAA, no questions or concerns. Member was returning a call for assessment.

## 2021-08-10 ENCOUNTER — TELEPHONE (OUTPATIENT)
Dept: MEDICAL GROUP | Facility: LAB | Age: 76
End: 2021-08-10

## 2021-08-10 ENCOUNTER — SLEEP CENTER VISIT (OUTPATIENT)
Dept: SLEEP MEDICINE | Facility: MEDICAL CENTER | Age: 76
End: 2021-08-10
Payer: MEDICARE

## 2021-08-10 VITALS
SYSTOLIC BLOOD PRESSURE: 140 MMHG | OXYGEN SATURATION: 93 % | HEIGHT: 58 IN | BODY MASS INDEX: 19.94 KG/M2 | HEART RATE: 68 BPM | DIASTOLIC BLOOD PRESSURE: 80 MMHG | RESPIRATION RATE: 16 BRPM | WEIGHT: 95 LBS

## 2021-08-10 DIAGNOSIS — R06.02 SOB (SHORTNESS OF BREATH): ICD-10-CM

## 2021-08-10 DIAGNOSIS — Z87.891 FORMER SMOKER: ICD-10-CM

## 2021-08-10 DIAGNOSIS — J45.20 MILD INTERMITTENT ASTHMA WITHOUT COMPLICATION: ICD-10-CM

## 2021-08-10 DIAGNOSIS — G47.09 OTHER INSOMNIA: ICD-10-CM

## 2021-08-10 DIAGNOSIS — J44.9 CHRONIC OBSTRUCTIVE PULMONARY DISEASE, UNSPECIFIED COPD TYPE (HCC): ICD-10-CM

## 2021-08-10 PROCEDURE — 99214 OFFICE O/P EST MOD 30 MIN: CPT | Performed by: NURSE PRACTITIONER

## 2021-08-10 RX ORDER — MONTELUKAST SODIUM 10 MG/1
TABLET ORAL
Qty: 90 TABLET | Refills: 3 | Status: SHIPPED | OUTPATIENT
Start: 2021-08-10 | End: 2022-08-10 | Stop reason: SDUPTHER

## 2021-08-10 RX ORDER — TEMAZEPAM 15 MG/1
15 CAPSULE ORAL NIGHTLY PRN
Qty: 30 CAPSULE | Refills: 2 | Status: SHIPPED | OUTPATIENT
Start: 2021-08-10 | End: 2021-11-08
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
## 2021-08-10 NOTE — TELEPHONE ENCOUNTER
APPOINTMENT SCHEDULING  Patient overdue for appointment. Called patient to schedule appointment.  Phone Number Called: [phone]    Call outcome: Left detailed message for patient. Requested patient to call back. asked if ok to change upcoming appt to [facility], care gaps due

## 2021-08-11 ENCOUNTER — TELEPHONE (OUTPATIENT)
Dept: MEDICAL GROUP | Facility: LAB | Age: 76
End: 2021-08-11

## 2021-08-11 NOTE — TELEPHONE ENCOUNTER
APPOINTMENT SCHEDULING  Patient overdue for appointment. Called patient to schedule appointment.  Phone Number Called: [phone]    Call outcome: Left detailed message for patient. Requested patient to call back.Switch fv appt to [facility]? Reminded to have labs done prior to appt

## 2021-08-16 ENCOUNTER — TELEPHONE (OUTPATIENT)
Dept: MEDICAL GROUP | Facility: LAB | Age: 76
End: 2021-08-16

## 2021-08-16 ENCOUNTER — OUTPATIENT INFUSION SERVICES (OUTPATIENT)
Dept: ONCOLOGY | Facility: MEDICAL CENTER | Age: 76
End: 2021-08-16
Attending: INTERNAL MEDICINE
Payer: MEDICARE

## 2021-08-16 ENCOUNTER — HOSPITAL ENCOUNTER (OUTPATIENT)
Dept: LAB | Facility: MEDICAL CENTER | Age: 76
End: 2021-08-16
Attending: INTERNAL MEDICINE
Payer: MEDICARE

## 2021-08-16 VITALS
DIASTOLIC BLOOD PRESSURE: 76 MMHG | WEIGHT: 95.46 LBS | SYSTOLIC BLOOD PRESSURE: 162 MMHG | BODY MASS INDEX: 20.59 KG/M2 | HEIGHT: 57 IN | HEART RATE: 82 BPM | OXYGEN SATURATION: 94 % | RESPIRATION RATE: 18 BRPM | TEMPERATURE: 96.7 F

## 2021-08-16 DIAGNOSIS — E55.9 VITAMIN D DEFICIENCY: ICD-10-CM

## 2021-08-16 DIAGNOSIS — M81.0 AGE-RELATED OSTEOPOROSIS WITHOUT CURRENT PATHOLOGICAL FRACTURE: ICD-10-CM

## 2021-08-16 DIAGNOSIS — E03.9 HYPOTHYROIDISM, UNSPECIFIED TYPE: ICD-10-CM

## 2021-08-16 LAB
25(OH)D3 SERPL-MCNC: 80 NG/ML (ref 30–100)
CA-I BLD ISE-SCNC: 1.27 MMOL/L (ref 1.1–1.3)
CREAT BLD-MCNC: 0.9 MG/DL (ref 0.5–1.4)
T4 FREE SERPL-MCNC: 1.78 NG/DL (ref 0.93–1.7)
TSH SERPL DL<=0.005 MIU/L-ACNC: 0.08 UIU/ML (ref 0.38–5.33)

## 2021-08-16 PROCEDURE — 82306 VITAMIN D 25 HYDROXY: CPT

## 2021-08-16 PROCEDURE — 82330 ASSAY OF CALCIUM: CPT

## 2021-08-16 PROCEDURE — 96372 THER/PROPH/DIAG INJ SC/IM: CPT

## 2021-08-16 PROCEDURE — 36415 COLL VENOUS BLD VENIPUNCTURE: CPT

## 2021-08-16 PROCEDURE — 84439 ASSAY OF FREE THYROXINE: CPT

## 2021-08-16 PROCEDURE — 82565 ASSAY OF CREATININE: CPT

## 2021-08-16 PROCEDURE — 84443 ASSAY THYROID STIM HORMONE: CPT

## 2021-08-16 PROCEDURE — 700111 HCHG RX REV CODE 636 W/ 250 OVERRIDE (IP): Mod: JG | Performed by: INTERNAL MEDICINE

## 2021-08-16 PROCEDURE — 36415 COLL VENOUS BLD VENIPUNCTURE: CPT | Mod: XU

## 2021-08-16 RX ORDER — DIPHENHYDRAMINE HYDROCHLORIDE 50 MG/ML
50 INJECTION INTRAMUSCULAR; INTRAVENOUS PRN
Status: CANCELLED | OUTPATIENT
Start: 2021-08-16

## 2021-08-16 RX ORDER — MONTELUKAST SODIUM 10 MG/1
TABLET ORAL
Qty: 30 TABLET | Refills: 1 | Status: SHIPPED
Start: 2021-08-16 | End: 2021-08-19

## 2021-08-16 RX ORDER — METHYLPREDNISOLONE SODIUM SUCCINATE 125 MG/2ML
125 INJECTION, POWDER, LYOPHILIZED, FOR SOLUTION INTRAMUSCULAR; INTRAVENOUS PRN
Status: CANCELLED | OUTPATIENT
Start: 2021-08-16

## 2021-08-16 RX ORDER — EPINEPHRINE 1 MG/ML(1)
0.5 AMPUL (ML) INJECTION PRN
Status: CANCELLED | OUTPATIENT
Start: 2021-08-16

## 2021-08-16 RX ADMIN — DENOSUMAB 60 MG: 60 INJECTION SUBCUTANEOUS at 09:54

## 2021-08-16 NOTE — TELEPHONE ENCOUNTER
ESTABLISHED PATIENT PRE-VISIT PLANNING     Patient was contacted to complete PVP.     Note: Patient will not be contacted if there is no indication to call.     1.  Reviewed notes from the last few office visits within the medical group: Yes    2.  If any orders were placed at last visit or intended to be done for this visit (i.e. 6 mos follow-up), do we have Results/Consult Notes?         •  Labs - Labs ordered, but not to be completed until before appt per pt .  Note: If patient appointment is for lab review and patient did not complete labs, check with provider if OK to reschedule patient until labs completed.       •  Imaging - Imaging ordered, completed and results are in chart.       •  Referrals - No referrals were ordered at last office visit.    3. Is this appointment scheduled as a Hospital Follow-Up? No    4.  Immunizations were updated in Epic using Reconcile Outside Information activity? Yes    5.  Patient is due for the following Health Maintenance Topics:   Health Maintenance Due   Topic Date Due   • Annual Wellness Visit  Never done   • Annual Pulmonary Function Test / Spirometry  Never done   • IMM ZOSTER VACCINES (2 of 3) 04/26/2017     6.  AHA (Pulse8) form printed for Provider? No, patient does not have any open alerts

## 2021-08-16 NOTE — PROGRESS NOTES
Pt ambulatory to OPI for [id number] Prolia injection.  Pt has no s/s of infection, pt denies any recent dental surgeries or plans there of, pt has no complaints at this time.  ISTAT labs drawn from RAC w/ 25G butterfly, gauze and coban applied to draw site.  Pt cr and ca w/n parameters for tx.  Prolia injected into pt's left back arm, band-aid placed.  Pt left on foot in NAD.  Pt's next appt for in 6 months made.

## 2021-08-19 ENCOUNTER — HOSPITAL ENCOUNTER (EMERGENCY)
Facility: MEDICAL CENTER | Age: 76
End: 2021-08-19
Attending: EMERGENCY MEDICINE
Payer: MEDICARE

## 2021-08-19 VITALS
WEIGHT: 95.9 LBS | OXYGEN SATURATION: 93 % | DIASTOLIC BLOOD PRESSURE: 71 MMHG | HEIGHT: 58 IN | BODY MASS INDEX: 20.13 KG/M2 | HEART RATE: 76 BPM | SYSTOLIC BLOOD PRESSURE: 148 MMHG | RESPIRATION RATE: 25 BRPM | TEMPERATURE: 98.1 F

## 2021-08-19 DIAGNOSIS — I10 POORLY-CONTROLLED HYPERTENSION: ICD-10-CM

## 2021-08-19 PROBLEM — R94.30 NONSPECIFIC ABNORMAL FUNCTION STUDY, CARDIOVASCULAR: Status: ACTIVE | Noted: 2021-08-19

## 2021-08-19 PROBLEM — F13.20 SEDATIVE, HYPNOTIC, OR ANXIOLYTIC DEPENDENCE (HCC): Status: ACTIVE | Noted: 2021-08-19

## 2021-08-19 PROBLEM — R73.9 HYPERGLYCEMIA: Status: ACTIVE | Noted: 2021-08-19

## 2021-08-19 PROCEDURE — 99282 EMERGENCY DEPT VISIT SF MDM: CPT

## 2021-08-19 RX ORDER — LEVOTHYROXINE SODIUM 0.05 MG/1
50 TABLET ORAL
Qty: 90 TABLET | Refills: 3 | Status: SHIPPED | OUTPATIENT
Start: 2021-08-19 | End: 2022-08-10 | Stop reason: SDUPTHER

## 2021-08-19 ASSESSMENT — LIFESTYLE VARIABLES: DO YOU DRINK ALCOHOL: NO

## 2021-08-19 NOTE — ED TRIAGE NOTES
Pt ambulated to triage with   Chief Complaint   Patient presents with   • Sent by MD     seen and BP was elevated, and thyriod medication has been just recently been changed.  going to be starting to take 50 mcg instead of 75 mcg   • Blood Pressure Problem     pt taking lisinopril and was increased to 10 mg from 5 mg, pt sees Dr To      Pt Informed regarding triage process and verbalized understanding to inform triage tech or RN for any changes in condition. Placed in lobby.

## 2021-08-19 NOTE — ED PROVIDER NOTES
ED Provider Note    CHIEF COMPLAINT  Chief Complaint   Patient presents with   • Sent by MD     seen and BP was elevated, and thyriod medication has been just recently been changed.  going to be starting to take 50 mcg instead of 75 mcg   • Blood Pressure Problem     pt taking lisinopril and was increased to 10 mg from 5 mg, pt sees Dr To       HPI  Olga Rich is a 76 y.o. female who presents with elevated blood pressure.  She is on 10 mg lisinopril which he typically takes in the morning.  She did recently change her levothyroxine dose from 75 mcg to 50 mcg due to a slightly elevated thyroid hormone level.  She states that she is in her usual state of health.  Denies any headache, lightheadedness, nausea, vomiting, chest pain, trouble breathing.  No abnormal lower extremity edema.  She did initially present with slight hypertension though this improved spontaneously without any further interventions.  She has been compliant with her medications.  Denies any acute changes.    REVIEW OF SYSTEMS  See HPI for further details. All other systems are negative.     PAST MEDICAL HISTORY   has a past medical history of Allergic rhinitis, Arthritis, Asthma in adult, Cataract (2012), Dental disorder, Dyslipidemia (2016), High cholesterol, Hypertension, Hypothyroid, Hypothyroid (2014), Hypothyroid (2014), Osteoporosis, unspecified, and Pneumonia ().    SOCIAL HISTORY  Social History     Tobacco Use   • Smoking status: Former Smoker     Packs/day: 0.25     Years: 40.00     Pack years: 10.00     Types: Cigarettes     Quit date: 3/1/2014     Years since quittin.4   • Smokeless tobacco: Never Used   Vaping Use   • Vaping Use: Never used   Substance and Sexual Activity   • Alcohol use: No     Comment: very rarely   • Drug use: Yes     Types: Marijuana     Comment: occasionally for glaucoma which runs in her family/ per pt no    • Sexual activity: Never     Partners: Male       SURGICAL HISTORY    "has a past surgical history that includes cataract phaco with iol and femur orif.    CURRENT MEDICATIONS  Home Medications     Reviewed by Melvi Aly R.N. (Registered Nurse) on 08/19/21 at 1200  Med List Status: Partial   Medication Last Dose Status   aspirin (ASA) 325 MG Tab  Active   atorvastatin (LIPITOR) 40 MG Tab  Active   calcium carbonate (OS-DARIEN 500) 1250 MG TABS  Active   Cholecalciferol (VITAMIN D) 2000 UNITS CAPS  Active   Denosumab (PROLIA SC)  Active   fluticasone-salmeterol (ADVAIR) 250-50 MCG/DOSE AEROSOL POWDER, BREATH ACTIVATED  Active   levothyroxine (SYNTHROID) 50 MCG Tab  Active   lisinopril (PRINIVIL) 10 MG Tab 8/19/2021 Active   magnesium oxide (MAG-OX) 400 MG Tab  Active   metoprolol SR (TOPROL XL) 25 MG TABLET SR 24 HR 8/19/2021 Active   montelukast (SINGULAIR) 10 MG Tab  Active   montelukast (SINGULAIR) 10 MG Tab  Active   multivitamin (THERAGRAN) TABS  Active   temazepam (RESTORIL) 15 MG Cap  Active                ALLERGIES  Allergies   Allergen Reactions   • Demerol Anaphylaxis       PHYSICAL EXAM  VITAL SIGNS: BP (!) 184/100   Pulse 91   Temp 36.9 °C (98.4 °F) (Temporal)   Resp 16   Ht 1.473 m (4' 10\")   Wt 43.5 kg (95 lb 14.4 oz)   SpO2 96%   BMI 20.04 kg/m²   Pulse ox interpretation: I interpret this pulse ox as normal.  Constitutional: Alert in no apparent distress.  HENT: No signs of trauma, Bilateral external ears normal, Nose normal.   Neck: Normal range of motion, Supple, No stridor.   Cardiovascular: Regular rate and rhythm.   Thorax & Lungs: Normal breath sounds, No respiratory distress.   Abdomen: Soft, No tenderness.  Skin: Warm, Dry, No erythema, No rash.   Extremities: No edema, No cyanosis  Musculoskeletal: Good range of motion in all major joints. No major deformities noted.   Neurologic: Alert, No focal deficits noted.       DIAGNOSTIC STUDIES / PROCEDURES      COURSE & MEDICAL DECISION MAKING    Medications - No data to display    Pertinent Labs & Imaging " "studies reviewed. (See chart for details)  76 y.o. female presenting with asymptomatic hypertension.  Initially quite hypertensive upon arrival.  It spontaneously improved without any treatment however.  No headache, chest pain, trouble breathing.  No edema.  Had a creatinine of 0.9 as recently as 3 days ago on prior lab testing.    She was encouraged to continue taking her blood pressure medications as prescribed and to follow-up with her primary care physician for further management.  There is not appear to be any indication for further work-up or intervention at this time.  She was discharged home in stable condition.    The patient was instructed to follow-up with primary care physician for further management.  To return immediately for any worsening symptoms or development of any other concerning signs or symptoms. The patient verbalizes understanding in their own words.    /71   Pulse 76   Temp 36.7 °C (98.1 °F) (Temporal)   Resp (!) 25   Ht 1.473 m (4' 10\")   Wt 43.5 kg (95 lb 14.4 oz)   SpO2 93%   BMI 20.04 kg/m²     The patient was referred to primary care where they will receive further BP management.      No follow-up provider specified.    FINAL IMPRESSION  1. Poorly-controlled hypertension            Electronically signed by: Vic Mcneal M.D., 8/19/2021 1:45 PM    "

## 2021-08-20 NOTE — ED PROVIDER NOTES
ED Provider Note    CHIEF COMPLAINT  Chief Complaint   Patient presents with   • Sent by MD     seen and BP was elevated, and thyriod medication has been just recently been changed.  going to be starting to take 50 mcg instead of 75 mcg   • Blood Pressure Problem     pt taking lisinopril and was increased to 10 mg from 5 mg, pt sees Dr To       HPI  Olga Rich is a 76 y.o. female who presents with elevated blood pressure.  She is on 10 mg lisinopril which he typically takes in the morning.  She did recently change her levothyroxine dose from 75 mcg to 50 mcg due to a slightly elevated thyroid hormone level.  She states that she is in her usual state of health.  Denies any headache, lightheadedness, nausea, vomiting, chest pain, trouble breathing.  No abnormal lower extremity edema.  She did initially present with slight hypertension though this improved spontaneously without any further interventions.  She has been compliant with her medications.  Denies any acute changes.    REVIEW OF SYSTEMS  See HPI for further details. All other systems are negative.     PAST MEDICAL HISTORY   has a past medical history of Allergic rhinitis, Arthritis, Asthma in adult, Cataract (2012), Dental disorder, Dyslipidemia (2016), High cholesterol, Hypertension, Hypothyroid, Hypothyroid (2014), Hypothyroid (2014), Osteoporosis, unspecified, and Pneumonia ().    SOCIAL HISTORY  Social History     Tobacco Use   • Smoking status: Former Smoker     Packs/day: 0.25     Years: 40.00     Pack years: 10.00     Types: Cigarettes     Quit date: 3/1/2014     Years since quittin.4   • Smokeless tobacco: Never Used   Vaping Use   • Vaping Use: Never used   Substance and Sexual Activity   • Alcohol use: No     Comment: very rarely   • Drug use: Yes     Types: Marijuana     Comment: occasionally for glaucoma which runs in her family/ per pt no    • Sexual activity: Never     Partners: Male       SURGICAL HISTORY    "has a past surgical history that includes cataract phaco with iol and femur orif.    CURRENT MEDICATIONS  Home Medications     Reviewed by Melvi Aly R.N. (Registered Nurse) on 08/19/21 at 1200  Med List Status: Partial   Medication Last Dose Status   aspirin (ASA) 325 MG Tab  Active   atorvastatin (LIPITOR) 40 MG Tab  Active   calcium carbonate (OS-DARIEN 500) 1250 MG TABS  Active   Cholecalciferol (VITAMIN D) 2000 UNITS CAPS  Active   Denosumab (PROLIA SC)  Active   fluticasone-salmeterol (ADVAIR) 250-50 MCG/DOSE AEROSOL POWDER, BREATH ACTIVATED  Active   levothyroxine (SYNTHROID) 50 MCG Tab  Active   lisinopril (PRINIVIL) 10 MG Tab 8/19/2021 Active   magnesium oxide (MAG-OX) 400 MG Tab  Active   metoprolol SR (TOPROL XL) 25 MG TABLET SR 24 HR 8/19/2021 Active   montelukast (SINGULAIR) 10 MG Tab  Active   montelukast (SINGULAIR) 10 MG Tab  Active   multivitamin (THERAGRAN) TABS  Active   temazepam (RESTORIL) 15 MG Cap  Active                ALLERGIES  Allergies   Allergen Reactions   • Demerol Anaphylaxis       PHYSICAL EXAM  VITAL SIGNS: BP (!) 184/100   Pulse 91   Temp 36.9 °C (98.4 °F) (Temporal)   Resp 16   Ht 1.473 m (4' 10\")   Wt 43.5 kg (95 lb 14.4 oz)   SpO2 96%   BMI 20.04 kg/m²   Pulse ox interpretation: ***I interpret this pulse ox as normal.  Constitutional: Alert in no apparent distress.  HENT: No signs of trauma, Bilateral external ears normal, Nose normal.   Eyes: Pupils are equal and reactive, Conjunctiva normal, Non-icteric.   Neck: Normal range of motion, No tenderness, Supple, No stridor.   Lymphatic: No lymphadenopathy noted.   Cardiovascular: Regular rate and rhythm.   Thorax & Lungs: Normal breath sounds, No respiratory distress, No wheezing, No chest tenderness.   Abdomen: Bowel sounds normal, Soft, No tenderness, No masses, No pulsatile masses. No peritoneal signs.  Skin: Warm, Dry, No erythema, No rash.   Back: No bony tenderness, No CVA tenderness.   Extremities: Intact distal " "pulses, No edema, No tenderness, No cyanosis  Musculoskeletal: Good range of motion in all major joints. No tenderness to palpation or major deformities noted.   Neurologic: Alert, Normal motor function and gait, Normal sensory function, No focal deficits noted.   Psychiatric: Affect normal, Judgment normal, Mood normal.       DIAGNOSTIC STUDIES / PROCEDURES    EKG - Physician interpretation        COURSE & MEDICAL DECISION MAKING    Medications - No data to display    Pertinent Labs & Imaging studies reviewed. (See chart for details)  76 y.o. female ***    The patient will not drink alcohol nor drive with prescribed medications.   The patient was instructed to follow-up with primary care physician for further management.  To return immediately for any worsening symptoms or development of any other concerning signs or symptoms. The patient verbalizes understanding in their own words.    BP (!) 184/100   Pulse 91   Temp 36.9 °C (98.4 °F) (Temporal)   Resp 16   Ht 1.473 m (4' 10\")   Wt 43.5 kg (95 lb 14.4 oz)   SpO2 96%   BMI 20.04 kg/m²     The patient was referred to primary care where they will receive further BP management.      No follow-up provider specified.    FINAL IMPRESSION  No diagnosis found.        Electronically signed by: Vic Mcneal M.D., 8/19/2021 1:45 PM    "

## 2021-08-23 ENCOUNTER — OFFICE VISIT (OUTPATIENT)
Dept: MEDICAL GROUP | Facility: LAB | Age: 76
End: 2021-08-23
Payer: MEDICARE

## 2021-08-23 VITALS
DIASTOLIC BLOOD PRESSURE: 70 MMHG | OXYGEN SATURATION: 95 % | HEIGHT: 58 IN | WEIGHT: 93.6 LBS | TEMPERATURE: 96.8 F | RESPIRATION RATE: 12 BRPM | HEART RATE: 90 BPM | BODY MASS INDEX: 19.65 KG/M2 | SYSTOLIC BLOOD PRESSURE: 175 MMHG

## 2021-08-23 DIAGNOSIS — I15.8 OTHER SECONDARY HYPERTENSION: ICD-10-CM

## 2021-08-23 PROCEDURE — 99213 OFFICE O/P EST LOW 20 MIN: CPT | Performed by: INTERNAL MEDICINE

## 2021-08-23 RX ORDER — LISINOPRIL 20 MG/1
20 TABLET ORAL DAILY
Qty: 90 TABLET | Refills: 3 | Status: SHIPPED | OUTPATIENT
Start: 2021-08-23 | End: 2022-01-12 | Stop reason: SDUPTHER

## 2021-08-23 NOTE — PROGRESS NOTES
CC: Olga Rich is a 76 y.o. female is suffering from   Chief Complaint   Patient presents with   • Hospital Follow-up     hypertension   • Follow-Up     lab work          SUBJECTIVE:  1. Other secondary hypertension  Olga is here for follow-up, recently evaluated at [facility] because of essential hypertension.  We have agreed to go ahead and increase her lisinopril upwards from 10 to 20 mg each day new prescription was written.  I encourage patient to check her blood pressure 3 times a week to purchase an [employer] blood pressure cuff for home use if possible.        Past social, family, history: , retired  Social History     Tobacco Use   • Smoking status: Former Smoker     Packs/day: 0.25     Years: 40.00     Pack years: 10.00     Types: Cigarettes     Quit date: 3/1/2014     Years since quittin.4   • Smokeless tobacco: Never Used   Vaping Use   • Vaping Use: Never used   Substance Use Topics   • Alcohol use: No     Comment: very rarely   • Drug use: Yes     Types: Marijuana     Comment: occasionally for glaucoma which runs in her family/ per pt no          MEDICATIONS:    Current Outpatient Medications:   •  lisinopril (PRINIVIL) 20 MG Tab, Take 1 Tablet by mouth every day., Disp: 90 Tablet, Rfl: 3  •  levothyroxine (SYNTHROID) 50 MCG Tab, Take 1 Tablet by mouth every morning on an empty stomach., Disp: 90 Tablet, Rfl: 3  •  fluticasone-salmeterol (ADVAIR) 250-50 MCG/DOSE AEROSOL POWDER, BREATH ACTIVATED, Inhale 1 Puff 2 times a day., Disp: 1 Each, Rfl: 11  •  montelukast (SINGULAIR) 10 MG Tab, TAKE ONE TABLET BY MOUTH ONE TIME DAILY, Disp: 90 tablet, Rfl: 3  •  temazepam (RESTORIL) 15 MG Cap, Take 1 capsule by mouth at bedtime as needed for Sleep for up to 90 days., Disp: 30 capsule, Rfl: 2  •  atorvastatin (LIPITOR) 40 MG Tab, Take 1 tablet by mouth every day., Disp: 100 tablet, Rfl: 3  •  metoprolol SR (TOPROL XL) 25 MG TABLET SR 24 HR, TAKE ONE TABLET BY MOUTH ONE  TIME DAILY, Disp: 100 tablet, Rfl: 3  •  Denosumab (PROLIA SC), Inject 1 Dose as instructed every 6 months. Indications: twice a year, Disp: , Rfl:   •  aspirin (ASA) 325 MG Tab, Take 324 mg by mouth every day. Takes one or two depending on arthritis, Disp: , Rfl:   •  magnesium oxide (MAG-OX) 400 MG Tab, Take 400 mg by mouth every day., Disp: , Rfl:   •  Cholecalciferol (VITAMIN D) 2000 UNITS CAPS, Take  by mouth., Disp: , Rfl:   •  calcium carbonate (OS-DARIEN 500) 1250 MG TABS, Take 1,250 mg by mouth every day., Disp: , Rfl:   •  multivitamin (THERAGRAN) TABS, Take 1 Tab by mouth every day., Disp: , Rfl:     PROBLEMS:  Patient Active Problem List    Diagnosis Date Noted   • Nonspecific abnormal function study, cardiovascular 08/19/2021   • Sedative, hypnotic, or anxiolytic dependence (HCC) 08/19/2021   • Hyperglycemia 08/19/2021   • Non-recurrent unilateral inguinal hernia without obstruction or gangrene 09/19/2019   • Mild intermittent asthma without complication 07/30/2018   • SOB (shortness of breath) 08/18/2017   • Chronic obstructive pulmonary disease (HCC) 08/18/2017   • Aortic valve stenosis, moderate 07/18/2017   • Insomnia 08/19/2016   • Dyslipidemia 04/26/2016   • Osteoporosis 07/18/2014   • Hypothyroid 04/18/2014   • Environmental allergies 03/14/2014       REVIEW OF SYSTEMS:  Gen.:  No Nausea, Vomiting, fever, Chills.  Heart: No chest pain.  Lungs:  No shortness of Breath.  Psychological: Vinay unusual Anxiety depression     PHYSICAL EXAM   Constitutional: Alert, cooperative, not in acute distress.  Cardiovascular:  Rate Rhythm is regular without murmurs rubs clicks.     Thorax & Lungs: Clear to auscultation, no wheezing, rhonchi, or rales  HENT: Normocephalic, Atraumatic.  Eyes: PERRLA, EOMI, Conjunctiva normal.   Neck: Trachia is midline no swelling of the thyroid.   Lymphatic: No lymphadenopathy noted.   Neurologic: Alert & oriented x 3, cranial nerves II through XII are intact, Normal motor function,  "Normal sensory function, No focal deficits noted.   Psychiatric: Affect normal, Judgment normal, Mood normal.     VITAL SIGNS:BP (!) 175/70   Pulse 90   Temp 36 °C (96.8 °F)   Resp 12   Ht 1.473 m (4' 10\")   Wt 42.5 kg (93 lb 9.6 oz)   SpO2 95%   BMI 19.56 kg/m²     Labs: Reviewed    Assessment:                                                     Plan:    1. Other secondary hypertension  Patient's to check her blood pressure at home 3 times a week in the morning, as to if possible purchase an arm on blood pressure cuff for home monitoring  - lisinopril (PRINIVIL) 20 MG Tab; Take 1 Tablet by mouth every day.  Dispense: 90 Tablet; Refill: 3        "

## 2021-09-07 ENCOUNTER — TELEPHONE (OUTPATIENT)
Dept: MEDICAL GROUP | Facility: LAB | Age: 76
End: 2021-09-07

## 2021-09-07 NOTE — TELEPHONE ENCOUNTER
ESTABLISHED PATIENT PRE-VISIT PLANNING     Patient was NOT contacted to complete PVP.     Note: Patient will not be contacted if there is no indication to call.     1.  Reviewed notes from the last few office visits within the medical group: Yes    2.  If any orders were placed at last visit or intended to be done for this visit (i.e. 6 mos follow-up), do we have Results/Consult Notes?         •  Labs - Labs were not ordered at last office visit.  Note: If patient appointment is for lab review and patient did not complete labs, check with provider if OK to reschedule patient until labs completed.       •  Imaging - Imaging was not ordered at last office visit.       •  Referrals - No referrals were ordered at last office visit.    3. Is this appointment scheduled as a Hospital Follow-Up? No    4.  Immunizations were updated in Epic using Reconcile Outside Information activity? Yes    5.  Patient is due for the following Health Maintenance Topics:   Health Maintenance Due   Topic Date Due   • Annual Pulmonary Function Test / Spirometry  Never done   • IMM ZOSTER VACCINES (2 of 3) 04/26/2017   • IMM INFLUENZA (1) 09/01/2021         6.  AHA (Pulse8) form printed for Provider? No, patient does not have any open alerts

## 2022-01-04 ENCOUNTER — OFFICE VISIT (OUTPATIENT)
Dept: RHEUMATOLOGY | Facility: MEDICAL CENTER | Age: 77
End: 2022-01-04
Payer: MEDICARE

## 2022-01-04 VITALS
OXYGEN SATURATION: 95 % | BODY MASS INDEX: 20.06 KG/M2 | RESPIRATION RATE: 12 BRPM | WEIGHT: 96 LBS | TEMPERATURE: 96.8 F | DIASTOLIC BLOOD PRESSURE: 80 MMHG | SYSTOLIC BLOOD PRESSURE: 110 MMHG | HEART RATE: 82 BPM

## 2022-01-04 DIAGNOSIS — M81.0 AGE-RELATED OSTEOPOROSIS WITHOUT CURRENT PATHOLOGICAL FRACTURE: ICD-10-CM

## 2022-01-04 DIAGNOSIS — E03.9 ACQUIRED HYPOTHYROIDISM: ICD-10-CM

## 2022-01-04 DIAGNOSIS — J44.9 CHRONIC OBSTRUCTIVE PULMONARY DISEASE, UNSPECIFIED COPD TYPE (HCC): ICD-10-CM

## 2022-01-04 DIAGNOSIS — I35.0 AORTIC VALVE STENOSIS, ETIOLOGY OF CARDIAC VALVE DISEASE UNSPECIFIED: ICD-10-CM

## 2022-01-04 PROCEDURE — 99214 OFFICE O/P EST MOD 30 MIN: CPT | Performed by: INTERNAL MEDICINE

## 2022-01-04 NOTE — PROGRESS NOTES
Chief Complaint- joint pain     Subjective:   Olga Rich is a 76 y.o. female here today for follow up of rheumatological issues    This is a follow-up visit for this patient who we follow in this clinic for osteoporosis.  Patient was diagnosed with osteoporosis in 2008 status post right femur fracture.  Patient status post Fosamax for 4 years now on Prolia 60 mg subcu every 6 months with benefit.  Patient's last bone density scan March 2021 does show some improvement in T-scores as well as FRAX scores compared to 2019.  Patient denies any fractures since last visit,  Patient denies any side effects from the medication, denies any unexplained weight loss, denies any fevers of unknown etiology, denies any GI upset, denies any rashes, denies any new joint swelling, denies recurrent infections.      Additional comorbidities include  hypothyroidism and asthma for which she is managed by her primary care doctor.     Additional psychosocial comorbidities include alcoholism in the family but patient is quite happy with her son and daughter who continues to be sober.       S/p fosamax for 4 years      DEXA 4/2014 T scores -4.4, -3.9  DEXA 7/2016 T scores -3.7, -4.0  FRAX Major Osteoporotic 28.9%, Hip fx risk 11.1%  DEXA 1/21/2019 T scores -4.0, -3.4  FRAX 1/21/2019 major osteoporotic fracture risk 31.3%, hip fracture risk 13.9%  DEXA 3/11/2021 T scores -3.8, -3.6  FRAX 3/11/2021 major osteoporotic fracture risk 31.1%, hip fracture risk 13.8%   On Prolia 60 mg every 6 months      Current Outpatient Medications   Medication Sig Dispense Refill   • lisinopril (PRINIVIL) 20 MG Tab Take 1 Tablet by mouth every day. 90 Tablet 3   • levothyroxine (SYNTHROID) 50 MCG Tab Take 1 Tablet by mouth every morning on an empty stomach. 90 Tablet 3   • fluticasone-salmeterol (ADVAIR) 250-50 MCG/DOSE AEROSOL POWDER, BREATH ACTIVATED Inhale 1 Puff 2 times a day. 1 Each 11   • montelukast (SINGULAIR) 10 MG Tab TAKE ONE TABLET BY MOUTH ONE  TIME DAILY 90 tablet 3   • atorvastatin (LIPITOR) 40 MG Tab Take 1 tablet by mouth every day. 100 tablet 3   • metoprolol SR (TOPROL XL) 25 MG TABLET SR 24 HR TAKE ONE TABLET BY MOUTH ONE TIME DAILY 100 tablet 3   • Denosumab (PROLIA SC) Inject 1 Dose as instructed every 6 months. Indications: twice a year     • aspirin (ASA) 325 MG Tab Take 324 mg by mouth every day. Takes one or two depending on arthritis     • magnesium oxide (MAG-OX) 400 MG Tab Take 400 mg by mouth every day.     • Cholecalciferol (VITAMIN D) 2000 UNITS CAPS Take  by mouth.     • calcium carbonate (OS-DARIEN 500) 1250 MG TABS Take 1,250 mg by mouth every day.     • multivitamin (THERAGRAN) TABS Take 1 Tab by mouth every day.       No current facility-administered medications for this visit.     She  has a past medical history of Allergic rhinitis, Arthritis, Asthma in adult, Cataract (2/2012), Dental disorder, Dyslipidemia (4/26/2016), High cholesterol, Hypertension, Hypothyroid, Hypothyroid (4/18/2014), Hypothyroid (4/18/2014), Osteoporosis, unspecified, and Pneumonia (2009).    ROS   Other than what is mentioned in HPI or physical exam, there is no history of headaches, double vision or blurred vision. No temporal tenderness or jaw claudication. No trouble swallowing difficulties or sore throats.  No chest complaints including chest pain, cough or sputum production. No GI complaints including nausea, vomiting, change in bowel habits, or past peptic ulcer disease. No history of blood in the stools. No urinary complaints including dysuria or frequency. No history of alopecia, photosensitivity, oral ulcerations, Raynaud's phenomena.       Objective:     /80   Pulse 82   Temp 36 °C (96.8 °F) (Temporal)   Resp 12   Wt 43.5 kg (96 lb)   SpO2 95%  Body mass index is 20.06 kg/m².   Physical Exam:    Constitutional: Alert and oriented X3, patient is talkative with good eye contact.Skin: Warm, dry, good turgor, no rashes in visible areas.Eye:  Equal, round and reactive, conjunctiva clear, lids normal EOM intactENMT: Lips without lesions, good dentition, no oropharyngeal ulcers, moist buccal mucosa, pinna without deformityNeck: Trachea midline, no masses, no thyromegaly.Lymph:  No cervical lymphadenopathy, no axillary lymphadenopathy, no supraclavicular lymphadenopathyRespiratory: Unlabored respiratory effort, lungs clear to auscultation, no wheezes, no ronchi.Cardiovascular: Normal S1, S2, no murmur, no edema.Abdomen: Soft, non-tender, no masses, no hepatosplenomegaly.Psych: Alert and oriented x3, normal affect and mood.Neuro: Cranial nerves 2-12 are grossly intact, no loss of sensation LEExt:no joint laxity noted in bilateral arms, no joint laxity noted in bilateral legs, minimal dowagers hump upper back, evidence of Heberden's nodes in the left index DIP joint, gait without antalgia and without foot drop    Lab Results   Component Value Date/Time    SODIUM 141 01/06/2020 10:42 AM    POTASSIUM 3.9 01/06/2020 10:42 AM    CHLORIDE 104 01/06/2020 10:42 AM    CO2 24 01/06/2020 10:42 AM    GLUCOSE 86 01/06/2020 10:42 AM    BUN 11 01/06/2020 10:42 AM    CREATININE 0.88 01/06/2020 10:42 AM      Lab Results   Component Value Date/Time    WBC 6.0 06/12/2019 10:02 AM    RBC 4.75 06/12/2019 10:02 AM    HEMOGLOBIN 14.3 06/12/2019 10:02 AM    HEMATOCRIT 45.3 06/12/2019 10:02 AM    MCV 95.4 06/12/2019 10:02 AM    MCH 30.1 06/12/2019 10:02 AM    MCHC 31.6 (L) 06/12/2019 10:02 AM    MPV 9.5 06/12/2019 10:02 AM    NEUTSPOLYS 57.10 06/12/2019 10:02 AM    LYMPHOCYTES 29.20 06/12/2019 10:02 AM    MONOCYTES 9.50 06/12/2019 10:02 AM    EOSINOPHILS 3.20 06/12/2019 10:02 AM    BASOPHILS 0.80 06/12/2019 10:02 AM      Lab Results   Component Value Date/Time    CALCIUM 9.9 01/06/2020 10:42 AM    ASTSGOT 28 01/06/2020 10:42 AM    ALTSGPT 29 01/06/2020 10:42 AM    ALKPHOSPHAT 62 01/06/2020 10:42 AM    TBILIRUBIN 0.6 01/06/2020 10:42 AM    ALBUMIN 4.4 01/06/2020 10:42 AM    ALBUMIN  5.77 (H) 12/04/2014 03:10 PM    TOTPROTEIN 8.0 01/06/2020 10:42 AM    TOTPROTEIN 9.50 (H) 12/04/2014 03:10 PM     Lab Results   Component Value Date/Time    SEDRATEWES 22 12/04/2014 03:10 PM     Lab Results   Component Value Date/Time    CTELOPEP 50 06/09/2015 01:15 PM     Lab Results   Component Value Date/Time    PTHINTACT 48.8 12/09/2016 10:20 AM       Assessment and Plan:     1. Age-related osteoporosis without current pathological fracture  Doing quite well on Prolia 60 mg subcu every 6 months, will continue with such, next DEXA March 2023, we will order at next visit.   continue calcium citrate 1200 mg by mouth daily and vitamin D about 2000 units by mouth daily and magnesium 200 mg by mouth daily  Of note vitamin D level done August 2021 in normal range    2. Acquired hypothyroidism  Followed by patients PCP, can exacerbate joint pain, I recommend monitoring thyroid on a regular basis to assure it is not contributing to the patient's joint pain    3. Chronic obstructive pulmonary disease, unspecified COPD type (HCC)  May impact the type of medications we can use for this patient's arthritis. We will have to keep this under advisement.    4. Aortic valve stenosis, etiology of cardiac valve disease unspecified    Followup: Return in about 1 year (around 1/4/2023). or sooner hilton Rich  was seen 30 minutes face-to-face of which more than 50% of the time was spent counseling the patient (excluding time for procedures)  regarding  rheumatological condition and care. Therapy was discussed in detail.      Please note that this dictation was created using voice recognition software. I have made every reasonable attempt to correct obvious errors, but I expect that there are errors of grammar and possibly content that I did not discover before finalizing the note.

## 2022-01-12 ENCOUNTER — OFFICE VISIT (OUTPATIENT)
Dept: CARDIOLOGY | Facility: MEDICAL CENTER | Age: 77
End: 2022-01-12
Payer: MEDICARE

## 2022-01-12 VITALS
HEIGHT: 58 IN | HEART RATE: 83 BPM | SYSTOLIC BLOOD PRESSURE: 122 MMHG | BODY MASS INDEX: 20.28 KG/M2 | WEIGHT: 96.6 LBS | RESPIRATION RATE: 12 BRPM | DIASTOLIC BLOOD PRESSURE: 70 MMHG | OXYGEN SATURATION: 94 %

## 2022-01-12 DIAGNOSIS — E78.5 DYSLIPIDEMIA: ICD-10-CM

## 2022-01-12 DIAGNOSIS — Z79.899 HIGH RISK MEDICATION USE: ICD-10-CM

## 2022-01-12 DIAGNOSIS — I35.0 MILD AORTIC STENOSIS: ICD-10-CM

## 2022-01-12 DIAGNOSIS — Z82.49 FAMILY HISTORY OF ATHEROSCLEROSIS: ICD-10-CM

## 2022-01-12 DIAGNOSIS — I25.10 ATHEROSCLEROSIS OF NATIVE CORONARY ARTERY OF NATIVE HEART WITHOUT ANGINA PECTORIS: ICD-10-CM

## 2022-01-12 DIAGNOSIS — I10 HTN (HYPERTENSION), MALIGNANT: ICD-10-CM

## 2022-01-12 DIAGNOSIS — I15.8 OTHER SECONDARY HYPERTENSION: ICD-10-CM

## 2022-01-12 PROCEDURE — 99214 OFFICE O/P EST MOD 30 MIN: CPT | Performed by: INTERNAL MEDICINE

## 2022-01-12 RX ORDER — ATORVASTATIN CALCIUM 40 MG/1
40 TABLET, FILM COATED ORAL DAILY
Qty: 100 TABLET | Refills: 3 | Status: SHIPPED | OUTPATIENT
Start: 2022-01-12 | End: 2022-10-10 | Stop reason: SDUPTHER

## 2022-01-12 RX ORDER — TEMAZEPAM 15 MG/1
15 CAPSULE ORAL NIGHTLY PRN
COMMUNITY
End: 2022-08-10 | Stop reason: SDUPTHER

## 2022-01-12 RX ORDER — LISINOPRIL 20 MG/1
20 TABLET ORAL DAILY
Qty: 90 TABLET | Refills: 3 | Status: SHIPPED | OUTPATIENT
Start: 2022-01-12 | End: 2022-10-10 | Stop reason: SDUPTHER

## 2022-01-12 RX ORDER — METOPROLOL SUCCINATE 25 MG/1
TABLET, EXTENDED RELEASE ORAL
Qty: 100 TABLET | Refills: 3 | Status: SHIPPED | OUTPATIENT
Start: 2022-01-12 | End: 2022-10-10 | Stop reason: SDUPTHER

## 2022-01-12 ASSESSMENT — ENCOUNTER SYMPTOMS
FEVER: 0
WEIGHT LOSS: 0
CLAUDICATION: 0
HALLUCINATIONS: 0
LOSS OF CONSCIOUSNESS: 0
DIZZINESS: 0
MYALGIAS: 0
ABDOMINAL PAIN: 0
EYE PAIN: 0
PALPITATIONS: 0
HEADACHES: 0
VOMITING: 0
SPEECH CHANGE: 0
ORTHOPNEA: 0
BRUISES/BLEEDS EASILY: 0
CHILLS: 0
BLOOD IN STOOL: 0
EYE DISCHARGE: 0
DEPRESSION: 0
PND: 0
SHORTNESS OF BREATH: 0
DOUBLE VISION: 0
COUGH: 0
SENSORY CHANGE: 0
FALLS: 0
BLURRED VISION: 0
NAUSEA: 0

## 2022-01-12 NOTE — PROGRESS NOTES
Chief Complaint   Patient presents with   • Dyslipidemia       Subjective:   Olga Rich is a 76 y.o. female who presents today for cardiac care and evaluation because of an abnormal calcium scan in the past. At the last visit, patient was deemed to be asymptomatic and no further cardiac workup was entertained. We optimized her medical therapy.     In the interim, patient has been doing well without having any symptoms. Patient denies having chest pain, dyspnea, palpitation, presyncope, syncope episodes. Able to climb up at least 2 flights of stairs.    I have independently interpreted and reviewed blood tests results with patient in clinic which shows normal LDL level 79, triglycerides 80, renal and liver function.     She was found to have aortic stenosis for which she underwent TTE and SANGEETA in 05/2017.    I have independently interpreted and reviewed blood tests results with patient in clinic which shows normal LDL level 79, triglycerides 80, renal and liver function. No new labs.      Past Medical History:   Diagnosis Date   • Allergic rhinitis    • Arthritis     fingers   • Asthma in adult    • Cataract 2/2012    Bilat    • Dental disorder     partial upper   • Dyslipidemia 4/26/2016   • High cholesterol    • Hypertension    • Hypothyroid    • Hypothyroid 4/18/2014   • Hypothyroid 4/18/2014   • Osteoporosis, unspecified    • Pneumonia 2009     Past Surgical History:   Procedure Laterality Date   • CATARACT PHACO WITH IOL      Dr. Vargas   • ORIF, FRACTURE, FEMUR      titamium Dr. Mares.      Family History   Problem Relation Age of Onset   • Other Mother 97        old age.    • Heart Attack Father 56        MI   • Heart Disease Father    • Other Son         ETOH   • Other Son         ETOH   • Heart Disease Sister 53   • Heart Attack Sister 85   • No Known Problems Brother    • No Known Problems Sister    • No Known Problems Sister      Social History     Socioeconomic History   • Marital status:       Spouse name: Not on file   • Number of children: Not on file   • Years of education: Not on file   • Highest education level: Not on file   Occupational History   • Not on file   Tobacco Use   • Smoking status: Former Smoker     Packs/day: 0.25     Years: 40.00     Pack years: 10.00     Types: Cigarettes     Quit date: 3/1/2014     Years since quittin.8   • Smokeless tobacco: Never Used   Vaping Use   • Vaping Use: Never used   Substance and Sexual Activity   • Alcohol use: No     Comment: very rarely   • Drug use: Yes     Types: Marijuana     Comment: occasionally for glaucoma which runs in her family/ per pt no    • Sexual activity: Never     Partners: Male   Other Topics Concern   • Not on file   Social History Narrative   • Not on file     Social Determinants of Health     Financial Resource Strain:    • Difficulty of Paying Living Expenses: Not on file   Food Insecurity:    • Worried About Running Out of Food in the Last Year: Not on file   • Ran Out of Food in the Last Year: Not on file   Transportation Needs:    • Lack of Transportation (Medical): Not on file   • Lack of Transportation (Non-Medical): Not on file   Physical Activity:    • Days of Exercise per Week: Not on file   • Minutes of Exercise per Session: Not on file   Stress:    • Feeling of Stress : Not on file   Social Connections:    • Frequency of Communication with Friends and Family: Not on file   • Frequency of Social Gatherings with Friends and Family: Not on file   • Attends [employer] Services: Not on file   • Active Member of Clubs or Organizations: Not on file   • Attends Club or Organization Meetings: Not on file   • Marital Status: Not on file   Intimate Partner Violence:    • Fear of Current or Ex-Partner: Not on file   • Emotionally Abused: Not on file   • Physically Abused: Not on file   • Sexually Abused: Not on file   Housing Stability:    • Unable to Pay for Housing in the Last Year: Not on file   • Number of Places  Lived in the Last Year: Not on file   • Unstable Housing in the Last Year: Not on file     Allergies   Allergen Reactions   • Demerol Anaphylaxis     Outpatient Encounter Medications as of 1/12/2022   Medication Sig Dispense Refill   • temazepam (RESTORIL) 15 MG Cap      • lisinopril (PRINIVIL) 20 MG Tab Take 1 Tablet by mouth every day. 90 Tablet 3   • levothyroxine (SYNTHROID) 50 MCG Tab Take 1 Tablet by mouth every morning on an empty stomach. 90 Tablet 3   • fluticasone-salmeterol (ADVAIR) 250-50 MCG/DOSE AEROSOL POWDER, BREATH ACTIVATED Inhale 1 Puff 2 times a day. 1 Each 11   • montelukast (SINGULAIR) 10 MG Tab TAKE ONE TABLET BY MOUTH ONE TIME DAILY 90 tablet 3   • atorvastatin (LIPITOR) 40 MG Tab Take 1 tablet by mouth every day. 100 tablet 3   • metoprolol SR (TOPROL XL) 25 MG TABLET SR 24 HR TAKE ONE TABLET BY MOUTH ONE TIME DAILY 100 tablet 3   • Denosumab (PROLIA SC) Inject 1 Dose as instructed every 6 months. Indications: twice a year     • aspirin (ASA) 325 MG Tab Take 324 mg by mouth every day. Takes one or two depending on arthritis     • magnesium oxide (MAG-OX) 400 MG Tab Take 400 mg by mouth every day.     • Cholecalciferol (VITAMIN D) 2000 UNITS CAPS Take  by mouth.     • calcium carbonate (OS-DARIEN 500) 1250 MG TABS Take 1,250 mg by mouth every day.     • multivitamin (THERAGRAN) TABS Take 1 Tab by mouth every day.       No facility-administered encounter medications on file as of 1/12/2022.     Review of Systems   Constitutional: Negative for chills, fever, malaise/fatigue and weight loss.   HENT: Negative for ear discharge, ear pain, hearing loss and nosebleeds.    Eyes: Negative for blurred vision, double vision, pain and discharge.   Respiratory: Negative for cough and shortness of breath.    Cardiovascular: Negative for chest pain, palpitations, orthopnea, claudication, leg swelling and PND.   Gastrointestinal: Negative for abdominal pain, blood in stool, melena, nausea and vomiting.  "  Genitourinary: Negative for dysuria and hematuria.   Musculoskeletal: Negative for falls, joint pain and myalgias.   Skin: Negative for itching and rash.   Neurological: Negative for dizziness, sensory change, speech change, loss of consciousness and headaches.   Endo/Heme/Allergies: Negative for environmental allergies. Does not bruise/bleed easily.   Psychiatric/Behavioral: Negative for depression, hallucinations and suicidal ideas.        Objective:   /70 (BP Location: Left arm, Patient Position: Sitting, BP Cuff Size: Adult)   Pulse 83   Resp 12   Ht 1.473 m (4' 10\")   Wt 43.8 kg (96 lb 9.6 oz)   SpO2 94%   BMI 20.19 kg/m²     Physical Exam  Vitals and nursing note reviewed.   Constitutional:       General: She is not in acute distress.     Appearance: She is not diaphoretic.   HENT:      Head: Normocephalic and atraumatic.      Right Ear: External ear normal.      Left Ear: External ear normal.      Nose: No congestion or rhinorrhea.   Eyes:      General:         Right eye: No discharge.         Left eye: No discharge.   Neck:      Thyroid: No thyromegaly.      Vascular: No JVD.   Cardiovascular:      Rate and Rhythm: Normal rate and regular rhythm.      Pulses: Normal pulses.   Pulmonary:      Effort: No respiratory distress.   Abdominal:      General: There is no distension.      Tenderness: There is no abdominal tenderness.   Musculoskeletal:         General: No swelling or tenderness.      Right lower leg: No edema.      Left lower leg: No edema.   Skin:     General: Skin is warm and dry.   Neurological:      Mental Status: She is alert and oriented to person, place, and time.      Cranial Nerves: No cranial nerve deficit.   Psychiatric:         Behavior: Behavior normal.         Assessment:     1. Mild aortic stenosis     2. HTN (hypertension), malignant     3. Family history of atherosclerosis     4. High risk medication use     5. Dyslipidemia         Medical Decision Making:  Today's " Assessment / Status / Plan:   At this time patient is clinically stable in terms of her cardiac standpoint.  Today, based on physical examination findings, patient is euvolemic. No JVD, lungs are clear to auscultation, no pitting edema in bilateral lower extremities, no ascites.    Dry weight is 96 lbs.    Blood pressure is not well controlled.  Will continue lisinopril at 20 mg daily.  Continue other current medications at current dose.   Does not want to be referred to valve program as she is feeling well. She prefers to see me at this time.  Clinical monitor for AS.     I will see patient back in clinic with lab tests and studies results in 6 months.     I thank you Dr. Flor for referring patient to our Cardiology Clinic today.

## 2022-02-08 DIAGNOSIS — J45.20 MILD INTERMITTENT ASTHMA WITHOUT COMPLICATION: ICD-10-CM

## 2022-02-10 NOTE — TELEPHONE ENCOUNTER
Have we ever prescribed this med? Yes.  If yes, what date? 08/10/2021    Last OV: 08/10/2021- MARY Chambers     Next OV: No appointment on file-   5.  Follow-up in 1 year to review symptoms, sooner if needed.    DX: Mild intermittent asthma without complication (J45.20)    Medications:   Requested Prescriptions     Pending Prescriptions Disp Refills   • fluticasone-salmeterol (ADVAIR) 250-50 MCG/DOSE AEROSOL POWDER, BREATH ACTIVATED [Pharmacy Med Name: FLUTIC/SALME 250/50 AER HIKM]       Sig: INHALE 1 PUFF BY MOUTH TWICE DAILY

## 2022-02-17 ENCOUNTER — OUTPATIENT INFUSION SERVICES (OUTPATIENT)
Dept: ONCOLOGY | Facility: MEDICAL CENTER | Age: 77
End: 2022-02-17
Attending: INTERNAL MEDICINE
Payer: MEDICARE

## 2022-02-17 VITALS
HEIGHT: 57 IN | BODY MASS INDEX: 20.45 KG/M2 | RESPIRATION RATE: 18 BRPM | OXYGEN SATURATION: 94 % | TEMPERATURE: 97.7 F | WEIGHT: 94.8 LBS | HEART RATE: 79 BPM | DIASTOLIC BLOOD PRESSURE: 88 MMHG | SYSTOLIC BLOOD PRESSURE: 149 MMHG

## 2022-02-17 DIAGNOSIS — M81.0 AGE-RELATED OSTEOPOROSIS WITHOUT CURRENT PATHOLOGICAL FRACTURE: ICD-10-CM

## 2022-02-17 LAB
CA-I BLD ISE-SCNC: 1.25 MMOL/L (ref 1.1–1.3)
CREAT BLD-MCNC: 0.9 MG/DL (ref 0.5–1.4)

## 2022-02-17 PROCEDURE — 82330 ASSAY OF CALCIUM: CPT

## 2022-02-17 PROCEDURE — 36415 COLL VENOUS BLD VENIPUNCTURE: CPT

## 2022-02-17 PROCEDURE — 82565 ASSAY OF CREATININE: CPT

## 2022-02-17 PROCEDURE — 700111 HCHG RX REV CODE 636 W/ 250 OVERRIDE (IP): Performed by: INTERNAL MEDICINE

## 2022-02-17 PROCEDURE — 96372 THER/PROPH/DIAG INJ SC/IM: CPT

## 2022-02-17 RX ORDER — METHYLPREDNISOLONE SODIUM SUCCINATE 125 MG/2ML
125 INJECTION, POWDER, LYOPHILIZED, FOR SOLUTION INTRAMUSCULAR; INTRAVENOUS PRN
Status: CANCELLED | OUTPATIENT
Start: 2022-02-17

## 2022-02-17 RX ORDER — DIPHENHYDRAMINE HYDROCHLORIDE 50 MG/ML
50 INJECTION INTRAMUSCULAR; INTRAVENOUS PRN
Status: CANCELLED | OUTPATIENT
Start: 2022-02-17

## 2022-02-17 RX ORDER — EPINEPHRINE 1 MG/ML(1)
0.5 AMPUL (ML) INJECTION PRN
Status: CANCELLED | OUTPATIENT
Start: 2022-02-17

## 2022-02-17 RX ADMIN — DENOSUMAB 60 MG: 60 INJECTION SUBCUTANEOUS at 10:06

## 2022-02-17 NOTE — PROGRESS NOTES
Pt arrives to [facility] for Prolia.  Pt denies any s/sx of infection or recent/planned dental procedures.  ISTAT calcium and creatinine drawn via 25g butterfly needle to L-FA.  Labs reviewed and ok to give Prolia per pharmacy.  Prolia given SC to back of RUE.  Scheduled next appt in 6 months and informed pt of appt time.  Pt dc home to self care.

## 2022-03-28 ENCOUNTER — APPOINTMENT (OUTPATIENT)
Dept: RADIOLOGY | Facility: MEDICAL CENTER | Age: 77
End: 2022-03-28
Attending: EMERGENCY MEDICINE
Payer: MEDICARE

## 2022-03-28 ENCOUNTER — HOSPITAL ENCOUNTER (OUTPATIENT)
Facility: MEDICAL CENTER | Age: 77
End: 2022-03-29
Attending: EMERGENCY MEDICINE | Admitting: INTERNAL MEDICINE
Payer: MEDICARE

## 2022-03-28 DIAGNOSIS — K29.00 ACUTE GASTRITIS WITHOUT HEMORRHAGE, UNSPECIFIED GASTRITIS TYPE: ICD-10-CM

## 2022-03-28 DIAGNOSIS — K20.90 ESOPHAGITIS WITH GASTRITIS: ICD-10-CM

## 2022-03-28 DIAGNOSIS — K29.70 ESOPHAGITIS WITH GASTRITIS: ICD-10-CM

## 2022-03-28 DIAGNOSIS — E86.0 DEHYDRATION: ICD-10-CM

## 2022-03-28 DIAGNOSIS — R10.13 EPIGASTRIC ABDOMINAL PAIN: ICD-10-CM

## 2022-03-28 DIAGNOSIS — R11.2 INTRACTABLE VOMITING WITH NAUSEA: ICD-10-CM

## 2022-03-28 DIAGNOSIS — K20.90 ESOPHAGITIS, UNSPECIFIED WITHOUT BLEEDING: ICD-10-CM

## 2022-03-28 DIAGNOSIS — R11.2 INTRACTABLE VOMITING WITH NAUSEA, UNSPECIFIED VOMITING TYPE: ICD-10-CM

## 2022-03-28 LAB
ALBUMIN SERPL BCP-MCNC: 4.8 G/DL (ref 3.2–4.9)
ALBUMIN/GLOB SERPL: 1.3 G/DL
ALP SERPL-CCNC: 84 U/L (ref 30–99)
ALT SERPL-CCNC: 16 U/L (ref 2–50)
ANION GAP SERPL CALC-SCNC: 16 MMOL/L (ref 7–16)
APPEARANCE UR: CLEAR
AST SERPL-CCNC: 26 U/L (ref 12–45)
BASOPHILS # BLD AUTO: 0.3 % (ref 0–1.8)
BASOPHILS # BLD: 0.06 K/UL (ref 0–0.12)
BILIRUB SERPL-MCNC: 0.4 MG/DL (ref 0.1–1.5)
BILIRUB UR QL STRIP.AUTO: NEGATIVE
BUN SERPL-MCNC: 24 MG/DL (ref 8–22)
CALCIUM SERPL-MCNC: 10.7 MG/DL (ref 8.5–10.5)
CHLORIDE SERPL-SCNC: 97 MMOL/L (ref 96–112)
CO2 SERPL-SCNC: 27 MMOL/L (ref 20–33)
COLOR UR: YELLOW
CREAT SERPL-MCNC: 1.21 MG/DL (ref 0.5–1.4)
EOSINOPHIL # BLD AUTO: 0.02 K/UL (ref 0–0.51)
EOSINOPHIL NFR BLD: 0.1 % (ref 0–6.9)
ERYTHROCYTE [DISTWIDTH] IN BLOOD BY AUTOMATED COUNT: 47.8 FL (ref 35.9–50)
GFR SERPLBLD CREATININE-BSD FMLA CKD-EPI: 46 ML/MIN/1.73 M 2
GLOBULIN SER CALC-MCNC: 3.6 G/DL (ref 1.9–3.5)
GLUCOSE SERPL-MCNC: 115 MG/DL (ref 65–99)
GLUCOSE UR STRIP.AUTO-MCNC: NEGATIVE MG/DL
HCT VFR BLD AUTO: 46.1 % (ref 37–47)
HGB BLD-MCNC: 15.2 G/DL (ref 12–16)
IMM GRANULOCYTES # BLD AUTO: 0.12 K/UL (ref 0–0.11)
IMM GRANULOCYTES NFR BLD AUTO: 0.6 % (ref 0–0.9)
KETONES UR STRIP.AUTO-MCNC: 15 MG/DL
LACTATE BLD-SCNC: 1.8 MMOL/L (ref 0.5–2)
LEUKOCYTE ESTERASE UR QL STRIP.AUTO: NEGATIVE
LYMPHOCYTES # BLD AUTO: 1.85 K/UL (ref 1–4.8)
LYMPHOCYTES NFR BLD: 9.5 % (ref 22–41)
MAGNESIUM SERPL-MCNC: 2 MG/DL (ref 1.5–2.5)
MCH RBC QN AUTO: 30.8 PG (ref 27–33)
MCHC RBC AUTO-ENTMCNC: 33 G/DL (ref 33.6–35)
MCV RBC AUTO: 93.5 FL (ref 81.4–97.8)
MICRO URNS: ABNORMAL
MONOCYTES # BLD AUTO: 1.3 K/UL (ref 0–0.85)
MONOCYTES NFR BLD AUTO: 6.7 % (ref 0–13.4)
NEUTROPHILS # BLD AUTO: 16.18 K/UL (ref 2–7.15)
NEUTROPHILS NFR BLD: 82.8 % (ref 44–72)
NITRITE UR QL STRIP.AUTO: NEGATIVE
NRBC # BLD AUTO: 0 K/UL
NRBC BLD-RTO: 0 /100 WBC
PH UR STRIP.AUTO: 6.5 [PH] (ref 5–8)
PLATELET # BLD AUTO: 542 K/UL (ref 164–446)
PMV BLD AUTO: 8.5 FL (ref 9–12.9)
POTASSIUM SERPL-SCNC: 3.6 MMOL/L (ref 3.6–5.5)
PROT SERPL-MCNC: 8.4 G/DL (ref 6–8.2)
PROT UR QL STRIP: NEGATIVE MG/DL
RBC # BLD AUTO: 4.93 M/UL (ref 4.2–5.4)
RBC UR QL AUTO: NEGATIVE
SODIUM SERPL-SCNC: 140 MMOL/L (ref 135–145)
SP GR UR STRIP.AUTO: 1.02
UROBILINOGEN UR STRIP.AUTO-MCNC: 1 MG/DL
WBC # BLD AUTO: 19.5 K/UL (ref 4.8–10.8)

## 2022-03-28 PROCEDURE — 94760 N-INVAS EAR/PLS OXIMETRY 1: CPT

## 2022-03-28 PROCEDURE — 81003 URINALYSIS AUTO W/O SCOPE: CPT

## 2022-03-28 PROCEDURE — G0378 HOSPITAL OBSERVATION PER HR: HCPCS

## 2022-03-28 PROCEDURE — 700105 HCHG RX REV CODE 258: Performed by: INTERNAL MEDICINE

## 2022-03-28 PROCEDURE — C9113 INJ PANTOPRAZOLE SODIUM, VIA: HCPCS | Performed by: INTERNAL MEDICINE

## 2022-03-28 PROCEDURE — 85025 COMPLETE CBC W/AUTO DIFF WBC: CPT

## 2022-03-28 PROCEDURE — 96374 THER/PROPH/DIAG INJ IV PUSH: CPT | Mod: XU

## 2022-03-28 PROCEDURE — 96375 TX/PRO/DX INJ NEW DRUG ADDON: CPT

## 2022-03-28 PROCEDURE — 99285 EMERGENCY DEPT VISIT HI MDM: CPT

## 2022-03-28 PROCEDURE — 700105 HCHG RX REV CODE 258: Performed by: EMERGENCY MEDICINE

## 2022-03-28 PROCEDURE — 36415 COLL VENOUS BLD VENIPUNCTURE: CPT

## 2022-03-28 PROCEDURE — 700102 HCHG RX REV CODE 250 W/ 637 OVERRIDE(OP): Performed by: INTERNAL MEDICINE

## 2022-03-28 PROCEDURE — 80053 COMPREHEN METABOLIC PANEL: CPT

## 2022-03-28 PROCEDURE — 74177 CT ABD & PELVIS W/CONTRAST: CPT | Mod: MG

## 2022-03-28 PROCEDURE — 87040 BLOOD CULTURE FOR BACTERIA: CPT | Mod: 91

## 2022-03-28 PROCEDURE — 96376 TX/PRO/DX INJ SAME DRUG ADON: CPT

## 2022-03-28 PROCEDURE — A9270 NON-COVERED ITEM OR SERVICE: HCPCS | Performed by: INTERNAL MEDICINE

## 2022-03-28 PROCEDURE — 99219 PR INITIAL OBSERVATION CARE,LEVL II: CPT | Performed by: INTERNAL MEDICINE

## 2022-03-28 PROCEDURE — 87086 URINE CULTURE/COLONY COUNT: CPT

## 2022-03-28 PROCEDURE — 71045 X-RAY EXAM CHEST 1 VIEW: CPT

## 2022-03-28 PROCEDURE — 83735 ASSAY OF MAGNESIUM: CPT

## 2022-03-28 PROCEDURE — 700111 HCHG RX REV CODE 636 W/ 250 OVERRIDE (IP): Performed by: INTERNAL MEDICINE

## 2022-03-28 PROCEDURE — 700117 HCHG RX CONTRAST REV CODE 255: Performed by: EMERGENCY MEDICINE

## 2022-03-28 PROCEDURE — 83605 ASSAY OF LACTIC ACID: CPT

## 2022-03-28 PROCEDURE — 700111 HCHG RX REV CODE 636 W/ 250 OVERRIDE (IP): Performed by: EMERGENCY MEDICINE

## 2022-03-28 RX ORDER — PROCHLORPERAZINE EDISYLATE 5 MG/ML
5 INJECTION INTRAMUSCULAR; INTRAVENOUS ONCE
Status: COMPLETED | OUTPATIENT
Start: 2022-03-28 | End: 2022-03-28

## 2022-03-28 RX ORDER — ATORVASTATIN CALCIUM 10 MG/1
10 TABLET, FILM COATED ORAL EVERY EVENING
Status: DISCONTINUED | OUTPATIENT
Start: 2022-03-29 | End: 2022-03-29 | Stop reason: HOSPADM

## 2022-03-28 RX ORDER — LEVOTHYROXINE SODIUM 0.05 MG/1
50 TABLET ORAL
Status: DISCONTINUED | OUTPATIENT
Start: 2022-03-29 | End: 2022-03-29 | Stop reason: HOSPADM

## 2022-03-28 RX ORDER — LISINOPRIL 20 MG/1
20 TABLET ORAL DAILY
Status: DISCONTINUED | OUTPATIENT
Start: 2022-03-29 | End: 2022-03-29 | Stop reason: HOSPADM

## 2022-03-28 RX ORDER — MONTELUKAST SODIUM 10 MG/1
10 TABLET ORAL NIGHTLY
Status: DISCONTINUED | OUTPATIENT
Start: 2022-03-28 | End: 2022-03-29 | Stop reason: HOSPADM

## 2022-03-28 RX ORDER — METOPROLOL SUCCINATE 25 MG/1
25 TABLET, EXTENDED RELEASE ORAL
Status: DISCONTINUED | OUTPATIENT
Start: 2022-03-29 | End: 2022-03-29 | Stop reason: HOSPADM

## 2022-03-28 RX ORDER — ONDANSETRON 4 MG/1
4 TABLET, ORALLY DISINTEGRATING ORAL EVERY 4 HOURS PRN
Status: DISCONTINUED | OUTPATIENT
Start: 2022-03-28 | End: 2022-03-29 | Stop reason: HOSPADM

## 2022-03-28 RX ORDER — PANTOPRAZOLE SODIUM 40 MG/10ML
40 INJECTION, POWDER, LYOPHILIZED, FOR SOLUTION INTRAVENOUS ONCE
Status: COMPLETED | OUTPATIENT
Start: 2022-03-28 | End: 2022-03-28

## 2022-03-28 RX ORDER — BUDESONIDE AND FORMOTEROL FUMARATE DIHYDRATE 160; 4.5 UG/1; UG/1
2 AEROSOL RESPIRATORY (INHALATION)
Status: DISCONTINUED | OUTPATIENT
Start: 2022-03-28 | End: 2022-03-29 | Stop reason: HOSPADM

## 2022-03-28 RX ORDER — IBUPROFEN 200 MG
1250 CAPSULE ORAL DAILY
Status: DISCONTINUED | OUTPATIENT
Start: 2022-03-29 | End: 2022-03-28

## 2022-03-28 RX ORDER — MONTELUKAST SODIUM 10 MG/1
5 TABLET ORAL NIGHTLY
Status: DISCONTINUED | OUTPATIENT
Start: 2022-03-28 | End: 2022-03-28

## 2022-03-28 RX ORDER — OXYCODONE HYDROCHLORIDE 5 MG/1
2.5 TABLET ORAL
Status: DISCONTINUED | OUTPATIENT
Start: 2022-03-28 | End: 2022-03-29 | Stop reason: HOSPADM

## 2022-03-28 RX ORDER — SODIUM CHLORIDE 9 MG/ML
INJECTION, SOLUTION INTRAVENOUS CONTINUOUS
Status: DISCONTINUED | OUTPATIENT
Start: 2022-03-28 | End: 2022-03-28

## 2022-03-28 RX ORDER — HYDROMORPHONE HYDROCHLORIDE 1 MG/ML
0.25 INJECTION, SOLUTION INTRAMUSCULAR; INTRAVENOUS; SUBCUTANEOUS
Status: DISCONTINUED | OUTPATIENT
Start: 2022-03-28 | End: 2022-03-29 | Stop reason: HOSPADM

## 2022-03-28 RX ORDER — ONDANSETRON 2 MG/ML
4 INJECTION INTRAMUSCULAR; INTRAVENOUS ONCE
Status: COMPLETED | OUTPATIENT
Start: 2022-03-28 | End: 2022-03-28

## 2022-03-28 RX ORDER — BISACODYL 10 MG
10 SUPPOSITORY, RECTAL RECTAL
Status: DISCONTINUED | OUTPATIENT
Start: 2022-03-28 | End: 2022-03-29 | Stop reason: HOSPADM

## 2022-03-28 RX ORDER — SODIUM CHLORIDE 9 MG/ML
INJECTION, SOLUTION INTRAVENOUS CONTINUOUS
Status: ACTIVE | OUTPATIENT
Start: 2022-03-28 | End: 2022-03-29

## 2022-03-28 RX ORDER — POLYETHYLENE GLYCOL 3350 17 G/17G
1 POWDER, FOR SOLUTION ORAL
Status: DISCONTINUED | OUTPATIENT
Start: 2022-03-28 | End: 2022-03-29 | Stop reason: HOSPADM

## 2022-03-28 RX ORDER — ONDANSETRON 2 MG/ML
4 INJECTION INTRAMUSCULAR; INTRAVENOUS EVERY 4 HOURS PRN
Status: DISCONTINUED | OUTPATIENT
Start: 2022-03-28 | End: 2022-03-29 | Stop reason: HOSPADM

## 2022-03-28 RX ORDER — OXYCODONE HYDROCHLORIDE 5 MG/1
5 TABLET ORAL
Status: DISCONTINUED | OUTPATIENT
Start: 2022-03-28 | End: 2022-03-29 | Stop reason: HOSPADM

## 2022-03-28 RX ORDER — AMOXICILLIN 250 MG
2 CAPSULE ORAL 2 TIMES DAILY
Status: DISCONTINUED | OUTPATIENT
Start: 2022-03-28 | End: 2022-03-29 | Stop reason: HOSPADM

## 2022-03-28 RX ORDER — SODIUM CHLORIDE 9 MG/ML
1000 INJECTION, SOLUTION INTRAVENOUS ONCE
Status: COMPLETED | OUTPATIENT
Start: 2022-03-28 | End: 2022-03-28

## 2022-03-28 RX ORDER — LORAZEPAM 2 MG/ML
0.5 INJECTION INTRAMUSCULAR
Status: DISCONTINUED | OUTPATIENT
Start: 2022-03-28 | End: 2022-03-29 | Stop reason: HOSPADM

## 2022-03-28 RX ORDER — ATORVASTATIN CALCIUM 40 MG/1
40 TABLET, FILM COATED ORAL DAILY
Status: DISCONTINUED | OUTPATIENT
Start: 2022-03-29 | End: 2022-03-28

## 2022-03-28 RX ORDER — LABETALOL HYDROCHLORIDE 5 MG/ML
10 INJECTION, SOLUTION INTRAVENOUS EVERY 4 HOURS PRN
Status: DISCONTINUED | OUTPATIENT
Start: 2022-03-28 | End: 2022-03-29 | Stop reason: HOSPADM

## 2022-03-28 RX ORDER — TEMAZEPAM 15 MG/1
15 CAPSULE ORAL NIGHTLY PRN
Status: DISCONTINUED | OUTPATIENT
Start: 2022-03-28 | End: 2022-03-29 | Stop reason: HOSPADM

## 2022-03-28 RX ADMIN — SODIUM CHLORIDE: 9 INJECTION, SOLUTION INTRAVENOUS at 23:42

## 2022-03-28 RX ADMIN — ONDANSETRON 4 MG: 2 INJECTION INTRAMUSCULAR; INTRAVENOUS at 17:13

## 2022-03-28 RX ADMIN — SODIUM CHLORIDE: 9 INJECTION, SOLUTION INTRAVENOUS at 20:30

## 2022-03-28 RX ADMIN — ONDANSETRON 4 MG: 2 INJECTION INTRAMUSCULAR; INTRAVENOUS at 18:58

## 2022-03-28 RX ADMIN — IOHEXOL 100 ML: 350 INJECTION, SOLUTION INTRAVENOUS at 18:56

## 2022-03-28 RX ADMIN — SODIUM CHLORIDE 1000 ML: 9 INJECTION, SOLUTION INTRAVENOUS at 17:13

## 2022-03-28 RX ADMIN — FAMOTIDINE 40 MG: 10 INJECTION INTRAVENOUS at 19:41

## 2022-03-28 RX ADMIN — PROCHLORPERAZINE EDISYLATE 5 MG: 5 INJECTION INTRAMUSCULAR; INTRAVENOUS at 19:50

## 2022-03-28 RX ADMIN — SODIUM CHLORIDE: 9 INJECTION, SOLUTION INTRAVENOUS at 18:30

## 2022-03-28 RX ADMIN — SODIUM CHLORIDE 1000 ML: 9 INJECTION, SOLUTION INTRAVENOUS at 18:59

## 2022-03-28 RX ADMIN — MONTELUKAST 10 MG: 10 TABLET, FILM COATED ORAL at 23:02

## 2022-03-28 RX ADMIN — PANTOPRAZOLE SODIUM 40 MG: 40 INJECTION, POWDER, LYOPHILIZED, FOR SOLUTION INTRAVENOUS at 20:22

## 2022-03-28 ASSESSMENT — ENCOUNTER SYMPTOMS
VOMITING: 1
NAUSEA: 1
ABDOMINAL PAIN: 1
BLURRED VISION: 0
MYALGIAS: 0
COUGH: 0
SORE THROAT: 0
HEADACHES: 0
DOUBLE VISION: 0
PALPITATIONS: 0
DEPRESSION: 0
HEMOPTYSIS: 0
FEVER: 0
STRIDOR: 0
HEARTBURN: 1
BRUISES/BLEEDS EASILY: 0
NECK PAIN: 0
DIZZINESS: 0
INSOMNIA: 0
WEIGHT LOSS: 0

## 2022-03-28 NOTE — ED PROVIDER NOTES
"ED Provider Note     Scribed for Kate Carreon D.O. by Naif Brush. 3/28/2022, 4:12 PM.     Primary care provider: Abdon Hilario D.O.  Means of arrival: walk in         History obtained from: patient  History limited by: none    CHIEF COMPLAINT  Chief Complaint   Patient presents with   • N/V     Pt states she thinks she's having an allergic reaction to soy. 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 Pt states she can't keep anything down. Pt reports SOB but states, \"I just got myself wound up.\" No facial swelling noted. Pt endorses it hurts when she swallows.        HPI  Olga Rich is a 77 y.o. female who presents to the emergency Department for nausea and intermittent vomiting for the past 12 days. The patient also reports vomiting for the first 3 days of her symptoms and multiple episodes again last night, prompting her to come to the ED for evaluation. She states she is unable to keep anything down. She also reports a feeling \"like the bones ache\" in her chest prior to throwing up. She also has an inguinal hernia. She denies any facial swelling, diarrhea, or fever. The patient states she's been having \"problems\" with the soy in her Ensure and notes her symptoms are somewhat alleviated when staying away from soy products. She has hypothyroidism and notes she had her Synthroid decreased from 75 mg to 50 mg recently.    REVIEW OF SYSTEMS  Pertinent positives include nausea, vomiting. Pertinent negatives include no facial swelling, diarrhea, fever.   See HPI for further details. All other systems are negative.    PAST MEDICAL HISTORY  Past Medical History:   Diagnosis Date   • Allergic rhinitis    • Arthritis     fingers   • Asthma in adult    • Cataract 2/2012    Bilat    • Dental disorder     partial upper   • Dyslipidemia 4/26/2016   • High cholesterol    • Hypertension    • Hypothyroid    • Hypothyroid 4/18/2014   • Hypothyroid 4/18/2014   • Osteoporosis, " Fax was sent to office for  authorization no required, will scan into system. unspecified    • Pneumonia        FAMILY HISTORY  Family History   Problem Relation Age of Onset   • Other Mother 97        old age.    • Heart Attack Father 56        MI   • Heart Disease Father    • Other Son         ETOH   • Other Son         ETOH   • Heart Disease Sister 53   • Heart Attack Sister 85   • No Known Problems Brother    • No Known Problems Sister    • No Known Problems Sister        SOCIAL HISTORY  Social History     Tobacco Use   • Smoking status: Former Smoker     Packs/day: 0.25     Years: 40.00     Pack years: 10.00     Types: Cigarettes     Quit date: 3/1/2014     Years since quittin.0   • Smokeless tobacco: Never Used   Vaping Use   • Vaping Use: Never used   Substance Use Topics   • Alcohol use: No     Comment: very rarely   • Drug use: Yes     Types: Marijuana     Comment: occasionally for glaucoma which runs in her family/ per pt no       Social History     Substance and Sexual Activity   Drug Use Yes   • Types: Marijuana    Comment: occasionally for glaucoma which runs in her family/ per pt no        SURGICAL HISTORY  Past Surgical History:   Procedure Laterality Date   • CATARACT PHACO WITH IOL      Dr. Vargas   • ORIF, FRACTURE, FEMUR      titamium Dr. Mares.        CURRENT MEDICATIONS  Current Outpatient Medications:   •  fluticasone-salmeterol (ADVAIR) 250-50 MCG/DOSE AEROSOL POWDER, BREATH ACTIVATED, INHALE 1 PUFF BY MOUTH TWICE DAILY, Disp: 1 Each, Rfl: 11  •  temazepam (RESTORIL) 15 MG Cap, , Disp: , Rfl:   •  metoprolol SR (TOPROL XL) 25 MG TABLET SR 24 HR, TAKE ONE TABLET BY MOUTH ONE TIME DAILY, Disp: 100 Tablet, Rfl: 3  •  atorvastatin (LIPITOR) 40 MG Tab, Take 1 Tablet by mouth every day., Disp: 100 Tablet, Rfl: 3  •  lisinopril (PRINIVIL) 20 MG Tab, Take 1 Tablet by mouth every day., Disp: 90 Tablet, Rfl: 3  •  levothyroxine (SYNTHROID) 50 MCG Tab, Take 1 Tablet by mouth every morning on an empty stomach., Disp: 90 Tablet, Rfl: 3  •  montelukast (SINGULAIR) 10 MG  "Tab, TAKE ONE TABLET BY MOUTH ONE TIME DAILY, Disp: 90 tablet, Rfl: 3  •  Denosumab (PROLIA SC), Inject 1 Dose as instructed every 6 months. Indications: twice a year, Disp: , Rfl:   •  aspirin (ASA) 325 MG Tab, Take 324 mg by mouth every day. Takes one or two depending on arthritis, Disp: , Rfl:   •  magnesium oxide (MAG-OX) 400 MG Tab, Take 400 mg by mouth every day., Disp: , Rfl:   •  Cholecalciferol (VITAMIN D) 2000 UNITS CAPS, Take  by mouth., Disp: , Rfl:   •  calcium carbonate (OS-DARIEN 500) 1250 MG TABS, Take 1,250 mg by mouth every day., Disp: , Rfl:   •  multivitamin (THERAGRAN) TABS, Take 1 Tab by mouth every day., Disp: , Rfl:     ALLERGIES  Allergies   Allergen Reactions   • Meperidine Anaphylaxis       PHYSICAL EXAM  VITAL SIGNS: /83   Pulse (!) 116   Temp 36.6 °C (97.8 °F) (Temporal)   Resp 20   Ht 1.473 m (4' 10\")   Wt 41 kg (90 lb 6.2 oz)   SpO2 93%   BMI 18.89 kg/m²     Constitutional: Patient is a very petite elderly female.  Pale and ill-appearing, mild acute distress.   HENT: Normocephalic, atraumatic. Oral mucosa is dry with parched lips and fissured tongue.  Eyes: PERRL, EOMI, Conjunctiva without erythema   Cardiovascular: Tachycardic heart rate and Regular rhythm. No murmur  Thorax & Lungs: Clear and equal breath sounds with good excursion. No respiratory distress, no rhonchi, wheezing or rales.   Abdomen: Bowel sounds normal in all four quadrants. Soft, slight epigastric and upper quadrant tenderness, no rebound, guarding, palpable masses.  Skin: Warm, Dry, pale.   Extremities: Peripheral pulses 4/4 No edema, No tenderness  Musculoskeletal: Normal range of motion in all major joints. No major deformities noted.   Neurologic: Alert & oriented x 3, Normal motor function, Normal sensory function  Psychiatric: Affect normal, Judgment normal, Mood normal.     DIAGNOSTICS/PROCEDURES    LABS  Results for orders placed or performed during the hospital encounter of 03/28/22   LACTIC ACID "   Result Value Ref Range    Lactic Acid 1.8 0.5 - 2.0 mmol/L   CBC WITH DIFFERENTIAL   Result Value Ref Range    WBC 19.5 (H) 4.8 - 10.8 K/uL    RBC 4.93 4.20 - 5.40 M/uL    Hemoglobin 15.2 12.0 - 16.0 g/dL    Hematocrit 46.1 37.0 - 47.0 %    MCV 93.5 81.4 - 97.8 fL    MCH 30.8 27.0 - 33.0 pg    MCHC 33.0 (L) 33.6 - 35.0 g/dL    RDW 47.8 35.9 - 50.0 fL    Platelet Count 542 (H) 164 - 446 K/uL    MPV 8.5 (L) 9.0 - 12.9 fL    Neutrophils-Polys 82.80 (H) 44.00 - 72.00 %    Lymphocytes 9.50 (L) 22.00 - 41.00 %    Monocytes 6.70 0.00 - 13.40 %    Eosinophils 0.10 0.00 - 6.90 %    Basophils 0.30 0.00 - 1.80 %    Immature Granulocytes 0.60 0.00 - 0.90 %    Nucleated RBC 0.00 /100 WBC    Neutrophils (Absolute) 16.18 (H) 2.00 - 7.15 K/uL    Lymphs (Absolute) 1.85 1.00 - 4.80 K/uL    Monos (Absolute) 1.30 (H) 0.00 - 0.85 K/uL    Eos (Absolute) 0.02 0.00 - 0.51 K/uL    Baso (Absolute) 0.06 0.00 - 0.12 K/uL    Immature Granulocytes (abs) 0.12 (H) 0.00 - 0.11 K/uL    NRBC (Absolute) 0.00 K/uL   COMP METABOLIC PANEL   Result Value Ref Range    Sodium 140 135 - 145 mmol/L    Potassium 3.6 3.6 - 5.5 mmol/L    Chloride 97 96 - 112 mmol/L    Co2 27 20 - 33 mmol/L    Anion Gap 16.0 7.0 - 16.0    Glucose 115 (H) 65 - 99 mg/dL    Bun 24 (H) 8 - 22 mg/dL    Creatinine 1.21 0.50 - 1.40 mg/dL    Calcium 10.7 (H) 8.5 - 10.5 mg/dL    AST(SGOT) 26 12 - 45 U/L    ALT(SGPT) 16 2 - 50 U/L    Alkaline Phosphatase 84 30 - 99 U/L    Total Bilirubin 0.4 0.1 - 1.5 mg/dL    Albumin 4.8 3.2 - 4.9 g/dL    Total Protein 8.4 (H) 6.0 - 8.2 g/dL    Globulin 3.6 (H) 1.9 - 3.5 g/dL    A-G Ratio 1.3 g/dL   URINALYSIS    Specimen: Urine   Result Value Ref Range    Color Yellow     Character Clear     Specific Gravity 1.018 <1.035    Ph 6.5 5.0 - 8.0    Glucose Negative Negative mg/dL    Ketones 15 (A) Negative mg/dL    Protein Negative Negative mg/dL    Bilirubin Negative Negative    Urobilinogen, Urine 1.0 Negative    Nitrite Negative Negative    Leukocyte  Esterase Negative Negative    Occult Blood Negative Negative    Micro Urine Req see below    ESTIMATED GFR   Result Value Ref Range    GFR (CKD-EPI) 46 (A) >60 mL/min/1.73 m 2   MAGNESIUM   Result Value Ref Range    Magnesium 2.0 1.5 - 2.5 mg/dL       Labs reviewed by me    RADIOLOGY/PROCEDURES  CT-ABDOMEN-PELVIS WITH   Final Result      1.  Wall thickening of distal stomach suggesting gastritis or possibly ulcer disease.   2.  Wall thickening of distal esophagus which is likely inflammatory.   3.  No bowel obstruction or perforation.   4.  Fat-containing LEFT inguinal hernia, possibly inflamed.            DX-CHEST-PORTABLE (1 VIEW)   Final Result      1.  Hyperexpansion and hyperlucency suggesting emphysema.   2.  Otherwise, no acute cardiopulmonary disease.        Results and radiologist interpretation reviewed by me.     COURSE & MEDICAL DECISION MAKING  Pertinent Labs & Imaging studies reviewed. (See chart for details)    4:12 PM - Patient seen and evaluated at bedside. Ordered for DX chest, LA, CBC w/, CMP, UA, urine culture, blood culture x2 to evaluate. Patient will be treated with Zofran 4 mg, NS infusion 1 L for her symptoms. Differential diagnoses include, but are not limited to, gastritis, viral gastroenteritis, food poisoning  Patient's labs show 19.5 thousand white count with 82% neutrophils.  Her electrolytes are unremarkable, glucose is 115 BUN is 24 indicating dehydration and her calcium is elevated at 10.7.  Urinalysis is negative.  Chest x-ray was unremarkable. She states she still feels quite ill.    HYDRATION: Based on the patient's presentation of Acute Vomiting and Tachycardia the patient was given IV fluids. IV Hydration was used because oral hydration was not adequate alone. Upon recheck following hydration, the patient was mildly improved.    6:41 PM - Patient seen at bedside. She states she feels generally improved but is now nauseous again. Discussed lab and imaging results with the  patient and updated them on the plan of care, including awaiting CT results and treating with Zofran 4 mg and IV fluids. I answered all questions regarding their care. Patient verbalizes understanding and agreement to this plan of care.     7:30 PM - Patient will be treated with Pepcid 40 mg.    7:42 PM - Patient seen at bedside. She is still vomiting despite treatment with Zofran. Discussed imaging results with the patient and updated them on the plan of care, including admission to the hospitalist. I answered all questions regarding their care. Patient verbalizes understanding and agreement to this plan of care. Patient will be treated with Compazine 5 mg.  Patient CT reveals gastric and distal esophageal inflammation.  She is persistently vomiting and will require hospital admission.    7:57 PM - Spoke with Dr. Lopes, Hospitalist, about the patient's condition. They agree to evaluate the patient for hospitalization.      DISPOSITION:  Patient will be hospitalized by Dr. Lopes in guarded condition.      FINAL IMPRESSION  1. Acute gastritis without hemorrhage, unspecified gastritis type    2. Intractable vomiting with nausea    3. Dehydration    4. Esophagitis, unspecified without bleeding    5. Epigastric abdominal pain         Naif SCHULTE (Elida), am scribing for, and in the presence of, Kate Carreon D.O..    Electronically signed by: Naif Brush (Elida), 3/28/2022    Kate SCHULTE D.O. personally performed the services described in this documentation, as scribed by Naif Brush in my presence, and it is both accurate and complete.    The note accurately reflects work and decisions made by me.  Kate Carreon D.O.  3/29/2022  12:05 AM

## 2022-03-28 NOTE — ED TRIAGE NOTES
"Chief Complaint   Patient presents with   • N/V     Pt states she thinks she's having an allergic reaction to soy. 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 Pt states she can't keep anything down. Pt reports SOB but states, \"I just got myself wound up.\" No facial swelling noted. Pt endorses it hurts when she swallows.        Pt assisted to triage with above complaint. Pt not in acute distress at this time.     /83   Pulse (!) 116   Temp 36.6 °C (97.8 °F) (Temporal)   Resp 20   Ht 1.473 m (4' 10\")   Wt 41 kg (90 lb 6.2 oz)   SpO2 93%   BMI 18.89 kg/m²     "

## 2022-03-29 ENCOUNTER — PHARMACY VISIT (OUTPATIENT)
Dept: PHARMACY | Facility: MEDICAL CENTER | Age: 77
End: 2022-03-29
Payer: COMMERCIAL

## 2022-03-29 VITALS
OXYGEN SATURATION: 95 % | HEART RATE: 65 BPM | BODY MASS INDEX: 19.85 KG/M2 | WEIGHT: 94.58 LBS | SYSTOLIC BLOOD PRESSURE: 95 MMHG | HEIGHT: 58 IN | RESPIRATION RATE: 18 BRPM | TEMPERATURE: 96.9 F | DIASTOLIC BLOOD PRESSURE: 53 MMHG

## 2022-03-29 PROBLEM — R11.2 NAUSEA AND VOMITING: Status: RESOLVED | Noted: 2022-03-28 | Resolved: 2022-03-29

## 2022-03-29 LAB
ANION GAP SERPL CALC-SCNC: 11 MMOL/L (ref 7–16)
BUN SERPL-MCNC: 18 MG/DL (ref 8–22)
CALCIUM SERPL-MCNC: 7.9 MG/DL (ref 8.5–10.5)
CHLORIDE SERPL-SCNC: 110 MMOL/L (ref 96–112)
CO2 SERPL-SCNC: 20 MMOL/L (ref 20–33)
CREAT SERPL-MCNC: 0.94 MG/DL (ref 0.5–1.4)
ERYTHROCYTE [DISTWIDTH] IN BLOOD BY AUTOMATED COUNT: 49.8 FL (ref 35.9–50)
GFR SERPLBLD CREATININE-BSD FMLA CKD-EPI: 62 ML/MIN/1.73 M 2
GLUCOSE SERPL-MCNC: 90 MG/DL (ref 65–99)
HCT VFR BLD AUTO: 34.3 % (ref 37–47)
HGB BLD-MCNC: 11.1 G/DL (ref 12–16)
MCH RBC QN AUTO: 30.7 PG (ref 27–33)
MCHC RBC AUTO-ENTMCNC: 32.4 G/DL (ref 33.6–35)
MCV RBC AUTO: 95 FL (ref 81.4–97.8)
PLATELET # BLD AUTO: 378 K/UL (ref 164–446)
PMV BLD AUTO: 8.8 FL (ref 9–12.9)
POTASSIUM SERPL-SCNC: 4.2 MMOL/L (ref 3.6–5.5)
RBC # BLD AUTO: 3.61 M/UL (ref 4.2–5.4)
SODIUM SERPL-SCNC: 141 MMOL/L (ref 135–145)
WBC # BLD AUTO: 9.5 K/UL (ref 4.8–10.8)

## 2022-03-29 PROCEDURE — 700111 HCHG RX REV CODE 636 W/ 250 OVERRIDE (IP): Performed by: INTERNAL MEDICINE

## 2022-03-29 PROCEDURE — 80048 BASIC METABOLIC PNL TOTAL CA: CPT

## 2022-03-29 PROCEDURE — 94664 DEMO&/EVAL PT USE INHALER: CPT

## 2022-03-29 PROCEDURE — 96372 THER/PROPH/DIAG INJ SC/IM: CPT

## 2022-03-29 PROCEDURE — G0378 HOSPITAL OBSERVATION PER HR: HCPCS

## 2022-03-29 PROCEDURE — 85027 COMPLETE CBC AUTOMATED: CPT

## 2022-03-29 PROCEDURE — RXMED WILLOW AMBULATORY MEDICATION CHARGE: Performed by: INTERNAL MEDICINE

## 2022-03-29 PROCEDURE — 700102 HCHG RX REV CODE 250 W/ 637 OVERRIDE(OP): Performed by: INTERNAL MEDICINE

## 2022-03-29 PROCEDURE — A9270 NON-COVERED ITEM OR SERVICE: HCPCS | Performed by: INTERNAL MEDICINE

## 2022-03-29 PROCEDURE — 99217 PR OBSERVATION CARE DISCHARGE: CPT | Performed by: INTERNAL MEDICINE

## 2022-03-29 RX ORDER — PANTOPRAZOLE SODIUM 20 MG/1
40 TABLET, DELAYED RELEASE ORAL 2 TIMES DAILY
Qty: 120 TABLET | Refills: 3 | Status: SHIPPED | OUTPATIENT
Start: 2022-03-29 | End: 2022-04-28

## 2022-03-29 RX ORDER — ONDANSETRON 4 MG/1
4 TABLET, ORALLY DISINTEGRATING ORAL EVERY 6 HOURS PRN
Qty: 40 TABLET | Refills: 0 | Status: SHIPPED | OUTPATIENT
Start: 2022-03-29 | End: 2022-03-29 | Stop reason: SDUPTHER

## 2022-03-29 RX ORDER — PANTOPRAZOLE SODIUM 20 MG/1
20 TABLET, DELAYED RELEASE ORAL 2 TIMES DAILY
Qty: 60 TABLET | Refills: 0 | Status: SHIPPED | OUTPATIENT
Start: 2022-03-29 | End: 2022-03-29 | Stop reason: SDUPTHER

## 2022-03-29 RX ORDER — ONDANSETRON 4 MG/1
4 TABLET, ORALLY DISINTEGRATING ORAL EVERY 6 HOURS PRN
Qty: 30 TABLET | Refills: 0 | Status: SHIPPED | OUTPATIENT
Start: 2022-03-29 | End: 2022-03-29 | Stop reason: SDUPTHER

## 2022-03-29 RX ORDER — SUCRALFATE 1 G/1
1 TABLET ORAL
Qty: 56 TABLET | Refills: 0 | Status: SHIPPED | OUTPATIENT
Start: 2022-03-29 | End: 2022-04-12

## 2022-03-29 RX ORDER — SUCRALFATE 1 G/1
1 TABLET ORAL
Qty: 56 TABLET | Refills: 0 | Status: SHIPPED | OUTPATIENT
Start: 2022-03-29 | End: 2022-03-29 | Stop reason: SDUPTHER

## 2022-03-29 RX ORDER — ONDANSETRON 4 MG/1
4 TABLET, ORALLY DISINTEGRATING ORAL EVERY 6 HOURS PRN
Qty: 30 TABLET | Refills: 0 | Status: SHIPPED | OUTPATIENT
Start: 2022-03-29 | End: 2022-04-08

## 2022-03-29 RX ORDER — PANTOPRAZOLE SODIUM 20 MG/1
40 TABLET, DELAYED RELEASE ORAL 2 TIMES DAILY
Qty: 120 TABLET | Refills: 0 | Status: SHIPPED | OUTPATIENT
Start: 2022-03-29 | End: 2022-03-29 | Stop reason: SDUPTHER

## 2022-03-29 RX ADMIN — BUDESONIDE AND FORMOTEROL FUMARATE DIHYDRATE 2 PUFF: 160; 4.5 AEROSOL RESPIRATORY (INHALATION) at 08:30

## 2022-03-29 RX ADMIN — LISINOPRIL 20 MG: 20 TABLET ORAL at 06:12

## 2022-03-29 RX ADMIN — LEVOTHYROXINE SODIUM 50 MCG: 0.05 TABLET ORAL at 06:11

## 2022-03-29 RX ADMIN — ENOXAPARIN SODIUM 30 MG: 30 INJECTION SUBCUTANEOUS at 06:13

## 2022-03-29 RX ADMIN — METOPROLOL SUCCINATE 25 MG: 25 TABLET, EXTENDED RELEASE ORAL at 10:31

## 2022-03-29 ASSESSMENT — LIFESTYLE VARIABLES
TOTAL SCORE: 0
DOES PATIENT WANT TO STOP DRINKING: NO
EVER HAD A DRINK FIRST THING IN THE MORNING TO STEADY YOUR NERVES TO GET RID OF A HANGOVER: NO
TOTAL SCORE: 0
ON A TYPICAL DAY WHEN YOU DRINK ALCOHOL HOW MANY DRINKS DO YOU HAVE: 0
CONSUMPTION TOTAL: NEGATIVE
HOW MANY TIMES IN THE PAST YEAR HAVE YOU HAD 5 OR MORE DRINKS IN A DAY: 0
ALCOHOL_USE: NO
AVERAGE NUMBER OF DAYS PER WEEK YOU HAVE A DRINK CONTAINING ALCOHOL: 0
TOTAL SCORE: 0
HAVE PEOPLE ANNOYED YOU BY CRITICIZING YOUR DRINKING: NO
EVER FELT BAD OR GUILTY ABOUT YOUR DRINKING: NO
HAVE YOU EVER FELT YOU SHOULD CUT DOWN ON YOUR DRINKING: NO

## 2022-03-29 ASSESSMENT — FIBROSIS 4 INDEX: FIB4 SCORE: 0.95

## 2022-03-29 ASSESSMENT — PATIENT HEALTH QUESTIONNAIRE - PHQ9
2. FEELING DOWN, DEPRESSED, IRRITABLE, OR HOPELESS: NOT AT ALL
1. LITTLE INTEREST OR PLEASURE IN DOING THINGS: NOT AT ALL
SUM OF ALL RESPONSES TO PHQ9 QUESTIONS 1 AND 2: 0

## 2022-03-29 ASSESSMENT — PAIN DESCRIPTION - PAIN TYPE: TYPE: ACUTE PAIN

## 2022-03-29 NOTE — HOSPITAL COURSE
"Ms. Olga Rich is a 77 year old female with a PMHx of asthma, DL D, HTN, hypothyroidism, osteoporosis, COPD, who was admitted on 3/28/2022 due to nausea and vomiting.    Patient reports that she has been having intermittent nausea and vomiting for the past 12 days with symptoms worsening in the past 3 days.  Patient presented herself to the ER for evaluation.  Patient reports that she could not keep anything down and also reports symptoms associated with \"bone aching \", in her chest prior to throwing up.  Patient also noted to have inguinal hernia.  She denies any facial swelling, no diarrhea and no fever.  Patient also states that she has been having \"problems \"with sore in her Ensure and notes that her symptoms are somewhat alleviated by staying away from soy products.  Patient was admitted into the observation unit for fluids, antiemetics, and monitoring.    In ER, initial labs noted WBC 19.5, glucose 115, BUN 24, total protein 8.4.  Urine analysis was negative for any acute pathologies.  Initial CXR noted hyperexpansion and hyperlucency suggestive of emphysema, otherwise no acute cardiopulmonary disease.  CT abdomen pelvis noted wall thickening of distal stomach suggestive of gastritis or possibly ulcer disease, wall thickening of distal esophagus which is likely inflammatory with no bowel obstruction or perforation, fat-containing left inguinal hernia possibly inflamed.    During this course of stay, patient has not had any nausea or vomiting for over 24 hours.  Patient was able to tolerate initiation of full liquid diet and was eventually advanced to soft GI just diet and tolerated advancement.  Discussed with patient CT abdomen pelvis results of which nausea and vomiting is likely due to gastritis with esophagitis.    Patient seen and examined prior to being discharged.  Discussed with patient being placed on antinausea medication, Zofran, PPI, and Carafate.  Patient to obtain referral from PCP to see " gastroenterology if gastritis with esophagitis persist after course of medication.  Patient highly recommended to follow-up with PCP s/p hospitalization.  All questions and concerns answered prior to being discharged.  Patient discharged home.

## 2022-03-29 NOTE — PROGRESS NOTES
Arrive to dc lounge via wheelchair. A/O, dc instructions reviewed w/ pt, verbalized understanding. Meds to beds delivered.

## 2022-03-29 NOTE — DISCHARGE PLANNING
[facility] TCN chart review completed. Collaborated with NAYLA Pedersen prior to meeting with the pt. The most current review of medical record, knowledge of pt's PLOF and social support, LACE+ score of 72 were considered.  6 clicks scores not documented, though reports she is mobilizing at her baseline (community ambulation without AD).      Pt seen at bedside. Introduced TCN program.Discussed [facility] plan benefits including Meds to Beds, medical [facility]er and [facility]. Pt provided customer service # [phone] to access these resources. Declines [facility] referral, prefers to follow up OP with Dr. Hilario and will call to make an appt when her neighbor will be able to assist with transport.  No additional TCN needs anticipated this admission.

## 2022-03-29 NOTE — DISCHARGE PLANNING
Meds-to-Beds: Discharge prescription orders listed below delivered to patient's bedside. KIMBERLY Porras notified. Patient counseled. Patient elected to have co-payment billed to patient account.     Current Outpatient Medications   Medication Sig Dispense Refill   • sucralfate (CARAFATE) 1 GM Tab Take 1 Tablet by mouth 4 Times a Day,Before Meals and at Bedtime for 14 days. 56 Tablet 0   • pantoprazole (PROTONIX) 20 MG tablet Take 2 Tablets by mouth 2 times a day for 30 days. 120 Tablet 3      Davida Negrete, PharmD

## 2022-03-29 NOTE — ED NOTES
Med rec updated and complete. Allergies reviewed and updated  Confirmed name and date of birth.  Pt denies antibiotic use in last 30 days.      Home pharmacy  [facility] [phone]

## 2022-03-29 NOTE — DISCHARGE SUMMARY
"Discharge Summary    CHIEF COMPLAINT ON ADMISSION  Chief Complaint   Patient presents with   • N/V     Pt states she thinks she's having an allergic reaction to soy. 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 Pt states she can't keep anything down. Pt reports SOB but states, \"I just got myself wound up.\" No facial swelling noted. Pt endorses it hurts when she swallows.        Reason for Admission  Allergic Reaction      Admission Date  3/28/2022    CODE STATUS  Full Code    HPI & HOSPITAL COURSE     Ms. Olga Rich is a 77 year old female with a PMHx of asthma, DL D, HTN, hypothyroidism, osteoporosis, COPD, who was admitted on 3/28/2022 due to nausea and vomiting.    Patient reports that she has been having intermittent nausea and vomiting for the past 12 days with symptoms worsening in the past 3 days.  Patient presented herself to the ER for evaluation.  Patient reports that she could not keep anything down and also reports symptoms associated with \"bone aching \", in her chest prior to throwing up.  Patient also noted to have inguinal hernia.  She denies any facial swelling, no diarrhea and no fever.  Patient also states that she has been having \"problems \"with sore in her Ensure and notes that her symptoms are somewhat alleviated by staying away from soy products.  Patient was admitted into the observation unit for fluids, antiemetics, and monitoring.    In ER, initial labs noted WBC 19.5, glucose 115, BUN 24, total protein 8.4.  Urine analysis was negative for any acute pathologies.  Initial CXR noted hyperexpansion and hyperlucency suggestive of emphysema, otherwise no acute cardiopulmonary disease.  CT abdomen pelvis noted wall thickening of distal stomach suggestive of gastritis or possibly ulcer disease, wall thickening of distal esophagus which is likely inflammatory with no bowel obstruction or perforation, fat-containing left inguinal hernia possibly inflamed.    During " this course of stay, patient has not had any nausea or vomiting for over 24 hours.  Patient was able to tolerate initiation of full liquid diet and was eventually advanced to soft GI just diet and tolerated advancement.  Discussed with patient CT abdomen pelvis results of which nausea and vomiting is likely due to gastritis with esophagitis.    Patient seen and examined prior to being discharged.  Discussed with patient being placed on antinausea medication, Zofran, PPI, and Carafate.  Patient to obtain referral from PCP to see gastroenterology if gastritis with esophagitis persist after course of medication.  Patient highly recommended to follow-up with PCP s/p hospitalization.  All questions and concerns answered prior to being discharged.  Patient discharged home.      Therefore, she is discharged in good and stable condition to home with close outpatient follow-up.    The patient recovered much more quickly than anticipated on admission.    Discharge Date  03/29/22      FOLLOW UP ITEMS POST DISCHARGE  Please call [phone] to schedule PCP appointment for patient.    Required specialty appointments include:       Discharge Instructions per Yonis Fernandez, A.P.R.N.    -Follow-up with PCP s/p hospitalization and to obtain referral to follow-up with gastroenterology  -Start taking antinausea medication as needed  -Start taking PPI, Protonix twice daily to help with gastritis  -Start taking Carafate 1 g daily for 14 days to help with esophagitis  -Resume all other home medications    DIET: Soft GI, low acid containing food    ACTIVITY: As tolerated    DIAGNOSIS: Nausea and vomiting    Return to ER if symptoms persist, chest pain, palpitations, short of breath, numbness, tingling, weakness, and high fevers.      DISCHARGE DIAGNOSES  Principal Problem (Resolved):    Nausea and vomiting POA: Yes  Active Problems:    Hypothyroidism POA: Unknown    Dyslipidemia POA: Yes    Chronic obstructive pulmonary  disease (HCC) (Chronic) POA: Yes    Esophagitis with gastritis POA: Unknown      FOLLOW UP  Future Appointments   Date Time Provider Department Center   8/18/2022  9:[facility]     Abdon Hilario D.O.  [address] [address] [id number] [phone]    Schedule an appointment as soon as possible for a visit      [facility] [address]  [place] [address] [phone]  Schedule an appointment as soon as possible for a visit        MEDICATIONS ON DISCHARGE     Medication List      START taking these medications      Instructions   ondansetron 4 MG Tbdp  Commonly known as: ZOFRAN ODT   Take 1 Tablet by mouth every 6 hours as needed for Nausea (give PO if IV route is unavailable.) for up to 10 days.  Dose: 4 mg     pantoprazole 20 MG tablet  Commonly known as: PROTONIX   Take 1 Tablet by mouth 2 times a day for 30 days.  Dose: 20 mg     sucralfate 1 GM Tabs  Commonly known as: CARAFATE   Take 1 Tablet by mouth 4 Times a Day,Before Meals and at Bedtime for 14 days.  Dose: 1 g        CONTINUE taking these medications      Instructions   aspirin 325 MG Tabs  Commonly known as: ASA   Take 324 mg by mouth at bedtime. Takes one or two depending on arthritis  Dose: 324 mg     atorvastatin 40 MG Tabs  Commonly known as: LIPITOR   Take 1 Tablet by mouth every day.  Dose: 40 mg     calcium carbonate 1250 (500 Ca) MG Tabs  Commonly known as: OS-DARIEN 500   Take 1,250 mg by mouth every day.  Dose: 1,250 mg     fluticasone-salmeterol 250-50 MCG/DOSE Aepb  Commonly known as: ADVAIR   INHALE 1 PUFF BY MOUTH TWICE DAILY     levothyroxine 50 MCG Tabs  Commonly known as: SYNTHROID   Take 1 Tablet by mouth every morning on an empty stomach.  Dose: 50 mcg     lisinopril 20 MG Tabs  Commonly known as: PRINIVIL   Take 1 Tablet by mouth every day.  Dose: 20 mg     magnesium oxide 400 MG Tabs tablet  Commonly known as: MAG-OX   Take 400 mg by mouth every  day.  Dose: 400 mg     metoprolol SR 25 MG Tb24  Commonly known as: TOPROL XL   TAKE ONE TABLET BY MOUTH ONE TIME DAILY     montelukast 10 MG Tabs  Commonly known as: SINGULAIR   TAKE ONE TABLET BY MOUTH ONE TIME DAILY     multivitamin Tabs   Take 1 Tab by mouth every day.  Dose: 1 Tablet     temazepam 15 MG Caps  Commonly known as: Restoril   Take 15 mg by mouth at bedtime as needed for Sleep.  Dose: 15 mg     Vitamin D 50 MCG (2000 UT) Caps   Take 2,000 Units by mouth every day.  Dose: 2,000 Units            Allergies  Allergies   Allergen Reactions   • Meperidine Anaphylaxis       DIET  Orders Placed This Encounter   Procedures   • Diet Order Diet: Low Fiber(GI Soft)     Standing Status:   Standing     Number of Occurrences:   1     Order Specific Question:   Diet:     Answer:   Low Fiber(GI Soft) [2]       ACTIVITY  As tolerated.  Weight bearing as tolerated    CONSULTATIONS  NONE    PROCEDURES  NONE    IMAGING  CT-ABDOMEN-PELVIS WITH   Final Result      1.  Wall thickening of distal stomach suggesting gastritis or possibly ulcer disease.   2.  Wall thickening of distal esophagus which is likely inflammatory.   3.  No bowel obstruction or perforation.   4.  Fat-containing LEFT inguinal hernia, possibly inflamed.            DX-CHEST-PORTABLE (1 VIEW)   Final Result      1.  Hyperexpansion and hyperlucency suggesting emphysema.   2.  Otherwise, no acute cardiopulmonary disease.            LABORATORY  Lab Results   Component Value Date    SODIUM 141 03/29/2022    POTASSIUM 4.2 03/29/2022    CHLORIDE 110 03/29/2022    CO2 20 03/29/2022    GLUCOSE 90 03/29/2022    BUN 18 03/29/2022    CREATININE 0.94 03/29/2022        Lab Results   Component Value Date    WBC 9.5 03/29/2022    HEMOGLOBIN 11.1 (L) 03/29/2022    HEMATOCRIT 34.3 (L) 03/29/2022    PLATELETCT 378 03/29/2022        Total time of the discharge process exceeds 36  minutes.    ============================================================================================================  Please note that this dictation was created using voice recognition software. I have made every reasonable attempt to correct obvious errors, but there may be errors of grammar and possibly content that I did not discover before finalizing the note.    Electronically signed by:  MAZIN Fernandez, MSN, APRN, FNP-C  Hospitalist Services  [facility]  [phone]  [email or web address]  03/29/22                  [phone]

## 2022-03-29 NOTE — PROGRESS NOTES
Received report from ER RN. Patient brought to unit via [facility] by transport. Pt ambulated from [facility] in hallway to bathroom then back to room SBA steady. Patient in bed locked in lowest position with call light in reach. POC discussed. All questions answered.

## 2022-03-29 NOTE — CARE PLAN
The patient is Stable - Low risk of patient condition declining or worsening    Shift Goals  Clinical Goals: control nausea, hydration  Patient Goals: rest, hydrate  Family Goals: JUSTIN    Progress made toward(s) clinical / shift goals:    Problem: Knowledge Deficit - Standard  Goal: Patient and family/care givers will demonstrate understanding of plan of care, disease process/condition, diagnostic tests and medications  Outcome: Progressing

## 2022-03-29 NOTE — PROGRESS NOTES
2 RN Skin Assessment Completed by Roxi RN and Miranda RN.     Head: WDL  Ears: WDL  Nose: WDL  Mouth: WDL  Neck: WDL  Breasts/Chest: WDL  Shoulder Blades: WDL  Spine: WDL  (R) Arm/Elbow/hand: WDL  (L) Arm/Elbow/hand: WDL  Abdomen:WDL  Groin: WDL  Sacrum/Coccyx/Buttocks: WDL  (R) Leg: WDL  (L) Leg: WDL  (R) Heel/Foot/Toe: dry and flaky  (L) Heel/Foot/Toe: Dry and flaky              Devices in place: BP Cuff and Pulse Ox     Interventions in place: Gray ear foams for oxygen and Pillows     Possible skin injury found: No     Pictures uploaded into Epic: N/A  Wound Consult Placed: N/A

## 2022-03-29 NOTE — H&P
"Hospital Medicine History & Physical Note    Date of Service  3/28/2022    Primary Care Physician  Abdon Hilario D.O.      Code Status  Full Code    Chief Complaint  Chief Complaint   Patient presents with   • N/V     Pt states she thinks she's having an allergic reaction to soy. 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 Pt states she can't keep anything down. Pt reports SOB but states, \"I just got myself wound up.\" No facial swelling noted. Pt endorses it hurts when she swallows.        History of Presenting Illness  Olga Rich is a 77 y.o. female who presented 3/28/2022 for nausea and intermittent vomiting for the past 12 days. The patient also reports vomiting for the first 3 days of her symptoms and multiple episodes again last night, prompting her to come to the ED for evaluation. She states she is unable to keep anything down. She also reports a feeling \"like the bones ache\" in her chest prior to throwing up. She also has an inguinal hernia. She denies any facial swelling, diarrhea, or fever. The patient states she's been having \"problems\" with the soy in her Ensure and notes her symptoms are somewhat alleviated when staying away from soy products. She has hypothyroidism and notes she had her Synthroid decreased from 75 mg to 50 mg recently.      I discussed the plan of care with patient.    Review of Systems  Review of Systems   Constitutional: Negative for fever, malaise/fatigue and weight loss.   HENT: Negative for sore throat and tinnitus.    Eyes: Negative for blurred vision and double vision.   Respiratory: Negative for cough, hemoptysis and stridor.    Cardiovascular: Negative for chest pain and palpitations.   Gastrointestinal: Positive for abdominal pain, heartburn, nausea and vomiting.   Genitourinary: Negative for dysuria and urgency.   Musculoskeletal: Negative for myalgias and neck pain.   Skin: Negative for itching and rash.   Neurological: Negative " for dizziness and headaches.   Endo/Heme/Allergies: Does not bruise/bleed easily.   Psychiatric/Behavioral: Negative for depression. The patient does not have insomnia.        Past Medical History   has a past medical history of Allergic rhinitis, Arthritis, Asthma in adult, Cataract (2/2012), Dental disorder, Dyslipidemia (4/26/2016), High cholesterol, Hypertension, Hypothyroid, Hypothyroid (4/18/2014), Hypothyroid (4/18/2014), Osteoporosis, unspecified, and Pneumonia (2009).    Surgical History   has a past surgical history that includes cataract phaco with iol and orif, fracture, femur.     Family History  family history includes Heart Attack (age of onset: 56) in her father; Heart Attack (age of onset: 85) in her sister; Heart Disease in her father; Heart Disease (age of onset: 53) in her sister; No Known Problems in her brother, sister, and sister; Other in her son and son; Other (age of onset: 97) in her mother.   Family history reviewed with patient. There is no family history that is pertinent to the chief complaint.     Social History   reports that she quit smoking about 8 years ago. Her smoking use included cigarettes. She has a 10.00 pack-year smoking history. She has never used smokeless tobacco. She reports current drug use. Drug: Marijuana. She reports that she does not drink alcohol.    Allergies  Allergies   Allergen Reactions   • Meperidine Anaphylaxis       Medications  Prior to Admission Medications   Prescriptions Last Dose Informant Patient Reported? Taking?   Cholecalciferol (VITAMIN D) 2000 UNITS CAPS 3/27/2022 at 0900 Patient Yes No   Sig: Take 2,000 Units by mouth every day.   aspirin (ASA) 325 MG Tab 3/27/2022 at 2030 Patient Yes No   Sig: Take 324 mg by mouth at bedtime. Takes one or two depending on arthritis   atorvastatin (LIPITOR) 40 MG Tab 3/27/2022 at 2030 Patient No No   Sig: Take 1 Tablet by mouth every day.   calcium carbonate (OS-DARIEN 500) 1250 MG TABS 3/27/2022 at 0900 Patient  Yes No   Sig: Take 1,250 mg by mouth every day.   fluticasone-salmeterol (ADVAIR) 250-50 MCG/DOSE AEROSOL POWDER, BREATH ACTIVATED 3/28/2022 at 1000 Patient No No   Sig: INHALE 1 PUFF BY MOUTH TWICE DAILY   levothyroxine (SYNTHROID) 50 MCG Tab 3/28/2022 at 0900 Patient No No   Sig: Take 1 Tablet by mouth every morning on an empty stomach.   lisinopril (PRINIVIL) 20 MG Tab 3/28/2022 at 1000 Patient No No   Sig: Take 1 Tablet by mouth every day.   magnesium oxide (MAG-OX) 400 MG Tab 3/27/2022 at 1600 Patient Yes No   Sig: Take 400 mg by mouth every day.   metoprolol SR (TOPROL XL) 25 MG TABLET SR 24 HR 3/28/2022 at 0900 Patient No No   Sig: TAKE ONE TABLET BY MOUTH ONE TIME DAILY   montelukast (SINGULAIR) 10 MG Tab 3/27/2022 at 2030 Patient No No   Sig: TAKE ONE TABLET BY MOUTH ONE TIME DAILY   multivitamin (THERAGRAN) TABS 3/27/2022 at 1600 Patient Yes No   Sig: Take 1 Tab by mouth every day.   temazepam (RESTORIL) 15 MG Cap 3/27/2022 at 2030 Patient Yes No   Sig: Take 15 mg by mouth at bedtime as needed for Sleep.      Facility-Administered Medications: None       Physical Exam  Temp:  [36.6 °C (97.8 °F)-37.2 °C (98.9 °F)] 36.8 °C (98.2 °F)  Pulse:  [] 96  Resp:  [19-36] 19  BP: (118-149)/(58-83) 135/64  SpO2:  [92 %-99 %] 96 %  Blood Pressure : 118/58   Temperature: 37.2 °C (98.9 °F)   Pulse: 83   Respiration: 19   Pulse Oximetry: 99 %       Physical Exam  Vitals and nursing note reviewed.   Constitutional:       General: She is not in acute distress.     Appearance: Normal appearance. She is normal weight. She is not toxic-appearing.   HENT:      Head: Normocephalic and atraumatic.      Nose: Nose normal. No congestion or rhinorrhea.      Mouth/Throat:      Mouth: Mucous membranes are moist.      Pharynx: Oropharynx is clear.   Eyes:      Extraocular Movements: Extraocular movements intact.      Conjunctiva/sclera: Conjunctivae normal.      Pupils: Pupils are equal, round, and reactive to light.   Neck:       Vascular: No carotid bruit.   Cardiovascular:      Rate and Rhythm: Normal rate and regular rhythm.      Pulses: Normal pulses.      Heart sounds: Normal heart sounds. No murmur heard.    No gallop.   Pulmonary:      Effort: No respiratory distress.      Breath sounds: Normal breath sounds. No wheezing or rales.   Abdominal:      General: Abdomen is flat. Bowel sounds are normal. There is no distension.      Palpations: Abdomen is soft. There is no mass.      Tenderness: There is no abdominal tenderness.      Hernia: No hernia is present.   Musculoskeletal:         General: No tenderness or signs of injury.      Cervical back: Normal range of motion and neck supple. No muscular tenderness.   Lymphadenopathy:      Cervical: No cervical adenopathy.   Skin:     Capillary Refill: Capillary refill takes less than 2 seconds.      Coloration: Skin is not jaundiced or pale.      Findings: No bruising.   Neurological:      General: No focal deficit present.      Mental Status: She is alert and oriented to person, place, and time. Mental status is at baseline.      Cranial Nerves: No cranial nerve deficit.      Motor: No weakness.      Coordination: Coordination normal.   Psychiatric:         Mood and Affect: Mood normal.         Thought Content: Thought content normal.         Judgment: Judgment normal.         Laboratory:  Recent Labs     03/28/22  1554   WBC 19.5*   RBC 4.93   HEMOGLOBIN 15.2   HEMATOCRIT 46.1   MCV 93.5   MCH 30.8   MCHC 33.0*   RDW 47.8   PLATELETCT 542*   MPV 8.5*     Recent Labs     03/28/22  1554   SODIUM 140   POTASSIUM 3.6   CHLORIDE 97   CO2 27   GLUCOSE 115*   BUN 24*   CREATININE 1.21   CALCIUM 10.7*     Recent Labs     03/28/22  1554   ALTSGPT 16   ASTSGOT 26   ALKPHOSPHAT 84   TBILIRUBIN 0.4   GLUCOSE 115*         No results for input(s): NTPROBNP in the last 72 hours.      No results for input(s): TROPONINT in the last 72 hours.    Imaging:  CT-ABDOMEN-PELVIS WITH   Final Result      1.  Wall  thickening of distal stomach suggesting gastritis or possibly ulcer disease.   2.  Wall thickening of distal esophagus which is likely inflammatory.   3.  No bowel obstruction or perforation.   4.  Fat-containing LEFT inguinal hernia, possibly inflamed.            DX-CHEST-PORTABLE (1 VIEW)   Final Result      1.  Hyperexpansion and hyperlucency suggesting emphysema.   2.  Otherwise, no acute cardiopulmonary disease.          X-Ray:  I have personally reviewed the images and compared with prior images.    Assessment/Plan:  I anticipate this patient will require at least two midnights for appropriate medical management, necessitating inpatient admission.    * Nausea and vomiting- (present on admission)  Assessment & Plan  Symptomatic management secondary to esophagitis, gastritis    Esophagitis with gastritis  Assessment & Plan  Clear liquids, trial of PPI, obtain GI consult in a.m.    Chronic obstructive pulmonary disease (HCC)- (present on admission)  Assessment & Plan  Continue advair and singulair   Oxygen as needed, keep O2 > 90%    Dyslipidemia- (present on admission)  Assessment & Plan  Continue lipitor     Hypothyroidism  Assessment & Plan  Continue synthroid       VTE prophylaxis: SCDs/TEDs

## 2022-03-29 NOTE — RESPIRATORY CARE
"COPD EDUCATION by COPD CLINICAL EDUCATOR  3/29/2022  at  3:34 PM by Kate Ferrera, RRT     Patient interviewed by COPD education team.  Patient unable to participate in full program.  A short intervention has been conducted.  A comprehensive packet including information about COPD, types of treatments to manage their disease and safe home Oxygen usage was provided and reviewed with patient at the bedside.    COPD Screen       COPD Assessment  COPD Clinical Specialists ONLY  COPD Education Initiated: Yes--Short Intervention  DME Company: none  DME Equipment Type: none  Physician Name: GALEN CADE D.O  Pulmonologist Name: Christiane Chambers  Referrals Initiated: Yes  Pulmonary Rehab: Declined  Smoking Cessation: N/A (quit 2014)  Palliative Care:  (not seen)  Hospice: N/A  [facility] Outreach: N/A  [facility]: Yes (established)  Dispatch Health: Declined  Private In-Home Care Agency: N/A  Is this a COPD exacerbation patient?: No  $ Demo/Eval of SVN's, MDI's and Aerosols: Yes (spacergiven)  (OP) Pulmonary Function Testing:  (pt states she had one down at [facility] when dr vasquez was there, refused to do it every again)    PFT Results    No results found for: PFT    Meds to Beds  Would the patient like to opt in for Bedside Medication Delivery at Discharge?: No     MY COPD ACTION PLAN     It is recommended that patients and physicians /healthcare providers complete this action plan together. This plan should be discussed at each physician visit and updated as needed.    The green, yellow and red zones show groups of symptoms of COPD. This list of symptoms is not comprehensive, and you may experience other symptoms. In the \"Actions\" column, your healthcare provider has recommended actions for you to take based on your symptoms.    Patient Name: Olga Rich   YOB: 1945   Last Updated on: 3/29/2022  3:34 PM   Green Zone:  I am doing well today Actions   •  Usual activitiy " "and exercise level •  Take daily medications   •  Usual amounts of cough and phlegm/mucus •  Use oxygen as prescribed   •  Sleep well at night •  Continue regular exercise/diet plan   •  Appetite is good •  At all times avoid cigarette smoke, inhaled irritants     Daily Medications (these medications are taken every day):   Fluticosone/Salmeterol (Advair) 1 Puff Twice daily     Additional Information:  Rinse mouth after use     Yellow Zone:  I am having a bad day or a COPD flare Actions   •  More breathless than usual •  Continue daily medications   •  I have less energy for my daily activities •  Use quick relief inhaler as ordered   •  Increased or thicker phlegm/mucus •  Use oxygen as prescribed   •  Using quick relief inhaler/nebulizer more often •  Get plenty of rest   •  Swelling of ankles more than usual •  Use pursed lip breathing   •  More coughing than usual •  At all times avoid cigarette smoke, inhaled irritants   •  I feel like I have a \"chest cold\"   •  Poor sleep and my symptoms woke me up   •  My appetite is not good   •  My medicine is not helping    •  Call provider immediately if symptoms don’t improve     Continue daily medications, add rescue medications:               Medications to be used during a flare up, (as Discussed with Provider):              Red Zone:  I need urgent medical care Actions   •  Severe shortness of breath even at rest •  Call 911 or seek medical care immediately   •  Not able to do any activity because of breathing    •  Fever or shaking chills    •  Feeling confused or very drowsy     •  Chest pains    •  Coughing up blood              "

## 2022-03-29 NOTE — DISCHARGE INSTRUCTIONS
Discharge Instructions    Discharged to home by car with relative. Discharged via wheelchair, hospital escort: Yes.  Special equipment needed: Not Applicable    Be sure to schedule a follow-up appointment with your primary care doctor or any specialists as instructed.     Discharge Plan:   Diet Plan: Discussed  Activity Level: Discussed  Confirmed Follow up Appointment: Patient to Call and Schedule Appointment  Confirmed Symptoms Management: Discussed  Medication Reconciliation Updated: Yes  Influenza Vaccine Indication: Not indicated: Previously immunized this influenza season and > 8 years of age    I understand that a diet low in cholesterol, fat, and sodium is recommended for good health. Unless I have been given specific instructions below for another diet, I accept this instruction as my diet prescription.   Other diet:     Special Instructions: None    · Is patient discharged on Warfarin / Coumadin?   No     Depression / Suicide Risk    As you are discharged from this [facility] facility, it is important to learn how to keep safe from harming yourself.    Recognize the warning signs:  · Abrupt changes in personality, positive or negative- including increase in energy   · Giving away possessions  · Change in eating patterns- significant weight changes-  positive or negative  · Change in sleeping patterns- unable to sleep or sleeping all the time   · Unwillingness or inability to communicate  · Depression  · Unusual sadness, discouragement and loneliness  · Talk of wanting to die  · Neglect of personal appearance   · Rebelliousness- reckless behavior  · Withdrawal from people/activities they love  · Confusion- inability to concentrate     If you or a loved one observes any of these behaviors or has concerns about self-harm, here's what you can do:  · Talk about it- your feelings and reasons for harming yourself  · Remove any means that you might use to hurt yourself (examples: pills, rope, extension cords,  firearm)  · Get professional help from the community (Mental Health, Substance Abuse, psychological counseling)  · Do not be alone:Call your Safe Contact- someone whom you trust who will be there for you.  · Call your local CRISIS HOTLINE 522-6891 or 757-270-4116  · Call your local Children's Mobile Crisis Response Team [place] (493) 565-3684 or www.[employer]  · Call the toll free National Suicide Prevention Hotlines   · National Suicide Prevention Lifeline 176-789-LKUM (3240)  · [employer] Line Network 800-SUICIDE (855-9829)    FOLLOW UP ITEMS POST DISCHARGE  Please call [phone] to schedule PCP appointment for patient.    Required specialty appointments include:       Discharge Instructions per Yonis Fernandez, A.P.R.N.    -Follow-up with PCP s/p hospitalization and to obtain referral to follow-up with gastroenterology  -Start taking antinausea medication as needed  -Start taking PPI, Protonix twice daily to help with gastritis  -Start taking Carafate 1 g daily for 14 days to help with esophagitis  -Resume all other home medications    DIET: Soft GI, low acid containing food    ACTIVITY: As tolerated    DIAGNOSIS: Nausea and vomiting    Return to ER if symptoms persist, chest pain, palpitations, short of breath, numbness, tingling, weakness, and high fevers.

## 2022-03-30 LAB
BACTERIA UR CULT: NORMAL
SIGNIFICANT IND 70042: NORMAL
SITE SITE: NORMAL
SOURCE SOURCE: NORMAL

## 2022-04-02 LAB
BACTERIA BLD CULT: NORMAL
BACTERIA BLD CULT: NORMAL
SIGNIFICANT IND 70042: NORMAL
SIGNIFICANT IND 70042: NORMAL
SITE SITE: NORMAL
SITE SITE: NORMAL
SOURCE SOURCE: NORMAL
SOURCE SOURCE: NORMAL

## 2022-04-06 ENCOUNTER — OFFICE VISIT (OUTPATIENT)
Dept: MEDICAL GROUP | Facility: MEDICAL CENTER | Age: 77
End: 2022-04-06
Payer: MEDICARE

## 2022-04-06 VITALS
OXYGEN SATURATION: 93 % | SYSTOLIC BLOOD PRESSURE: 108 MMHG | HEART RATE: 71 BPM | TEMPERATURE: 97.1 F | WEIGHT: 95.8 LBS | HEIGHT: 58 IN | DIASTOLIC BLOOD PRESSURE: 70 MMHG | BODY MASS INDEX: 20.11 KG/M2

## 2022-04-06 DIAGNOSIS — J41.0 SIMPLE CHRONIC BRONCHITIS (HCC): Chronic | ICD-10-CM

## 2022-04-06 DIAGNOSIS — Z12.31 ENCOUNTER FOR SCREENING MAMMOGRAM FOR MALIGNANT NEOPLASM OF BREAST: ICD-10-CM

## 2022-04-06 DIAGNOSIS — K29.70 ESOPHAGITIS WITH GASTRITIS: ICD-10-CM

## 2022-04-06 DIAGNOSIS — I77.9 DISORDER OF ARTERIES AND ARTERIOLES, UNSPECIFIED (HCC): ICD-10-CM

## 2022-04-06 DIAGNOSIS — F13.20 SEDATIVE, HYPNOTIC, OR ANXIOLYTIC DEPENDENCE (HCC): Chronic | ICD-10-CM

## 2022-04-06 DIAGNOSIS — K20.90 ESOPHAGITIS WITH GASTRITIS: ICD-10-CM

## 2022-04-06 PROCEDURE — 99214 OFFICE O/P EST MOD 30 MIN: CPT | Performed by: FAMILY MEDICINE

## 2022-04-06 ASSESSMENT — FIBROSIS 4 INDEX: FIB4 SCORE: 1.36

## 2022-04-06 NOTE — ASSESSMENT & PLAN NOTE
Discussed with patient to decrease aspirin to just 81 mg daily  Continue on current medication when she runs out of Carafate I will have her hold and discontinue on Protonix and see how she does.  Referral to GI

## 2022-04-06 NOTE — PATIENT INSTRUCTIONS
Lets use Tylenol for pain relief you can take 2 tabs every 8 hours  Aspirin can cause irritation in your belly, lets only take 81 mg a day  Continue the Protonix as directed  When the Carafate runs out we can take a break from it and see how you feel      Gastroesophageal Reflux Disease, Adult  Gastroesophageal reflux (OFELIA) happens when acid from the stomach flows up into the tube that connects the mouth and the stomach (esophagus). Normally, food travels down the esophagus and stays in the stomach to be digested. With OFELIA, food and stomach acid sometimes move back up into the esophagus. You may have a disease called gastroesophageal reflux disease (GERD) if the reflux:  · Happens often.  · Causes frequent or very bad symptoms.  · Causes problems such as damage to the esophagus.  When this happens, the esophagus becomes sore and swollen (inflamed). Over time, GERD can make small holes (ulcers) in the lining of the esophagus.  What are the causes?  This condition is caused by a problem with the muscle between the esophagus and the stomach. When this muscle is weak or not normal, it does not close properly to keep food and acid from coming back up from the stomach. The muscle can be weak because of:  · Tobacco use.  · Pregnancy.  · Having a certain type of hernia (hiatal hernia).  · Alcohol use.  · Certain foods and drinks, such as coffee, chocolate, onions, and peppermint.  What increases the risk?  You are more likely to develop this condition if you:  · Are overweight.  · Have a disease that affects your connective tissue.  · Use NSAID medicines.  What are the signs or symptoms?  Symptoms of this condition include:  · Heartburn.  · Difficult or painful swallowing.  · The feeling of having a lump in the throat.  · A bitter taste in the mouth.  · Bad breath.  · Having a lot of saliva.  · Having an upset or bloated stomach.  · Belching.  · Chest pain. Different conditions can cause chest pain. Make sure you see your  doctor if you have chest pain.  · Shortness of breath or noisy breathing (wheezing).  · Ongoing (chronic) cough or a cough at night.  · Wearing away of the surface of teeth (tooth enamel).  · Weight loss.  How is this treated?  Treatment will depend on how bad your symptoms are. Your doctor may suggest:  · Changes to your diet.  · Medicine.  · Surgery.  Follow these instructions at home:  Eating and drinking    · Follow a diet as told by your doctor. You may need to avoid foods and drinks such as:  ? Coffee and tea (with or without caffeine).  ? Drinks that contain alcohol.  ? Energy drinks and sports drinks.  ? Bubbly (carbonated) drinks or sodas.  ? Chocolate and cocoa.  ? Peppermint and mint flavorings.  ? Garlic and onions.  ? Horseradish.  ? Spicy and acidic foods. These include peppers, chili powder, hobson powder, vinegar, hot sauces, and BBQ sauce.  ? Citrus fruit juices and citrus fruits, such as oranges, juan, and limes.  ? Tomato-based foods. These include red sauce, chili, salsa, and pizza with red sauce.  ? Fried and fatty foods. These include donuts, french fries, potato chips, and high-fat dressings.  ? High-fat meats. These include hot dogs, rib eye steak, sausage, ham, and pope.  ? High-fat dairy items, such as whole milk, butter, and cream cheese.  · Eat small meals often. Avoid eating large meals.  · Avoid drinking large amounts of liquid with your meals.  · Avoid eating meals during the 2-3 hours before bedtime.  · Avoid lying down right after you eat.  · Do not exercise right after you eat.  Lifestyle    · Do not use any products that contain nicotine or tobacco. These include cigarettes, e-cigarettes, and chewing tobacco. If you need help quitting, ask your doctor.  · Try to lower your stress. If you need help doing this, ask your doctor.  · If you are overweight, lose an amount of weight that is healthy for you. Ask your doctor about a safe weight loss goal.  General instructions  · Pay  attention to any changes in your symptoms.  · Take over-the-counter and prescription medicines only as told by your doctor. Do not take aspirin, ibuprofen, or other NSAIDs unless your doctor says it is okay.  · Wear loose clothes. Do not wear anything tight around your waist.  · Raise (elevate) the head of your bed about 6 inches (15 cm).  · Avoid bending over if this makes your symptoms worse.  · Keep all follow-up visits as told by your doctor. This is important.  Contact a doctor if:  · You have new symptoms.  · You lose weight and you do not know why.  · You have trouble swallowing or it hurts to swallow.  · You have wheezing or a cough that keeps happening.  · Your symptoms do not get better with treatment.  · You have a hoarse voice.  Get help right away if:  · You have pain in your arms, neck, jaw, teeth, or back.  · You feel sweaty, dizzy, or light-headed.  · You have chest pain or shortness of breath.  · You throw up (vomit) and your throw-up looks like blood or coffee grounds.  · You pass out (faint).  · Your poop (stool) is bloody or black.  · You cannot swallow, drink, or eat.  Summary  · If a person has gastroesophageal reflux disease (GERD), food and stomach acid move back up into the esophagus and cause symptoms or problems such as damage to the esophagus.  · Treatment will depend on how bad your symptoms are.  · Follow a diet as told by your doctor.  · Take all medicines only as told by your doctor.  This information is not intended to replace advice given to you by your health care provider. Make sure you discuss any questions you have with your health care provider.  Document Released: 06/05/2009 Document Revised: 06/26/2019 Document Reviewed: 06/26/2019  [employer] Patient Education © 2020 [employer]      Osteoarthritis    Osteoarthritis is a type of arthritis that affects tissue that covers the ends of bones in joints (cartilage). Cartilage acts as a cushion between the bones and helps them move  "smoothly. Osteoarthritis results when cartilage in the joints gets worn down. Osteoarthritis is sometimes called \"wear and tear\" arthritis.  Osteoarthritis is the most common form of arthritis. It often occurs in older people. It is a condition that gets worse over time (a progressive condition). Joints that are most often affected by this condition are in:  · Fingers.  · Toes.  · Hips.  · Knees.  · Spine, including neck and lower back.  What are the causes?  This condition is caused by age-related wearing down of cartilage that covers the ends of bones.  What increases the risk?  The following factors may make you more likely to develop this condition:  · Older age.  · Being overweight or obese.  · Overuse of joints, such as in athletes.  · Past injury of a joint.  · Past surgery on a joint.  · Family history of osteoarthritis.  What are the signs or symptoms?  The main symptoms of this condition are pain, swelling, and stiffness in the joint. The joint may lose its shape over time. Small pieces of bone or cartilage may break off and float inside of the joint, which may cause more pain and damage to the joint. Small deposits of bone (osteophytes) may grow on the edges of the joint. Other symptoms may include:  · A grating or scraping feeling inside the joint when you move it.  · Popping or creaking sounds when you move.  Symptoms may affect one or more joints. Osteoarthritis in a major joint, such as your knee or hip, can make it painful to walk or exercise. If you have osteoarthritis in your hands, you might not be able to  items, twist your hand, or control small movements of your hands and fingers (fine motor skills).  How is this diagnosed?  This condition may be diagnosed based on:  · Your medical history.  · A physical exam.  · Your symptoms.  · X-rays of the affected joint(s).  · Blood tests to rule out other types of arthritis.  How is this treated?  There is no cure for this condition, but treatment " can help to control pain and improve joint function. Treatment plans may include:  · A prescribed exercise program that allows for rest and joint relief. You may work with a physical therapist.  · A weight control plan.  · Pain relief techniques, such as:  ? Applying heat and cold to the joint.  ? Electric pulses delivered to nerve endings under the skin (transcutaneous electrical nerve stimulation, or TENS).  ? Massage.  ? Certain nutritional supplements.  · NSAIDs or prescription medicines to help relieve pain.  · Medicine to help relieve pain and inflammation (corticosteroids). This can be given by mouth (orally) or as an injection.  · Assistive devices, such as a brace, wrap, splint, specialized glove, or cane.  · Surgery, such as:  ? An osteotomy. This is done to reposition the bones and relieve pain or to remove loose pieces of bone and cartilage.  ? Joint replacement surgery. You may need this surgery if you have very bad (advanced) osteoarthritis.  Follow these instructions at home:  Activity  · Rest your affected joints as directed by your health care provider.  · Do not drive or use heavy machinery while taking prescription pain medicine.  · Exercise as directed. Your health care provider or physical therapist may recommend specific types of exercise, such as:  ? Strengthening exercises. These are done to strengthen the muscles that support joints that are affected by arthritis. They can be performed with weights or with exercise bands to add resistance.  ? Aerobic activities. These are exercises, such as brisk walking or water aerobics, that get your heart pumping.  ? Range-of-motion activities. These keep your joints easy to move.  ? Balance and agility exercises.  Managing pain, stiffness, and swelling         · If directed, apply heat to the affected area as often as told by your health care provider. Use the heat source that your health care provider recommends, such as a moist heat pack or a heating  pad.  ? If you have a removable assistive device, remove it as told by your health care provider.  ? Place a towel between your skin and the heat source. If your health care provider tells you to keep the assistive device on while you apply heat, place a towel between the assistive device and the heat source.  ? Leave the heat on for 20-30 minutes.  ? Remove the heat if your skin turns bright red. This is especially important if you are unable to feel pain, heat, or cold. You may have a greater risk of getting burned.  · If directed, put ice on the affected joint:  ? If you have a removable assistive device, remove it as told by your health care provider.  ? Put ice in a plastic bag.  ? Place a towel between your skin and the bag. If your health care provider tells you to keep the assistive device on during icing, place a towel between the assistive device and the bag.  ? Leave the ice on for 20 minutes, 2-3 times a day.  General instructions  · Take over-the-counter and prescription medicines only as told by your health care provider.  · Maintain a healthy weight. Follow instructions from your health care provider for weight control. These may include dietary restrictions.  · Do not use any products that contain nicotine or tobacco, such as cigarettes and e-cigarettes. These can delay bone healing. If you need help quitting, ask your health care provider.  · Use assistive devices as directed by your health care provider.  · Keep all follow-up visits as told by your health care provider. This is important.  Where to find more information  · National [place] of Arthritis and Musculoskeletal and Skin Diseases: www.niams.nih.gov  · National [place] on Aging: www.jasson.nih.gov  · American College of Rheumatology: www.rheumatology.org  Contact a health care provider if:  · Your skin turns red.  · You develop a rash.  · You have pain that gets worse.  · You have a fever along with joint or muscle aches.  Get help right  away if:  · You lose a lot of weight.  · You suddenly lose your appetite.  · You have night sweats.  Summary  · Osteoarthritis is a type of arthritis that affects tissue covering the ends of bones in joints (cartilage).  · This condition is caused by age-related wearing down of cartilage that covers the ends of bones.  · The main symptom of this condition is pain, swelling, and stiffness in the joint.  · There is no cure for this condition, but treatment can help to control pain and improve joint function.  This information is not intended to replace advice given to you by your health care provider. Make sure you discuss any questions you have with your health care provider.  Document Released: 12/18/2006 Document Revised: 11/30/2018 Document Reviewed: 08/21/2017  [employer] Patient Education © 2020 [employer]

## 2022-04-06 NOTE — PROGRESS NOTES
"Subjective:     CC:  Diagnoses of Esophagitis with gastritis, Simple chronic bronchitis (HCC), Sedative, hypnotic, or anxiolytic dependence (HCC), Disorder of arteries and arterioles, unspecified (HCC), and Encounter for screening mammogram for malignant neoplasm of breast were pertinent to this visit.    HISTORY OF THE PRESENT ILLNESS: Patient is a 77 y.o. female. This pleasant patient is here today to establish care and discuss ER follow-up for abdominal pain.     Problem   Disorder of Arteries and Arterioles, Unspecified (Hcc)    Recent CT scan did show diffuse atherosclerosis of the aorta.  Patient has been taking full dose aspirin once or twice a day patient is on 40 mg of atorvastatin.     Esophagitis With Gastritis    Patient seen on emergency department hospital 3/28.  She told me she was feeling bad starting on [employer] Day and denies partaking in festivities.  She has had abdominal pain, nausea, vomiting, heartburn, difficulty swallowing and admits to some weight loss as well.  She denies constipation, diarrhea, blood in the stool, blood in her vomit, coffee-ground in her emesis.  CT of her abdomen had the ED shows:    \"IMPRESSION:   1.  Wall thickening of distal stomach suggesting gastritis or possibly ulcer disease.  2.  Wall thickening of distal esophagus which is likely inflammatory.  3.  No bowel obstruction or perforation.  4.  Fat-containing LEFT inguinal hernia, possibly inflamed.\"    Patient was discharged on Protonix 40 mg twice daily, Carafate 4 times daily and as needed Zofran.  She tells me her symptoms have improved greatly, she is swallowing better and her appetite is improved.  She is still concerned and like to see a GI doc which is understandable.  She tells me she takes the Zofran nightly as needed for feelings of nausea.    Going back further she has had similar symptoms started in October and then she had again and middle of winter.  At that time she determine if it were to happen " again she would go to the emergency department which is probably the most recent visit.     Sedative, Hypnotic, Or Anxiolytic Dependence (Hcc)    Patient has been she uses temazepam 1-2 times a week to help with her sleep.     Chronic Obstructive Pulmonary Disease (Hcc)    Patient quit smoking in 2014 with what appears to be a 40-pack-year history.  She is on Advair twice daily and feels that her breathing is fairly well controlled.  She tells me she has significant claustrophobia and that is why she has not been able to finish the PFTs.         Current Outpatient Medications Ordered in Epic   Medication Sig Dispense Refill   • aspirin EC (ECOTRIN) 81 MG Tablet Delayed Response Take 81 mg by mouth every day.     • sucralfate (CARAFATE) 1 GM Tab Take 1 Tablet by mouth 4 Times a Day,Before Meals and at Bedtime for 14 days. 56 Tablet 0   • pantoprazole (PROTONIX) 20 MG tablet Take 2 Tablets by mouth 2 times a day for 30 days. 120 Tablet 3   • ondansetron (ZOFRAN ODT) 4 MG TABLET DISPERSIBLE Dissolve1 Tablet by mouth every 6 hours as needed for Nausea  for up to 10 days. 30 Tablet 0   • fluticasone-salmeterol (ADVAIR) 250-50 MCG/DOSE AEROSOL POWDER, BREATH ACTIVATED INHALE 1 PUFF BY MOUTH TWICE DAILY 1 Each 11   • temazepam (RESTORIL) 15 MG Cap Take 15 mg by mouth at bedtime as needed for Sleep.     • metoprolol SR (TOPROL XL) 25 MG TABLET SR 24 HR TAKE ONE TABLET BY MOUTH ONE TIME DAILY 100 Tablet 3   • atorvastatin (LIPITOR) 40 MG Tab Take 1 Tablet by mouth every day. 100 Tablet 3   • lisinopril (PRINIVIL) 20 MG Tab Take 1 Tablet by mouth every day. 90 Tablet 3   • levothyroxine (SYNTHROID) 50 MCG Tab Take 1 Tablet by mouth every morning on an empty stomach. 90 Tablet 3   • montelukast (SINGULAIR) 10 MG Tab TAKE ONE TABLET BY MOUTH ONE TIME DAILY 90 tablet 3   • magnesium oxide (MAG-OX) 400 MG Tab Take 400 mg by mouth every day.     • Cholecalciferol (VITAMIN D) 2000 UNITS CAPS Take 2,000 Units by mouth every day.    "  • calcium carbonate (OS-DARIEN 500) 1250 MG TABS Take 1,250 mg by mouth every day.     • multivitamin (THERAGRAN) TABS Take 1 Tab by mouth every day.       No current [facility]-ordered facility-administered medications on file.       Health Maintenance: Ordered mammogram    ROS:   ROS see HPI      Objective:       Exam: /70   Pulse 71   Temp 36.2 °C (97.1 °F) (Temporal)   Ht 1.473 m (4' 10\")   Wt 43.5 kg (95 lb 12.8 oz)   SpO2 93%  Body mass index is 20.02 kg/m².    Physical Exam  Vitals reviewed.   Constitutional:       Appearance: Normal appearance.   HENT:      Head: Normocephalic and atraumatic.   Cardiovascular:      Rate and Rhythm: Normal rate and regular rhythm.      Heart sounds: Normal heart sounds.   Pulmonary:      Effort: Pulmonary effort is normal.      Breath sounds: Normal breath sounds.   Abdominal:      General: Abdomen is flat. Bowel sounds are normal. There is no distension.      Palpations: Abdomen is soft. There is no mass.      Tenderness: There is no abdominal tenderness. There is no guarding.   Neurological:      Mental Status: She is alert. Mental status is at baseline.   Psychiatric:         Mood and Affect: Mood normal.         Behavior: Behavior normal.           Assessment & Plan:   77 y.o. female with the following -    Problem List Items Addressed This Visit     Chronic obstructive pulmonary disease (HCC) (Chronic)     Patient overall doing well we will continue her current management plan         Sedative, hypnotic, or anxiolytic dependence (HCC) (Chronic)     We will continue with current plan she gets a couple good night sleep a week.  No concerning findings on PDMP review.         Disorder of arteries and arterioles, unspecified (HCC) (Chronic)     We will continue with aspirin and statin will take aspirin down to 81 mg given GI symptoms         Esophagitis with gastritis     Discussed with patient to decrease aspirin to just 81 mg daily  Continue on current medication when " she runs out of Carafate I will have her hold and discontinue on Protonix and see how she does.  Referral to GI         Relevant Orders    Referral to Gastroenterology      Other Visit Diagnoses     Encounter for screening mammogram for malignant neoplasm of breast        Relevant Orders    MA-SCREENING MAMMO BILAT W/TOMOSYNTHESIS W/CAD          I spent a total of 30 minutes with record review, exam, communication with the patient, communication with other providers, and documentation of this encounter.    Return in about 4 months (around 8/6/2022), or if symptoms worsen or fail to improve, for Annual Medicare.    Please note that this dictation was created using voice recognition software. I have made every reasonable attempt to correct obvious errors, but I expect that there are errors of grammar and possibly content that I did not discover before finalizing the note.

## 2022-04-06 NOTE — PROGRESS NOTES
Annual Health Assessment Questions:    1.  Are you currently engaging in any exercise or physical activity? Yes    2.  How would you describe your mood or emotional well-being today? good    3.  Have you had any falls in the last year? No    4.  Have you noticed any problems with your balance or had difficulty walking? No    5.  In the last six months have you experienced any leakage of urine? No    6. DPA/Advanced Directive: Patient has Durable Power of  on file.

## 2022-04-06 NOTE — ASSESSMENT & PLAN NOTE
We will continue with current plan she gets a couple good night sleep a week.  No concerning findings on PDMP review.

## 2022-06-16 ENCOUNTER — HOSPITAL ENCOUNTER (OUTPATIENT)
Dept: RADIOLOGY | Facility: MEDICAL CENTER | Age: 77
End: 2022-06-16
Attending: FAMILY MEDICINE
Payer: MEDICARE

## 2022-06-16 DIAGNOSIS — Z12.31 ENCOUNTER FOR SCREENING MAMMOGRAM FOR MALIGNANT NEOPLASM OF BREAST: ICD-10-CM

## 2022-06-16 PROCEDURE — 77063 BREAST TOMOSYNTHESIS BI: CPT

## 2022-06-27 ENCOUNTER — APPOINTMENT (OUTPATIENT)
Dept: SLEEP MEDICINE | Facility: MEDICAL CENTER | Age: 77
End: 2022-06-27
Payer: MEDICARE

## 2022-08-06 DIAGNOSIS — E78.5 DYSLIPIDEMIA: ICD-10-CM

## 2022-08-06 DIAGNOSIS — J41.0 SIMPLE CHRONIC BRONCHITIS (HCC): ICD-10-CM

## 2022-08-06 DIAGNOSIS — E02 SUBCLINICAL IODINE-DEFICIENCY HYPOTHYROIDISM: ICD-10-CM

## 2022-08-08 RX ORDER — METHYLPREDNISOLONE SODIUM SUCCINATE 125 MG/2ML
125 INJECTION, POWDER, LYOPHILIZED, FOR SOLUTION INTRAMUSCULAR; INTRAVENOUS PRN
Status: CANCELLED | OUTPATIENT
Start: 2022-08-08

## 2022-08-08 RX ORDER — EPINEPHRINE 1 MG/ML(1)
0.5 AMPUL (ML) INJECTION PRN
Status: CANCELLED | OUTPATIENT
Start: 2022-08-08

## 2022-08-08 RX ORDER — DIPHENHYDRAMINE HYDROCHLORIDE 50 MG/ML
50 INJECTION INTRAMUSCULAR; INTRAVENOUS PRN
Status: CANCELLED | OUTPATIENT
Start: 2022-08-08

## 2022-08-10 ENCOUNTER — OFFICE VISIT (OUTPATIENT)
Dept: SLEEP MEDICINE | Facility: MEDICAL CENTER | Age: 77
End: 2022-08-10
Payer: MEDICARE

## 2022-08-10 VITALS
BODY MASS INDEX: 19.52 KG/M2 | HEART RATE: 88 BPM | OXYGEN SATURATION: 94 % | HEIGHT: 58 IN | RESPIRATION RATE: 16 BRPM | SYSTOLIC BLOOD PRESSURE: 140 MMHG | DIASTOLIC BLOOD PRESSURE: 84 MMHG | WEIGHT: 93 LBS

## 2022-08-10 DIAGNOSIS — R06.02 SOB (SHORTNESS OF BREATH): ICD-10-CM

## 2022-08-10 DIAGNOSIS — J41.0 SIMPLE CHRONIC BRONCHITIS (HCC): Chronic | ICD-10-CM

## 2022-08-10 DIAGNOSIS — Z87.891 FORMER SMOKER: ICD-10-CM

## 2022-08-10 DIAGNOSIS — G47.09 OTHER INSOMNIA: ICD-10-CM

## 2022-08-10 PROCEDURE — 99213 OFFICE O/P EST LOW 20 MIN: CPT | Performed by: NURSE PRACTITIONER

## 2022-08-10 RX ORDER — LEVALBUTEROL TARTRATE 45 UG/1
1-2 AEROSOL, METERED ORAL EVERY 4 HOURS PRN
Qty: 1 EACH | Refills: 5 | Status: SHIPPED | OUTPATIENT
Start: 2022-08-10 | End: 2023-11-22 | Stop reason: CLARIF

## 2022-08-10 RX ORDER — TEMAZEPAM 15 MG/1
15 CAPSULE ORAL NIGHTLY PRN
Qty: 30 CAPSULE | Refills: 2 | Status: SHIPPED | OUTPATIENT
Start: 2022-08-10 | End: 2022-10-24 | Stop reason: SDUPTHER

## 2022-08-10 RX ORDER — MONTELUKAST SODIUM 10 MG/1
TABLET ORAL
Qty: 90 TABLET | Refills: 3 | Status: SHIPPED | OUTPATIENT
Start: 2022-08-10 | End: 2022-10-24 | Stop reason: SDUPTHER

## 2022-08-10 RX ORDER — TEMAZEPAM 15 MG/1
15 CAPSULE ORAL NIGHTLY PRN
Qty: 30 CAPSULE | Refills: 2 | Status: CANCELLED | OUTPATIENT
Start: 2022-08-10 | End: 2022-11-08

## 2022-08-10 RX ORDER — LEVOTHYROXINE SODIUM 0.05 MG/1
50 TABLET ORAL
Qty: 90 TABLET | Refills: 0 | Status: SHIPPED | OUTPATIENT
Start: 2022-08-10 | End: 2022-11-14 | Stop reason: SDUPTHER

## 2022-08-10 ASSESSMENT — FIBROSIS 4 INDEX: FIB4 SCORE: 1.36
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
## 2022-08-18 ENCOUNTER — OUTPATIENT INFUSION SERVICES (OUTPATIENT)
Dept: ONCOLOGY | Facility: MEDICAL CENTER | Age: 77
End: 2022-08-18
Attending: INTERNAL MEDICINE
Payer: MEDICARE

## 2022-08-18 ENCOUNTER — HOSPITAL ENCOUNTER (OUTPATIENT)
Dept: LAB | Facility: MEDICAL CENTER | Age: 77
End: 2022-08-18
Attending: FAMILY MEDICINE
Payer: MEDICARE

## 2022-08-18 VITALS
RESPIRATION RATE: 18 BRPM | DIASTOLIC BLOOD PRESSURE: 88 MMHG | WEIGHT: 92.81 LBS | HEART RATE: 75 BPM | TEMPERATURE: 96.8 F | HEIGHT: 58 IN | SYSTOLIC BLOOD PRESSURE: 167 MMHG | BODY MASS INDEX: 19.48 KG/M2 | OXYGEN SATURATION: 94 %

## 2022-08-18 DIAGNOSIS — E78.5 DYSLIPIDEMIA: ICD-10-CM

## 2022-08-18 DIAGNOSIS — J41.0 SIMPLE CHRONIC BRONCHITIS (HCC): ICD-10-CM

## 2022-08-18 DIAGNOSIS — E02 SUBCLINICAL IODINE-DEFICIENCY HYPOTHYROIDISM: ICD-10-CM

## 2022-08-18 DIAGNOSIS — M81.0 AGE-RELATED OSTEOPOROSIS WITHOUT CURRENT PATHOLOGICAL FRACTURE: ICD-10-CM

## 2022-08-18 LAB
ALBUMIN SERPL BCP-MCNC: 4.6 G/DL (ref 3.2–4.9)
ALBUMIN/GLOB SERPL: 1.3 G/DL
ALP SERPL-CCNC: 72 U/L (ref 30–99)
ALT SERPL-CCNC: 9 U/L (ref 2–50)
ANION GAP SERPL CALC-SCNC: 13 MMOL/L (ref 7–16)
AST SERPL-CCNC: 24 U/L (ref 12–45)
BILIRUB SERPL-MCNC: 0.4 MG/DL (ref 0.1–1.5)
BUN SERPL-MCNC: 15 MG/DL (ref 8–22)
CA-I BLD ISE-SCNC: 1.19 MMOL/L (ref 1.1–1.3)
CALCIUM SERPL-MCNC: 10.3 MG/DL (ref 8.5–10.5)
CHLORIDE SERPL-SCNC: 103 MMOL/L (ref 96–112)
CHOLEST SERPL-MCNC: 207 MG/DL (ref 100–199)
CO2 SERPL-SCNC: 22 MMOL/L (ref 20–33)
CREAT BLD-MCNC: 0.8 MG/DL (ref 0.5–1.4)
CREAT SERPL-MCNC: 0.86 MG/DL (ref 0.5–1.4)
ERYTHROCYTE [DISTWIDTH] IN BLOOD BY AUTOMATED COUNT: 49.7 FL (ref 35.9–50)
GFR SERPLBLD CREATININE-BSD FMLA CKD-EPI: 68 ML/MIN/1.73 M 2
GLOBULIN SER CALC-MCNC: 3.5 G/DL (ref 1.9–3.5)
GLUCOSE SERPL-MCNC: 88 MG/DL (ref 65–99)
HCT VFR BLD AUTO: 43.1 % (ref 37–47)
HDLC SERPL-MCNC: 80 MG/DL
HGB BLD-MCNC: 14 G/DL (ref 12–16)
LDLC SERPL CALC-MCNC: 102 MG/DL
MCH RBC QN AUTO: 30.4 PG (ref 27–33)
MCHC RBC AUTO-ENTMCNC: 32.5 G/DL (ref 33.6–35)
MCV RBC AUTO: 93.7 FL (ref 81.4–97.8)
PLATELET # BLD AUTO: 397 K/UL (ref 164–446)
PMV BLD AUTO: 9.7 FL (ref 9–12.9)
POTASSIUM SERPL-SCNC: 4 MMOL/L (ref 3.6–5.5)
PROT SERPL-MCNC: 8.1 G/DL (ref 6–8.2)
RBC # BLD AUTO: 4.6 M/UL (ref 4.2–5.4)
SODIUM SERPL-SCNC: 138 MMOL/L (ref 135–145)
TRIGL SERPL-MCNC: 127 MG/DL (ref 0–149)
TSH SERPL DL<=0.005 MIU/L-ACNC: 3.33 UIU/ML (ref 0.38–5.33)
WBC # BLD AUTO: 8.5 K/UL (ref 4.8–10.8)

## 2022-08-18 PROCEDURE — 36415 COLL VENOUS BLD VENIPUNCTURE: CPT

## 2022-08-18 PROCEDURE — 82565 ASSAY OF CREATININE: CPT | Mod: XU

## 2022-08-18 PROCEDURE — 80053 COMPREHEN METABOLIC PANEL: CPT

## 2022-08-18 PROCEDURE — 36415 COLL VENOUS BLD VENIPUNCTURE: CPT | Mod: XU

## 2022-08-18 PROCEDURE — 700111 HCHG RX REV CODE 636 W/ 250 OVERRIDE (IP): Performed by: INTERNAL MEDICINE

## 2022-08-18 PROCEDURE — 85027 COMPLETE CBC AUTOMATED: CPT

## 2022-08-18 PROCEDURE — 96372 THER/PROPH/DIAG INJ SC/IM: CPT

## 2022-08-18 PROCEDURE — 84443 ASSAY THYROID STIM HORMONE: CPT

## 2022-08-18 PROCEDURE — 82330 ASSAY OF CALCIUM: CPT

## 2022-08-18 PROCEDURE — 80061 LIPID PANEL: CPT

## 2022-08-18 RX ORDER — METHYLPREDNISOLONE SODIUM SUCCINATE 125 MG/2ML
125 INJECTION, POWDER, LYOPHILIZED, FOR SOLUTION INTRAMUSCULAR; INTRAVENOUS PRN
Status: CANCELLED | OUTPATIENT
Start: 2023-02-14

## 2022-08-18 RX ORDER — EPINEPHRINE 1 MG/ML(1)
0.5 AMPUL (ML) INJECTION PRN
Status: CANCELLED | OUTPATIENT
Start: 2023-02-14

## 2022-08-18 RX ORDER — DIPHENHYDRAMINE HYDROCHLORIDE 50 MG/ML
50 INJECTION INTRAMUSCULAR; INTRAVENOUS PRN
Status: CANCELLED | OUTPATIENT
Start: 2023-02-14

## 2022-08-18 RX ADMIN — DENOSUMAB 60 MG: 60 INJECTION SUBCUTANEOUS at 09:34

## 2022-08-18 ASSESSMENT — FIBROSIS 4 INDEX: FIB4 SCORE: 1.36

## 2022-08-18 NOTE — PROGRESS NOTES
Patient arrived ambulatory to [facility] for Prolia.  Patient denies any active infections or recent oral surgery.  Labs drawn from right a/c and results within parameters to treat.  Prolia given to left back arm, patient tolerated well.  Next appointment scheduled and patient ambulated out of clinic in no apparent distress.

## 2022-08-22 ENCOUNTER — TELEPHONE (OUTPATIENT)
Dept: MEDICAL GROUP | Facility: MEDICAL CENTER | Age: 77
End: 2022-08-22
Payer: MEDICARE

## 2022-08-22 NOTE — TELEPHONE ENCOUNTER
1STattempt and lvm for lab results callback   Patient is pending for LP . Patient is on  Xarelto for PE/DVT / Afib. Per attending ok to hold Xarelto for LP. Per attending hematology to be called in AM regarding anticoagulation.

## 2022-08-22 NOTE — TELEPHONE ENCOUNTER
----- Message from Richar Marti D.O. sent at 8/19/2022  6:39 AM PDT -----  Please let patient know her labs were not in a bad range and we can go over the details at her appointment on the 26th.  Does look like we will also need to discuss her blood pressure if she can keep track of it at home and bring in a log that would be helpful.  Thank you

## 2022-08-26 ENCOUNTER — OFFICE VISIT (OUTPATIENT)
Dept: MEDICAL GROUP | Facility: MEDICAL CENTER | Age: 77
End: 2022-08-26
Payer: MEDICARE

## 2022-08-26 VITALS
OXYGEN SATURATION: 93 % | WEIGHT: 94 LBS | SYSTOLIC BLOOD PRESSURE: 132 MMHG | HEART RATE: 76 BPM | BODY MASS INDEX: 19.73 KG/M2 | HEIGHT: 58 IN | TEMPERATURE: 97.1 F | DIASTOLIC BLOOD PRESSURE: 76 MMHG

## 2022-08-26 DIAGNOSIS — E02 SUBCLINICAL IODINE-DEFICIENCY HYPOTHYROIDISM: ICD-10-CM

## 2022-08-26 DIAGNOSIS — K20.90 ESOPHAGITIS WITH GASTRITIS: ICD-10-CM

## 2022-08-26 DIAGNOSIS — N18.31 STAGE 3A CHRONIC KIDNEY DISEASE: ICD-10-CM

## 2022-08-26 DIAGNOSIS — E78.5 DYSLIPIDEMIA: ICD-10-CM

## 2022-08-26 DIAGNOSIS — K29.70 ESOPHAGITIS WITH GASTRITIS: ICD-10-CM

## 2022-08-26 DIAGNOSIS — J41.0 SIMPLE CHRONIC BRONCHITIS (HCC): Chronic | ICD-10-CM

## 2022-08-26 PROCEDURE — 99214 OFFICE O/P EST MOD 30 MIN: CPT | Performed by: FAMILY MEDICINE

## 2022-08-26 ASSESSMENT — FIBROSIS 4 INDEX: FIB4 SCORE: 1.59

## 2022-08-26 NOTE — ASSESSMENT & PLAN NOTE
In a good range, no concerning symptoms or side effects will continue on current dose and monitor for changes.

## 2022-08-26 NOTE — ASSESSMENT & PLAN NOTE
Tolerating inhaler well, has agreed to do PFT for pulmonologist. Patient doing a good job rinsing mouth after Advair.

## 2022-08-26 NOTE — ASSESSMENT & PLAN NOTE
Maintaining good renal function. Will monitor q6months, avoid nephrotoxic medication, patient is on lisinopril.

## 2022-08-26 NOTE — PROGRESS NOTES
"Subjective:     CC: \"Lab review\"    HPI:   Olga presents today with:    Patient feels well does not have any complaints.  No recent falls, no chest pain, shortness of breath, pain with urination, trouble with stools, leg swelling.  Feel like she is tolerating medication well.  She rinses her mouth out after she uses her inhalers.  She says she is sleeping better than she has been in a while.  Generally feels upbeat.    Problem   Stage 3a Chronic Kidney Disease (Hcc)         Esophagitis With Gastritis    Patient seen on emergency department hospital 3/28.  She told me she was feeling bad starting on [employer] Day and denies partaking in festivities.  She has had abdominal pain, nausea, vomiting, heartburn, difficulty swallowing and admits to some weight loss as well.  She denies constipation, diarrhea, blood in the stool, blood in her vomit, coffee-ground in her emesis.  CT of her abdomen had the ED shows:    \"IMPRESSION:   1.  Wall thickening of distal stomach suggesting gastritis or possibly ulcer disease.  2.  Wall thickening of distal esophagus which is likely inflammatory.  3.  No bowel obstruction or perforation.  4.  Fat-containing LEFT inguinal hernia, possibly inflamed.\"    Patient was discharged on Protonix 40 mg twice daily, Carafate 4 times daily and as needed Zofran.  She tells me her symptoms have improved greatly, she is swallowing better and her appetite is improved.  She is still concerned and like to see a GI doc which is understandable.  She tells me she takes the Zofran nightly as needed for feelings of nausea.    Going back further she has had similar symptoms started in October and then she had again and middle of winter.  At that time she determine if it were to happen again she would go to the emergency department which is probably the most recent visit.     Chronic Obstructive Pulmonary Disease (Hcc)    Patient quit smoking in 2014 with what appears to be a 40-pack-year history.  She is " "on Advair twice daily and feels that her breathing is fairly well controlled.  She tells me she has significant claustrophobia and that is why she has not been able to finish the PFTs.     Dyslipidemia         Hypothyroidism             Current Outpatient Medications Ordered in Epic   Medication Sig Dispense Refill    levalbuterol (XOPENEX HFA) 45 MCG/ACT inhaler Inhale 1-2 Puffs every four hours as needed for Shortness of Breath. 1 Each 5    montelukast (SINGULAIR) 10 MG Tab TAKE ONE TABLET BY MOUTH ONE TIME DAILY 90 Tablet 3    temazepam (RESTORIL) 15 MG Cap Take 1 Capsule by mouth at bedtime as needed for Sleep for up to 90 days. 30 Capsule 2    levothyroxine (SYNTHROID) 50 MCG Tab Take 1 Tablet by mouth every morning on an empty stomach. 90 Tablet 0    fluticasone-salmeterol (ADVAIR) 250-50 MCG/DOSE AEROSOL POWDER, BREATH ACTIVATED INHALE 1 PUFF BY MOUTH TWICE DAILY 1 Each 11    metoprolol SR (TOPROL XL) 25 MG TABLET SR 24 HR TAKE ONE TABLET BY MOUTH ONE TIME DAILY 100 Tablet 3    atorvastatin (LIPITOR) 40 MG Tab Take 1 Tablet by mouth every day. 100 Tablet 3    lisinopril (PRINIVIL) 20 MG Tab Take 1 Tablet by mouth every day. 90 Tablet 3    magnesium oxide (MAG-OX) 400 MG Tab Take 400 mg by mouth every day.      Cholecalciferol (VITAMIN D) 2000 UNITS CAPS Take 2,000 Units by mouth every day.      calcium carbonate (OS-DARIEN 500) 1250 MG TABS Take 1,250 mg by mouth every day.      multivitamin (THERAGRAN) TABS Take 1 Tab by mouth every day.       No current [facility]-ordered facility-administered medications on file.       Health Maintenance: discussed completing shingles vaccine, getting Covid booster, annual flu shot    ROS:  ROS see HPI    Objective:     Exam:  /76   Pulse 76   Temp 36.2 °C (97.1 °F) (Temporal)   Ht 1.48 m (4' 10.27\")   Wt 42.6 kg (94 lb)   SpO2 93%   BMI 19.46 kg/m²  Body mass index is 19.46 kg/m².    Physical Exam  Vitals reviewed.   Constitutional:       General: She is not in acute " distress.     Appearance: Normal appearance.   HENT:      Head: Normocephalic and atraumatic.   Cardiovascular:      Rate and Rhythm: Normal rate and regular rhythm.      Heart sounds: Normal heart sounds.   Pulmonary:      Effort: Pulmonary effort is normal.      Breath sounds: Normal breath sounds.   Neurological:      Mental Status: She is alert. Mental status is at baseline.      Gait: Gait abnormal (uses cane).   Psychiatric:         Mood and Affect: Mood normal.         Behavior: Behavior normal.         Assessment & Plan:     77 y.o. female with the following -     Problem List Items Addressed This Visit       Chronic obstructive pulmonary disease (HCC) (Chronic)     Tolerating inhaler well, has agreed to do PFT for pulmonologist. Patient doing a good job rinsing mouth after Advair.         Stage 3a chronic kidney disease (HCC) (Chronic)     Maintaining good renal function. Will monitor q6months, avoid nephrotoxic medication, patient is on lisinopril.         Relevant Orders    Renal Function Panel    Hypothyroidism     In a good range, no concerning symptoms or side effects will continue on current dose and monitor for changes.         Relevant Orders    TSH WITH REFLEX TO FT4    Dyslipidemia     Lipid panel mildly elevated compared to previous. Will maintain current management and surveillance, if continues to trend up will discuss further.         Esophagitis with gastritis     Patient reports follow up with GI went well. No need for further follow up at this time. Symptoms well controlled, believed to be a one off event.                  Return in about 6 months (around 2/26/2023) for Lab F/U.    Please note that this dictation was created using voice recognition software. I have made every reasonable attempt to correct obvious errors, but I expect that there are errors of grammar and possibly content that I did not discover before finalizing the note.

## 2022-08-26 NOTE — ASSESSMENT & PLAN NOTE
Patient reports follow up with GI went well. No need for further follow up at this time. Symptoms well controlled, believed to be a one off event.

## 2022-08-26 NOTE — ASSESSMENT & PLAN NOTE
Lipid panel mildly elevated compared to previous. Will maintain current management and surveillance, if continues to trend up will discuss further.

## 2022-08-30 ENCOUNTER — TELEPHONE (OUTPATIENT)
Dept: SLEEP MEDICINE | Facility: MEDICAL CENTER | Age: 77
End: 2022-08-30
Payer: MEDICARE

## 2022-09-01 RX ORDER — FLUTICASONE PROPIONATE AND SALMETEROL 250; 50 UG/1; UG/1
1 POWDER RESPIRATORY (INHALATION) 2 TIMES DAILY
Qty: 60 EACH | Refills: 11 | Status: SHIPPED | OUTPATIENT
Start: 2022-09-01 | End: 2023-07-25 | Stop reason: SDUPTHER

## 2022-09-01 NOTE — TELEPHONE ENCOUNTER
Destiney Coon  to Riaz Liu Ass't    LM    3:27 PM  Patient is calling because she would like to switch her pharmacy to [facility] on [address]. She used to use the [facility] and they closed down and all her medications were sent to [facility] which she does not want to use. She is requesting a refill for Advair 250-50 mcg [facility]

## 2022-09-15 ENCOUNTER — DOCUMENTATION (OUTPATIENT)
Dept: HEALTH INFORMATION MANAGEMENT | Facility: OTHER | Age: 77
End: 2022-09-15
Payer: MEDICARE

## 2022-10-10 ENCOUNTER — TELEPHONE (OUTPATIENT)
Dept: CARDIOLOGY | Facility: MEDICAL CENTER | Age: 77
End: 2022-10-10
Payer: MEDICARE

## 2022-10-10 DIAGNOSIS — E78.5 DYSLIPIDEMIA: ICD-10-CM

## 2022-10-10 DIAGNOSIS — I25.10 ATHEROSCLEROSIS OF NATIVE CORONARY ARTERY OF NATIVE HEART WITHOUT ANGINA PECTORIS: ICD-10-CM

## 2022-10-10 DIAGNOSIS — Z82.49 FAMILY HISTORY OF ATHEROSCLEROSIS: ICD-10-CM

## 2022-10-10 DIAGNOSIS — I15.8 OTHER SECONDARY HYPERTENSION: ICD-10-CM

## 2022-10-10 RX ORDER — ATORVASTATIN CALCIUM 40 MG/1
40 TABLET, FILM COATED ORAL DAILY
Qty: 100 TABLET | Refills: 0 | Status: SHIPPED | OUTPATIENT
Start: 2022-10-10 | End: 2023-01-16 | Stop reason: SDUPTHER

## 2022-10-10 RX ORDER — LISINOPRIL 20 MG/1
20 TABLET ORAL DAILY
Qty: 100 TABLET | Refills: 0 | Status: SHIPPED | OUTPATIENT
Start: 2022-10-10 | End: 2023-01-13 | Stop reason: SDUPTHER

## 2022-10-10 RX ORDER — METOPROLOL SUCCINATE 25 MG/1
25 TABLET, EXTENDED RELEASE ORAL DAILY
Qty: 100 TABLET | Refills: 0 | Status: SHIPPED | OUTPATIENT
Start: 2022-10-10 | End: 2023-01-16 | Stop reason: SDUPTHER

## 2022-10-10 NOTE — TELEPHONE ENCOUNTER
TT    Caller: Husam Espinoza    Medication Name and Dosage:    lisinopril (PRINIVIL) 20 MG Tab [[id number]]    atorvastatin (LIPITOR) 40 MG Tab [[id number]]    metoprolol SR (TOPROL XL) 25 MG TABLET SR 24 HR [[id number]]    Did patient contact pharmacy (If no, please call pharmacy first): Yes    Medication amount left: 2 days    Preferred Pharmacy:   [facility]   [address]    Other questions (Topic): N/A    Callback Number (Will only call for issues): [phone]    Thank you,   Dagmar CHOI

## 2022-10-19 ENCOUNTER — TELEPHONE (OUTPATIENT)
Dept: SLEEP MEDICINE | Facility: MEDICAL CENTER | Age: 77
End: 2022-10-19
Payer: MEDICARE

## 2022-10-19 DIAGNOSIS — R06.02 SOB (SHORTNESS OF BREATH): ICD-10-CM

## 2022-10-19 DIAGNOSIS — G47.09 OTHER INSOMNIA: ICD-10-CM

## 2022-10-21 NOTE — TELEPHONE ENCOUNTER
Marianne Cox  You 2 days ago     RS  Patient needs rx Temazepam and Singulair transferred to [facility] and [address] Thank you      Olga Rich [phone]  Marianne Cox 2 days ago     SS  transfer rx Temazepam and Singulair to [facility] and [address]     Please send new Rx's to [facility] on [address].

## 2022-10-24 RX ORDER — MONTELUKAST SODIUM 10 MG/1
TABLET ORAL
Qty: 90 TABLET | Refills: 3 | Status: SHIPPED | OUTPATIENT
Start: 2022-10-24 | End: 2023-07-25 | Stop reason: SDUPTHER

## 2022-10-24 RX ORDER — TEMAZEPAM 15 MG/1
15 CAPSULE ORAL NIGHTLY PRN
Qty: 30 CAPSULE | Refills: 2 | Status: SHIPPED | OUTPATIENT
Start: 2022-10-24 | End: 2023-01-22

## 2022-11-14 RX ORDER — LEVOTHYROXINE SODIUM 0.05 MG/1
50 TABLET ORAL
Qty: 90 TABLET | Refills: 0 | Status: SHIPPED | OUTPATIENT
Start: 2022-11-14 | End: 2023-02-07 | Stop reason: SDUPTHER

## 2022-11-14 NOTE — TELEPHONE ENCOUNTER
Received request via: Patient    Was the patient seen in the last year in this department? Yes    Does the patient have an active prescription (recently filled or refills available) for medication(s) requested? No    Does the patient have nursing home Plus and need 100 day supply (blood pressure, diabetes and cholesterol meds only)? Yes

## 2022-11-23 ENCOUNTER — DOCUMENTATION (OUTPATIENT)
Dept: HEALTH INFORMATION MANAGEMENT | Facility: OTHER | Age: 77
End: 2022-11-23
Payer: MEDICARE

## 2023-01-05 ENCOUNTER — TELEPHONE (OUTPATIENT)
Dept: RHEUMATOLOGY | Facility: MEDICAL CENTER | Age: 78
End: 2023-01-05
Payer: MEDICARE

## 2023-01-05 NOTE — TELEPHONE ENCOUNTER
VOICEMAIL: 01/04/23  1. Caller Name: Olga                      Call Back Number: [phone] (home) [phone] (work)     2. Message: Pt called and lm, she has a Prolia scheduled on 02/20. Usually she see Dr Thompson prior. Pt requesting an appt prior to her Prolia.     3. Patient approves office to leave a detailed voicemail/MyChart message: N\A      01/05/23:  Called and spoke with pt. Scheduled pt an appt on 02/16/23 with Dr Thompson.

## 2023-01-12 DIAGNOSIS — I15.8 OTHER SECONDARY HYPERTENSION: ICD-10-CM

## 2023-01-12 DIAGNOSIS — I25.10 ATHEROSCLEROSIS OF NATIVE CORONARY ARTERY OF NATIVE HEART WITHOUT ANGINA PECTORIS: ICD-10-CM

## 2023-01-12 DIAGNOSIS — Z82.49 FAMILY HISTORY OF ATHEROSCLEROSIS: ICD-10-CM

## 2023-01-12 DIAGNOSIS — E78.5 DYSLIPIDEMIA: ICD-10-CM

## 2023-01-13 ENCOUNTER — TELEPHONE (OUTPATIENT)
Dept: CARDIOLOGY | Facility: MEDICAL CENTER | Age: 78
End: 2023-01-13
Payer: MEDICARE

## 2023-01-13 DIAGNOSIS — I15.8 OTHER SECONDARY HYPERTENSION: ICD-10-CM

## 2023-01-13 RX ORDER — LISINOPRIL 20 MG/1
20 TABLET ORAL DAILY
Qty: 100 TABLET | Refills: 0 | Status: SHIPPED | OUTPATIENT
Start: 2023-01-13 | End: 2023-02-28

## 2023-01-13 NOTE — TELEPHONE ENCOUNTER
Is the patient due for a refill? Yes    Was the patient seen the past year? Yes    Date of last office visit: 01/12/22    Does the patient have an upcoming appointment?  Yes   If yes, When? 02/28/23    Provider to refill:TT    Does the patients insurance require a 100 day supply?  Yes    Second courtesy refill. Please advise. Thank you.

## 2023-01-13 NOTE — TELEPHONE ENCOUNTER
TT        Caller: Olga Abernathyer    Medication Name and Dosage: lisinopril (PRINIVIL) 20 MG Tab        Please call your pharmacy and have them send us a refill request or speak to a live representative, RX number may have changed.    Medication amount left: 8 left     Preferred Pharmacy: [id number] - [place], [place] - [address]    Other questions (Topic): n/a    Callback Number (Will only call for issues): [phone]    Thank you    -Js BATISTA

## 2023-01-13 NOTE — TELEPHONE ENCOUNTER
Is the patient due for a refill? Yes    Was the patient seen the past year? No    Date of last office visit: 1/12/2023    Does the patient have an upcoming appointment?  Yes   If yes, When? 2/28/2023    Provider to refill:TT    Does the patients insurance require a 100 day supply?  Yes

## 2023-01-16 ENCOUNTER — TELEPHONE (OUTPATIENT)
Dept: CARDIOLOGY | Facility: MEDICAL CENTER | Age: 78
End: 2023-01-16
Payer: MEDICARE

## 2023-01-16 DIAGNOSIS — I25.10 ATHEROSCLEROSIS OF NATIVE CORONARY ARTERY OF NATIVE HEART WITHOUT ANGINA PECTORIS: ICD-10-CM

## 2023-01-16 DIAGNOSIS — Z82.49 FAMILY HISTORY OF ATHEROSCLEROSIS: ICD-10-CM

## 2023-01-16 DIAGNOSIS — E78.5 DYSLIPIDEMIA: ICD-10-CM

## 2023-01-16 RX ORDER — METOPROLOL SUCCINATE 25 MG/1
25 TABLET, EXTENDED RELEASE ORAL DAILY
Qty: 100 TABLET | Refills: 0 | Status: SHIPPED | OUTPATIENT
Start: 2023-01-16 | End: 2023-05-01

## 2023-01-16 RX ORDER — ATORVASTATIN CALCIUM 40 MG/1
40 TABLET, FILM COATED ORAL DAILY
Qty: 100 TABLET | Refills: 0 | Status: SHIPPED | OUTPATIENT
Start: 2023-01-16 | End: 2023-02-28

## 2023-01-16 NOTE — TELEPHONE ENCOUNTER
Is the patient due for a refill? Yes    Was the patient seen the past year? No    Date of last office visit: 1/12/2022    Does the patient have an upcoming appointment?  Yes   If yes, When? 2/18/2023    Provider to refill:TT    Does the patients insurance require a 100 day supply?  Yes

## 2023-01-17 RX ORDER — ATORVASTATIN CALCIUM 40 MG/1
40 TABLET, FILM COATED ORAL DAILY
Qty: 100 TABLET | Refills: 0 | OUTPATIENT
Start: 2023-01-17

## 2023-01-17 RX ORDER — METOPROLOL SUCCINATE 25 MG/1
TABLET, EXTENDED RELEASE ORAL
Qty: 100 TABLET | Refills: 0 | OUTPATIENT
Start: 2023-01-17

## 2023-01-17 RX ORDER — LISINOPRIL 20 MG/1
20 TABLET ORAL DAILY
Qty: 100 TABLET | Refills: 0 | OUTPATIENT
Start: 2023-01-17

## 2023-02-07 RX ORDER — LEVOTHYROXINE SODIUM 0.05 MG/1
50 TABLET ORAL
Qty: 100 TABLET | Refills: 0 | Status: SHIPPED | OUTPATIENT
Start: 2023-02-07 | End: 2023-05-05

## 2023-02-07 NOTE — TELEPHONE ENCOUNTER
Received request via: Patient    Was the patient seen in the last year in this department? Yes    Does the patient have an active prescription (recently filled or refills available) for medication(s) requested? No    Does the patient have detention Plus and need 100 day supply (blood pressure, diabetes and cholesterol meds only)? Yes, quantity updated to 100 days

## 2023-02-16 ENCOUNTER — HOSPITAL ENCOUNTER (OUTPATIENT)
Dept: LAB | Facility: MEDICAL CENTER | Age: 78
End: 2023-02-16
Attending: FAMILY MEDICINE
Payer: MEDICARE

## 2023-02-16 ENCOUNTER — OFFICE VISIT (OUTPATIENT)
Dept: RHEUMATOLOGY | Facility: MEDICAL CENTER | Age: 78
End: 2023-02-16
Attending: INTERNAL MEDICINE
Payer: MEDICARE

## 2023-02-16 VITALS
BODY MASS INDEX: 20.29 KG/M2 | HEART RATE: 96 BPM | OXYGEN SATURATION: 91 % | TEMPERATURE: 97.4 F | SYSTOLIC BLOOD PRESSURE: 140 MMHG | DIASTOLIC BLOOD PRESSURE: 80 MMHG | RESPIRATION RATE: 12 BRPM | WEIGHT: 98 LBS

## 2023-02-16 DIAGNOSIS — E02 SUBCLINICAL IODINE-DEFICIENCY HYPOTHYROIDISM: ICD-10-CM

## 2023-02-16 DIAGNOSIS — M81.0 AGE-RELATED OSTEOPOROSIS WITHOUT CURRENT PATHOLOGICAL FRACTURE: ICD-10-CM

## 2023-02-16 DIAGNOSIS — I35.0 AORTIC VALVE STENOSIS, ETIOLOGY OF CARDIAC VALVE DISEASE UNSPECIFIED: ICD-10-CM

## 2023-02-16 DIAGNOSIS — N18.31 STAGE 3A CHRONIC KIDNEY DISEASE: ICD-10-CM

## 2023-02-16 DIAGNOSIS — E03.9 ACQUIRED HYPOTHYROIDISM: ICD-10-CM

## 2023-02-16 DIAGNOSIS — J44.9 CHRONIC OBSTRUCTIVE PULMONARY DISEASE, UNSPECIFIED COPD TYPE (HCC): ICD-10-CM

## 2023-02-16 LAB
ALBUMIN SERPL BCP-MCNC: 4.6 G/DL (ref 3.2–4.9)
BUN SERPL-MCNC: 15 MG/DL (ref 8–22)
CALCIUM ALBUM COR SERPL-MCNC: 10 MG/DL (ref 8.5–10.5)
CALCIUM SERPL-MCNC: 10.5 MG/DL (ref 8.5–10.5)
CHLORIDE SERPL-SCNC: 103 MMOL/L (ref 96–112)
CO2 SERPL-SCNC: 29 MMOL/L (ref 20–33)
CREAT SERPL-MCNC: 0.84 MG/DL (ref 0.5–1.4)
GFR SERPLBLD CREATININE-BSD FMLA CKD-EPI: 70 ML/MIN/1.73 M 2
GLUCOSE SERPL-MCNC: 109 MG/DL (ref 65–99)
PHOSPHATE SERPL-MCNC: 4 MG/DL (ref 2.5–4.5)
POTASSIUM SERPL-SCNC: 4.3 MMOL/L (ref 3.6–5.5)
SODIUM SERPL-SCNC: 142 MMOL/L (ref 135–145)
TSH SERPL DL<=0.005 MIU/L-ACNC: 5.08 UIU/ML (ref 0.38–5.33)

## 2023-02-16 PROCEDURE — 80069 RENAL FUNCTION PANEL: CPT

## 2023-02-16 PROCEDURE — 99214 OFFICE O/P EST MOD 30 MIN: CPT | Performed by: INTERNAL MEDICINE

## 2023-02-16 PROCEDURE — 36415 COLL VENOUS BLD VENIPUNCTURE: CPT

## 2023-02-16 PROCEDURE — 99212 OFFICE O/P EST SF 10 MIN: CPT | Performed by: INTERNAL MEDICINE

## 2023-02-16 PROCEDURE — 84443 ASSAY THYROID STIM HORMONE: CPT

## 2023-02-16 RX ORDER — TEMAZEPAM 15 MG/1
15 CAPSULE ORAL NIGHTLY PRN
COMMUNITY
End: 2023-08-24

## 2023-02-16 ASSESSMENT — FIBROSIS 4 INDEX: FIB4 SCORE: 1.59

## 2023-02-16 NOTE — PROGRESS NOTES
Chief Complaint- joint pain     Subjective:   Olga Rich is a 77 y.o. female here today for follow up of rheumatological issues    This is a follow-up visit for this patient who we see in this clinic for osteoporosis.  Patient was diagnosed with osteoporosis in 2008 status post a right femur fracture.  Patient had been on Fosamax for 4 years currently is on Prolia 60 mg subcu every 6 months with benefit.  Last DEXA was March 2021, patient next DEXA due March 2023. Patient denies any side effects from the medication, denies any unexplained weight loss, denies any fevers of unknown etiology, denies any GI upset, denies any rashes, denies any new joint swelling, denies recurrent infections.  Patient denies any fractures since last visit.     Additional comorbidities include  hypothyroidism and COPD/asthma for which she is managed by pulmonology and her primary care doctor.     Additional psychosocial comorbidities include alcoholism in the family but patient is quite happy with her son and daughter who continues to be sober.       S/p fosamax for 4 years      DEXA 4/2014 T scores -4.4, -3.9  DEXA 7/2016 T scores -3.7, -4.0  FRAX Major Osteoporotic 28.9%, Hip fx risk 11.1%  DEXA 1/21/2019 T scores -4.0, -3.4  FRAX 1/21/2019 major osteoporotic fracture risk 31.3%, hip fracture risk 13.9%  DEXA 3/11/2021 T scores -3.8, -3.6  FRAX 3/11/2021 major osteoporotic fracture risk 31.1%, hip fracture risk 13.8%     Addendum 7/6/2023  DEXA 7/5/2023 T scores -3.8, -3.4  FRAX 7/5/2023 major osteoporotic fracture risk 31.3%, hip fracture risk 13.0%  On Prolia 60 mg every 6 months     Current Outpatient Medications   Medication Sig Dispense Refill    temazepam (RESTORIL) 15 MG Cap Take 15 mg by mouth at bedtime as needed for Sleep.      levothyroxine (SYNTHROID) 50 MCG Tab Take 1 Tablet by mouth every morning on an empty stomach. 100 Tablet 0    atorvastatin (LIPITOR) 40 MG Tab Take 1 Tablet by mouth every day. **LAST SEEN  1/2022 .  TO ENSURE FURTHER REFILLS, CALL [phone] TO SCHEDULE FOLLOW UP VISIT.** 100 Tablet 0    metoprolol SR (TOPROL XL) 25 MG TABLET SR 24 HR Take 1 Tablet by mouth every day. **LAST SEEN 1/2022 .  TO ENSURE FURTHER REFILLS, CALL [phone] TO SCHEDULE FOLLOW UP VISIT.** 100 Tablet 0    lisinopril (PRINIVIL) 20 MG Tab Take 1 Tablet by mouth every day. 100 Tablet 0    montelukast (SINGULAIR) 10 MG Tab TAKE ONE TABLET BY MOUTH ONE TIME DAILY 90 Tablet 3    fluticasone-salmeterol (ADVAIR DISKUS) 250-50 MCG/ACT AEROSOL POWDER, BREATH ACTIVATED Inhale 1 Puff 2 times a day. Rinse mouth after each use. 60 Each 11    levalbuterol (XOPENEX HFA) 45 MCG/ACT inhaler Inhale 1-2 Puffs every four hours as needed for Shortness of Breath. 1 Each 5    fluticasone-salmeterol (ADVAIR) 250-50 MCG/DOSE AEROSOL POWDER, BREATH ACTIVATED INHALE 1 PUFF BY MOUTH TWICE DAILY 1 Each 11    magnesium oxide (MAG-OX) 400 MG Tab Take 400 mg by mouth every day.      Cholecalciferol (VITAMIN D) 2000 UNITS CAPS Take 2,000 Units by mouth every day.      calcium carbonate (OS-DARIEN 500) 1250 MG TABS Take 1,250 mg by mouth every day.      multivitamin (THERAGRAN) TABS Take 1 Tab by mouth every day.       No current facility-administered medications for this visit.     She  has a past medical history of Allergic rhinitis, Arthritis, Asthma in adult, Cataract (2/2012), Dental disorder, Dyslipidemia (4/26/2016), GERD (gastroesophageal reflux disease), High cholesterol, Hypertension, Hypothyroid, Hypothyroid (4/18/2014), Hypothyroid (4/18/2014), Osteoporosis, unspecified, and Pneumonia (2009).    ROS   Other than what is mentioned in HPI or physical exam, there is no history of headaches, double vision or blurred vision. No temporal tenderness or jaw claudication. No trouble swallowing difficulties or sore throats.  No chest complaints including chest pain, cough or sputum production. No GI complaints including nausea, vomiting, change in bowel  habits, or past peptic ulcer disease. No history of blood in the stools. No urinary complaints including dysuria or frequency. No history of alopecia, photosensitivity, oral ulcerations, Raynaud's phenomena.       Objective:     BP (!) 140/80   Pulse 96   Temp 36.3 °C (97.4 °F) (Temporal)   Resp 12   Wt 44.5 kg (98 lb)   SpO2 91%  Body mass index is 20.29 kg/m².   Physical Exam:    Constitutional: Alert and oriented X3, patient is talkative with good eye contact.Skin: Warm, dry, good turgor, no rashes in visible areas.Eye: Equal, round and reactive, conjunctiva clear, lids normal EOM intactENMT: Lips without lesions, good dentition, no oropharyngeal ulcers, moist buccal mucosa, pinna without deformityNeck: Trachea midline, no masses, no thyromegaly.Lymph:  No cervical lymphadenopathy, no axillary lymphadenopathy, no supraclavicular lymphadenopathyRespiratory: Unlabored respiratory effort, lungs clear to auscultation, no wheezes, no ronchi.Cardiovascular: Normal S1, S2, Regular rate and rhythm, no murmurs rubs or gallops  .Abdomen: Soft, non-tender, no masses, no hepatosplenomegaly.Psych: Alert and oriented x3, normal affect and mood.Neuro: Cranial nerves 2-12 are grossly intact, no loss of sensation LEExt:no joint laxity noted in bilateral arms, no joint laxity noted in bilateral legs, mild Heberden's nodes DIP joints minimal dowagers hump    Lab Results   Component Value Date/Time    SODIUM 138 08/18/2022 10:35 AM    POTASSIUM 4.0 08/18/2022 10:35 AM    CHLORIDE 103 08/18/2022 10:35 AM    CO2 22 08/18/2022 10:35 AM    GLUCOSE 88 08/18/2022 10:35 AM    BUN 15 08/18/2022 10:35 AM    CREATININE 0.86 08/18/2022 10:35 AM      Lab Results   Component Value Date/Time    WBC 8.5 08/18/2022 10:35 AM    RBC 4.60 08/18/2022 10:35 AM    HEMOGLOBIN 14.0 08/18/2022 10:35 AM    HEMATOCRIT 43.1 08/18/2022 10:35 AM    MCV 93.7 08/18/2022 10:35 AM    MCH 30.4 08/18/2022 10:35 AM    MCHC 32.5 (L) 08/18/2022 10:35 AM    MPV 9.7  08/18/2022 10:35 AM    NEUTSPOLYS 82.80 (H) 03/28/2022 03:54 PM    LYMPHOCYTES 9.50 (L) 03/28/2022 03:54 PM    MONOCYTES 6.70 03/28/2022 03:54 PM    EOSINOPHILS 0.10 03/28/2022 03:54 PM    BASOPHILS 0.30 03/28/2022 03:54 PM      Lab Results   Component Value Date/Time    CALCIUM 10.3 08/18/2022 10:35 AM    ASTSGOT 24 08/18/2022 10:35 AM    ALTSGPT 9 08/18/2022 10:35 AM    ALKPHOSPHAT 72 08/18/2022 10:35 AM    TBILIRUBIN 0.4 08/18/2022 10:35 AM    ALBUMIN 4.6 08/18/2022 10:35 AM    ALBUMIN 5.77 (H) 12/04/2014 03:10 PM    TOTPROTEIN 8.1 08/18/2022 10:35 AM    TOTPROTEIN 9.50 (H) 12/04/2014 03:10 PM     Lab Results   Component Value Date/Time    SEDRATEWES 22 12/04/2014 03:10 PM     Lab Results   Component Value Date/Time    CTELOPEP 50 06/09/2015 01:15 PM     Lab Results   Component Value Date/Time    PTHINTACT 48.8 12/09/2016 10:20 AM     Assessment and Plan:     1. Age-related osteoporosis without current pathological fracture  Clinically stable on Prolia 60 mg subcu every 6 months.  Last DEXA March 2021 patient due for DEXA March 2023, DEXA ordered for patient   continue calcium citrate 1200 mg by mouth daily and vitamin D about 2000 units by mouth daily and magnesium 200 mg by mouth daily  - DS-BONE DENSITY STUDY (DEXA); Future    2. Acquired hypothyroidism  Followed by patients PCP, can exacerbate joint pain, I recommend monitoring thyroid on a regular basis to assure it is not contributing to the patient's joint pain  - DS-BONE DENSITY STUDY (DEXA); Future    3. Chronic obstructive pulmonary disease, unspecified COPD type (HCC)  Currently managed by pulmonology  - DS-BONE DENSITY STUDY (DEXA); Future    4. Aortic valve stenosis, etiology of cardiac valve disease unspecified  Currently managed by cardiology    Followup: Return in about 1 year (around 2/16/2024). or sooner hilton Rich  was seen 30 minutes face-to-face of which more than 50% of the time was spent counseling the patient (excluding  time for procedures)  regarding  rheumatological condition and care. Therapy was discussed in detail.      Please note that this dictation was created using voice recognition software. I have made every reasonable attempt to correct obvious errors, but I expect that there are errors of grammar and possibly content that I did not discover before finalizing the note.

## 2023-02-20 ENCOUNTER — OUTPATIENT INFUSION SERVICES (OUTPATIENT)
Dept: ONCOLOGY | Facility: MEDICAL CENTER | Age: 78
End: 2023-02-20
Attending: INTERNAL MEDICINE
Payer: MEDICARE

## 2023-02-20 VITALS
BODY MASS INDEX: 20.55 KG/M2 | HEART RATE: 91 BPM | RESPIRATION RATE: 18 BRPM | HEIGHT: 58 IN | OXYGEN SATURATION: 92 % | SYSTOLIC BLOOD PRESSURE: 169 MMHG | TEMPERATURE: 97.2 F | DIASTOLIC BLOOD PRESSURE: 90 MMHG | WEIGHT: 97.88 LBS

## 2023-02-20 DIAGNOSIS — M81.0 AGE-RELATED OSTEOPOROSIS WITHOUT CURRENT PATHOLOGICAL FRACTURE: ICD-10-CM

## 2023-02-20 LAB
CA-I BLD ISE-SCNC: 1.27 MMOL/L (ref 1.1–1.3)
CREAT BLD-MCNC: 0.9 MG/DL (ref 0.5–1.4)

## 2023-02-20 PROCEDURE — 96372 THER/PROPH/DIAG INJ SC/IM: CPT

## 2023-02-20 PROCEDURE — 700111 HCHG RX REV CODE 636 W/ 250 OVERRIDE (IP): Performed by: INTERNAL MEDICINE

## 2023-02-20 PROCEDURE — 36415 COLL VENOUS BLD VENIPUNCTURE: CPT

## 2023-02-20 PROCEDURE — 82330 ASSAY OF CALCIUM: CPT

## 2023-02-20 PROCEDURE — 82565 ASSAY OF CREATININE: CPT

## 2023-02-20 RX ORDER — DIPHENHYDRAMINE HYDROCHLORIDE 50 MG/ML
50 INJECTION INTRAMUSCULAR; INTRAVENOUS PRN
Status: CANCELLED | OUTPATIENT
Start: 2023-08-13

## 2023-02-20 RX ORDER — METHYLPREDNISOLONE SODIUM SUCCINATE 125 MG/2ML
125 INJECTION, POWDER, LYOPHILIZED, FOR SOLUTION INTRAMUSCULAR; INTRAVENOUS PRN
Status: CANCELLED | OUTPATIENT
Start: 2023-08-13

## 2023-02-20 RX ORDER — EPINEPHRINE 1 MG/ML(1)
0.5 AMPUL (ML) INJECTION PRN
Status: CANCELLED | OUTPATIENT
Start: 2023-08-13

## 2023-02-20 RX ADMIN — DENOSUMAB 60 MG: 60 INJECTION SUBCUTANEOUS at 14:36

## 2023-02-20 ASSESSMENT — FIBROSIS 4 INDEX: FIB4 SCORE: 1.59

## 2023-02-20 NOTE — PROGRESS NOTES
Pt presents to [facility] for prolia injection. Pt denies any oral surgery in the last 6 weeks and is taking calcium and vitamin D; education on s/s of hypocalcemia provided and Pt verbalized understanding. Labs drawn from right hand; brisk blood return observed and pt tolerated well. Labs within treatable parameters. Prolia injected into Right back arm. with no s/s of adverse reactions. Emailed scheduling to set next Prolia 6 month appointment and to reach out to the Pt as she does not do MyChart. Pt discharged to self care with all personal belongings and in no apparent distress.

## 2023-02-24 ENCOUNTER — OFFICE VISIT (OUTPATIENT)
Dept: MEDICAL GROUP | Facility: MEDICAL CENTER | Age: 78
End: 2023-02-24
Payer: MEDICARE

## 2023-02-24 VITALS
OXYGEN SATURATION: 95 % | HEART RATE: 91 BPM | DIASTOLIC BLOOD PRESSURE: 72 MMHG | BODY MASS INDEX: 20.65 KG/M2 | WEIGHT: 98.4 LBS | HEIGHT: 58 IN | TEMPERATURE: 97.3 F | SYSTOLIC BLOOD PRESSURE: 126 MMHG

## 2023-02-24 DIAGNOSIS — I77.9 DISORDER OF ARTERIES AND ARTERIOLES, UNSPECIFIED (HCC): ICD-10-CM

## 2023-02-24 DIAGNOSIS — R09.81 SINUS CONGESTION: ICD-10-CM

## 2023-02-24 DIAGNOSIS — N18.31 STAGE 3A CHRONIC KIDNEY DISEASE: Chronic | ICD-10-CM

## 2023-02-24 DIAGNOSIS — Z11.59 NEED FOR HEPATITIS C SCREENING TEST: ICD-10-CM

## 2023-02-24 DIAGNOSIS — E02 SUBCLINICAL IODINE-DEFICIENCY HYPOTHYROIDISM: ICD-10-CM

## 2023-02-24 DIAGNOSIS — E78.5 DYSLIPIDEMIA: ICD-10-CM

## 2023-02-24 DIAGNOSIS — J41.0 SIMPLE CHRONIC BRONCHITIS (HCC): Chronic | ICD-10-CM

## 2023-02-24 DIAGNOSIS — F13.20 SEDATIVE, HYPNOTIC, OR ANXIOLYTIC DEPENDENCE (HCC): Chronic | ICD-10-CM

## 2023-02-24 DIAGNOSIS — J45.20 MILD INTERMITTENT ASTHMA WITHOUT COMPLICATION: Chronic | ICD-10-CM

## 2023-02-24 PROCEDURE — 99214 OFFICE O/P EST MOD 30 MIN: CPT | Performed by: FAMILY MEDICINE

## 2023-02-24 RX ORDER — FLUTICASONE PROPIONATE 50 MCG
1 SPRAY, SUSPENSION (ML) NASAL DAILY
Qty: 16 G | Refills: 3 | Status: SHIPPED | OUTPATIENT
Start: 2023-02-24 | End: 2023-03-20

## 2023-02-24 ASSESSMENT — PATIENT HEALTH QUESTIONNAIRE - PHQ9: CLINICAL INTERPRETATION OF PHQ2 SCORE: 0

## 2023-02-24 ASSESSMENT — FIBROSIS 4 INDEX: FIB4 SCORE: 1.59

## 2023-02-24 NOTE — ASSESSMENT & PLAN NOTE
TSH has trended up to 5, technically still in range but not optimal. Currently on levothyroxine 50 mcg but at 75 mcg TSH was way too low. Will try 75 mcg on Sunday and Wednesday and 50 mcg on all other days and recheck at next lab draw.

## 2023-02-24 NOTE — PROGRESS NOTES
"Subjective:     CC: \"lab follow up\"    HPI:   Olga presents today with:    Lab review:  Thyroid showed TSH has trended up towards 5  Patient taking 50 mcg per day as directed    Renal function  Understands importance of hydration  No ibuprofen or other NSAIDs, does use tylenol  Patient takes lisinopril 20 mg as directed    Sinus Congestion  Last few months has not increased dyspnea on exertion  Notes increased sinus congestion  Concerned about lungs and heart as well  Not having chest pain or leg swelling  She is taking atorvastatin as directed, no body aches  She is taking metoprolol and lisinopril as directed as well    Patient uses temazepam as directed, does not use it every night  No issues with over sedation, sleep walking or falls    Problem   Stage 3a Chronic Kidney Disease (Hcc)   Disorder of Arteries and Arterioles, Unspecified (Hcc)   Sedative, Hypnotic, Or Anxiolytic Dependence (Hcc)    Patient has been she uses temazepam 1-2 times a week to help with her sleep.     Mild Intermittent Asthma Without Complication   Dyslipidemia   Hypothyroidism       Current Outpatient Medications Ordered in Epic   Medication Sig Dispense Refill    fluticasone (FLONASE) 50 MCG/ACT nasal spray Administer 1 Spray into affected nostril(S) every day. 16 g 3    temazepam (RESTORIL) 15 MG Cap Take 15 mg by mouth at bedtime as needed for Sleep.      levothyroxine (SYNTHROID) 50 MCG Tab Take 1 Tablet by mouth every morning on an empty stomach. 100 Tablet 0    atorvastatin (LIPITOR) 40 MG Tab Take 1 Tablet by mouth every day. **LAST SEEN 1/2022 .  TO ENSURE FURTHER REFILLS, CALL [phone] TO SCHEDULE FOLLOW UP VISIT.** 100 Tablet 0    metoprolol SR (TOPROL XL) 25 MG TABLET SR 24 HR Take 1 Tablet by mouth every day. **LAST SEEN 1/2022 .  TO ENSURE FURTHER REFILLS, CALL [phone] TO SCHEDULE FOLLOW UP VISIT.** 100 Tablet 0    lisinopril (PRINIVIL) 20 MG Tab Take 1 Tablet by mouth every day. 100 Tablet 0    montelukast " "(SINGULAIR) 10 MG Tab TAKE ONE TABLET BY MOUTH ONE TIME DAILY 90 Tablet 3    fluticasone-salmeterol (ADVAIR DISKUS) 250-50 MCG/ACT AEROSOL POWDER, BREATH ACTIVATED Inhale 1 Puff 2 times a day. Rinse mouth after each use. 60 Each 11    levalbuterol (XOPENEX HFA) 45 MCG/ACT inhaler Inhale 1-2 Puffs every four hours as needed for Shortness of Breath. 1 Each 5    fluticasone-salmeterol (ADVAIR) 250-50 MCG/DOSE AEROSOL POWDER, BREATH ACTIVATED INHALE 1 PUFF BY MOUTH TWICE DAILY 1 Each 11    magnesium oxide (MAG-OX) 400 MG Tab Take 400 mg by mouth every day.      Cholecalciferol (VITAMIN D) 2000 UNITS CAPS Take 2,000 Units by mouth every day.      calcium carbonate (OS-DARIEN 500) 1250 MG TABS Take 1,250 mg by mouth every day.      multivitamin (THERAGRAN) TABS Take 1 Tab by mouth every day.       No current [facility]-ordered facility-administered medications on file.       Health Maintenance: no vaccines today    ROS:  ROS see HPI    Objective:     Exam:  /72   Pulse 91   Temp 36.3 °C (97.3 °F) (Temporal)   Ht 1.47 m (4' 9.87\")   Wt 44.6 kg (98 lb 6.4 oz)   SpO2 95%   BMI 20.66 kg/m²  Body mass index is 20.66 kg/m².    Physical Exam  Vitals reviewed.   Constitutional:       General: She is not in acute distress.     Appearance: Normal appearance.   HENT:      Head: Normocephalic and atraumatic.      Nose: Nose normal.      Mouth/Throat:      Mouth: Mucous membranes are dry.      Pharynx: Oropharynx is clear.   Cardiovascular:      Rate and Rhythm: Normal rate and regular rhythm.      Heart sounds: Normal heart sounds.   Pulmonary:      Effort: Pulmonary effort is normal.      Breath sounds: Normal breath sounds.   Neurological:      Mental Status: She is alert. Mental status is at baseline.   Psychiatric:         Mood and Affect: Mood normal.         Behavior: Behavior normal.       Labs:           Assessment & Plan:     77 y.o. female with the following -     Problem List Items Addressed This Visit       Chronic " obstructive pulmonary disease (HCC) (Chronic)    Relevant Medications    fluticasone (FLONASE) 50 MCG/ACT nasal spray    Other Relevant Orders    CBC WITH DIFFERENTIAL    Mild intermittent asthma without complication (Chronic)     No wheezing on exam  Patient to continue Advair and prn albuterol  Patient reports compliance with montelukast  Patient to follow up with pulmonology         Relevant Orders    CBC WITH DIFFERENTIAL    Sedative, hypnotic, or anxiolytic dependence (HCC) (Chronic)     Chronic, stable. Continue with current defined treatment plan: Patient to continue with atorvastatin 40 mg, will be following up with cardiology to discuss if shortness of breath is cardiogenic, does not have any concerning other symptoms to suggest its her heart. Follow-up at least annually.         Relevant Orders    Comp Metabolic Panel    Disorder of arteries and arterioles, unspecified (HCC) (Chronic)     Chronic, stable, as based on today's assessment and impact on other conditions evaluated today. Continue with current treatment plan: Continue temazepam 15 mg nightly prn, patient doing a good job not using it every night. Follow-up with specialist as directed, but at least annually.         Stage 3a chronic kidney disease (HCC) (Chronic)     GFR continues to improve, encouraged adequate hydration and avoid nephrotoxic medication. Will continue to monitor every 6 months.         Relevant Orders    Comp Metabolic Panel    MICROALBUMIN CREAT RATIO URINE    Hypothyroidism     TSH has trended up to 5, technically still in range but not optimal. Currently on levothyroxine 50 mcg but at 75 mcg TSH was way too low. Will try 75 mcg on Sunday and Wednesday and 50 mcg on all other days and recheck at next lab draw.         Relevant Orders    TSH WITH REFLEX TO FT4    Dyslipidemia     Chronic and stable, patient to continue with atorvastatin 40 mg         Relevant Orders    Lipid Profile     Other Visit Diagnoses       Sinus  congestion      Patient to initiate daily flonase in each nostril, discussed can take 2 weeks to really see if it is helpful    Relevant Orders    CBC WITH DIFFERENTIAL    Need for hepatitis C screening test        Relevant Orders    HCV Scrn ( [id number] [employer])                Return in about 6 months (around 2023), or if symptoms worsen or fail to improve.    Please note that this dictation was created using voice recognition software. I have made every reasonable attempt to correct obvious errors, but I expect that there are errors of grammar and possibly content that I did not discover before finalizing the note.

## 2023-02-24 NOTE — ASSESSMENT & PLAN NOTE
Chronic, stable, as based on today's assessment and impact on other conditions evaluated today. Continue with current treatment plan: Continue temazepam 15 mg nightly prn, patient doing a good job not using it every night. Follow-up with specialist as directed, but at least annually.

## 2023-02-24 NOTE — ASSESSMENT & PLAN NOTE
GFR continues to improve, encouraged adequate hydration and avoid nephrotoxic medication. Will continue to monitor every 6 months.

## 2023-02-24 NOTE — ASSESSMENT & PLAN NOTE
Chronic, stable. Continue with current defined treatment plan: Patient to continue with atorvastatin 40 mg, will be following up with cardiology to discuss if shortness of breath is cardiogenic, does not have any concerning other symptoms to suggest its her heart. Follow-up at least annually.

## 2023-02-24 NOTE — ASSESSMENT & PLAN NOTE
No wheezing on exam  Patient to continue Advair and prn albuterol  Patient reports compliance with montelukast  Patient to follow up with pulmonology

## 2023-02-24 NOTE — PATIENT INSTRUCTIONS
Thyroid medication  Thyroid medication take 50 mcg daily other than on Sundays and Wednesdays take 75 mcg which would be 1 and 1/2 tablet

## 2023-02-28 ENCOUNTER — OFFICE VISIT (OUTPATIENT)
Dept: CARDIOLOGY | Facility: MEDICAL CENTER | Age: 78
End: 2023-02-28
Payer: MEDICARE

## 2023-02-28 VITALS
BODY MASS INDEX: 22.18 KG/M2 | DIASTOLIC BLOOD PRESSURE: 70 MMHG | SYSTOLIC BLOOD PRESSURE: 142 MMHG | RESPIRATION RATE: 18 BRPM | HEART RATE: 89 BPM | HEIGHT: 56 IN | OXYGEN SATURATION: 94 % | WEIGHT: 98.6 LBS

## 2023-02-28 DIAGNOSIS — I10 HTN (HYPERTENSION), MALIGNANT: ICD-10-CM

## 2023-02-28 DIAGNOSIS — Z79.899 HIGH RISK MEDICATION USE: ICD-10-CM

## 2023-02-28 DIAGNOSIS — I35.0 MILD AORTIC STENOSIS: ICD-10-CM

## 2023-02-28 DIAGNOSIS — E78.5 DYSLIPIDEMIA: ICD-10-CM

## 2023-02-28 DIAGNOSIS — I25.10 ATHEROSCLEROSIS OF NATIVE CORONARY ARTERY OF NATIVE HEART WITHOUT ANGINA PECTORIS: ICD-10-CM

## 2023-02-28 PROCEDURE — 99214 OFFICE O/P EST MOD 30 MIN: CPT | Performed by: INTERNAL MEDICINE

## 2023-02-28 RX ORDER — LISINOPRIL 40 MG/1
40 TABLET ORAL DAILY
Qty: 100 TABLET | Refills: 4 | Status: SHIPPED | OUTPATIENT
Start: 2023-02-28 | End: 2024-03-11 | Stop reason: SDUPTHER

## 2023-02-28 RX ORDER — ROSUVASTATIN CALCIUM 40 MG/1
40 TABLET, COATED ORAL DAILY
Qty: 100 TABLET | Refills: 4 | Status: SHIPPED | OUTPATIENT
Start: 2023-02-28 | End: 2024-03-11 | Stop reason: SDUPTHER

## 2023-02-28 ASSESSMENT — ENCOUNTER SYMPTOMS
WEIGHT LOSS: 0
PND: 0
FEVER: 0
EYE PAIN: 0
COUGH: 0
CHILLS: 0
FALLS: 0
SHORTNESS OF BREATH: 0
ABDOMINAL PAIN: 0
PALPITATIONS: 0
BLOOD IN STOOL: 0
DOUBLE VISION: 0
BRUISES/BLEEDS EASILY: 0
DEPRESSION: 0
SPEECH CHANGE: 0
DIZZINESS: 0
HEADACHES: 0
LOSS OF CONSCIOUSNESS: 0
SENSORY CHANGE: 0
CLAUDICATION: 0
BLURRED VISION: 0
VOMITING: 0
MYALGIAS: 0
ORTHOPNEA: 0
EYE DISCHARGE: 0
HALLUCINATIONS: 0
NAUSEA: 0

## 2023-02-28 ASSESSMENT — FIBROSIS 4 INDEX: FIB4 SCORE: 1.59

## 2023-02-28 NOTE — PATIENT INSTRUCTIONS
Will increase lisinopril to 40 mg daily.    Will change Atorvastatin to rosuvastatin 40 mg p.o. once a day for better LDL control.

## 2023-02-28 NOTE — PROGRESS NOTES
Chief Complaint   Patient presents with    Aortic Stenosis     F/V Dx: Aortic valve stenosis, moderate      Shortness of Breath    Dyslipidemia       Subjective:   Olga Rich is a 77 y.o. female who presents today for cardiac care and evaluation because of an abnormal calcium scan in the past. At the last visit, patient was deemed to be asymptomatic and no further cardiac workup was entertained. We optimized her medical therapy.      She was found to have aortic stenosis for which she underwent TTE and SANGEETA in 05/2017.    I have independently interpreted and reviewed blood tests results with patient in clinic which shows elevated LDL level 102, triglycerides 127, renal and liver function.    In the interim, patient has been doing well without having any worsening symptoms. Patient denies having chest pain, palpitation, presyncope, syncope episodes.      Past Medical History:   Diagnosis Date    Allergic rhinitis     Arthritis     fingers    Asthma in adult     Cataract 2/2012    Bilat     Dental disorder     partial upper    Dyslipidemia 4/26/2016    GERD (gastroesophageal reflux disease)     High cholesterol     Hypertension     Hypothyroid     Hypothyroid 4/18/2014    Hypothyroid 4/18/2014    Osteoporosis, unspecified     Pneumonia 2009     Past Surgical History:   Procedure Laterality Date    CATARACT PHACO WITH IOL      Dr. Vargas    ORIF, FRACTURE, FEMUR      titamium Dr. Mares.      Family History   Problem Relation Age of Onset    Other Mother 97        old age.     Heart Attack Father 56        MI    Heart Disease Father     Other Son         ETOH    Other Son         ETOH    Heart Disease Sister 53    Heart Attack Sister 85    No Known Problems Brother     No Known Problems Sister     No Known Problems Sister      Social History     Socioeconomic History    Marital status:      Spouse name: Not on file    Number of children: Not on file    Years of education: Not on file    Highest  education level: Not on file   Occupational History    Not on file   Tobacco Use    Smoking status: Former     Packs/day: 0.25     Years: 40.00     Pack years: 10.00     Types: Cigarettes     Quit date: 3/1/2014     Years since quittin.0    Smokeless tobacco: Never   Vaping Use    Vaping Use: Never used   Substance and Sexual Activity    Alcohol use: No     Comment: very rarely    Drug use: Yes     Types: Marijuana, Inhaled     Comment: occasionally for glaucoma which runs in her family/ per pt no     Sexual activity: Never     Partners: Male   Other Topics Concern    Not on file   Social History Narrative    Not on file     Social Determinants of Health     Financial Resource Strain: Not on file   Food Insecurity: Not on file   Transportation Needs: Not on file   Physical Activity: Not on file   Stress: Not on file   Social Connections: Not on file   Intimate Partner Violence: Not on file   Housing Stability: Not on file     Allergies   Allergen Reactions    Meperidine Anaphylaxis     Outpatient Encounter Medications as of 2023   Medication Sig Dispense Refill    fluticasone (FLONASE) 50 MCG/ACT nasal spray Administer 1 Spray into affected nostril(S) every day. 16 g 3    temazepam (RESTORIL) 15 MG Cap Take 15 mg by mouth at bedtime as needed for Sleep.      levothyroxine (SYNTHROID) 50 MCG Tab Take 1 Tablet by mouth every morning on an empty stomach. 100 Tablet 0    atorvastatin (LIPITOR) 40 MG Tab Take 1 Tablet by mouth every day. **LAST SEEN 2022 .  TO ENSURE FURTHER REFILLS, CALL [phone] TO SCHEDULE FOLLOW UP VISIT.** 100 Tablet 0    metoprolol SR (TOPROL XL) 25 MG TABLET SR 24 HR Take 1 Tablet by mouth every day. **LAST SEEN 2022 .  TO ENSURE FURTHER REFILLS, CALL [phone] TO SCHEDULE FOLLOW UP VISIT.** 100 Tablet 0    lisinopril (PRINIVIL) 20 MG Tab Take 1 Tablet by mouth every day. 100 Tablet 0    montelukast (SINGULAIR) 10 MG Tab TAKE ONE TABLET BY MOUTH ONE TIME DAILY 90 Tablet 3     "fluticasone-salmeterol (ADVAIR DISKUS) 250-50 MCG/ACT AEROSOL POWDER, BREATH ACTIVATED Inhale 1 Puff 2 times a day. Rinse mouth after each use. 60 Each 11    levalbuterol (XOPENEX HFA) 45 MCG/ACT inhaler Inhale 1-2 Puffs every four hours as needed for Shortness of Breath. 1 Each 5    magnesium oxide (MAG-OX) 400 MG Tab Take 400 mg by mouth every day.      Cholecalciferol (VITAMIN D) 2000 UNITS CAPS Take 2,000 Units by mouth every day.      calcium carbonate (OS-DARIEN 500) 1250 MG TABS Take 1,250 mg by mouth every day.      multivitamin (THERAGRAN) TABS Take 1 Tab by mouth every day.      fluticasone-salmeterol (ADVAIR) 250-50 MCG/DOSE AEROSOL POWDER, BREATH ACTIVATED INHALE 1 PUFF BY MOUTH TWICE DAILY 1 Each 11     No facility-administered encounter medications on file as of 2/28/2023.     Review of Systems   Constitutional:  Negative for chills, fever, malaise/fatigue and weight loss.   HENT:  Negative for ear discharge, ear pain, hearing loss and nosebleeds.    Eyes:  Negative for blurred vision, double vision, pain and discharge.   Respiratory:  Negative for cough and shortness of breath.    Cardiovascular:  Negative for chest pain, palpitations, orthopnea, claudication, leg swelling and PND.   Gastrointestinal:  Negative for abdominal pain, blood in stool, melena, nausea and vomiting.   Genitourinary:  Negative for dysuria and hematuria.   Musculoskeletal:  Negative for falls, joint pain and myalgias.   Skin:  Negative for itching and rash.   Neurological:  Negative for dizziness, sensory change, speech change, loss of consciousness and headaches.   Endo/Heme/Allergies:  Negative for environmental allergies. Does not bruise/bleed easily.   Psychiatric/Behavioral:  Negative for depression, hallucinations and suicidal ideas.       Objective:   BP (!) 142/70 (BP Location: Left arm, Patient Position: Sitting, BP Cuff Size: Adult)   Pulse 89   Resp 18   Ht 1.422 m (4' 8\")   Wt 44.7 kg (98 lb 9.6 oz)   SpO2 94%   " BMI 22.11 kg/m²     Physical Exam  Vitals and nursing note reviewed.   Constitutional:       General: She is not in acute distress.     Appearance: She is not diaphoretic.   HENT:      Head: Normocephalic and atraumatic.      Right Ear: External ear normal.      Left Ear: External ear normal.      Nose: No congestion or rhinorrhea.   Eyes:      General:         Right eye: No discharge.         Left eye: No discharge.   Neck:      Thyroid: No thyromegaly.      Vascular: No JVD.   Cardiovascular:      Rate and Rhythm: Normal rate and regular rhythm.      Pulses: Normal pulses.   Pulmonary:      Effort: No respiratory distress.   Abdominal:      General: There is no distension.      Tenderness: There is no abdominal tenderness.   Musculoskeletal:         General: No swelling or tenderness.      Right lower leg: No edema.      Left lower leg: No edema.   Skin:     General: Skin is warm and dry.   Neurological:      Mental Status: She is alert and oriented to person, place, and time.      Cranial Nerves: No cranial nerve deficit.   Psychiatric:         Behavior: Behavior normal.       Assessment:     1. Atherosclerosis of native coronary artery of native heart without angina pectoris        2. HTN (hypertension), malignant        3. Mild aortic stenosis        4. High risk medication use            Medical Decision Making:  Today's Assessment / Status / Plan:   At this time patient is clinically stable in terms of her cardiac standpoint.  Today, based on physical examination findings, patient is euvolemic. No JVD, lungs are clear to auscultation, no pitting edema in bilateral lower extremities, no ascites.    Dry weight is 98 lbs.    Will change Atorvastatin to rosuvastatin 40 mg p.o. once a day for better LDL control.    Blood pressure is not well controlled.  Will increase lisinopril to 40 mg daily.  Continue other current medications at current dose.     Does not want to be referred to valve program as she is feeling  well.   She prefers to see me at this time.  Clinical monitor for AS.     I will see patient back in clinic with lab tests and studies results in 6 months.     I thank you Dr. Flor for referring patient to our Cardiology Clinic today.

## 2023-03-02 PROBLEM — I73.9 PERIPHERAL VASCULAR DISEASE, UNSPECIFIED (HCC): Status: ACTIVE | Noted: 2022-04-06

## 2023-03-12 PROBLEM — R94.30 NONSPECIFIC ABNORMAL FUNCTION STUDY, CARDIOVASCULAR: Status: RESOLVED | Noted: 2021-08-19 | Resolved: 2023-03-12

## 2023-03-20 RX ORDER — FLUTICASONE PROPIONATE 50 MCG
1 SPRAY, SUSPENSION (ML) NASAL DAILY
Qty: 16 ML | Refills: 3 | Status: SHIPPED | OUTPATIENT
Start: 2023-03-20 | End: 2023-08-24

## 2023-04-29 DIAGNOSIS — I25.10 ATHEROSCLEROSIS OF NATIVE CORONARY ARTERY OF NATIVE HEART WITHOUT ANGINA PECTORIS: ICD-10-CM

## 2023-04-29 DIAGNOSIS — E78.5 DYSLIPIDEMIA: ICD-10-CM

## 2023-04-29 DIAGNOSIS — Z82.49 FAMILY HISTORY OF ATHEROSCLEROSIS: ICD-10-CM

## 2023-05-01 RX ORDER — METOPROLOL SUCCINATE 25 MG/1
25 TABLET, EXTENDED RELEASE ORAL DAILY
Qty: 100 TABLET | Refills: 3 | Status: SHIPPED | OUTPATIENT
Start: 2023-05-01 | End: 2024-03-11

## 2023-05-01 NOTE — TELEPHONE ENCOUNTER
Is the patient due for a refill? Yes    Was the patient seen the past year? Yes    Date of last office visit: 2/28/2023    Does the patient have an upcoming appointment?  No   If yes, When?     Provider to refill:TT    Does the patients insurance require a 100 day supply?  Yes

## 2023-05-05 RX ORDER — LEVOTHYROXINE SODIUM 0.05 MG/1
TABLET ORAL
Qty: 100 TABLET | Refills: 0 | Status: SHIPPED | OUTPATIENT
Start: 2023-05-05 | End: 2023-08-01 | Stop reason: SDUPTHER

## 2023-06-19 ENCOUNTER — HOSPITAL ENCOUNTER (OUTPATIENT)
Dept: RADIOLOGY | Facility: MEDICAL CENTER | Age: 78
End: 2023-06-19
Attending: INTERNAL MEDICINE
Payer: MEDICARE

## 2023-06-19 DIAGNOSIS — Z12.31 VISIT FOR SCREENING MAMMOGRAM: ICD-10-CM

## 2023-06-19 PROCEDURE — 77063 BREAST TOMOSYNTHESIS BI: CPT

## 2023-07-05 ENCOUNTER — HOSPITAL ENCOUNTER (OUTPATIENT)
Dept: RADIOLOGY | Facility: MEDICAL CENTER | Age: 78
End: 2023-07-05
Attending: INTERNAL MEDICINE
Payer: MEDICARE

## 2023-07-05 DIAGNOSIS — M81.0 AGE-RELATED OSTEOPOROSIS WITHOUT CURRENT PATHOLOGICAL FRACTURE: ICD-10-CM

## 2023-07-05 DIAGNOSIS — E03.9 ACQUIRED HYPOTHYROIDISM: ICD-10-CM

## 2023-07-05 DIAGNOSIS — J44.9 CHRONIC OBSTRUCTIVE PULMONARY DISEASE, UNSPECIFIED COPD TYPE (HCC): ICD-10-CM

## 2023-07-05 PROCEDURE — 77080 DXA BONE DENSITY AXIAL: CPT

## 2023-07-25 ENCOUNTER — HOSPITAL ENCOUNTER (OUTPATIENT)
Dept: LAB | Facility: MEDICAL CENTER | Age: 78
End: 2023-07-25
Attending: INTERNAL MEDICINE
Payer: MEDICARE

## 2023-07-25 ENCOUNTER — OFFICE VISIT (OUTPATIENT)
Dept: SLEEP MEDICINE | Facility: MEDICAL CENTER | Age: 78
End: 2023-07-25
Attending: NURSE PRACTITIONER
Payer: MEDICARE

## 2023-07-25 ENCOUNTER — HOSPITAL ENCOUNTER (OUTPATIENT)
Dept: LAB | Facility: MEDICAL CENTER | Age: 78
End: 2023-07-25
Attending: FAMILY MEDICINE
Payer: MEDICARE

## 2023-07-25 VITALS
DIASTOLIC BLOOD PRESSURE: 66 MMHG | RESPIRATION RATE: 16 BRPM | BODY MASS INDEX: 19.52 KG/M2 | HEIGHT: 58 IN | WEIGHT: 93 LBS | SYSTOLIC BLOOD PRESSURE: 118 MMHG | OXYGEN SATURATION: 94 % | HEART RATE: 77 BPM

## 2023-07-25 VITALS — BODY MASS INDEX: 19.52 KG/M2 | WEIGHT: 93 LBS | HEIGHT: 58 IN

## 2023-07-25 DIAGNOSIS — J45.20 MILD INTERMITTENT ASTHMA WITHOUT COMPLICATION: Chronic | ICD-10-CM

## 2023-07-25 DIAGNOSIS — Z11.59 NEED FOR HEPATITIS C SCREENING TEST: ICD-10-CM

## 2023-07-25 DIAGNOSIS — Z87.891 FORMER SMOKER: ICD-10-CM

## 2023-07-25 DIAGNOSIS — J41.0 SIMPLE CHRONIC BRONCHITIS (HCC): Chronic | ICD-10-CM

## 2023-07-25 DIAGNOSIS — F13.20 SEDATIVE, HYPNOTIC, OR ANXIOLYTIC DEPENDENCE (HCC): Chronic | ICD-10-CM

## 2023-07-25 DIAGNOSIS — R06.02 SOB (SHORTNESS OF BREATH): ICD-10-CM

## 2023-07-25 DIAGNOSIS — R09.81 SINUS CONGESTION: ICD-10-CM

## 2023-07-25 DIAGNOSIS — J44.89 ASTHMA-COPD OVERLAP SYNDROME (HCC): ICD-10-CM

## 2023-07-25 DIAGNOSIS — E02 SUBCLINICAL IODINE-DEFICIENCY HYPOTHYROIDISM: ICD-10-CM

## 2023-07-25 DIAGNOSIS — F51.01 PRIMARY INSOMNIA: Chronic | ICD-10-CM

## 2023-07-25 DIAGNOSIS — N18.31 STAGE 3A CHRONIC KIDNEY DISEASE: Chronic | ICD-10-CM

## 2023-07-25 DIAGNOSIS — I10 HTN (HYPERTENSION), MALIGNANT: ICD-10-CM

## 2023-07-25 DIAGNOSIS — G47.34 NOCTURNAL HYPOXIA: ICD-10-CM

## 2023-07-25 DIAGNOSIS — E78.5 DYSLIPIDEMIA: ICD-10-CM

## 2023-07-25 DIAGNOSIS — Z91.09 ENVIRONMENTAL ALLERGIES: ICD-10-CM

## 2023-07-25 LAB
ALBUMIN SERPL BCP-MCNC: 4.6 G/DL (ref 3.2–4.9)
ALBUMIN/GLOB SERPL: 1.5 G/DL
ALP SERPL-CCNC: 60 U/L (ref 30–99)
ALT SERPL-CCNC: 12 U/L (ref 2–50)
ANION GAP SERPL CALC-SCNC: 11 MMOL/L (ref 7–16)
ANION GAP SERPL CALC-SCNC: 12 MMOL/L (ref 7–16)
AST SERPL-CCNC: 18 U/L (ref 12–45)
BASOPHILS # BLD AUTO: 0.9 % (ref 0–1.8)
BASOPHILS # BLD: 0.06 K/UL (ref 0–0.12)
BILIRUB SERPL-MCNC: 0.4 MG/DL (ref 0.1–1.5)
BUN SERPL-MCNC: 19 MG/DL (ref 8–22)
BUN SERPL-MCNC: 19 MG/DL (ref 8–22)
CALCIUM ALBUM COR SERPL-MCNC: 9.9 MG/DL (ref 8.5–10.5)
CALCIUM SERPL-MCNC: 10.3 MG/DL (ref 8.5–10.5)
CALCIUM SERPL-MCNC: 10.4 MG/DL (ref 8.5–10.5)
CHLORIDE SERPL-SCNC: 101 MMOL/L (ref 96–112)
CHLORIDE SERPL-SCNC: 101 MMOL/L (ref 96–112)
CHOLEST SERPL-MCNC: 181 MG/DL (ref 100–199)
CHOLEST SERPL-MCNC: 183 MG/DL (ref 100–199)
CO2 SERPL-SCNC: 24 MMOL/L (ref 20–33)
CO2 SERPL-SCNC: 25 MMOL/L (ref 20–33)
CREAT SERPL-MCNC: 0.81 MG/DL (ref 0.5–1.4)
CREAT SERPL-MCNC: 0.83 MG/DL (ref 0.5–1.4)
CREAT UR-MCNC: 44.19 MG/DL
EOSINOPHIL # BLD AUTO: 0.19 K/UL (ref 0–0.51)
EOSINOPHIL NFR BLD: 2.8 % (ref 0–6.9)
ERYTHROCYTE [DISTWIDTH] IN BLOOD BY AUTOMATED COUNT: 46.4 FL (ref 35.9–50)
GFR SERPLBLD CREATININE-BSD FMLA CKD-EPI: 71 ML/MIN/1.73 M 2
GFR SERPLBLD CREATININE-BSD FMLA CKD-EPI: 73 ML/MIN/1.73 M 2
GLOBULIN SER CALC-MCNC: 3 G/DL (ref 1.9–3.5)
GLUCOSE SERPL-MCNC: 87 MG/DL (ref 65–99)
GLUCOSE SERPL-MCNC: 88 MG/DL (ref 65–99)
HCT VFR BLD AUTO: 42.1 % (ref 37–47)
HCV AB SER QL: NORMAL
HDLC SERPL-MCNC: 76 MG/DL
HDLC SERPL-MCNC: 76 MG/DL
HGB BLD-MCNC: 13.4 G/DL (ref 12–16)
IMM GRANULOCYTES # BLD AUTO: 0.01 K/UL (ref 0–0.11)
IMM GRANULOCYTES NFR BLD AUTO: 0.1 % (ref 0–0.9)
LDLC SERPL CALC-MCNC: 84 MG/DL
LDLC SERPL CALC-MCNC: 86 MG/DL
LYMPHOCYTES # BLD AUTO: 2.01 K/UL (ref 1–4.8)
LYMPHOCYTES NFR BLD: 30 % (ref 22–41)
MCH RBC QN AUTO: 30 PG (ref 27–33)
MCHC RBC AUTO-ENTMCNC: 31.8 G/DL (ref 32.2–35.5)
MCV RBC AUTO: 94.2 FL (ref 81.4–97.8)
MICROALBUMIN UR-MCNC: 2.4 MG/DL
MICROALBUMIN/CREAT UR: 54 MG/G (ref 0–30)
MONOCYTES # BLD AUTO: 0.55 K/UL (ref 0–0.85)
MONOCYTES NFR BLD AUTO: 8.2 % (ref 0–13.4)
NEUTROPHILS # BLD AUTO: 3.88 K/UL (ref 1.82–7.42)
NEUTROPHILS NFR BLD: 58 % (ref 44–72)
NRBC # BLD AUTO: 0 K/UL
NRBC BLD-RTO: 0 /100 WBC (ref 0–0.2)
PLATELET # BLD AUTO: 376 K/UL (ref 164–446)
PMV BLD AUTO: 9.1 FL (ref 9–12.9)
POTASSIUM SERPL-SCNC: 4.1 MMOL/L (ref 3.6–5.5)
POTASSIUM SERPL-SCNC: 4.1 MMOL/L (ref 3.6–5.5)
PROT SERPL-MCNC: 7.6 G/DL (ref 6–8.2)
RBC # BLD AUTO: 4.47 M/UL (ref 4.2–5.4)
SODIUM SERPL-SCNC: 137 MMOL/L (ref 135–145)
SODIUM SERPL-SCNC: 137 MMOL/L (ref 135–145)
TRIGL SERPL-MCNC: 107 MG/DL (ref 0–149)
TRIGL SERPL-MCNC: 107 MG/DL (ref 0–149)
TSH SERPL DL<=0.005 MIU/L-ACNC: 1.85 UIU/ML (ref 0.38–5.33)
WBC # BLD AUTO: 6.7 K/UL (ref 4.8–10.8)

## 2023-07-25 PROCEDURE — 94729 DIFFUSING CAPACITY: CPT | Performed by: NURSE PRACTITIONER

## 2023-07-25 PROCEDURE — 3078F DIAST BP <80 MM HG: CPT | Performed by: NURSE PRACTITIONER

## 2023-07-25 PROCEDURE — 99212 OFFICE O/P EST SF 10 MIN: CPT | Mod: 25 | Performed by: NURSE PRACTITIONER

## 2023-07-25 PROCEDURE — 94726 PLETHYSMOGRAPHY LUNG VOLUMES: CPT | Mod: 26 | Performed by: NURSE PRACTITIONER

## 2023-07-25 PROCEDURE — 94060 EVALUATION OF WHEEZING: CPT | Performed by: NURSE PRACTITIONER

## 2023-07-25 PROCEDURE — 94726 PLETHYSMOGRAPHY LUNG VOLUMES: CPT | Mod: 26 | Performed by: STUDENT IN AN ORGANIZED HEALTH CARE EDUCATION/TRAINING PROGRAM

## 2023-07-25 PROCEDURE — 99214 OFFICE O/P EST MOD 30 MIN: CPT | Mod: 25 | Performed by: NURSE PRACTITIONER

## 2023-07-25 PROCEDURE — 94726 PLETHYSMOGRAPHY LUNG VOLUMES: CPT | Performed by: NURSE PRACTITIONER

## 2023-07-25 PROCEDURE — 80048 BASIC METABOLIC PNL TOTAL CA: CPT

## 2023-07-25 PROCEDURE — 80061 LIPID PANEL: CPT

## 2023-07-25 PROCEDURE — 36415 COLL VENOUS BLD VENIPUNCTURE: CPT

## 2023-07-25 PROCEDURE — 94060 EVALUATION OF WHEEZING: CPT | Mod: 26 | Performed by: STUDENT IN AN ORGANIZED HEALTH CARE EDUCATION/TRAINING PROGRAM

## 2023-07-25 PROCEDURE — 80061 LIPID PANEL: CPT | Mod: 91

## 2023-07-25 PROCEDURE — 82570 ASSAY OF URINE CREATININE: CPT

## 2023-07-25 PROCEDURE — 94729 DIFFUSING CAPACITY: CPT | Mod: 26 | Performed by: NURSE PRACTITIONER

## 2023-07-25 PROCEDURE — 80053 COMPREHEN METABOLIC PANEL: CPT

## 2023-07-25 PROCEDURE — 82043 UR ALBUMIN QUANTITATIVE: CPT

## 2023-07-25 PROCEDURE — 94761 N-INVAS EAR/PLS OXIMETRY MLT: CPT | Performed by: NURSE PRACTITIONER

## 2023-07-25 PROCEDURE — 94729 DIFFUSING CAPACITY: CPT | Mod: 26 | Performed by: STUDENT IN AN ORGANIZED HEALTH CARE EDUCATION/TRAINING PROGRAM

## 2023-07-25 PROCEDURE — 3074F SYST BP LT 130 MM HG: CPT | Performed by: NURSE PRACTITIONER

## 2023-07-25 PROCEDURE — 94060 EVALUATION OF WHEEZING: CPT | Mod: 26 | Performed by: NURSE PRACTITIONER

## 2023-07-25 PROCEDURE — G0472 HEP C SCREEN HIGH RISK/OTHER: HCPCS

## 2023-07-25 PROCEDURE — 84443 ASSAY THYROID STIM HORMONE: CPT

## 2023-07-25 PROCEDURE — 85025 COMPLETE CBC W/AUTO DIFF WBC: CPT

## 2023-07-25 RX ORDER — TIOTROPIUM BROMIDE INHALATION SPRAY 3.12 UG/1
5 SPRAY, METERED RESPIRATORY (INHALATION) DAILY
Qty: 2 EACH | Refills: 0 | COMMUNITY
Start: 2023-07-25 | End: 2023-10-12 | Stop reason: SDUPTHER

## 2023-07-25 RX ORDER — TIOTROPIUM BROMIDE INHALATION SPRAY 3.12 UG/1
5 SPRAY, METERED RESPIRATORY (INHALATION) DAILY
Qty: 1 EACH | Refills: 11 | Status: SHIPPED | OUTPATIENT
Start: 2023-07-25 | End: 2023-07-25 | Stop reason: SDUPTHER

## 2023-07-25 RX ORDER — TEMAZEPAM 15 MG/1
15 CAPSULE ORAL NIGHTLY PRN
Qty: 30 CAPSULE | Status: CANCELLED | OUTPATIENT
Start: 2023-07-25

## 2023-07-25 RX ORDER — FLUTICASONE PROPIONATE AND SALMETEROL 250; 50 UG/1; UG/1
1 POWDER RESPIRATORY (INHALATION) 2 TIMES DAILY
Qty: 60 EACH | Refills: 11 | Status: SHIPPED | OUTPATIENT
Start: 2023-07-25 | End: 2023-10-12 | Stop reason: SDUPTHER

## 2023-07-25 RX ORDER — MONTELUKAST SODIUM 10 MG/1
TABLET ORAL
Qty: 90 TABLET | Refills: 3 | Status: SHIPPED | OUTPATIENT
Start: 2023-07-25

## 2023-07-25 RX ORDER — TEMAZEPAM 15 MG/1
15 CAPSULE ORAL NIGHTLY PRN
Qty: 30 CAPSULE | Refills: 2 | Status: CANCELLED | OUTPATIENT
Start: 2023-07-25 | End: 2023-10-23

## 2023-07-25 RX ORDER — TEMAZEPAM 15 MG/1
15 CAPSULE ORAL NIGHTLY PRN
Qty: 30 CAPSULE | Refills: 2 | Status: SHIPPED | OUTPATIENT
Start: 2023-07-25 | End: 2023-10-23

## 2023-07-25 ASSESSMENT — PULMONARY FUNCTION TESTS
FEV1: 0.54
FEV1_PERCENT_PREDICTED: 34
FEV1_LLN: 1.29
FVC_PERCENT_PREDICTED: 57
FVC: 1.27
FEV1_PERCENT_CHANGE: 6
FVC_LLN: 1.68
FEV1/FVC_PERCENT_PREDICTED: 59
FEV1_PERCENT_CHANGE: 0
FEV1/FVC: 42.52
FEV1/FVC_PERCENT_PREDICTED: 54
FVC: 1.14
FVC_PERCENT_PREDICTED: 63
FEV1/FVC: 43
FEV1_PERCENT_PREDICTED: 34
FEV1/FVC_PERCENT_PREDICTED: 54
FEV1_PREDICTED: 1.55
FVC_PREDICTED: 2.01
FEV1/FVC_PREDICTED: 78
FEV1/FVC_PERCENT_PREDICTED: 77
FEV1/FVC_PERCENT_CHANGE: -8
FEV1/FVC: 46
FEV1: 0.53
FEV1/FVC_PERCENT_CHANGE: 0
FEV1/FVC: 47
FEV1/FVC_PERCENT_PREDICTED: 60
FEV1/FVC_PERCENT_LLN: 65

## 2023-07-25 ASSESSMENT — FIBROSIS 4 INDEX
FIB4 SCORE: 1.61
FIB4 SCORE: 1.61

## 2023-07-25 NOTE — PATIENT INSTRUCTIONS
Complete overnight oximetry prior to next office visit    Use Spiriva Respimat 2.5mcg 2puffs once daily, sample and instruction provided. If breathing improves with this, check price at pharmacy and continue inhaler on daily basis  Continue Advair 1 puff twice daily, rinse mouth after use    Refills sent to pharmacy for advair, singulair and temazepam

## 2023-07-25 NOTE — PROCEDURES
Multi-Ox Readings  Multi Ox #1 Room air   O2 sat % at rest 95   O2 sat % on exertion 94   O2 sat average on exertion     Multi Ox #2     O2 sat % at rest     O2 sat % on exertion     O2 sat average on exertion       Oxygen Use     Oxygen Frequency     Duration of need     Is the patient mobile within the home?     CPAP Use?     BIPAP Use?     Servo Titration

## 2023-07-25 NOTE — PROCEDURES
Technician: Catherine Baptiste RRT, CPFT  Good patient effort & cooperation.  The results of this test meet the ATS/ERS standards for acceptability & reproducibility.  Test was performed on the [employer] Body Plethysmograph-Elite DX system.  Predicted equations for Spirometry are GLI-2012, ITS for lung volumes, and GLI-2017 for DLCO.  The DLCO was uncorrected for Hgb.  A bronchodilator of Ventolin HFA -2puffs via spacer administered.  DLCO performed during dilation period.  Patient C/O being claustrophobic, but was able to perform the Pleth maneuver without incident.    Interpretation;      There is a very severe obstructive ventilatory defect.  No significant bronchodilator response.  The increase in RV and TLC is consistent with hyperinflation.  The reduced DLCO with normal DL/VA is consistent with obstructive lung disease.  MVV is reduced proportionately to the degree of reduction in FEV1.

## 2023-07-25 NOTE — PROGRESS NOTES
Chief Complaint   Patient presents with    Follow-Up     COPD/ Asthma/ Insomnia // last seen 8/10/2022     Results     PFT 7/25/2023        HPI:  Olga Rich is a 78 y.o. year old female here today for follow-up on asthma/COPD overlap and history of insomnia.  Last office visit 8/10/2022    [facility]MR[facility] stGstrstastdstest:st st1st Exacerbations this year: 0    PFT results today.  Advair was utilized this morning.  PFT FVC 1.14 L or 57%, FEV1 0.53 L or 34%, FEV1/FVC ratio 47, hyperinflation with %, TLC 4.95 L or 120% and DLCO 68% predicted.  Mild bronchodilator response noted.  This notes severe obstruction with hyperinflation.  Recommend adding Spiriva Respimat 2.5 migrans 2 puffs once daily to optimize therapy.  Reviewed with patient.    Currently using Advair 250/50mcg 1 puff twice daily, levalbuterol HFA as needed and Singulair nightly.  She notes her breathing to be stable without exacerbations since last office visit.  She continues to exercise daily doing 5 different sets of exercises and notes only shortness of breath during that time.  She tolerates ADLs and normal flat ground walking and chores without issue.  No regular cough, phlegm, chest pain, chest tightness or wheezing.    Multi ox in office noted normal room air sat at rest and with exertion.    Using temazepam 50 mg nightly as needed insomnia.  Last refill 10/24/2023 for 90 tablets. She continues to use with benefit and denies side effects.    PULM HX:  Former smoker, quit 2014 with 10-pack-year history.  PFT 10/31/2007 noted FVC 1.79 L or 79%, FEV1 1.01 L or 55%, FEV1/FVC ratio 57, %, %, and DLCO 99% predicted.  Patient remains on Advair 250/50 micrograms 1 puff twice daily, no use of albuterol HFA inhaler and Singulair 10 mg nightly.  She notes her symptoms to be stable.  She is had no exacerbations, ER visits or need for antibiotics or steroids for her breathing in the last year.       She has a history of insomnia and uses temazepam 15 mg up  to 2 times per week when having difficulty initiating sleep.      ROS: As per HPI and otherwise negative if not stated.    Past Medical History:   Diagnosis Date    Allergic rhinitis     Arthritis     fingers    Asthma in adult     Cataract 2012    Bilat     Dental disorder     partial upper    Dyslipidemia 2016    GERD (gastroesophageal reflux disease)     High cholesterol     Hypertension     Hypothyroid     Hypothyroid 2014    Hypothyroid 2014    Nonspecific abnormal function study, cardiovascular 2021    Osteoporosis, unspecified     Pneumonia        Past Surgical History:   Procedure Laterality Date    CATARACT PHACO WITH IOL      Dr. Vargas    ORIF, FRACTURE, FEMUR      titamium Dr. Mares.        Family History   Problem Relation Age of Onset    Other Mother 97        old age.     Heart Attack Father 56        MI    Heart Disease Father     Other Son         ETOH    Other Son         ETOH    Heart Disease Sister 53    Heart Attack Sister 85    No Known Problems Brother     No Known Problems Sister     No Known Problems Sister        Social History     Socioeconomic History    Marital status:      Spouse name: Not on file    Number of children: Not on file    Years of education: Not on file    Highest education level: Not on file   Occupational History    Not on file   Tobacco Use    Smoking status: Former     Packs/day: 0.25     Years: 40.00     Pack years: 10.00     Types: Cigarettes     Quit date: 3/1/2014     Years since quittin.4    Smokeless tobacco: Never   Vaping Use    Vaping Use: Never used   Substance and Sexual Activity    Alcohol use: No     Comment: very rarely    Drug use: Yes     Types: Marijuana, Inhaled     Comment: occasionally for glaucoma which runs in her family/ per pt no     Sexual activity: Never     Partners: Male   Other Topics Concern    Not on file   Social History Narrative    Not on file     Social Determinants of Health     Financial  "Resource Strain: Not on file   Food Insecurity: Not on file   Transportation Needs: Not on file   Physical Activity: Not on file   Stress: Not on file   Social Connections: Not on file   Intimate Partner Violence: Not on file   Housing Stability: Not on file       Allergies as of 07/25/2023 - Reviewed 07/25/2023   Allergen Reaction Noted    Meperidine Anaphylaxis 03/28/2022        Vitals:  /66 (BP Location: Left arm, Patient Position: Sitting, BP Cuff Size: Adult)   Pulse 77   Resp 16   Ht 1.467 m (4' 9.75\")   Wt 42.2 kg (93 lb)   SpO2 94%     Current medications as of today   Current Outpatient Medications   Medication Sig Dispense Refill    levothyroxine (SYNTHROID) 50 MCG Tab TAKE 1 TABLET BY MOUTH EVERY DAY IN THE MORNING ON AN EMPTY STOMACH 100 Tablet 0    metoprolol SR (TOPROL XL) 25 MG TABLET SR 24 HR Take 1 Tablet by mouth every day. 100 Tablet 3    fluticasone (FLONASE) 50 MCG/ACT nasal spray ADMINISTER 1 SPRAY INTO AFFECTED NOSTRIL(S) EVERY DAY. 16 mL 3    rosuvastatin (CRESTOR) 40 MG tablet Take 1 Tablet by mouth every day. 100 Tablet 4    lisinopril (PRINIVIL) 40 MG tablet Take 1 Tablet by mouth every day. 100 Tablet 4    temazepam (RESTORIL) 15 MG Cap Take 15 mg by mouth at bedtime as needed for Sleep.      montelukast (SINGULAIR) 10 MG Tab TAKE ONE TABLET BY MOUTH ONE TIME DAILY 90 Tablet 3    fluticasone-salmeterol (ADVAIR DISKUS) 250-50 MCG/ACT AEROSOL POWDER, BREATH ACTIVATED Inhale 1 Puff 2 times a day. Rinse mouth after each use. 60 Each 11    magnesium oxide (MAG-OX) 400 MG Tab Take 400 mg by mouth every day.      Cholecalciferol (VITAMIN D) 2000 UNITS CAPS Take 2,000 Units by mouth every day.      calcium carbonate (OS-DARIEN 500) 1250 MG TABS Take 1,250 mg by mouth every day.      multivitamin (THERAGRAN) TABS Take 1 Tab by mouth every day.      levalbuterol (XOPENEX HFA) 45 MCG/ACT inhaler Inhale 1-2 Puffs every four hours as needed for Shortness of Breath. (Patient not taking: " Reported on 7/25/2023) 1 Each 5     No current facility-administered medications for this visit.         Physical Exam:   Gen:           Alert and oriented, No apparent distress. Mood and affect appropriate, normal interaction with examiner.  Eyes:          PERRL, EOM intact, sclere white, conjunctive moist.  Ears:          Not examined.   Hearing:     Grossly intact.  Nose:          Normal, no lesions or deformities.  Dentition:    Not examined.   Oropharynx:   Not examined.   Mallampati Classification: Not examined.   Neck:        Supple, trachea midline, no masses.  Respiratory Effort: No intercostal retractions or use of accessory muscles.   Lung Auscultation:      Clear to auscultation bilaterally; no rales, rhonchi or wheezing.  CV:            Regular rate and rhythm. No murmurs, rubs or gallops.  Abd:           Not examined.   Lymphadenopathy: Not examined.   Gait and Station: Normal.  Digits and Nails: No clubbing, cyanosis, petechiae, or nodes.   Cranial Nerves: II-XII grossly intact.  Skin:        No rashes, lesions or ulcers noted.               Ext:           No cyanosis or edema.      Assessment:  1. Asthma-COPD overlap syndrome (HCC)        2. Primary insomnia        3. Environmental allergies        4. Body mass index (BMI) 19.9 or less, adult        5. Former smoker                 Immunizations:    Flu:recommend in the fall  Pneumovax 23:2015  Prevnar 13:2016  PCV 20: not due  COVID-19: 1/12/22    Plan:  Patient's asthma/COPD overlap does note decline since 2007 classifying the patient as having some very severe COPD.  Recommend stepping up therapy to include Advair 250 mcg plus Spiriva Respimat 2.5 mcg 2 puffs once daily.  Sample and instruction provided to patient on Spiriva.  Rx sent to pharmacy to check cost.  If she finds benefit with therapy in the next month advise continuing this on a daily basis.  Refills provided for Singulair nightly.   CNOX on RA prior to next OV to r/o nocturnal  hypoxia  Patient may continue temazepam.  Refills provided.  We will continue to monitor therapy.  No complaint of seasonal allergies at this time.  We will continue to monitor.  Follow-up with primary care for other health concerns.  Continue regular exercise and healthy eating  Follow-up in 2 months to review respiratory status with changes in bronchodilator therapy, sooner if needed.  ADDENDUM:   Overnight oximeter readings 9/8/2023 on room air noted basal SPO2 of 86.3% and less than 80% ox saturation for 426.3 minutes of the night.  Recommend starting supplemental O2 at 2 L/min.    Please note that this dictation was created using voice recognition software. I have made every reasonable attempt to correct obvious errors, but it is possible there are errors of grammar and possibly content that I did not discover before finalizing the note.

## 2023-08-01 RX ORDER — LEVOTHYROXINE SODIUM 0.05 MG/1
50 TABLET ORAL
Qty: 100 TABLET | Refills: 0 | Status: SHIPPED | OUTPATIENT
Start: 2023-08-01 | End: 2023-10-26

## 2023-08-01 NOTE — TELEPHONE ENCOUNTER
Received request via: Patient    Was the patient seen in the last year in this department? Yes    Does the patient have an active prescription (recently filled or refills available) for medication(s) requested? No    Does the patient have [facility] and need 100 day supply (blood pressure, diabetes and cholesterol meds only)? Medication is not for cholesterol, blood pressure or diabetes

## 2023-08-01 NOTE — TELEPHONE ENCOUNTER
Pt called and LVM requesting a refill of her levothyroxine 50 MCG. Pt has upcoming appt on 8/24/23

## 2023-08-21 ENCOUNTER — OUTPATIENT INFUSION SERVICES (OUTPATIENT)
Dept: ONCOLOGY | Facility: MEDICAL CENTER | Age: 78
End: 2023-08-21
Attending: INTERNAL MEDICINE
Payer: MEDICARE

## 2023-08-21 VITALS
TEMPERATURE: 97.4 F | WEIGHT: 94.58 LBS | HEART RATE: 64 BPM | BODY MASS INDEX: 20.4 KG/M2 | DIASTOLIC BLOOD PRESSURE: 77 MMHG | OXYGEN SATURATION: 92 % | RESPIRATION RATE: 18 BRPM | SYSTOLIC BLOOD PRESSURE: 132 MMHG | HEIGHT: 57 IN

## 2023-08-21 DIAGNOSIS — M81.0 AGE-RELATED OSTEOPOROSIS WITHOUT CURRENT PATHOLOGICAL FRACTURE: ICD-10-CM

## 2023-08-21 LAB
CA-I BLD ISE-SCNC: 1.26 MMOL/L (ref 1.1–1.3)
CREAT BLD-MCNC: 0.9 MG/DL (ref 0.5–1.4)

## 2023-08-21 PROCEDURE — 36415 COLL VENOUS BLD VENIPUNCTURE: CPT

## 2023-08-21 PROCEDURE — 96372 THER/PROPH/DIAG INJ SC/IM: CPT

## 2023-08-21 PROCEDURE — 82330 ASSAY OF CALCIUM: CPT

## 2023-08-21 PROCEDURE — 82565 ASSAY OF CREATININE: CPT

## 2023-08-21 PROCEDURE — 700111 HCHG RX REV CODE 636 W/ 250 OVERRIDE (IP): Mod: JZ | Performed by: INTERNAL MEDICINE

## 2023-08-21 RX ORDER — DIPHENHYDRAMINE HYDROCHLORIDE 50 MG/ML
50 INJECTION INTRAMUSCULAR; INTRAVENOUS PRN
OUTPATIENT
Start: 2024-02-15

## 2023-08-21 RX ORDER — METHYLPREDNISOLONE SODIUM SUCCINATE 125 MG/2ML
125 INJECTION, POWDER, LYOPHILIZED, FOR SOLUTION INTRAMUSCULAR; INTRAVENOUS PRN
OUTPATIENT
Start: 2024-02-15

## 2023-08-21 RX ORDER — EPINEPHRINE 1 MG/ML(1)
0.5 AMPUL (ML) INJECTION PRN
OUTPATIENT
Start: 2024-02-15

## 2023-08-21 RX ADMIN — DENOSUMAB 60 MG: 60 INJECTION SUBCUTANEOUS at 14:33

## 2023-08-21 ASSESSMENT — FIBROSIS 4 INDEX: FIB4 SCORE: 1.11

## 2023-08-21 NOTE — PROGRESS NOTES
Ms Rich is here today for prolia injection.   She denies any loose or broken teeth, no jaw pain and no invasive procedures. She reports she takes calcium and vitamin D as prescribed.     Venipuncture to her left arm, istat labs are within parameters for treatment today.     Prolia was given SQ to the back of her right arm and was tolerated well.     Ms Rich was discharged in stable condition.

## 2023-08-24 ENCOUNTER — OFFICE VISIT (OUTPATIENT)
Dept: MEDICAL GROUP | Facility: MEDICAL CENTER | Age: 78
End: 2023-08-24
Payer: MEDICARE

## 2023-08-24 VITALS
OXYGEN SATURATION: 94 % | TEMPERATURE: 98 F | WEIGHT: 94.4 LBS | BODY MASS INDEX: 20.37 KG/M2 | SYSTOLIC BLOOD PRESSURE: 116 MMHG | HEART RATE: 80 BPM | HEIGHT: 57 IN | DIASTOLIC BLOOD PRESSURE: 74 MMHG

## 2023-08-24 DIAGNOSIS — E78.5 DYSLIPIDEMIA: ICD-10-CM

## 2023-08-24 DIAGNOSIS — E03.8 OTHER SPECIFIED HYPOTHYROIDISM: ICD-10-CM

## 2023-08-24 DIAGNOSIS — K20.90 ESOPHAGITIS WITH GASTRITIS: ICD-10-CM

## 2023-08-24 DIAGNOSIS — N18.2 STAGE 2 CHRONIC KIDNEY DISEASE: ICD-10-CM

## 2023-08-24 DIAGNOSIS — M81.0 AGE-RELATED OSTEOPOROSIS WITHOUT CURRENT PATHOLOGICAL FRACTURE: ICD-10-CM

## 2023-08-24 DIAGNOSIS — Z91.09 ENVIRONMENTAL ALLERGIES: ICD-10-CM

## 2023-08-24 DIAGNOSIS — R92.30 DENSE BREAST TISSUE ON MAMMOGRAM: ICD-10-CM

## 2023-08-24 DIAGNOSIS — J44.89 ASTHMA-COPD OVERLAP SYNDROME (HCC): Chronic | ICD-10-CM

## 2023-08-24 DIAGNOSIS — K29.70 ESOPHAGITIS WITH GASTRITIS: ICD-10-CM

## 2023-08-24 PROCEDURE — 3074F SYST BP LT 130 MM HG: CPT | Performed by: FAMILY MEDICINE

## 2023-08-24 PROCEDURE — 3078F DIAST BP <80 MM HG: CPT | Performed by: FAMILY MEDICINE

## 2023-08-24 PROCEDURE — 99214 OFFICE O/P EST MOD 30 MIN: CPT | Performed by: FAMILY MEDICINE

## 2023-08-24 ASSESSMENT — FIBROSIS 4 INDEX: FIB4 SCORE: 1.11

## 2023-08-24 NOTE — PROGRESS NOTES
"Subjective:     CC: \"lab follow up\"    HPI:   Olga presents today with:    Takes over the counter Advil sinus once or twice a week, seems to keep sinuses clear. The flonase worked but she felt sick to her stomach so stopped this.    She is using her inhalers as directed and seems like it is working. Helping her Peek ball game. Exercise is better. Does rinse her mouth out after inhaler use.    Taking levothyroxine daily other than Sunday's and Wednesday's she takes 1.5 pills    She does not want ultrasound follow up for mammogram that was overall benign but did show breast tissue, \"it always says that\"      No problems updated.    Current Outpatient Medications Ordered in Epic   Medication Sig Dispense Refill    levothyroxine (SYNTHROID) 50 MCG Tab Take 1 Tablet by mouth every morning on an empty stomach. 100 Tablet 0    montelukast (SINGULAIR) 10 MG Tab TAKE ONE TABLET BY MOUTH ONE TIME DAILY 90 Tablet 3    fluticasone-salmeterol (ADVAIR DISKUS) 250-50 MCG/ACT AEROSOL POWDER, BREATH ACTIVATED Inhale 1 Puff 2 times a day. Rinse mouth after each use. 60 Each 11    tiotropium (SPIRIVA RESPIMAT) 2.5 mcg/Act Aero Soln Inhale 2 Inhalations every day. Assemble and prime. 2 Each 0    temazepam (RESTORIL) 15 MG Cap Take 1 Capsule by mouth at bedtime as needed for Sleep for up to 90 days. 30 Capsule 2    metoprolol SR (TOPROL XL) 25 MG TABLET SR 24 HR Take 1 Tablet by mouth every day. 100 Tablet 3    rosuvastatin (CRESTOR) 40 MG tablet Take 1 Tablet by mouth every day. 100 Tablet 4    lisinopril (PRINIVIL) 40 MG tablet Take 1 Tablet by mouth every day. 100 Tablet 4    levalbuterol (XOPENEX HFA) 45 MCG/ACT inhaler Inhale 1-2 Puffs every four hours as needed for Shortness of Breath. 1 Each 5    magnesium oxide (MAG-OX) 400 MG Tab Take 400 mg by mouth every day.      Cholecalciferol (VITAMIN D) 2000 UNITS CAPS Take 2,000 Units by mouth every day.      calcium carbonate (OS-DARIEN 500) 1250 MG TABS Take 1,250 mg by mouth every " "day.      multivitamin (THERAGRAN) TABS Take 1 Tab by mouth every day.       No current [facility]-ordered facility-administered medications on file.       Health Maintenance: completed    ROS:  ROS see HPI    Objective:     Exam:  /74   Pulse 80   Temp 36.7 °C (98 °F) (Temporal)   Ht 1.448 m (4' 9\")   Wt 42.8 kg (94 lb 6.4 oz)   SpO2 94%   BMI 20.43 kg/m²  Body mass index is 20.43 kg/m².    Physical Exam  Vitals reviewed.   Constitutional:       General: She is not in acute distress.     Appearance: Normal appearance.   HENT:      Head: Normocephalic and atraumatic.   Cardiovascular:      Rate and Rhythm: Normal rate and regular rhythm.      Heart sounds: Normal heart sounds.   Pulmonary:      Effort: Pulmonary effort is normal. No respiratory distress.      Breath sounds: Normal breath sounds.   Skin:     General: Skin is warm and dry.   Neurological:      Mental Status: She is alert. Mental status is at baseline.      Gait: Gait normal.   Psychiatric:         Mood and Affect: Mood normal.         Behavior: Behavior normal.       Labs:             Imaging:  DEXA 7/5/23  IMPRESSION:  According to the World Health Organization classification, bone mineral density of this patient is osteoporotic for the lumbar spine and osteoporotic for the left femur. Decrease in bone density in the lumbar spine since the most recent exam is not   statistically significant. Increase in bone density in the left femur since the most recent exam is not statistically significant.  10-year Probability of Fracture:  Major Osteoporotic     31.3%  Hip     13.0%  Population      [place] ()  Based on left femur neck BMD    Mammogram 6/19/23  IMPRESSION:  1.  Breasts are heterogeneously dense, which may obscure small masses. No gross evidence of malignancy and no interval change.  2.  Screening mammogram in one year is recommended.  R2 - CATEGORY 2: BENIGN FINDING(S)      Assessment & Plan:     78 y.o. female with the following - "     1. Stage 2 chronic kidney disease  Renal function over last year has improved. She does have mild microaluminuria. Patient is on Lisinopril, sugars and BP seem well controlled, denies frequent NSAID use. Encouraged adequate hydration will monitor at least annually.    2. Asthma-COPD overlap syndrome (HCC)  Reports good improvement with addition of Spirva. Patient rinsing mouth and no concerns for thrush. Has follow up with Pulmonology this fall.    3. Other specified hypothyroidism  TSH in a good range. Will continue with Levothyroxine 50 mcg daily other than Wednesday and Sunday she takes 75 mcg.    4. Environmental allergies  Chronic and stable, did not tolerate flonase due to nausea. Doing well with twice weekly over the counter sinus medicine.    5. Dyslipidemia  Chronic and stable in better range than last year. Will continue on rosuvastatin 40 mg. Patient is also followed by cardiology.    6. Age-related osteoporosis without current pathological fracture  Relatively stable, she takes calcium and vitamin D. Patient is an active and independent living 78 year old female with a history of pathologic fracture. We discussed medication for osteoporosis. Will need to be mindful of history of gastritis if decide to pursue medication in future.    7. Esophagitis with gastritis  Will need to assess further if we pursue bisphosphonate vs other osteoporosis medication.    8. Dense breast tissue on mammogram  Patient declines follow up ultrasound which is reasonable.      Return in about 3 months (around 11/24/2023), or if symptoms worsen or fail to improve, for Medication F/U.    Please note that this dictation was created using voice recognition software. I have made every reasonable attempt to correct obvious errors, but I expect that there are errors of grammar and possibly content that I did not discover before finalizing the note.

## 2023-08-30 ENCOUNTER — HOSPITAL ENCOUNTER (OUTPATIENT)
Dept: CARDIOLOGY | Facility: MEDICAL CENTER | Age: 78
End: 2023-08-30
Attending: INTERNAL MEDICINE
Payer: MEDICARE

## 2023-08-30 DIAGNOSIS — I35.0 MILD AORTIC STENOSIS: ICD-10-CM

## 2023-08-30 LAB
LV EJECT FRACT  99904: 60
LV EJECT FRACT MOD 2C 99903: 69.5
LV EJECT FRACT MOD 4C 99902: 57.64
LV EJECT FRACT MOD BP 99901: 63.01

## 2023-08-30 PROCEDURE — 93306 TTE W/DOPPLER COMPLETE: CPT | Mod: 26 | Performed by: INTERNAL MEDICINE

## 2023-08-30 PROCEDURE — 93306 TTE W/DOPPLER COMPLETE: CPT

## 2023-09-07 ENCOUNTER — HOME STUDY (OUTPATIENT)
Dept: SLEEP MEDICINE | Facility: MEDICAL CENTER | Age: 78
End: 2023-09-07
Attending: NURSE PRACTITIONER
Payer: MEDICARE

## 2023-09-07 DIAGNOSIS — G47.34 NOCTURNAL HYPOXIA: ICD-10-CM

## 2023-09-07 PROCEDURE — 94762 N-INVAS EAR/PLS OXIMTRY CONT: CPT | Performed by: INTERNAL MEDICINE

## 2023-09-15 NOTE — PROCEDURES
This is an overnight oximetry study performed on September 8, 2023 for duration of 10 hours and 29 minutes on room air.    Basal SPO2 was 86%.  Total time spent below saturation of 88 was 426 minutes.  No clustered desaturations.  Patient may benefit from supplemental oxygen at night.

## 2023-10-12 ENCOUNTER — OFFICE VISIT (OUTPATIENT)
Dept: SLEEP MEDICINE | Facility: MEDICAL CENTER | Age: 78
End: 2023-10-12
Attending: NURSE PRACTITIONER
Payer: MEDICARE

## 2023-10-12 VITALS
HEIGHT: 57 IN | WEIGHT: 91 LBS | OXYGEN SATURATION: 92 % | RESPIRATION RATE: 16 BRPM | HEART RATE: 81 BPM | DIASTOLIC BLOOD PRESSURE: 74 MMHG | SYSTOLIC BLOOD PRESSURE: 130 MMHG | BODY MASS INDEX: 19.63 KG/M2

## 2023-10-12 DIAGNOSIS — Z87.891 FORMER SMOKER: ICD-10-CM

## 2023-10-12 DIAGNOSIS — G47.34 NOCTURNAL HYPOXIA: ICD-10-CM

## 2023-10-12 DIAGNOSIS — F51.01 PRIMARY INSOMNIA: Chronic | ICD-10-CM

## 2023-10-12 DIAGNOSIS — J44.89 ASTHMA-COPD OVERLAP SYNDROME (HCC): Chronic | ICD-10-CM

## 2023-10-12 DIAGNOSIS — Z23 NEED FOR VACCINATION: ICD-10-CM

## 2023-10-12 PROCEDURE — 3078F DIAST BP <80 MM HG: CPT | Performed by: NURSE PRACTITIONER

## 2023-10-12 PROCEDURE — 90471 IMMUNIZATION ADMIN: CPT

## 2023-10-12 PROCEDURE — 99212 OFFICE O/P EST SF 10 MIN: CPT | Mod: 25 | Performed by: NURSE PRACTITIONER

## 2023-10-12 PROCEDURE — 3075F SYST BP GE 130 - 139MM HG: CPT | Performed by: NURSE PRACTITIONER

## 2023-10-12 PROCEDURE — 99214 OFFICE O/P EST MOD 30 MIN: CPT | Performed by: NURSE PRACTITIONER

## 2023-10-12 RX ORDER — FLUTICASONE PROPIONATE AND SALMETEROL 250; 50 UG/1; UG/1
1 POWDER RESPIRATORY (INHALATION) 2 TIMES DAILY
Qty: 60 EACH | Refills: 11 | Status: SHIPPED | OUTPATIENT
Start: 2023-10-12

## 2023-10-12 RX ORDER — TIOTROPIUM BROMIDE INHALATION SPRAY 3.12 UG/1
5 SPRAY, METERED RESPIRATORY (INHALATION) DAILY
Qty: 4 EACH | Refills: 0 | COMMUNITY
Start: 2023-10-12

## 2023-10-12 ASSESSMENT — FIBROSIS 4 INDEX: FIB4 SCORE: 1.11

## 2023-10-12 NOTE — PROGRESS NOTES
"Chief Complaint   Patient presents with    Follow-Up     COPD/ Asthma/ Insomnia // last seen 7/25/2023    Results     OPO 9/7/2023        HPI:  Olga Rich is a 78 y.o. year old female here today for follow-up on asthma/COPD overlap and history of insomnia.  Last office visit 7/25/23.     MMRC stGstrstastdstest:st st1st Exacerbations this year: 0, she notes last one being about 9 to 10 years ago.    CNOX on room air 9/8/2023 noted basal SPO2 of 86.3% and less than 88% for 426.3 minutes of the night.  Reviewed results with patient and she would benefit for supplemental O2 use at night.  He is hesitant to try therapy but she is willing to do so.  We reviewed the pathophysiology of untreated hypoxia.  She would ultimately like to avoid having to use oxygen during the day as she ages.    Currently using Advair 250/50mcg 1 puff twice daily, Spiriva Respimat 2.5mcg 2puffs once daily, levalbuterol HFA as needed and Singulair nightly.  She notes improvement in breathing with addition of Spiriva but have increased cost of inhalers due to being in the \"donut hole\".    Using temazepam 50 mg nightly as needed insomnia. Last fill 7/25/23, 30 days with 2 refills. She continues to use with benefit and denies side effects.  Notes using the medication only 2 times per week currently.  She notes generally not sleeping well and waking multiple times at night.  She goes to bed around 10 PM and may not fall asleep for hours and when she does sleeps for 30 minutes to 1 hour and then wakes every 2 hours until waking up at 7:30 AM.  She will nap in the afternoon but not necessarily every day for 30 to 45 minutes.      She continues to exercise daily doing jumping jacks, pickleball and 5 different types of stretches.  She continues to maintain her own home inside and out.  She denies send shortness of breath with activity, cough, phlegm, chest pain, chest tightness or wheezing.  She notes shortness of breath only after vigorous exercise.  She has " not required MARCE.    PULM HX:  Former smoker, quit 2014 with 10-pack-year history.  PFT 10/31/2007 noted FVC 1.79 L or 79%, FEV1 1.01 L or 55%, FEV1/FVC ratio 57, %, %, and DLCO 99% predicted.  PFT FVC 1.14 L or 57%, FEV1 0.53 L or 34%, FEV1/FVC ratio 47, hyperinflation with %, TLC 4.95 L or 120% and DLCO 68% predicted.  Mild bronchodilator response noted.  This notes severe obstruction with hyperinflation.  Recommend adding Spiriva Respimat 2.5 migrans 2 puffs once daily to optimize therapy.       She has a history of insomnia and uses temazepam 15 mg up to 2 times per week when having difficulty initiating sleep.     ROS: As per HPI and otherwise negative if not stated.    Past Medical History:   Diagnosis Date    Allergic rhinitis     Arthritis     fingers    Asthma in adult     Cataract 2/2012    Bilat     Dental disorder     partial upper    Dyslipidemia 4/26/2016    GERD (gastroesophageal reflux disease)     High cholesterol     Hypertension     Hypothyroid     Hypothyroid 4/18/2014    Hypothyroid 4/18/2014    Nonspecific abnormal function study, cardiovascular 8/19/2021    Osteoporosis, unspecified     Pneumonia 2009       Past Surgical History:   Procedure Laterality Date    CATARACT PHACO WITH IOL      Dr. Vargas    ORIF, FRACTURE, FEMUR      titamium Dr. Mares.        Family History   Problem Relation Age of Onset    Other Mother 97        old age.     Heart Attack Father 56        MI    Heart Disease Father     Other Son         ETOH    Other Son         ETOH    Heart Disease Sister 53    Heart Attack Sister 85    No Known Problems Brother     No Known Problems Sister     No Known Problems Sister        Social History     Socioeconomic History    Marital status:      Spouse name: Not on file    Number of children: Not on file    Years of education: Not on file    Highest education level: Not on file   Occupational History    Not on file   Tobacco Use    Smoking status:  "Former     Current packs/day: 0.00     Average packs/day: 0.3 packs/day for 40.0 years (10.0 ttl pk-yrs)     Types: Cigarettes     Start date: 3/1/1974     Quit date: 3/1/2014     Years since quittin.6    Smokeless tobacco: Never   Vaping Use    Vaping Use: Never used   Substance and Sexual Activity    Alcohol use: No     Comment: very rarely    Drug use: Yes     Types: Marijuana, Inhaled     Comment: occasionally for glaucoma which runs in her family/ per pt no     Sexual activity: Never     Partners: Male   Other Topics Concern    Not on file   Social History Narrative    Not on file     Social Determinants of Health     Financial Resource Strain: Not on file   Food Insecurity: Not on file   Transportation Needs: Not on file   Physical Activity: Not on file   Stress: Not on file   Social Connections: Not on file   Intimate Partner Violence: Not on file   Housing Stability: Not on file       Allergies as of 10/12/2023 - Reviewed 10/12/2023   Allergen Reaction Noted    Meperidine Anaphylaxis 2022        Vitals:  /74 (BP Location: Left arm, Patient Position: Sitting, BP Cuff Size: Adult)   Pulse 81   Resp 16   Ht 1.448 m (4' 9\")   Wt 41.3 kg (91 lb)   SpO2 92%     Current medications as of today   Current Outpatient Medications   Medication Sig Dispense Refill    levothyroxine (SYNTHROID) 50 MCG Tab Take 1 Tablet by mouth every morning on an empty stomach. 100 Tablet 0    montelukast (SINGULAIR) 10 MG Tab TAKE ONE TABLET BY MOUTH ONE TIME DAILY 90 Tablet 3    fluticasone-salmeterol (ADVAIR DISKUS) 250-50 MCG/ACT AEROSOL POWDER, BREATH ACTIVATED Inhale 1 Puff 2 times a day. Rinse mouth after each use. 60 Each 11    tiotropium (SPIRIVA RESPIMAT) 2.5 mcg/Act Aero Soln Inhale 2 Inhalations every day. Assemble and prime. 2 Each 0    temazepam (RESTORIL) 15 MG Cap Take 1 Capsule by mouth at bedtime as needed for Sleep for up to 90 days. 30 Capsule 2    metoprolol SR (TOPROL XL) 25 MG TABLET SR 24 HR " Take 1 Tablet by mouth every day. 100 Tablet 3    rosuvastatin (CRESTOR) 40 MG tablet Take 1 Tablet by mouth every day. 100 Tablet 4    lisinopril (PRINIVIL) 40 MG tablet Take 1 Tablet by mouth every day. 100 Tablet 4    levalbuterol (XOPENEX HFA) 45 MCG/ACT inhaler Inhale 1-2 Puffs every four hours as needed for Shortness of Breath. 1 Each 5    magnesium oxide (MAG-OX) 400 MG Tab Take 400 mg by mouth every day.      Cholecalciferol (VITAMIN D) 2000 UNITS CAPS Take 2,000 Units by mouth every day.      calcium carbonate (OS-DARIEN 500) 1250 MG TABS Take 1,250 mg by mouth every day.      multivitamin (THERAGRAN) TABS Take 1 Tab by mouth every day.       No current facility-administered medications for this visit.         Physical Exam:   Gen:           Alert and oriented, No apparent distress. Mood and affect appropriate, normal interaction with examiner.  Eyes:          PERRL, EOM intact, sclere white, conjunctive moist.  Ears:          Not examined.   Hearing:     Grossly intact.  Nose:          Normal, no lesions or deformities.  Dentition:    Not examined.   Oropharynx:   Not examined.   Mallampati Classification: Not examined.   Neck:        Supple, trachea midline, no masses.  Respiratory Effort: No intercostal retractions or use of accessory muscles.   Lung Auscultation:      Clear to auscultation bilaterally; no rales, rhonchi or wheezing.  CV:            Regular rate and rhythm. No murmurs, rubs or gallops.  Abd:           Not examined.   Lymphadenopathy: Not examined.   Gait and Station: Normal.  Digits and Nails: No clubbing, cyanosis, petechiae, or nodes.   Cranial Nerves: II-XII grossly intact.  Skin:        No rashes, lesions or ulcers noted.               Ext:           No cyanosis or edema.      Assessment:  1. Asthma-COPD overlap syndrome        2. Primary insomnia        3. Nocturnal hypoxia        4. Body mass index (BMI) 19.9 or less, adult        5. Former smoker              "    Immunizations:    Flu:10/12/23  Pneumovax 23:2015  Prevnar 13:2016  PCV 20: not due  COVID-19: 1/12/22    Plan:  Asthma/COPD is improved utilizing Advair and Spiriva together.  Patient is requesting refill of her Advair to stay filled for generic specifically.  She is also requesting samples of Spiriva due to the cost going up due to being in the \"donut hole\".   Influenza vaccine given today    Spiriva Respimat 2.5mcg samples provided; LOT#[id number], Exp:4/2025, 4 total  She continues to suffer from insomnia and using temazepam 2 times a week.  We reviewed the pathophysiology of insomnia and also nocturnal hypoxia.  Recommend starting oxygen at night at 2 L/min to see if this improves her overall sleep and daytime symptoms.  We reviewed that she is at a higher risk of cardiac events due to persistent hypoxia at night.   DME O2; 2lPM qhs  Follow-up with primary care for health concerns.  Follow-up in 6 months to review symptoms, sooner if needed.    Please note that this dictation was created using voice recognition software. I have made every reasonable attempt to correct obvious errors, but it is possible there are errors of grammar and possibly content that I did not discover before finalizing the note.      "

## 2023-11-06 NOTE — TELEPHONE ENCOUNTER
Carolina Davis LPN TT    Caller: -  Olga    Medication Name and Dosage:    atorvastatin (LIPITOR) 40 MG Tab [[id number]]    metoprolol SR (TOPROL XL) 25 MG TABLET SR 24 HR [[id number]]    Medication amount left:  4    Preferred Pharmacy:   [facility]    Other questions (Topic): N/A    Callback Number (Will only call for issues): [phone]    Thank you,   Dagmar CHOI    thickness and length to prevent injection and/or ulceration. 3. Discussed additional diabetic lower extremity care techniques with patient today. 4. We did discuss importance of shoe gear and foot inspection to prevent potential issues. 5. We will see the patient back at a later date for continued podiatric management and care. Patient was advised to call the office with any questions or concerns prior to their next appointment if needed. Seen By:    Marley Crane DPM    Electronically signed by Marley Crane 

## 2023-11-22 ENCOUNTER — OFFICE VISIT (OUTPATIENT)
Dept: MEDICAL GROUP | Facility: MEDICAL CENTER | Age: 78
End: 2023-11-22
Payer: MEDICARE

## 2023-11-22 VITALS
DIASTOLIC BLOOD PRESSURE: 80 MMHG | WEIGHT: 91 LBS | TEMPERATURE: 97.8 F | HEART RATE: 72 BPM | SYSTOLIC BLOOD PRESSURE: 122 MMHG | HEIGHT: 58 IN | OXYGEN SATURATION: 94 % | RESPIRATION RATE: 20 BRPM | BODY MASS INDEX: 19.1 KG/M2

## 2023-11-22 DIAGNOSIS — E03.8 OTHER SPECIFIED HYPOTHYROIDISM: ICD-10-CM

## 2023-11-22 DIAGNOSIS — J44.89 ASTHMA-COPD OVERLAP SYNDROME (HCC): Chronic | ICD-10-CM

## 2023-11-22 DIAGNOSIS — M81.0 AGE-RELATED OSTEOPOROSIS WITHOUT CURRENT PATHOLOGICAL FRACTURE: ICD-10-CM

## 2023-11-22 DIAGNOSIS — N18.31 STAGE 3A CHRONIC KIDNEY DISEASE: Chronic | ICD-10-CM

## 2023-11-22 PROBLEM — R06.02 SHORTNESS OF BREATH: Status: RESOLVED | Noted: 2017-08-18 | Resolved: 2023-11-22

## 2023-11-22 PROCEDURE — 99213 OFFICE O/P EST LOW 20 MIN: CPT | Performed by: FAMILY MEDICINE

## 2023-11-22 PROCEDURE — 3074F SYST BP LT 130 MM HG: CPT | Performed by: FAMILY MEDICINE

## 2023-11-22 PROCEDURE — 3079F DIAST BP 80-89 MM HG: CPT | Performed by: FAMILY MEDICINE

## 2023-11-22 RX ORDER — ALBUTEROL SULFATE 90 UG/1
2 AEROSOL, METERED RESPIRATORY (INHALATION) EVERY 4 HOURS PRN
Qty: 1 EACH | Refills: 3 | Status: SHIPPED | OUTPATIENT
Start: 2023-11-22

## 2023-11-22 RX ORDER — TEMAZEPAM 15 MG/1
CAPSULE ORAL NIGHTLY PRN
COMMUNITY
Start: 2023-11-08

## 2023-11-22 ASSESSMENT — FIBROSIS 4 INDEX: FIB4 SCORE: 1.11

## 2023-11-22 NOTE — PROGRESS NOTES
"Subjective:     CC: \"follow up\"    HPI:   Olga presents today with:    RSV and COVID vaccines she is planning to do but wanted to make sure that was a good idea  Using Wixela and seems to be working well, not needing albuterol inhaler, hasn't had asthma attack in 10 years  Exercising daily, different stretching exercising and jumping jacks  Still getting prolia and taking Vitamin D/Calcium supplement  No falls  Not feeling depressed    Problem   SOB (shortness of breath) (Resolved)    IMO load March 2020         Current Outpatient Medications Ordered in Epic   Medication Sig Dispense Refill    temazepam (RESTORIL) 15 MG Cap       albuterol 108 (90 Base) MCG/ACT Aero Soln inhalation aerosol Inhale 2 Puffs every four hours as needed for Shortness of Breath. 1 Each 3    levothyroxine (SYNTHROID) 50 MCG Tab TAKE 1 TABLET BY MOUTH EVERY DAY IN THE MORNING ON AN EMPTY STOMACH 100 Tablet 3    fluticasone-salmeterol (ADVAIR DISKUS) 250-50 MCG/ACT AEROSOL POWDER, BREATH ACTIVATED Inhale 1 Puff 2 times a day. Rinse mouth after each use. 60 Each 11    tiotropium (SPIRIVA RESPIMAT) 2.5 mcg/Act Aero Soln Inhale 2 Inhalations every day. Assemble and prime. 4 Each 0    montelukast (SINGULAIR) 10 MG Tab TAKE ONE TABLET BY MOUTH ONE TIME DAILY 90 Tablet 3    metoprolol SR (TOPROL XL) 25 MG TABLET SR 24 HR Take 1 Tablet by mouth every day. 100 Tablet 3    rosuvastatin (CRESTOR) 40 MG tablet Take 1 Tablet by mouth every day. 100 Tablet 4    lisinopril (PRINIVIL) 40 MG tablet Take 1 Tablet by mouth every day. 100 Tablet 4    magnesium oxide (MAG-OX) 400 MG Tab Take 400 mg by mouth every day.      Cholecalciferol (VITAMIN D) 2000 UNITS CAPS Take 2,000 Units by mouth every day.      calcium carbonate (OS-DARIEN 500) 1250 MG TABS Take 1,250 mg by mouth every day.      multivitamin (THERAGRAN) TABS Take 1 Tab by mouth every day.       No current [facility]-ordered facility-administered medications on file.       Health Maintenance: " "Completed    ROS:  ROS see HPI    Objective:     Exam:  /80   Pulse 72   Temp 36.6 °C (97.8 °F) (Temporal)   Resp 20   Ht 1.473 m (4' 10\")   Wt 41.3 kg (91 lb)   SpO2 94%   BMI 19.02 kg/m²  Body mass index is 19.02 kg/m².    Physical Exam  Vitals reviewed.   Constitutional:       General: She is not in acute distress.     Appearance: Normal appearance.   HENT:      Head: Normocephalic and atraumatic.   Cardiovascular:      Rate and Rhythm: Normal rate and regular rhythm.      Heart sounds: Normal heart sounds.   Pulmonary:      Effort: Pulmonary effort is normal. No respiratory distress.      Breath sounds: Normal breath sounds.   Skin:     General: Skin is warm and dry.   Neurological:      Mental Status: She is alert. Mental status is at baseline.      Gait: Gait normal.   Psychiatric:         Mood and Affect: Mood normal.         Behavior: Behavior normal.         Assessment & Plan:     78 y.o. female with the following -     1. Asthma-COPD overlap syndrome  Chronic and very well controlled, do want patient to have albuterol on hand in case of emergency.  - albuterol 108 (90 Base) MCG/ACT Aero Soln inhalation aerosol; Inhale 2 Puffs every four hours as needed for Shortness of Breath.  Dispense: 1 Each; Refill: 3    2. Other specified hypothyroidism  - TSH WITH REFLEX TO FT4; Future    3. Stage 3a chronic kidney disease (HCC)  - Comp Metabolic Panel; Future    4. Age-related osteoporosis without current pathological fracture  Chronic and stable, continue with prolia, supplements and daily exercises          Return in about 3 months (around 2/22/2024), or if symptoms worsen or fail to improve, for Lab F/U, Medication F/U.    Please note that this dictation was created using voice recognition software. I have made every reasonable attempt to correct obvious errors, but I expect that there are errors of grammar and possibly content that I did not discover before finalizing the note.        "

## 2024-02-20 ENCOUNTER — APPOINTMENT (OUTPATIENT)
Dept: RHEUMATOLOGY | Facility: MEDICAL CENTER | Age: 79
End: 2024-02-20
Attending: INTERNAL MEDICINE
Payer: MEDICARE

## 2024-02-22 ENCOUNTER — APPOINTMENT (OUTPATIENT)
Dept: MEDICAL GROUP | Facility: MEDICAL CENTER | Age: 79
End: 2024-02-22
Payer: MEDICARE

## 2024-02-22 ENCOUNTER — HOSPITAL ENCOUNTER (OUTPATIENT)
Dept: LAB | Facility: MEDICAL CENTER | Age: 79
End: 2024-02-22
Attending: FAMILY MEDICINE
Payer: MEDICARE

## 2024-02-22 DIAGNOSIS — E03.8 OTHER SPECIFIED HYPOTHYROIDISM: ICD-10-CM

## 2024-02-22 DIAGNOSIS — N18.31 STAGE 3A CHRONIC KIDNEY DISEASE: Chronic | ICD-10-CM

## 2024-02-22 LAB
ALBUMIN SERPL BCP-MCNC: 4.3 G/DL (ref 3.2–4.9)
ALBUMIN/GLOB SERPL: 1.3 G/DL
ALP SERPL-CCNC: 65 U/L (ref 30–99)
ALT SERPL-CCNC: 19 U/L (ref 2–50)
ANION GAP SERPL CALC-SCNC: 12 MMOL/L (ref 7–16)
AST SERPL-CCNC: 27 U/L (ref 12–45)
BILIRUB SERPL-MCNC: 0.4 MG/DL (ref 0.1–1.5)
BUN SERPL-MCNC: 13 MG/DL (ref 8–22)
CALCIUM ALBUM COR SERPL-MCNC: 9.7 MG/DL (ref 8.5–10.5)
CALCIUM SERPL-MCNC: 9.9 MG/DL (ref 8.5–10.5)
CHLORIDE SERPL-SCNC: 105 MMOL/L (ref 96–112)
CO2 SERPL-SCNC: 23 MMOL/L (ref 20–33)
CREAT SERPL-MCNC: 0.9 MG/DL (ref 0.5–1.4)
GFR SERPLBLD CREATININE-BSD FMLA CKD-EPI: 64 ML/MIN/1.73 M 2
GLOBULIN SER CALC-MCNC: 3.3 G/DL (ref 1.9–3.5)
GLUCOSE SERPL-MCNC: 121 MG/DL (ref 65–99)
POTASSIUM SERPL-SCNC: 4.2 MMOL/L (ref 3.6–5.5)
PROT SERPL-MCNC: 7.6 G/DL (ref 6–8.2)
SODIUM SERPL-SCNC: 140 MMOL/L (ref 135–145)
TSH SERPL DL<=0.005 MIU/L-ACNC: 0.63 UIU/ML (ref 0.38–5.33)

## 2024-02-22 PROCEDURE — 36415 COLL VENOUS BLD VENIPUNCTURE: CPT

## 2024-02-22 PROCEDURE — 84443 ASSAY THYROID STIM HORMONE: CPT

## 2024-02-22 PROCEDURE — 80053 COMPREHEN METABOLIC PANEL: CPT

## 2024-03-05 ENCOUNTER — OFFICE VISIT (OUTPATIENT)
Dept: MEDICAL GROUP | Facility: MEDICAL CENTER | Age: 79
End: 2024-03-05
Payer: MEDICARE

## 2024-03-05 VITALS
SYSTOLIC BLOOD PRESSURE: 110 MMHG | BODY MASS INDEX: 19.35 KG/M2 | OXYGEN SATURATION: 92 % | DIASTOLIC BLOOD PRESSURE: 60 MMHG | WEIGHT: 92.2 LBS | HEART RATE: 83 BPM | TEMPERATURE: 97 F | HEIGHT: 58 IN | RESPIRATION RATE: 16 BRPM

## 2024-03-05 DIAGNOSIS — E03.8 OTHER SPECIFIED HYPOTHYROIDISM: ICD-10-CM

## 2024-03-05 DIAGNOSIS — N18.31 STAGE 3A CHRONIC KIDNEY DISEASE: Chronic | ICD-10-CM

## 2024-03-05 DIAGNOSIS — E78.5 DYSLIPIDEMIA: ICD-10-CM

## 2024-03-05 DIAGNOSIS — I73.9 PERIPHERAL VASCULAR DISEASE, UNSPECIFIED (HCC): ICD-10-CM

## 2024-03-05 DIAGNOSIS — F13.20 SEDATIVE, HYPNOTIC, OR ANXIOLYTIC DEPENDENCE (HCC): Chronic | ICD-10-CM

## 2024-03-05 DIAGNOSIS — F51.01 PRIMARY INSOMNIA: Chronic | ICD-10-CM

## 2024-03-05 DIAGNOSIS — Z12.11 COLON CANCER SCREENING: ICD-10-CM

## 2024-03-05 DIAGNOSIS — J44.89 ASTHMA-COPD OVERLAP SYNDROME (HCC): Chronic | ICD-10-CM

## 2024-03-05 PROCEDURE — 3074F SYST BP LT 130 MM HG: CPT | Performed by: FAMILY MEDICINE

## 2024-03-05 PROCEDURE — 99214 OFFICE O/P EST MOD 30 MIN: CPT | Performed by: FAMILY MEDICINE

## 2024-03-05 PROCEDURE — 3078F DIAST BP <80 MM HG: CPT | Performed by: FAMILY MEDICINE

## 2024-03-05 ASSESSMENT — PATIENT HEALTH QUESTIONNAIRE - PHQ9: CLINICAL INTERPRETATION OF PHQ2 SCORE: 0

## 2024-03-05 ASSESSMENT — FIBROSIS 4 INDEX: FIB4 SCORE: 1.32

## 2024-03-05 NOTE — PROGRESS NOTES
"Verbal consent was acquired by the patient to use [employer] ambient listening note generation during this visit    Subjective:     CC: \"medications and order for cologuard\"    History of Present Illness  The patient is an 78-year-old female here for a 3-month follow-up and she brings in notification from [employer]    She wants to know if she should do the Cologuard. She has not studied how often she is supposed to do colon cancer screening. She wants to keep screening. She has been feeling really good overall. Her breathing is okay. She denies any wheezing or coughing. She denies any pain in her legs when she goes for walks. She walks maybe a mile a day. She does jumping jacks and stretching exercises every day. She denies any concerns for thrush. She has not needed to use her albuterol. She denies any changes in her urinary habits. She is drinking enough water. She denies any falls.   Her mother lived to be 97. Her father passed away at the age of 56 from a heart attack.   She is still taking rosuvastatin. She is still using Advair. She does not use Spiriva all the time because it is expensive. She feels like her breathing is okay without it. She denies any concerns for thrush. She denies any pain in her legs when she goes for walks. She is still taking calcium and vitamin D. She is taking levothyroxine 50 mcg daily other than Sunday and Wednesday when she takes 75 mcg.          Objective:     Exam:  /60 (BP Location: Right arm, Patient Position: Sitting, BP Cuff Size: Adult)   Pulse 83   Temp 36.1 °C (97 °F) (Temporal)   Resp 16   Ht 1.473 m (4' 10\")   Wt 41.8 kg (92 lb 3.2 oz)   SpO2 92%   BMI 19.27 kg/m²  Body mass index is 19.27 kg/m².    Physical Exam  Vitals reviewed.   Constitutional:       General: She is not in acute distress.     Appearance: Normal appearance.   HENT:      Head: Normocephalic and atraumatic.   Cardiovascular:      Rate and Rhythm: Normal rate and regular rhythm.      Heart " sounds: Normal heart sounds.   Pulmonary:      Effort: Pulmonary effort is normal.      Breath sounds: Normal breath sounds.   Skin:     General: Skin is warm and dry.   Neurological:      Mental Status: She is alert. Mental status is at baseline.      Gait: Gait normal.   Psychiatric:         Mood and Affect: Mood normal.         Behavior: Behavior normal.       Results  Laboratory Studies  Labs were reviewed with the patient.      Assessment & Plan:       1. Asthma-COPD overlap syndrome    2. Sedative, hypnotic, or anxiolytic dependence (HCC)    3. Primary insomnia    4. Stage 3a chronic kidney disease (HCC)    5. Peripheral vascular disease, unspecified (HCC)    6. Dyslipidemia    7. Other specified hypothyroidism    8. Colon cancer screening  - COLOGUARD (FIT DNA)      Assessment & Plan  1. Asthma, COPD overlap syndrome.  This is chronic and stable. The patient reports her breathing is good. She is using her Advair as directed and rinsing her mouth out regularly. She tells me she has not needed her albuterol in quite a long time. She does not use her Spiriva as often due to cost of this inhaler. She is stable and has follow-up with her pulmonologist in 04/2024, so I will have her discuss this again. They did use samples before, which was helpful. I am not sure what else to suggest as these inhalers seem to be not as well covered by Medicare for some reason.    2. Sedative, hypnotic, and anxiolytic dependence.  This is chronic and stable. She does have temazepam 15 mg, but she uses this less than once a week for nights that is difficult to sleep. Otherwise, she feels like she is well rested and doing well.    3. Primary insomnia.  This is chronic. See above    4. Stage 3a CKD.  It is chronic and stable. The patient is drinking adequate water. She is taking lisinopril 40 mg, and she is avoiding nephrotoxic medication. Her most recent GFR was actually 64. She may with consistency take this off her list soon.    5.  Peripheral vascular disease.  This is chronic and stable. The patient denies any claudication with exercise, which she does do regularly. She is taking rosuvastatin 40 mg. I do think this is appropriate at her age and fall risk.    6. Dyslipidemia.  This is chronic. Her last lipid panel was in 07/2023 and her total cholesterol was under 200 and her LDL was under 100. We will have her continue her rosuvastatin 40 mg as prescribed.    7. Hypothyroidism.  The patient does not have any concerning symptoms. Her last TSH, which we checked a week ago, was 0.63. It is in a very good range. She is not having any concerning side effects or symptoms of hypo or hyperthyroidism.    8. Colon cancer screening.  We used shared decision making. The patient is healthy, independent living, and soon to be 78-year-old and she would like to continue screenings. I will place an order for Cologuard.    Follow-up  The patient will follow up in 3 months for her annual wellness visit.  Procedure: Her last Cologuard was in 2019, which was negative.         Return in about 3 months (around 6/5/2024), or if symptoms worsen or fail to improve, for Annual Medicare.      This note was created using voice recognition software ([employer]agon). The accuracy of the dictation is limited by the abilities of the software. I have reviewed the note prior to signing, however some errors in grammar and context are still possible. If you have any questions related to this note please do not hesitate to contact our office.

## 2024-03-11 ENCOUNTER — OFFICE VISIT (OUTPATIENT)
Dept: CARDIOLOGY | Facility: MEDICAL CENTER | Age: 79
End: 2024-03-11
Attending: INTERNAL MEDICINE
Payer: MEDICARE

## 2024-03-11 VITALS
OXYGEN SATURATION: 96 % | HEIGHT: 58 IN | RESPIRATION RATE: 14 BRPM | SYSTOLIC BLOOD PRESSURE: 138 MMHG | HEART RATE: 88 BPM | DIASTOLIC BLOOD PRESSURE: 78 MMHG | BODY MASS INDEX: 19.77 KG/M2 | WEIGHT: 94.2 LBS

## 2024-03-11 DIAGNOSIS — I35.0 MILD AORTIC STENOSIS: ICD-10-CM

## 2024-03-11 DIAGNOSIS — Z82.49 FAMILY HISTORY OF ATHEROSCLEROSIS: ICD-10-CM

## 2024-03-11 DIAGNOSIS — E78.5 DYSLIPIDEMIA: ICD-10-CM

## 2024-03-11 DIAGNOSIS — Z79.899 HIGH RISK MEDICATION USE: ICD-10-CM

## 2024-03-11 DIAGNOSIS — I10 HTN (HYPERTENSION), MALIGNANT: ICD-10-CM

## 2024-03-11 DIAGNOSIS — I25.10 ATHEROSCLEROSIS OF NATIVE CORONARY ARTERY OF NATIVE HEART WITHOUT ANGINA PECTORIS: ICD-10-CM

## 2024-03-11 PROCEDURE — 99214 OFFICE O/P EST MOD 30 MIN: CPT | Performed by: INTERNAL MEDICINE

## 2024-03-11 PROCEDURE — 3075F SYST BP GE 130 - 139MM HG: CPT | Performed by: INTERNAL MEDICINE

## 2024-03-11 PROCEDURE — 3078F DIAST BP <80 MM HG: CPT | Performed by: INTERNAL MEDICINE

## 2024-03-11 PROCEDURE — 99213 OFFICE O/P EST LOW 20 MIN: CPT | Performed by: INTERNAL MEDICINE

## 2024-03-11 RX ORDER — ROSUVASTATIN CALCIUM 40 MG/1
40 TABLET, COATED ORAL DAILY
Qty: 100 TABLET | Refills: 4 | Status: SHIPPED | OUTPATIENT
Start: 2024-03-11

## 2024-03-11 RX ORDER — LISINOPRIL 40 MG/1
40 TABLET ORAL DAILY
Qty: 100 TABLET | Refills: 4 | Status: SHIPPED | OUTPATIENT
Start: 2024-03-11

## 2024-03-11 RX ORDER — METOPROLOL SUCCINATE 50 MG/1
50 TABLET, EXTENDED RELEASE ORAL DAILY
Qty: 100 TABLET | Refills: 4 | Status: SHIPPED | OUTPATIENT
Start: 2024-03-11

## 2024-03-11 ASSESSMENT — ENCOUNTER SYMPTOMS
FALLS: 0
BLOOD IN STOOL: 0
COUGH: 0
SPEECH CHANGE: 0
VOMITING: 0
BLURRED VISION: 0
CLAUDICATION: 0
SENSORY CHANGE: 0
WEIGHT LOSS: 0
ABDOMINAL PAIN: 0
BRUISES/BLEEDS EASILY: 0
FEVER: 0
HEADACHES: 0
DOUBLE VISION: 0
DIZZINESS: 0
EYE DISCHARGE: 0
MYALGIAS: 0
CHILLS: 0
HALLUCINATIONS: 0
NAUSEA: 0
PND: 0
PALPITATIONS: 0
LOSS OF CONSCIOUSNESS: 0
EYE PAIN: 0
DEPRESSION: 0
ORTHOPNEA: 0
SHORTNESS OF BREATH: 0

## 2024-03-11 ASSESSMENT — FIBROSIS 4 INDEX: FIB4 SCORE: 1.33

## 2024-03-11 NOTE — PROGRESS NOTES
Chief Complaint   Patient presents with    Follow-Up     Dx: Atherosclerosis of native coronary artery of native heart without angina pectoris    Dyslipidemia    Aortic Stenosis     F/V Dx: Mild aortic stenosis       Subjective:   Olga Rich is an 79 y.o. female who presents today for cardiac care and evaluation because of an abnormal calcium scan in the past. At the last visit, patient was deemed to be asymptomatic and no further cardiac workup was entertained. We optimized her medical therapy.      She was found to have aortic stenosis for which she underwent TTE and SANGEETA in 05/2017.    08/2023 I have independently interpreted and reviewed echocardiogram's actual images with patient which showed normal left ventricular systolic function. No wall motion abnormality. No evidence of pulmonary hypertension. Mild AS.    I have independently interpreted and reviewed blood tests results with patient in clinic which shows elevated LDL level 86, triglycerides 127, renal and liver function. GFR of 24.    In the interim, patient has been doing well without having any worsening symptoms. Patient denies having chest pain, palpitation, presyncope, syncope episodes.      Past Medical History:   Diagnosis Date    Allergic rhinitis     Arthritis     fingers    Asthma in adult     Cataract 02/2012    Bilat     Dental disorder     partial upper    Dyslipidemia 04/26/2016    GERD (gastroesophageal reflux disease)     High cholesterol     Hypertension     Hypothyroid     Hypothyroid 04/18/2014    Hypothyroid 04/18/2014    Nonspecific abnormal function study, cardiovascular 08/19/2021    Osteoporosis, unspecified     Pneumonia 2009    SOB (shortness of breath) 08/18/2017    IMO load March 2020     Past Surgical History:   Procedure Laterality Date    CATARACT PHACO WITH IOL      Dr. Vargas    ORIF, FRACTURE, FEMUR      titamium Dr. Mares.      Family History   Problem Relation Age of Onset    Other Mother 97        old  age.     Heart Attack Father 56        MI    Heart Disease Father     Other Son         ETOH    Other Son         ETOH    Heart Disease Sister 53    Heart Attack Sister 85    No Known Problems Brother     No Known Problems Sister     No Known Problems Sister      Social History     Socioeconomic History    Marital status:      Spouse name: Not on file    Number of children: Not on file    Years of education: Not on file    Highest education level: Not on file   Occupational History    Not on file   Tobacco Use    Smoking status: Former     Current packs/day: 0.00     Average packs/day: 0.3 packs/day for 40.0 years (10.0 ttl pk-yrs)     Types: Cigarettes     Start date: 3/1/1974     Quit date: 3/1/2014     Years since quitting: 10.0    Smokeless tobacco: Never   Vaping Use    Vaping Use: Never used   Substance and Sexual Activity    Alcohol use: No     Comment: very rarely    Drug use: Yes     Types: Marijuana, Inhaled     Comment: occasionally for glaucoma which runs in her family/ per pt no     Sexual activity: Never     Partners: Male   Other Topics Concern    Not on file   Social History Narrative    Not on file     Social Determinants of Health     Financial Resource Strain: Not on file   Food Insecurity: Not on file   Transportation Needs: Not on file   Physical Activity: Not on file   Stress: Not on file   Social Connections: Not on file   Intimate Partner Violence: Not on file   Housing Stability: Not on file     Allergies   Allergen Reactions    Meperidine Anaphylaxis     Outpatient Encounter Medications as of 3/11/2024   Medication Sig Dispense Refill    metoprolol SR (TOPROL XL) 50 MG TABLET SR 24 HR Take 1 Tablet by mouth every day. 100 Tablet 4    rosuvastatin (CRESTOR) 40 MG tablet Take 1 Tablet by mouth every day. 100 Tablet 4    lisinopril (PRINIVIL) 40 MG tablet Take 1 Tablet by mouth every day. 100 Tablet 4    temazepam (RESTORIL) 15 MG Cap at bedtime as needed.      albuterol 108 (90 Base)  MCG/ACT Aero Soln inhalation aerosol Inhale 2 Puffs every four hours as needed for Shortness of Breath. 1 Each 3    levothyroxine (SYNTHROID) 50 MCG Tab TAKE 1 TABLET BY MOUTH EVERY DAY IN THE MORNING ON AN EMPTY STOMACH 100 Tablet 3    fluticasone-salmeterol (ADVAIR DISKUS) 250-50 MCG/ACT AEROSOL POWDER, BREATH ACTIVATED Inhale 1 Puff 2 times a day. Rinse mouth after each use. 60 Each 11    tiotropium (SPIRIVA RESPIMAT) 2.5 mcg/Act Aero Soln Inhale 2 Inhalations every day. Assemble and prime. 4 Each 0    montelukast (SINGULAIR) 10 MG Tab TAKE ONE TABLET BY MOUTH ONE TIME DAILY 90 Tablet 3    magnesium oxide (MAG-OX) 400 MG Tab Take 400 mg by mouth every day.      Cholecalciferol (VITAMIN D) 2000 UNITS CAPS Take 2,000 Units by mouth every day.      calcium carbonate (OS-DARIEN 500) 1250 MG TABS Take 1,250 mg by mouth every day.      multivitamin (THERAGRAN) TABS Take 1 Tab by mouth every day.      [DISCONTINUED] metoprolol SR (TOPROL XL) 25 MG TABLET SR 24 HR Take 1 Tablet by mouth every day. 100 Tablet 3    [DISCONTINUED] rosuvastatin (CRESTOR) 40 MG tablet Take 1 Tablet by mouth every day. 100 Tablet 4    [DISCONTINUED] lisinopril (PRINIVIL) 40 MG tablet Take 1 Tablet by mouth every day. 100 Tablet 4     No facility-administered encounter medications on file as of 3/11/2024.     Review of Systems   Constitutional:  Negative for chills, fever, malaise/fatigue and weight loss.   HENT:  Negative for ear discharge, ear pain, hearing loss and nosebleeds.    Eyes:  Negative for blurred vision, double vision, pain and discharge.   Respiratory:  Negative for cough and shortness of breath.    Cardiovascular:  Negative for chest pain, palpitations, orthopnea, claudication, leg swelling and PND.   Gastrointestinal:  Negative for abdominal pain, blood in stool, melena, nausea and vomiting.   Genitourinary:  Negative for dysuria and hematuria.   Musculoskeletal:  Negative for falls, joint pain and myalgias.   Skin:  Negative for  "itching and rash.   Neurological:  Negative for dizziness, sensory change, speech change, loss of consciousness and headaches.   Endo/Heme/Allergies:  Negative for environmental allergies. Does not bruise/bleed easily.   Psychiatric/Behavioral:  Negative for depression, hallucinations and suicidal ideas.         Objective:   /78 (BP Location: Left arm, Patient Position: Sitting, BP Cuff Size: Adult)   Pulse 88   Resp 14   Ht 1.473 m (4' 10\")   Wt 42.7 kg (94 lb 3.2 oz)   SpO2 96%   BMI 19.69 kg/m²     Physical Exam  Vitals and nursing note reviewed.   Constitutional:       General: She is not in acute distress.     Appearance: She is not diaphoretic.   HENT:      Head: Normocephalic and atraumatic.      Right Ear: External ear normal.      Left Ear: External ear normal.      Nose: No congestion or rhinorrhea.   Eyes:      General:         Right eye: No discharge.         Left eye: No discharge.   Neck:      Thyroid: No thyromegaly.      Vascular: No JVD.   Cardiovascular:      Rate and Rhythm: Normal rate and regular rhythm.      Pulses: Normal pulses.      Heart sounds: Murmur heard.   Pulmonary:      Effort: No respiratory distress.   Abdominal:      General: There is no distension.      Tenderness: There is no abdominal tenderness.   Musculoskeletal:         General: No swelling or tenderness.      Right lower leg: No edema.      Left lower leg: No edema.   Skin:     General: Skin is warm and dry.   Neurological:      Mental Status: She is alert and oriented to person, place, and time.      Cranial Nerves: No cranial nerve deficit.   Psychiatric:         Behavior: Behavior normal.         Assessment:     1. HTN (hypertension), malignant  metoprolol SR (TOPROL XL) 50 MG TABLET SR 24 HR    lisinopril (PRINIVIL) 40 MG tablet      2. Atherosclerosis of native coronary artery of native heart without angina pectoris  rosuvastatin (CRESTOR) 40 MG tablet    Basic Metabolic Panel    LIPID PANEL    Lipoprotein " (a)      3. Mild aortic stenosis  EC-ECHOCARDIOGRAM COMPLETE W/O CONT      4. Dyslipidemia  rosuvastatin (CRESTOR) 40 MG tablet    Basic Metabolic Panel    LIPID PANEL    Lipoprotein (a)      5. High risk medication use        6. Family history of atherosclerosis  Basic Metabolic Panel    LIPID PANEL    Lipoprotein (a)            Medical Decision Making:  Today's Assessment / Status / Plan:   At this time patient is clinically stable in terms of her cardiac standpoint.  Today, based on physical examination findings, patient is euvolemic. No JVD, lungs are clear to auscultation, no pitting edema in bilateral lower extremities, no ascites.    Dry weight is 98 lbs.    Will continue rosuvastatin 40 mg p.o. once a day for better LDL control.    Blood pressure controlled.  Will continue lisinopril to 40 mg daily.    Ok to increase Toprol to 50 mg daily.    Does not want to be referred to valve program as she is feeling well.   She prefers to see me at this time.  Clinical monitor for AS. Repeat TTE in 1 year.    I will see patient back in clinic with lab tests and studies results in 12 months.     Bruno Hood M.D.

## 2024-04-01 ENCOUNTER — OFFICE VISIT (OUTPATIENT)
Dept: RHEUMATOLOGY | Facility: MEDICAL CENTER | Age: 79
End: 2024-04-01
Attending: INTERNAL MEDICINE
Payer: MEDICARE

## 2024-04-01 VITALS
HEART RATE: 76 BPM | WEIGHT: 92 LBS | DIASTOLIC BLOOD PRESSURE: 62 MMHG | SYSTOLIC BLOOD PRESSURE: 130 MMHG | RESPIRATION RATE: 16 BRPM | TEMPERATURE: 97.2 F | BODY MASS INDEX: 19.23 KG/M2 | OXYGEN SATURATION: 93 %

## 2024-04-01 DIAGNOSIS — M81.0 AGE-RELATED OSTEOPOROSIS WITHOUT CURRENT PATHOLOGICAL FRACTURE: ICD-10-CM

## 2024-04-01 DIAGNOSIS — J44.9 CHRONIC OBSTRUCTIVE PULMONARY DISEASE, UNSPECIFIED COPD TYPE (HCC): ICD-10-CM

## 2024-04-01 DIAGNOSIS — I35.0 AORTIC VALVE STENOSIS, ETIOLOGY OF CARDIAC VALVE DISEASE UNSPECIFIED: ICD-10-CM

## 2024-04-01 DIAGNOSIS — E03.9 ACQUIRED HYPOTHYROIDISM: ICD-10-CM

## 2024-04-01 PROCEDURE — 99213 OFFICE O/P EST LOW 20 MIN: CPT | Performed by: INTERNAL MEDICINE

## 2024-04-01 PROCEDURE — 99212 OFFICE O/P EST SF 10 MIN: CPT

## 2024-04-01 PROCEDURE — 3075F SYST BP GE 130 - 139MM HG: CPT | Performed by: INTERNAL MEDICINE

## 2024-04-01 PROCEDURE — 3078F DIAST BP <80 MM HG: CPT | Performed by: INTERNAL MEDICINE

## 2024-04-01 ASSESSMENT — FIBROSIS 4 INDEX: FIB4 SCORE: 1.33

## 2024-04-01 NOTE — PROGRESS NOTES
Chief Complaint- joint pain     Subjective:   Olga Rich is a 79 y.o. female here today for follow up of rheumatological issues    This is a follow-up visit for this patient who we see in this clinic for osteoporosis.  Patient was diagnosed with osteoporosis in 2008 status post a right femur fracture.  Patient had been on Fosamax for 4 years and is currently on Prolia 60 mg subcu every 6 months with great benefit.  Patient has not had a bone fracture since last visit.  Last DEXA July 2023 next DEXA July 2025. Patient denies any side effects from the medication, denies any unexplained weight loss, denies any fevers of unknown etiology, denies any GI upset, denies any rashes, denies any new joint swelling, denies recurrent infections.        Additional comorbidities include  hypothyroidism and COPD/asthma for which she is managed by pulmonology and her primary care doctor.     Additional psychosocial comorbidities include alcoholism in the family but patient is quite happy with her son and daughter who continues to be sober.       S/p fosamax for 4 years      DEXA 4/2014 T scores -4.4, -3.9  DEXA 7/2016 T scores -3.7, -4.0  FRAX Major Osteoporotic 28.9%, Hip fx risk 11.1%  DEXA 1/21/2019 T scores -4.0, -3.4  FRAX 1/21/2019 major osteoporotic fracture risk 31.3%, hip fracture risk 13.9%  DEXA 3/11/2021 T scores -3.8, -3.6  FRAX 3/11/2021 major osteoporotic fracture risk 31.1%, hip fracture risk 13.8%   DEXA 7/5/2023 T scores -3.8, -3.4  FRAX 7/5/2023 major osteoporotic fracture risk 31.3%, hip fracture risk 13.0%  On Prolia 60 mg every 6 months          Current Outpatient Medications   Medication Sig Dispense Refill    metoprolol SR (TOPROL XL) 50 MG TABLET SR 24 HR Take 1 Tablet by mouth every day. 100 Tablet 4    rosuvastatin (CRESTOR) 40 MG tablet Take 1 Tablet by mouth every day. 100 Tablet 4    lisinopril (PRINIVIL) 40 MG tablet Take 1 Tablet by mouth every day. 100 Tablet 4    temazepam (RESTORIL) 15  MG Cap at bedtime as needed.      albuterol 108 (90 Base) MCG/ACT Aero Soln inhalation aerosol Inhale 2 Puffs every four hours as needed for Shortness of Breath. 1 Each 3    levothyroxine (SYNTHROID) 50 MCG Tab TAKE 1 TABLET BY MOUTH EVERY DAY IN THE MORNING ON AN EMPTY STOMACH 100 Tablet 3    fluticasone-salmeterol (ADVAIR DISKUS) 250-50 MCG/ACT AEROSOL POWDER, BREATH ACTIVATED Inhale 1 Puff 2 times a day. Rinse mouth after each use. 60 Each 11    tiotropium (SPIRIVA RESPIMAT) 2.5 mcg/Act Aero Soln Inhale 2 Inhalations every day. Assemble and prime. 4 Each 0    montelukast (SINGULAIR) 10 MG Tab TAKE ONE TABLET BY MOUTH ONE TIME DAILY 90 Tablet 3    magnesium oxide (MAG-OX) 400 MG Tab Take 400 mg by mouth every day.      Cholecalciferol (VITAMIN D) 2000 UNITS CAPS Take 2,000 Units by mouth every day.      calcium carbonate (OS-DARIEN 500) 1250 MG TABS Take 1,250 mg by mouth every day.      multivitamin (THERAGRAN) TABS Take 1 Tab by mouth every day.       No current facility-administered medications for this visit.     She  has a past medical history of Allergic rhinitis, Arthritis, Asthma in adult, Cataract (02/2012), Dental disorder, Dyslipidemia (04/26/2016), GERD (gastroesophageal reflux disease), High cholesterol, Hypertension, Hypothyroid, Hypothyroid (04/18/2014), Hypothyroid (04/18/2014), Nonspecific abnormal function study, cardiovascular (08/19/2021), Osteoporosis, unspecified, Pneumonia (2009), and SOB (shortness of breath) (08/18/2017).    ROS   Other than what is mentioned in HPI or physical exam, there is no history of headaches, double vision or blurred vision.  No trouble swallowing difficulties .  No chest complaints including chest pain, cough or sputum production. No GI complaints including nausea, vomiting, change in bowel habits, or past peptic ulcer disease. No history of blood in the stools. No urinary complaints including dysuria or frequency. No history of alopecia, photosensitivity      Objective:     /62   Pulse 76   Temp 36.2 °C (97.2 °F) (Temporal)   Resp 16   Wt 41.7 kg (92 lb)   SpO2 93%  Body mass index is 19.23 kg/m².   Physical Exam:    Constitutional: Alert and oriented X3, patient is talkative with good eye contact.Skin: Warm, dry, good turgor, no rashes in visible areas.Eye: Equal, round and reactive, conjunctiva clear, lids normal EOM intactENMT: Lips without lesions,  pinna without deformityNeck: Trachea midline, no masses, no thyromegaly.Lymph:  No cervical lymphadenopathy, no axillary lymphadenopathy, no supraclavicular lymphadenopathyRespiratory: Unlabored respiratory effort, lungs clear to auscultation, no wheezes, no ronchi.Cardiovascular: Normal S1, S2, Regular rate and rhythm, no murmurs rubs or gallops   .Abdomen: Soft, non-distended.Psych: Alert and oriented x3, normal affect and mood.Neuro: Cranial nerves 2-12 are grossly intact Ext:no joint laxity noted in bilateral arms, no joint laxity noted in bilateral legs, mild kyphosis of the upper back, but no tenderness along T-spine, joints with minimal Heberden's nodes most DIP joints both hands    Lab Results   Component Value Date/Time    HEPCAB Non-Reactive 07/25/2023 11:13 AM     Lab Results   Component Value Date/Time    SODIUM 140 02/22/2024 11:57 AM    POTASSIUM 4.2 02/22/2024 11:57 AM    CHLORIDE 105 02/22/2024 11:57 AM    CO2 23 02/22/2024 11:57 AM    GLUCOSE 121 (H) 02/22/2024 11:57 AM    BUN 13 02/22/2024 11:57 AM    CREATININE 0.90 02/22/2024 11:57 AM      Lab Results   Component Value Date/Time    WBC 6.7 07/25/2023 11:13 AM    RBC 4.47 07/25/2023 11:13 AM    HEMOGLOBIN 13.4 07/25/2023 11:13 AM    HEMATOCRIT 42.1 07/25/2023 11:13 AM    MCV 94.2 07/25/2023 11:13 AM    MCH 30.0 07/25/2023 11:13 AM    MCHC 31.8 (L) 07/25/2023 11:13 AM    MPV 9.1 07/25/2023 11:13 AM    NEUTSPOLYS 58.00 07/25/2023 11:13 AM    LYMPHOCYTES 30.00 07/25/2023 11:13 AM    MONOCYTES 8.20 07/25/2023 11:13 AM    EOSINOPHILS 2.80  07/25/2023 11:13 AM    BASOPHILS 0.90 07/25/2023 11:13 AM      Lab Results   Component Value Date/Time    CALCIUM 9.9 02/22/2024 11:57 AM    ASTSGOT 27 02/22/2024 11:57 AM    ALTSGPT 19 02/22/2024 11:57 AM    ALKPHOSPHAT 65 02/22/2024 11:57 AM    TBILIRUBIN 0.4 02/22/2024 11:57 AM    ALBUMIN 4.3 02/22/2024 11:57 AM    ALBUMIN 5.77 (H) 12/04/2014 03:10 PM    TOTPROTEIN 7.6 02/22/2024 11:57 AM    TOTPROTEIN 9.50 (H) 12/04/2014 03:10 PM     Lab Results   Component Value Date/Time    SEDRATEWES 22 12/04/2014 03:10 PM     Lab Results   Component Value Date/Time    CTELOPEP 50 06/09/2015 01:15 PM     Lab Results   Component Value Date/Time    PTHINTACT 48.8 12/09/2016 10:20 AM     Assessment and Plan:     1. Age-related osteoporosis without current pathological fracture  Currently on Prolia 60 mg subcu every 6 months, Prolia reordered for patient  Last DEXA July 2023 next DEXA July 2025   continue calcium citrate 1200 mg by mouth daily and vitamin D about 2000 units by mouth daily and magnesium 200 mg by mouth daily    2. Acquired hypothyroidism  Followed by patients PCP, can exacerbate joint pain, I recommend monitoring thyroid on a regular basis to assure it is not contributing to the patient's joint pain    3. Chronic obstructive pulmonary disease, unspecified COPD type (HCC)  Currently followed by pulmonology    4. Aortic valve stenosis, etiology of cardiac valve disease unspecified  Followed by cardiology    Followup: Return in about 1 year (around 4/1/2025). or sooner prn      Please note that this dictation was created using voice recognition software. I have made every reasonable attempt to correct obvious errors, but I expect that there are errors of grammar and possibly content that I did not discover before finalizing the note.

## 2024-04-03 NOTE — ED NOTES
(E4) spontaneous Pt has been provided written and verbal education on high blood pressure. She has a follow up appointment with primary care Monday. She is ambulatory on DC

## 2024-04-15 ENCOUNTER — OFFICE VISIT (OUTPATIENT)
Dept: SLEEP MEDICINE | Facility: MEDICAL CENTER | Age: 79
End: 2024-04-15
Attending: NURSE PRACTITIONER
Payer: MEDICARE

## 2024-04-15 VITALS
RESPIRATION RATE: 16 BRPM | BODY MASS INDEX: 19.31 KG/M2 | OXYGEN SATURATION: 93 % | SYSTOLIC BLOOD PRESSURE: 122 MMHG | HEART RATE: 74 BPM | WEIGHT: 92 LBS | DIASTOLIC BLOOD PRESSURE: 72 MMHG | HEIGHT: 58 IN

## 2024-04-15 DIAGNOSIS — Z87.891 FORMER SMOKER: ICD-10-CM

## 2024-04-15 DIAGNOSIS — J44.89 ASTHMA-COPD OVERLAP SYNDROME (HCC): Chronic | ICD-10-CM

## 2024-04-15 DIAGNOSIS — G47.34 NOCTURNAL HYPOXIA: ICD-10-CM

## 2024-04-15 DIAGNOSIS — F51.01 PRIMARY INSOMNIA: Chronic | ICD-10-CM

## 2024-04-15 PROCEDURE — 3078F DIAST BP <80 MM HG: CPT | Performed by: NURSE PRACTITIONER

## 2024-04-15 PROCEDURE — 3074F SYST BP LT 130 MM HG: CPT | Performed by: NURSE PRACTITIONER

## 2024-04-15 PROCEDURE — 99213 OFFICE O/P EST LOW 20 MIN: CPT | Performed by: NURSE PRACTITIONER

## 2024-04-15 ASSESSMENT — FIBROSIS 4 INDEX: FIB4 SCORE: 1.33

## 2024-04-15 NOTE — PROGRESS NOTES
Chief Complaint   Patient presents with    Follow-Up     COPD/ Asthma/ Insomnia // last seen 10/12/2023       HPI:  Olga Rich is a 79 y.o. year old female here today for follow-up on asthma/COPD overlap, nocturnal hypoxia and chronic insomnia.   Last office visit 10/12/2023    [facility] stGstrstastdstest:st st1st Exacerbations this year: 0    Currently using Advair 250/50mcg 1 puff twice daily, Spiriva Respimat 2.5mcg 2puffs once daily, levalbuterol HFA as needed and Singulair nightly.   She notes her breathing to be stable without complaint.  No general shortness of breath, cough, chest pain, chest tightness, phlegm or wheezing.  No exacerbations or cold since last office visit.  She is getting ready for [place] season and doing yard work currently and tolerating everything well.  She denies shortness of breath.    Using 2 L/min O2 at night and finds that her sleep is slightly improved.  She does take a 20-minute nap in the afternoon and we reviewed sleep hygiene.  She goes to bed at 10:30 PM and generally asleep within 30 minutes to 1 hour but wakes 3-4 times at night and can take again 30 minutes to 1 hour to resume sleep and gets up by 7-7:30 AM.  She denies waking with headaches or shortness of breath.    Using temazepam 50 mg nightly as needed insomnia.  She is currently using it once per week.  She denies any side effects.  It generally takes her anywhere between 30 minutes to 1 hour to fall asleep but with temazepam and may at the longest take 30 minutes.    PULM HX:  Former smoker, quit 2014 with 10-pack-year history.  PFT 10/31/2007 noted FVC 1.79 L or 79%, FEV1 1.01 L or 55%, FEV1/FVC ratio 57, %, %, and DLCO 99% predicted.  PFT FVC 1.14 L or 57%, FEV1 0.53 L or 34%, FEV1/FVC ratio 47, hyperinflation with %, TLC 4.95 L or 120% and DLCO 68% predicted.  Mild bronchodilator response noted.  This notes severe obstruction with hyperinflation.  Recommend adding Spiriva Respimat 2.5 migrans 2 puffs  once daily to optimize therapy.     CNOX on room air 9/8/2023 noted basal SPO2 of 86.3% and less than 88% for 426.3 minutes of the night. Started on O2 2LPM at night.     She has a history of insomnia and uses temazepam 15 mg up to 2 times per week when having difficulty initiating sleep.     ROS: As per HPI and otherwise negative if not stated.    Past Medical History:   Diagnosis Date    Allergic rhinitis     Arthritis     fingers    Asthma in adult     Cataract 02/2012    Bilat     Dental disorder     partial upper    Dyslipidemia 04/26/2016    GERD (gastroesophageal reflux disease)     High cholesterol     Hypertension     Hypothyroid     Hypothyroid 04/18/2014    Hypothyroid 04/18/2014    Nonspecific abnormal function study, cardiovascular 08/19/2021    Osteoporosis, unspecified     Pneumonia 2009    SOB (shortness of breath) 08/18/2017    IMO load March 2020       Past Surgical History:   Procedure Laterality Date    CATARACT PHACO WITH IOL      Dr. Sam CASTRO, FRACTURE, FEMUR      titamium Dr. Mares.        Family History   Problem Relation Age of Onset    Other Mother 97        old age.     Heart Attack Father 56        MI    Heart Disease Father     Other Son         ETOH    Other Son         ETOH    Heart Disease Sister 53    Heart Attack Sister 85    No Known Problems Brother     No Known Problems Sister     No Known Problems Sister        Social History     Socioeconomic History    Marital status:      Spouse name: Not on file    Number of children: Not on file    Years of education: Not on file    Highest education level: Not on file   Occupational History    Not on file   Tobacco Use    Smoking status: Former     Current packs/day: 0.00     Average packs/day: 0.3 packs/day for 40.0 years (10.0 ttl pk-yrs)     Types: Cigarettes     Start date: 3/1/1974     Quit date: 3/1/2014     Years since quitting: 10.1    Smokeless tobacco: Never   Vaping Use    Vaping Use: Never used   Substance and  "Sexual Activity    Alcohol use: No     Comment: very rarely    Drug use: Yes     Types: Marijuana, Inhaled     Comment: occasionally for glaucoma which runs in her family/ per pt no     Sexual activity: Never     Partners: Male   Other Topics Concern    Not on file   Social History Narrative    Not on file     Social Determinants of Health     Financial Resource Strain: Not on file   Food Insecurity: Not on file   Transportation Needs: Not on file   Physical Activity: Not on file   Stress: Not on file   Social Connections: Not on file   Intimate Partner Violence: Not on file   Housing Stability: Not on file       Allergies as of 04/15/2024 - Reviewed 04/15/2024   Allergen Reaction Noted    Meperidine Anaphylaxis 03/28/2022        Vitals:  /72 (BP Location: Left arm, Patient Position: Sitting, BP Cuff Size: Adult)   Pulse 74   Resp 16   Ht 1.473 m (4' 10\")   Wt 41.7 kg (92 lb)   SpO2 93%     Current medications as of today   Current Outpatient Medications   Medication Sig Dispense Refill    metoprolol SR (TOPROL XL) 50 MG TABLET SR 24 HR Take 1 Tablet by mouth every day. 100 Tablet 4    rosuvastatin (CRESTOR) 40 MG tablet Take 1 Tablet by mouth every day. 100 Tablet 4    lisinopril (PRINIVIL) 40 MG tablet Take 1 Tablet by mouth every day. 100 Tablet 4    temazepam (RESTORIL) 15 MG Cap at bedtime as needed.      albuterol 108 (90 Base) MCG/ACT Aero Soln inhalation aerosol Inhale 2 Puffs every four hours as needed for Shortness of Breath. 1 Each 3    levothyroxine (SYNTHROID) 50 MCG Tab TAKE 1 TABLET BY MOUTH EVERY DAY IN THE MORNING ON AN EMPTY STOMACH 100 Tablet 3    fluticasone-salmeterol (ADVAIR DISKUS) 250-50 MCG/ACT AEROSOL POWDER, BREATH ACTIVATED Inhale 1 Puff 2 times a day. Rinse mouth after each use. 60 Each 11    tiotropium (SPIRIVA RESPIMAT) 2.5 mcg/Act Aero Soln Inhale 2 Inhalations every day. Assemble and prime. 4 Each 0    montelukast (SINGULAIR) 10 MG Tab TAKE ONE TABLET BY MOUTH ONE TIME " DAILY 90 Tablet 3    magnesium oxide (MAG-OX) 400 MG Tab Take 400 mg by mouth every day.      Cholecalciferol (VITAMIN D) 2000 UNITS CAPS Take 2,000 Units by mouth every day.      calcium carbonate (OS-DARIEN 500) 1250 MG TABS Take 1,250 mg by mouth every day.      multivitamin (THERAGRAN) TABS Take 1 Tab by mouth every day.       No current facility-administered medications for this visit.         Physical Exam:   Gen:           Alert and oriented, No apparent distress. Mood and affect appropriate, normal interaction with examiner.  Eyes:          PERRL, EOM intact, sclere white, conjunctive moist. Glasses.  Ears:          Not examined.   Hearing:     Grossly intact.  Nose:          Normal, no lesions or deformities.  Dentition:    Not examined.   Oropharynx:   Not examined.   Mallampati Classification: Not examined.   Neck:        Supple, trachea midline, no masses.  Respiratory Effort: No intercostal retractions or use of accessory muscles.   Lung Auscultation:      Clear to auscultation bilaterally; no rales, rhonchi or wheezing.  CV:            Regular rate and rhythm. No murmurs, rubs or gallops.  Abd:           Not examined.   Lymphadenopathy: Not examined.   Gait and Station: Normal.  Digits and Nails: No clubbing, cyanosis, petechiae, or nodes.   Cranial Nerves: II-XII grossly intact.  Skin:        No rashes, lesions or ulcers noted.               Ext:           No cyanosis or edema.      Assessment:  1. Asthma-COPD overlap syndrome (HCC)        2. Nocturnal hypoxia        3. Primary insomnia        4. Body mass index (BMI) 19.9 or less, adult        5. Former smoker                 Immunizations:    Flu:10/12/23  Pneumovax 23:2015  Prevnar 13:2016  PCV 20: not due  COVID-19: 11/22/23    Plan:  Asthma/COPD is clinically stable.  She will continue with current bronchodilators.   [place]ger at next OV  Patient will continue to benefit from nocturnal O2 at 2 L/min.  Reviewed sleep hygiene and continued use of  temazepam 50 mg as needed.  She notes improvement in her sleep quality with addition of oxygen.  Encourage regular activity and healthy eating for conditioning.  Follow primary care for the health concerns.  Follow-up in 6 months with spirometry, sooner if needed.    Please note that this dictation was created using voice recognition software. I have made every reasonable attempt to correct obvious errors, but it is possible there are errors of grammar and possibly content that I did not discover before finalizing the note.

## 2024-04-18 ENCOUNTER — TELEPHONE (OUTPATIENT)
Dept: HEALTH INFORMATION MANAGEMENT | Facility: OTHER | Age: 79
End: 2024-04-18
Payer: MEDICARE

## 2024-04-23 ENCOUNTER — OUTPATIENT INFUSION SERVICES (OUTPATIENT)
Dept: ONCOLOGY | Facility: MEDICAL CENTER | Age: 79
End: 2024-04-23
Attending: INTERNAL MEDICINE
Payer: MEDICARE

## 2024-04-23 VITALS
TEMPERATURE: 97.6 F | DIASTOLIC BLOOD PRESSURE: 76 MMHG | HEART RATE: 76 BPM | RESPIRATION RATE: 18 BRPM | BODY MASS INDEX: 19.44 KG/M2 | OXYGEN SATURATION: 92 % | HEIGHT: 58 IN | WEIGHT: 92.59 LBS | SYSTOLIC BLOOD PRESSURE: 140 MMHG

## 2024-04-23 DIAGNOSIS — M81.0 AGE-RELATED OSTEOPOROSIS WITHOUT CURRENT PATHOLOGICAL FRACTURE: ICD-10-CM

## 2024-04-23 LAB
CA-I BLD ISE-SCNC: 1.5 MMOL/L (ref 1.1–1.3)
CREAT BLD-MCNC: 1.8 MG/DL (ref 0.5–1.4)

## 2024-04-23 PROCEDURE — 36415 COLL VENOUS BLD VENIPUNCTURE: CPT

## 2024-04-23 PROCEDURE — 82565 ASSAY OF CREATININE: CPT

## 2024-04-23 PROCEDURE — 96372 THER/PROPH/DIAG INJ SC/IM: CPT

## 2024-04-23 PROCEDURE — 700111 HCHG RX REV CODE 636 W/ 250 OVERRIDE (IP): Mod: JZ | Performed by: INTERNAL MEDICINE

## 2024-04-23 PROCEDURE — 82330 ASSAY OF CALCIUM: CPT

## 2024-04-23 RX ORDER — EPINEPHRINE 1 MG/ML(1)
0.5 AMPUL (ML) INJECTION PRN
OUTPATIENT
Start: 2024-08-15

## 2024-04-23 RX ORDER — METHYLPREDNISOLONE SODIUM SUCCINATE 125 MG/2ML
125 INJECTION, POWDER, LYOPHILIZED, FOR SOLUTION INTRAMUSCULAR; INTRAVENOUS PRN
OUTPATIENT
Start: 2024-08-15

## 2024-04-23 RX ORDER — DIPHENHYDRAMINE HYDROCHLORIDE 50 MG/ML
50 INJECTION INTRAMUSCULAR; INTRAVENOUS PRN
OUTPATIENT
Start: 2024-08-15

## 2024-04-23 RX ADMIN — DENOSUMAB 60 MG: 60 INJECTION SUBCUTANEOUS at 14:27

## 2024-04-23 ASSESSMENT — FIBROSIS 4 INDEX: FIB4 SCORE: 1.33

## 2024-04-23 NOTE — PROGRESS NOTES
Pt arrives to [facility] for Prolia.  Pt denies any s/sx of infection or recent/planned dental procedures.  ISTAT calcium and creatinine drawn via 25g butterfly needle to L-AC.  Labs reviewed and ok to give Prolia per pharmacy.  Calcium was elevated today.  Pt advised to talk to her doctor if she should change amount of oral calcium supplement.  Pt agrees with plan.  Prolia given SC to back [facility].  Email sent to scheduling dept to set up next appt in 6 months.  Pt dc home to self care.

## 2024-06-05 ENCOUNTER — OFFICE VISIT (OUTPATIENT)
Dept: MEDICAL GROUP | Facility: MEDICAL CENTER | Age: 79
End: 2024-06-05
Payer: MEDICARE

## 2024-06-05 VITALS
WEIGHT: 93.8 LBS | HEART RATE: 81 BPM | DIASTOLIC BLOOD PRESSURE: 76 MMHG | HEIGHT: 58 IN | BODY MASS INDEX: 19.69 KG/M2 | SYSTOLIC BLOOD PRESSURE: 126 MMHG | TEMPERATURE: 97.3 F | OXYGEN SATURATION: 93 %

## 2024-06-05 DIAGNOSIS — Z71.89 ACP (ADVANCE CARE PLANNING): ICD-10-CM

## 2024-06-05 DIAGNOSIS — H61.22 IMPACTED CERUMEN OF LEFT EAR: ICD-10-CM

## 2024-06-05 DIAGNOSIS — Z00.00 ENCOUNTER FOR MEDICARE ANNUAL WELLNESS EXAM: ICD-10-CM

## 2024-06-05 PROCEDURE — 3078F DIAST BP <80 MM HG: CPT | Performed by: FAMILY MEDICINE

## 2024-06-05 PROCEDURE — 3074F SYST BP LT 130 MM HG: CPT | Performed by: FAMILY MEDICINE

## 2024-06-05 PROCEDURE — 69209 REMOVE IMPACTED EAR WAX UNI: CPT | Mod: LT | Performed by: FAMILY MEDICINE

## 2024-06-05 PROCEDURE — G0439 PPPS, SUBSEQ VISIT: HCPCS | Mod: 25 | Performed by: FAMILY MEDICINE

## 2024-06-05 ASSESSMENT — FIBROSIS 4 INDEX: FIB4 SCORE: 1.33

## 2024-06-05 ASSESSMENT — PATIENT HEALTH QUESTIONNAIRE - PHQ9: CLINICAL INTERPRETATION OF PHQ2 SCORE: 0

## 2024-06-05 ASSESSMENT — ENCOUNTER SYMPTOMS: GENERAL WELL-BEING: GOOD

## 2024-06-05 ASSESSMENT — ACTIVITIES OF DAILY LIVING (ADL): BATHING_REQUIRES_ASSISTANCE: 0

## 2024-06-05 NOTE — PATIENT INSTRUCTIONS
a-natural-death-may-be-preferable-for-many-than-enduring-cpr  https://www.npr.org/sections/health-shots/2023/05/29/9098006067/a-natural-death-may-be-preferable-for-many-than-enduring-cpr#:~:text=Survival%20after%20CPR%20for%20in,2.4%25%20for%20those%20over%2090.    Today, your Healthcare Provider may have discussed the following recommendations:    1. Exercise and Physical Activity  According to the American Heart Association, it is recommended to engage in physical activity regularly and to aim for 150 minutes of moderate-intensity aerobic activity per week.  Your Healthcare Provider may have recommended taking the stairs instead of the elevator, starting or maintaining a walking program or strength-training program.    2. Emotional Well-being  Mental and emotional well-being is essential to overall health.  Your Healthcare Provider may have encouraged you to build strong, positive relationships with family and friends, become more involved in your community (by volunteering or joining a spiritual community), or focus on self-care.    3. Fall and Injury Prevention  To prevent falls and injuries and also improve your balance, your Healthcare Provider may have suggested that you use a cane or walker, start an exercise of physical therapy program, or have your vision and/or hearing tested.    4. Urinary Leakage (Urinary Incontinence)  To control or manage the leakage of urine, your Healthcare Provider may have recommended you start bladder training exercises (such as Kegel exercises), a trial of a medication or a referral to see a specialist to discuss surgical options.

## 2024-06-05 NOTE — PROCEDURES
Ear Cerumen Removal    Date/Time: 6/5/2024 10:23 AM    Performed by: Rcihar Marti D.O.  Authorized by: Richar Marti D.O.    Anesthesia:  Local Anesthetic: none  Location details: left ear  Patient tolerance: patient tolerated the procedure well with no immediate complications  Comments: Large cerumen plug was removed with irrigation only done by MA, patient tolerated procedure well, tympanic membrane was intact after procedure  Procedure type: irrigation (MA did irrigation)    Sedation:  Patient sedated: no

## 2024-06-05 NOTE — PROGRESS NOTES
Chief Complaint   Patient presents with    Annual Wellness Visit       HPI:  Olga Rich is a 79 y.o. here for Medicare Annual Wellness Visit     History of Present Illness  The patient is an 79-year-old female who presents for her annual visit.    The patient reports a general sense of well-being, with no specific concerns or inquiries regarding her medication regimen. She is scheduled for a cholesterol test in 09/2024 as ordered by Dr. Hood. She maintains an active lifestyle, engaging in yard work, jumping jacks, and stretching exercises. Socially, she maintains an active lifestyle and is planning a visit with her elder son tomorrow to feed the small critters in the birds. She reports no bladder control issues and denies experiencing depressive symptoms. She acknowledges occasional forgetfulness, but otherwise, her memory is satisfactory. She denies any recent falls and reports feeling stable on her feet. She feels safe at home. She expresses a desire for CPR in the event of immediate cardiac arrest. She has not received the Cologuard kit and expresses interest in resubmitting this at her next visit. She receives Prolia every 6 months. She reports poor sleep quality and uses temazepam once or twice weekly. Her breathing is stable, and she has not needed to use her albuterol rescue inhaler. Her current medications include Advair twice daily and Spiriva, which she reports as effective.    The patient utilizes an oxygen machine for a few hours nightly due to its loud noise and lighting of the room, which disrupts her sleep. She reports no breathing difficulties during the day. She plans to discuss with the machine's filters in 10/2024 to explore the possibility of reducing the light and sound lights.    The patient reports tinnitus, which began following her first COVID-19 vaccine. She attempts to clean her ears and rinse them thoroughly, but reports occasional unsuccessful attempts.   She has a family  history of heart attacks.       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 100 Tablet 4    rosuvastatin (CRESTOR) 40 MG tablet Take 1 Tablet by mouth every day. 100 Tablet 4    lisinopril (PRINIVIL) 40 MG tablet Take 1 Tablet by mouth every day. 100 Tablet 4    temazepam (RESTORIL) 15 MG Cap at bedtime as needed.      albuterol 108 (90 Base) MCG/ACT Aero Soln inhalation aerosol Inhale 2 Puffs every four hours as needed for Shortness of Breath. 1 Each 3    levothyroxine (SYNTHROID) 50 MCG Tab TAKE 1 TABLET BY MOUTH EVERY DAY IN THE MORNING ON AN EMPTY STOMACH 100 Tablet 3    fluticasone-salmeterol (ADVAIR DISKUS) 250-50 MCG/ACT AEROSOL POWDER, BREATH ACTIVATED Inhale 1 Puff 2 times a day. Rinse mouth after each use. 60 Each 11    tiotropium (SPIRIVA RESPIMAT) 2.5 mcg/Act Aero Soln Inhale 2 Inhalations every day. Assemble and prime. 4 Each 0    montelukast (SINGULAIR) 10 MG Tab TAKE ONE TABLET BY MOUTH ONE TIME DAILY 90 Tablet 3    magnesium oxide (MAG-OX) 400 MG Tab Take 400 mg by mouth every day.      Cholecalciferol (VITAMIN D) 2000 UNITS CAPS Take 2,000 Units by mouth every day.      calcium carbonate (OS-DARIEN 500) 1250 MG TABS Take  1,250 mg by mouth every day.      multivitamin (THERAGRAN) TABS Take 1 Tab by mouth every day.       No current facility-administered medications for this visit.          Current supplements as per medication list.     Allergies: Meperidine    Current social contact/activities:   Yard work  Jumping Jacks, stretching       She  reports that she quit smoking about 10 years ago. Her smoking use included cigarettes. She started smoking about 50 years ago. She has a 10 pack-year smoking history. She has never used smokeless tobacco. She reports current drug use. Drugs: Marijuana and Inhaled. She reports that she does not drink alcohol.  Counseling given: Not Answered      ROS:    Gait: Uses no assistive device  Ostomy: No  Other tubes: No  Amputations: No  Chronic oxygen use: Yes, 2-3L QHS  Last eye exam: back in 2022  Wears hearing aids: No   : Denies any urinary leakage during the last 6 months    Screening:    Depression Screening  Little interest or pleasure in doing things?  0 - not at all  Feeling down, depressed , or hopeless? 0 - not at all  Patient Health Questionnaire Score: 0     If depressive symptoms identified deferred to follow up visit unless specifically addressed in assessment and plan.    Interpretation of PHQ-9 Total Score   Score Severity   1-4 No Depression   5-9 Mild Depression   10-14 Moderate Depression   15-19 Moderately Severe Depression   20-27 Severe Depression    Screening for Cognitive Impairment  Do you or any of your friends or family members have any concern about your memory? No  Three Minute Recall (Leader, Season, Table) 3/3    Edward clock face with all 12 numbers and set the hands to show 10 minutes after 11.  Yes    Cognitive concerns identified deferred for follow up unless specifically addressed in assessment and plan.    Fall Risk Assessment  Has the patient had two or more falls in the last year or any fall with injury in the last year?  No    Safety Assessment  Do you always  wear your seatbelt?  Yes  Any changes to home needed to function safely? No  Difficulty hearing.  No  Patient counseled about all safety risks that were identified.    Functional Assessment ADLs  Are there any barriers preventing you from cooking for yourself or meeting nutritional needs?  No.    Are there any barriers preventing you from driving safely or obtaining transportation?  No.    Are there any barriers preventing you from using a telephone or calling for help?  No    Are there any barriers preventing you from shopping?  No.    Are there any barriers preventing you from taking care of your own finances?  No    Are there any barriers preventing you from managing your medications?  No    Are there any barriers preventing you from showering, bathing or dressing yourself? No    Are there any barriers preventing you from doing housework or laundry? No  Are there any barriers preventing you from using the toilet?No  Are you currently engaging in any exercise or physical activity?  Yes. Pt exercise and stretching daily     Self-Assessment of Health  What is your perception of your health? Good  Do you sleep more than six hours a night? No  In the past 7 days, how much did pain keep you from doing your normal work? None  Do you spend quality time with family or friends (virtually or in person)? Yes  Do you usually eat a heart healthy diet that constists of a variety of fruits, vegetables, whole grains and fiber? Yes  Do you eat foods high in fat and/or Fast Food more than three times per week? No    Advance Care Planning  Do you have an Advance Directive, Living Will, Durable Power of , or POLST? Yes  Advance Directive       is on file      Health Maintenance Summary            Scheduled - Annual Pulmonary Function Test / Spirometry (Yearly) Scheduled for 10/17/2024      07/25/2023  PULMONARY FUNCTION TESTS -Test requested: Complete Pulmonary Function Test; Include MIPS/MEPS? No              Annual  Wellness Visit (Yearly) Next due on 6/5/2025 06/05/2024  Visit Dx: Encounter for Medicare annual wellness exam    03/02/2023  Level of Service: [place] ANNUAL WELLNESS VISIT-INCLUDES PPPS SUBSEQUE*    05/27/2022  Level of Service: [place] ANNUAL WELLNESS VISIT-INCLUDES PPPS SUBSEQUE*    08/19/2021  Level of Service: [place] ANNUAL WELLNESS VISIT-INCLUDES PPPS SUBSEQUE*              IMM DTaP/Tdap/Td Vaccine (2 - Td or Tdap) Next due on 3/1/2027      03/01/2017  Imm Admin: Tdap Vaccine              Bone Density Scan (Every 5 Years) Next due on 7/5/2028 07/05/2023  DS-BONE DENSITY STUDY (DEXA)    03/11/2021  DS-BONE DENSITY STUDY (DEXA)    01/18/2019  DS-BONE DENSITY STUDY (DEXA)    07/21/2016  DS-BONE DENSITY STUDY (DEXA)    07/11/2014  DS-BONE DENSITY STUDY (DEXA)    Only the first 5 history entries have been loaded, but more history exists.              Pneumococcal Vaccine: 65+ Years (Series Information) Completed      03/21/2016  Imm Admin: Pneumococcal Conjugate Vaccine (Prevnar/PCV-13)    04/01/2015  Imm Admin: Pneumococcal polysaccharide vaccine (PPSV-23)              Zoster (Shingles) Vaccines (Series Information) Completed      11/21/2022  Imm Admin: Zoster Vaccine Recombinant (RZV) (SHINGRIX)    05/25/2022  Imm Admin: Zoster Vaccine Recombinant (RZV) (SHINGRIX)    03/01/2017  Imm Admin: Zoster Vaccine Live (ZVL) (Zostavax) - HISTORICAL DATA              Influenza Vaccine (Series Information) Completed      10/12/2023  Imm Admin: Influenza Vaccine Adult HD    11/28/2022  Imm Admin: Influenza Vaccine Adult HD    10/21/2020  Imm Admin: Influenza Vaccine Adult HD    09/19/2019  Imm Admin: Influenza Vaccine Adult HD    12/18/2018  Imm Admin: Influenza Vaccine Adult HD    Only the first 5 history entries have been loaded, but more history exists.              COVID-19 Vaccine (Series Information) Completed      11/22/2023  Imm Admin: Comirnaty (Covid-19 Vaccine, Mrna, [id number] Formula)    01/12/2022  Imm Admin:  MODERNA SARS-COV-2 VACCINE (12+)    2021  Imm Admin: MODERNA SARS-COV-2 VACCINE (12+)    2021  Imm Admin: MODERNA SARS-COV-2 VACCINE (12+)              Hepatitis A Vaccine (Hep A) (Series Information) Aged Out      No completion history exists for this topic.              HPV Vaccines (Series Information) Aged Out      No completion history exists for this topic.              Polio Vaccine (Inactivated Polio) (Series Information) Aged Out      No completion history exists for this topic.              Meningococcal Immunization (Series Information) Aged Out      No completion history exists for this topic.              Discontinued - Mammogram  Discontinued        Frequency changed to Never automatically (Topic No Longer Applies)    2023  MA-SCREENING MAMMO BILAT W/TOMOSYNTHESIS W/CAD    2022  MA-SCREENING MAMMO BILAT W/TOMOSYNTHESIS W/CAD    2021  MA-SCREENING MAMMO BILAT W/TOMOSYNTHESIS W/CAD    2019  MA-SCREENING MAMMO BILAT W/TOMOSYNTHESIS W/CAD    Only the first 5 history entries have been loaded, but more history exists.              Discontinued - Colorectal Cancer Screening  Ordered on 3/5/2024        Frequency changed to Never automatically (Topic No Longer Applies)    10/14/2019  COLOGUARD COLON CANCER SCREENING    2009  Colonoscopy ([facility] consult)              Discontinued - Hepatitis B Vaccine (Hep B)  Discontinued      No completion history exists for this topic.              Discontinued - Hepatitis C Screening  Discontinued        Frequency changed to Never automatically (Topic No Longer Applies)    2023  Hepatitis C Antibody component of HCV Scrn ( [id number] [facility])                    Patient Care Team:  Richar Marti D.O. as PCP - General (Family Medicine)  Abdon Hilario D.O. as PCP - [facility] Paneled  PRUDENCE Foster as Consulting Physician (Family Medicine)  Rahel Thompson M.D. as Consulting Physician  "(Rheumatology)  Vic Vargas M.D. (Inactive) as Consulting Physician (Ophthalmology)  Bruno Hood M.D. as Consulting Physician (Cardiovascular Disease (Cardiology))  Sirena as Respiratory Therapist (DME Supplier)        Social History     Tobacco Use    Smoking status: Former     Current packs/day: 0.00     Average packs/day: 0.3 packs/day for 40.0 years (10.0 ttl pk-yrs)     Types: Cigarettes     Start date: 3/1/1974     Quit date: 3/1/2014     Years since quitting: 10.2    Smokeless tobacco: Never   Vaping Use    Vaping status: Never Used   Substance Use Topics    Alcohol use: No     Comment: very rarely    Drug use: Yes     Types: Marijuana, Inhaled     Comment: occasionally for glaucoma which runs in her family/ per pt no      Family History   Problem Relation Age of Onset    Other Mother 97        old age.     Heart Attack Father 56        MI    Heart Disease Father     Other Son         ETOH    Other Son         ETOH    Heart Disease Sister 53    Heart Attack Sister 85    No Known Problems Brother     No Known Problems Sister     No Known Problems Sister      She  has a past medical history of Allergic rhinitis, Arthritis, Asthma in adult, Cataract (02/2012), Dental disorder, Dyslipidemia (04/26/2016), GERD (gastroesophageal reflux disease), High cholesterol, Hypertension, Hypothyroid, Hypothyroid (04/18/2014), Hypothyroid (04/18/2014), Nonspecific abnormal function study, cardiovascular (08/19/2021), Osteoporosis, unspecified, Pneumonia (2009), and SOB (shortness of breath) (08/18/2017).   Past Surgical History:   Procedure Laterality Date    CATARACT PHACO WITH IOL      Dr. Vargas    ORIF, FRACTURE, FEMUR      titamium Dr. Mares.        Exam:   /76   Pulse 81   Temp 36.3 °C (97.3 °F) (Temporal)   Ht 1.473 m (4' 10\")   Wt 42.5 kg (93 lb 12.8 oz)   SpO2 93%  Body mass index is 19.6 kg/m².    Hearing good.    Dentition good  Alert, oriented in no acute distress.  Eye contact is good, " speech goal directed, affect calm    Results  Laboratory Studies  TSH was 0.63. Metabolic panel appears stable. Creatinine is 0.9 and GFR was 64. Liver enzymes were not elevated.       Assessment and Plan. The following treatment and monitoring plan is recommended:    1. Encounter for Medicare annual wellness exam    2. ACP (advance care planning)    3. Impacted cerumen of left ear  - Ear Cerumen Removal      Assessment & Plan  1. Annual visit.  Age-appropriate guidance and counseling provided including about diet, exercise, remaining social, fall prevention and follow-up visits    2. Advanced directive.  The patient's advance directive and power of  were thoroughly discussed. A POLST form was provided to the patient.    3. Cerumen impaction.  A cerumen removal procedure will be performed today.    Follow-up  The patient is scheduled for a follow-up visit in 10/2024.       Services suggested: No services needed at this time  Health Care Screening: Age-appropriate preventive services recommended by USPTF and ACIP covered by Medicare were discussed today. Services ordered if indicated and agreed upon by the patient.  Referrals offered: Community-based lifestyle interventions to reduce health risks and promote self-management and wellness, fall prevention, nutrition, physical activity, tobacco-use cessation, weight loss, and mental health services as per orders if indicated.    Discussion today about general wellness and lifestyle habits:    Prevent falls and reduce trip hazards; Cautioned about securing or removing rugs.  Have a working fire alarm and carbon monoxide detector;   Engage in regular physical activity and social activities     Follow-up: Return in about 4 months (around 10/17/2024), or if symptoms worsen or fail to improve.

## 2024-06-13 PROBLEM — J96.11 CHRONIC HYPOXEMIC RESPIRATORY FAILURE (HCC): Status: ACTIVE | Noted: 2024-06-13

## 2024-06-14 ENCOUNTER — APPOINTMENT (OUTPATIENT)
Dept: FAMILY PLANNING/WOMEN'S HEALTH CLINIC | Facility: PHYSICIAN GROUP | Age: 79
End: 2024-06-14
Payer: MEDICARE

## 2024-06-14 VITALS
WEIGHT: 93 LBS | HEIGHT: 58 IN | DIASTOLIC BLOOD PRESSURE: 58 MMHG | BODY MASS INDEX: 19.52 KG/M2 | SYSTOLIC BLOOD PRESSURE: 120 MMHG

## 2024-06-14 DIAGNOSIS — E03.8 OTHER SPECIFIED HYPOTHYROIDISM: ICD-10-CM

## 2024-06-14 DIAGNOSIS — E78.5 DYSLIPIDEMIA: ICD-10-CM

## 2024-06-14 DIAGNOSIS — J44.89 ASTHMA-COPD OVERLAP SYNDROME (HCC): Chronic | ICD-10-CM

## 2024-06-14 DIAGNOSIS — I73.9 PERIPHERAL VASCULAR DISEASE, UNSPECIFIED (HCC): ICD-10-CM

## 2024-06-14 DIAGNOSIS — J96.11 CHRONIC HYPOXEMIC RESPIRATORY FAILURE (HCC): ICD-10-CM

## 2024-06-14 DIAGNOSIS — F51.01 PRIMARY INSOMNIA: Chronic | ICD-10-CM

## 2024-06-14 DIAGNOSIS — M81.0 AGE-RELATED OSTEOPOROSIS WITHOUT CURRENT PATHOLOGICAL FRACTURE: ICD-10-CM

## 2024-06-14 PROBLEM — F13.20 SEDATIVE, HYPNOTIC, OR ANXIOLYTIC DEPENDENCE (HCC): Chronic | Status: RESOLVED | Noted: 2021-08-19 | Resolved: 2024-06-14

## 2024-06-14 PROCEDURE — 1126F AMNT PAIN NOTED NONE PRSNT: CPT

## 2024-06-14 PROCEDURE — 99214 OFFICE O/P EST MOD 30 MIN: CPT

## 2024-06-14 SDOH — ECONOMIC STABILITY: INCOME INSECURITY: IN THE LAST 12 MONTHS, WAS THERE A TIME WHEN YOU WERE NOT ABLE TO PAY THE MORTGAGE OR RENT ON TIME?: NO

## 2024-06-14 SDOH — ECONOMIC STABILITY: TRANSPORTATION INSECURITY
IN THE PAST 12 MONTHS, HAS THE LACK OF TRANSPORTATION KEPT YOU FROM MEDICAL APPOINTMENTS OR FROM GETTING MEDICATIONS?: NO

## 2024-06-14 SDOH — ECONOMIC STABILITY: FOOD INSECURITY: WITHIN THE PAST 12 MONTHS, THE FOOD YOU BOUGHT JUST DIDN'T LAST AND YOU DIDN'T HAVE MONEY TO GET MORE.: NEVER TRUE

## 2024-06-14 SDOH — ECONOMIC STABILITY: FOOD INSECURITY: WITHIN THE PAST 12 MONTHS, YOU WORRIED THAT YOUR FOOD WOULD RUN OUT BEFORE YOU GOT MONEY TO BUY MORE.: NEVER TRUE

## 2024-06-14 SDOH — ECONOMIC STABILITY: TRANSPORTATION INSECURITY
IN THE PAST 12 MONTHS, HAS LACK OF TRANSPORTATION KEPT YOU FROM MEETINGS, WORK, OR FROM GETTING THINGS NEEDED FOR DAILY LIVING?: NO

## 2024-06-14 SDOH — ECONOMIC STABILITY: HOUSING INSECURITY
IN THE LAST 12 MONTHS, WAS THERE A TIME WHEN YOU DID NOT HAVE A STEADY PLACE TO SLEEP OR SLEPT IN A SHELTER (INCLUDING NOW)?: NO

## 2024-06-14 SDOH — ECONOMIC STABILITY: INCOME INSECURITY: HOW HARD IS IT FOR YOU TO PAY FOR THE VERY BASICS LIKE FOOD, HOUSING, MEDICAL CARE, AND HEATING?: NOT HARD AT ALL

## 2024-06-14 SDOH — ECONOMIC STABILITY: HOUSING INSECURITY: IN THE LAST 12 MONTHS, HOW MANY PLACES HAVE YOU LIVED?: 1

## 2024-06-14 ASSESSMENT — FIBROSIS 4 INDEX: FIB4 SCORE: 1.33

## 2024-06-14 ASSESSMENT — ACTIVITIES OF DAILY LIVING (ADL): BATHING_REQUIRES_ASSISTANCE: 0

## 2024-06-14 ASSESSMENT — ENCOUNTER SYMPTOMS: GENERAL WELL-BEING: GOOD

## 2024-06-14 ASSESSMENT — PATIENT HEALTH QUESTIONNAIRE - PHQ9: CLINICAL INTERPRETATION OF PHQ2 SCORE: 0

## 2024-06-14 ASSESSMENT — PAIN SCALES - GENERAL: PAINLEVEL: NO PAIN

## 2024-06-14 NOTE — ASSESSMENT & PLAN NOTE
Chronic, stable. She has a hx of smoking. She maintains on albuterol PRN, Advair BID, Singulair 10 mg daily, and Spiriva daily. Denies recent exacerbations. Denies current SOB or cough. She is using oxygen at night. Follows with pulmonology as previously scheduled.

## 2024-06-14 NOTE — ASSESSMENT & PLAN NOTE
Chronic, stable. Last lipid panel in July 2023 was WNL. She currently takes rosuvastatin 40 mg daily. We discussed her dietary/lifestyle regimen. Follow up with PCP for continued monitoring and management.  Lab Results   Component Value Date/Time    CHOLSTRLTOT 183 07/25/2023 11:14 AM    TRIGLYCERIDE 107 07/25/2023 11:14 AM    HDL 76 07/25/2023 11:14 AM    LDL 86 07/25/2023 11:14 AM

## 2024-06-14 NOTE — ASSESSMENT & PLAN NOTE
Chronic, stable. She has difficulty falling asleep. States her ears ring at night and her oxygen machine is loud at night so it makes it harder to fall asleep. She will take Restoril once or twice a week when she is having difficulty with sleeping.

## 2024-06-14 NOTE — PROGRESS NOTES
Comprehensive Health Assessment Program     Olga Rich is a 79 y.o. here for her comprehensive health assessment.    Patient Active Problem List    Diagnosis Date Noted    Chronic hypoxemic respiratory failure (HCC) 06/13/2024    Nocturnal hypoxia 04/15/2024    BMI 20.0-20.9, adult 03/12/2023    Stage 3a chronic kidney disease 08/26/2022    Peripheral vascular disease, unspecified (HCC) 04/06/2022    Esophagitis with gastritis 03/28/2022    Hyperglycemia 08/19/2021    Non-recurrent unilateral inguinal hernia without obstruction or gangrene 09/19/2019    Asthma-COPD overlap syndrome (HCC) 08/18/2017    Aortic valve stenosis, moderate 07/18/2017    Insomnia 08/19/2016    Dyslipidemia 04/26/2016    Osteoporosis 07/18/2014    Hypothyroidism 04/18/2014    Environmental allergies 03/14/2014       Current Outpatient Medications   Medication Sig Dispense Refill    metoprolol SR (TOPROL XL) 50 MG TABLET SR 24 HR Take 1 Tablet by mouth every day. 100 Tablet 4    rosuvastatin (CRESTOR) 40 MG tablet Take 1 Tablet by mouth every day. 100 Tablet 4    lisinopril (PRINIVIL) 40 MG tablet Take 1 Tablet by mouth every day. 100 Tablet 4    temazepam (RESTORIL) 15 MG Cap at bedtime as needed.      albuterol 108 (90 Base) MCG/ACT Aero Soln inhalation aerosol Inhale 2 Puffs every four hours as needed for Shortness of Breath. 1 Each 3    levothyroxine (SYNTHROID) 50 MCG Tab TAKE 1 TABLET BY MOUTH EVERY DAY IN THE MORNING ON AN EMPTY STOMACH 100 Tablet 3    fluticasone-salmeterol (ADVAIR DISKUS) 250-50 MCG/ACT AEROSOL POWDER, BREATH ACTIVATED Inhale 1 Puff 2 times a day. Rinse mouth after each use. 60 Each 11    tiotropium (SPIRIVA RESPIMAT) 2.5 mcg/Act Aero Soln Inhale 2 Inhalations every day. Assemble and prime. 4 Each 0    montelukast (SINGULAIR) 10 MG Tab TAKE ONE TABLET BY MOUTH ONE TIME DAILY 90 Tablet 3    magnesium oxide (MAG-OX) 400 MG Tab Take 400 mg by mouth every day.      Cholecalciferol (VITAMIN D) 2000 UNITS  CAPS Take 2,000 Units by mouth every day.      calcium carbonate (OS-DARIEN 500) 1250 MG TABS Take 1,250 mg by mouth every day.      multivitamin (THERAGRAN) TABS Take 1 Tab by mouth every day.       No current facility-administered medications for this visit.          Current supplements as per medication list.     Allergies:   Meperidine  Social History     Tobacco Use    Smoking status: Former     Current packs/day: 0.00     Average packs/day: 0.3 packs/day for 40.0 years (10.0 ttl pk-yrs)     Types: Cigarettes     Start date: 3/1/1974     Quit date: 3/1/2014     Years since quitting: 10.2    Smokeless tobacco: Never   Vaping Use    Vaping status: Never Used   Substance Use Topics    Alcohol use: No     Comment: very rarely    Drug use: Not Currently     Types: Marijuana, Inhaled     Comment: occasionally for glaucoma which runs in her family/ per pt no      Family History   Problem Relation Age of Onset    Other Mother 97        old age.     Heart Attack Father 56        MI    Heart Disease Father     Other Son         ETOH    Other Son         ETOH    Heart Disease Sister 53    Heart Attack Sister 85    No Known Problems Brother     No Known Problems Sister     No Known Problems Sister      Olga  has a past medical history of Allergic rhinitis, Arthritis, Asthma in adult, Cataract (02/2012), Dental disorder, Dyslipidemia (04/26/2016), GERD (gastroesophageal reflux disease), High cholesterol, Hypertension, Hypothyroid, Hypothyroid (04/18/2014), Hypothyroid (04/18/2014), Nonspecific abnormal function study, cardiovascular (08/19/2021), Osteoporosis, unspecified, Pneumonia (2009), and SOB (shortness of breath) (08/18/2017).   Past Surgical History:   Procedure Laterality Date    CATARACT PHACO WITH IOL      Dr. Vargas    ORIF, FRACTURE, FEMUR      titamium Dr. Mares.        Screening:  In the last six months have you experienced any leakage of urine? No    Depression Screening  Little interest or pleasure in  doing things?  0 - not at all  Feeling down, depressed , or hopeless? 0 - not at all  Patient Health Questionnaire Score: 0     If depressive symptoms identified deferred to follow up visit unless specifically addressed in assessment and plan.    Interpretation of PHQ-9 Total Score   Score Severity   1-4 No Depression   5-9 Mild Depression   10-14 Moderate Depression   15-19 Moderately Severe Depression   20-27 Severe Depression    Screening for Cognitive Impairment  Do you or any of your friends or family members have any concern about your memory? No  Three Minute Recall (Leader, Season, Table) 3/3    Edward clock face with all 12 numbers and set the hands to show 10 minutes after 11.  Yes    Cognitive concerns identified deferred for follow up unless specifically addressed in assessment and plan.    Fall Risk Assessment  Has the patient had two or more falls in the last year or any fall with injury in the last year?  No    Safety Assessment  Do you always wear your seatbelt?  Yes  Any changes to home needed to function safely? No  Difficulty hearing.  No  Patient counseled about all safety risks that were identified.    Functional Assessment ADLs  Are there any barriers preventing you from cooking for yourself or meeting nutritional needs?  No.    Are there any barriers preventing you from driving safely or obtaining transportation?  No.    Are there any barriers preventing you from using a telephone or calling for help?  No    Are there any barriers preventing you from shopping?  No.    Are there any barriers preventing you from taking care of your own finances?  No    Are there any barriers preventing you from managing your medications?  No    Are there any barriers preventing you from showering, bathing or dressing yourself? No    Are there any barriers preventing you from doing housework or laundry? No  Are there any barriers preventing you from using the toilet?No  Are you currently engaging in any exercise or  physical activity?  Yes. Does set of exercises everyday, yard work, walks daily     Self-Assessment of Health  What is your perception of your health? Good  Do you sleep more than six hours a night? Yes  In the past 7 days, how much did pain keep you from doing your normal work? None  Do you spend quality time with family or friends (virtually or in person)? Yes  Do you usually eat a heart healthy diet that constists of a variety of fruits, vegetables, whole grains and fiber? Yes  Do you eat foods high in fat and/or Fast Food more than three times per week? No    Advance Care Planning  Do you have an Advance Directive, Living Will, Durable Power of , or POLST? Yes  Advance Directive Living Will Durable Power of    is on file      Health Maintenance Summary            Scheduled - Annual Pulmonary Function Test / Spirometry (Yearly) Scheduled for 10/17/2024      07/25/2023  PULMONARY FUNCTION TESTS -Test requested: Complete Pulmonary Function Test; Include MIPS/MEPS? No              Annual Wellness Visit (Yearly) Next due on 6/14/2025 06/14/2024  Level of Service: [place] ANNUAL WELLNESS VISIT-INCLUDES PPPS SUBSEQUE*    06/05/2024  Visit Dx: Encounter for Medicare annual wellness exam    06/05/2024  Level of Service: ANNUAL WELLNESS VISIT-INCLUDES PPPS SUBSEQUE*    03/02/2023  Level of Service: [place] ANNUAL WELLNESS VISIT-INCLUDES PPPS SUBSEQUE*    05/27/2022  Level of Service: [place] ANNUAL WELLNESS VISIT-INCLUDES PPPS SUBSEQUE*    Only the first 5 history entries have been loaded, but more history exists.              IMM DTaP/Tdap/Td Vaccine (2 - Td or Tdap) Next due on 3/1/2027      03/01/2017  Imm Admin: Tdap Vaccine              Bone Density Scan (Every 5 Years) Next due on 7/5/2028 07/05/2023  DS-BONE DENSITY STUDY (DEXA)    03/11/2021  DS-BONE DENSITY STUDY (DEXA)    01/18/2019  DS-BONE DENSITY STUDY (DEXA)    07/21/2016  DS-BONE DENSITY STUDY (DEXA)    07/11/2014  DS-BONE DENSITY STUDY (DEXA)     Only the first 5 history entries have been loaded, but more history exists.              Pneumococcal Vaccine: 65+ Years (Series Information) Completed      03/21/2016  Imm Admin: Pneumococcal Conjugate Vaccine (Prevnar/PCV-13)    04/01/2015  Imm Admin: Pneumococcal polysaccharide vaccine (PPSV-23)              Zoster (Shingles) Vaccines (Series Information) Completed      11/21/2022  Imm Admin: Zoster Vaccine Recombinant (RZV) (SHINGRIX)    05/25/2022  Imm Admin: Zoster Vaccine Recombinant (RZV) (SHINGRIX)    03/01/2017  Imm Admin: Zoster Vaccine Live (ZVL) (Zostavax) - HISTORICAL DATA              Influenza Vaccine (Series Information) Completed      10/12/2023  Imm Admin: Influenza Vaccine Adult HD    11/28/2022  Imm Admin: Influenza Vaccine Adult HD    10/21/2020  Imm Admin: Influenza Vaccine Adult HD    09/19/2019  Imm Admin: Influenza Vaccine Adult HD    12/18/2018  Imm Admin: Influenza Vaccine Adult HD    Only the first 5 history entries have been loaded, but more history exists.              COVID-19 Vaccine (Series Information) Completed      11/22/2023  Imm Admin: Comirnaty (Covid-19 Vaccine, Mrna, 7012-7587 Formula)    01/12/2022  Imm Admin: MODERNA SARS-COV-2 VACCINE (12+)    06/18/2021  Imm Admin: MODERNA SARS-COV-2 VACCINE (12+)    05/21/2021  Imm Admin: MODERNA SARS-COV-2 VACCINE (12+)              Hepatitis A Vaccine (Hep A) (Series Information) Aged Out      No completion history exists for this topic.              HPV Vaccines (Series Information) Aged Out      No completion history exists for this topic.              Polio Vaccine (Inactivated Polio) (Series Information) Aged Out      No completion history exists for this topic.              Meningococcal Immunization (Series Information) Aged Out      No completion history exists for this topic.              Discontinued - Mammogram  Discontinued        Frequency changed to Never automatically (Topic No Longer Applies)    06/19/2023   MA-SCREENING MAMMO BILAT W/TOMOSYNTHESIS W/CAD    2022  MA-SCREENING MAMMO BILAT W/TOMOSYNTHESIS W/CAD    2021  MA-SCREENING MAMMO BILAT W/TOMOSYNTHESIS W/CAD    2019  MA-SCREENING MAMMO BILAT W/TOMOSYNTHESIS W/CAD    Only the first 5 history entries have been loaded, but more history exists.              Discontinued - Colorectal Cancer Screening  Ordered on 3/5/2024        Frequency changed to Never automatically (Topic No Longer Applies)    10/14/2019  COLOGUARD COLON CANCER SCREENING    2009  Colonoscopy (Done - [facility] GI consult)              Discontinued - Hepatitis B Vaccine (Hep B)  Discontinued      No completion history exists for this topic.              Discontinued - Hepatitis C Screening  Discontinued        Frequency changed to Never automatically (Topic No Longer Applies)    2023  Hepatitis C Antibody component of HCV Scrn ( [id number] 1xLife)                    Patient Care Team:  Richar Marti D.O. as PCP - General (Family Medicine)  Abdon Hilario D.O. as PCP - [facility] Paneled  PRUDENCE Foster as Consulting Physician (Family Medicine)  Rahel Thompson M.D. as Consulting Physician (Rheumatology)  Vic Vargas M.D. (Inactive) as Consulting Physician (Ophthalmology)  Bruno Hood M.D. as Consulting Physician (Cardiovascular Disease (Cardiology))  Sirena as Respiratory Therapist (DME Supplier)      Financial Resource Strain: Low Risk  (2024)    Overall Financial Resource Strain (CARDIA)     Difficulty of Paying Living Expenses: Not hard at all      Transportation Needs: No Transportation Needs (2024)    PRAPARE - Transportation     Lack of Transportation (Medical): No     Lack of Transportation (Non-Medical): No      Food Insecurity: No Food Insecurity (2024)    Hunger Vital Sign     Worried About Running Out of Food in the Last Year: Never true     Ran Out of Food in the Last Year: Never true        Encounter  "Vitals  Blood Pressure : 120/58  Weight: 42.2 kg (93 lb)  Height: 147.3 cm (4' 10\")  BMI (Calculated): 19.44  Pain Score: No pain  Pulmonary Vitals  O2 sat % room air:  (Concentrator)  O2 Flow Rate (L/min): 2.5     Alert, oriented in no acute distress.  Eye contact is good, speech goal directed, affect calm.    Assessment and Plan. The following treatment and monitoring plan is recommended:    Asthma-COPD overlap syndrome (HCC)  Chronic hypoxemic respiratory failure (HCC)  Chronic, stable. She has a hx of smoking. She maintains on albuterol PRN, Advair BID, Singulair 10 mg daily, and Spiriva daily. Denies recent exacerbations. Denies current SOB or cough. She is using oxygen at night. Follows with pulmonology as previously scheduled.    Dyslipidemia  Chronic, stable. Last lipid panel in July 2023 was WNL. She currently takes rosuvastatin 40 mg daily. We discussed her dietary/lifestyle regimen. Follow up with PCP for continued monitoring and management.  Lab Results   Component Value Date/Time    CHOLSTRLTOT 183 07/25/2023 11:14 AM    TRIGLYCERIDE 107 07/25/2023 11:14 AM    HDL 76 07/25/2023 11:14 AM    LDL 86 07/25/2023 11:14 AM       Hypothyroidism  Stable. Currently taking levothyroxine 50 mcg daily as prescribed. Denies complications. TSH stable.    Insomnia  Chronic, stable. She has difficulty falling asleep. States her ears ring at night and her oxygen machine is loud at night so it makes it harder to fall asleep. She will take Restoril once or twice a week when she is having difficulty with sleeping.     Osteoporosis  Chronic, stable. Last DEXA 2023 revealed lumbar spine T score of -3.8 and proximal left femur T score of -3.4. She does take calcium and vitamin D supplementation. She also receives prolia shots q6mo.  Does engage in weightbearing exercise. No hx of fragility fractures. Continue to follow up with PCP as previously scheduled.    Peripheral vascular disease, unspecified (HCC)  Patient had abnormal " QuantaFlo screening done last year. She remains asymptomatic with no prior diagnosis of PAD/PVD. Follow up with PCP at least annually for further monitoring.      Services suggested: No services needed at this time  Health Care Screening: Age-appropriate preventive services recommended by USPTF and ACIP covered by Medicare were discussed today. Services ordered if indicated and agreed upon by the patient.  Referrals offered: Community-based lifestyle interventions to reduce health risks and promote self-management and wellness, fall prevention, nutrition, physical activity, tobacco-use cessation, weight loss, and mental health services as per orders if indicated.    Discussion today about general wellness and lifestyle habits:    Prevent falls and reduce trip hazards; Cautioned about securing or removing rugs.  Have a working fire alarm and carbon monoxide detector.  Engage in regular physical activity and social activities.    Follow-up: Return for appointment with Primary Care Provider as needed..

## 2024-06-14 NOTE — ASSESSMENT & PLAN NOTE
Chronic, stable. Last DEXA 2023 revealed lumbar spine T score of -3.8 and proximal left femur T score of -3.4. She does take calcium and vitamin D supplementation. She also receives prolia shots q6mo.  Does engage in weightbearing exercise. No hx of fragility fractures. Continue to follow up with PCP as previously scheduled.

## 2024-06-14 NOTE — ASSESSMENT & PLAN NOTE
Patient had abnormal QuantaFlo screening done last year. She remains asymptomatic with no prior diagnosis of PAD/PVD. Follow up with PCP at least annually for further monitoring.

## 2024-08-20 DIAGNOSIS — F51.01 PRIMARY INSOMNIA: ICD-10-CM

## 2024-08-20 DIAGNOSIS — J44.89 ASTHMA-COPD OVERLAP SYNDROME (HCC): Chronic | ICD-10-CM

## 2024-08-20 RX ORDER — TIOTROPIUM BROMIDE INHALATION SPRAY 3.12 UG/1
5 SPRAY, METERED RESPIRATORY (INHALATION) DAILY
Qty: 4 G | Refills: 11 | Status: SHIPPED | OUTPATIENT
Start: 2024-08-20

## 2024-08-20 RX ORDER — TEMAZEPAM 15 MG/1
15 CAPSULE ORAL NIGHTLY PRN
Qty: 90 CAPSULE | Refills: 0 | Status: SHIPPED | OUTPATIENT
Start: 2024-08-20 | End: 2024-11-18

## 2024-08-20 NOTE — TELEPHONE ENCOUNTER
Have we ever prescribed this med? Yes.  If yes, what date? 10/12/23    Last OV: 4/15/24 KATELIN Brody    Next OV: 10/17/24 KATELIN Brody    DX: N/A    Medications: temazepam (RESTORIL) 15 MG Cap

## 2024-08-20 NOTE — TELEPHONE ENCOUNTER
Have we ever prescribed this med? Yes.  If yes, what date? 10/12/23    Last OV: 4/15/24 KATELIN Brody    Next OV: 10/17/24 KATELIN Brody    DX: Asthma-COPD overlap syndrome [J44.89]     Medications: tiotropium (SPIRIVA RESPIMAT) 2.5 mcg/Act Aero Soln

## 2024-09-10 ENCOUNTER — HOSPITAL ENCOUNTER (OUTPATIENT)
Dept: CARDIOLOGY | Facility: MEDICAL CENTER | Age: 79
End: 2024-09-10
Attending: INTERNAL MEDICINE
Payer: MEDICARE

## 2024-09-10 DIAGNOSIS — I35.0 MILD AORTIC STENOSIS: ICD-10-CM

## 2024-09-10 PROCEDURE — 93306 TTE W/DOPPLER COMPLETE: CPT

## 2024-09-11 LAB — LV EJECT FRACT  99904: 60

## 2024-09-11 PROCEDURE — 93306 TTE W/DOPPLER COMPLETE: CPT | Mod: 26 | Performed by: INTERNAL MEDICINE

## 2024-10-07 DIAGNOSIS — R06.02 SOB (SHORTNESS OF BREATH): ICD-10-CM

## 2024-10-07 RX ORDER — LEVOTHYROXINE SODIUM 50 UG/1
50 TABLET ORAL
Qty: 100 TABLET | Refills: 3 | Status: SHIPPED | OUTPATIENT
Start: 2024-10-07

## 2024-10-08 RX ORDER — MONTELUKAST SODIUM 10 MG/1
TABLET ORAL
Qty: 90 TABLET | Refills: 3 | Status: SHIPPED | OUTPATIENT
Start: 2024-10-08

## 2024-10-17 ENCOUNTER — NON-PROVIDER VISIT (OUTPATIENT)
Dept: SLEEP MEDICINE | Facility: MEDICAL CENTER | Age: 79
End: 2024-10-17
Attending: NURSE PRACTITIONER
Payer: MEDICARE

## 2024-10-17 VITALS — BODY MASS INDEX: 19.52 KG/M2 | WEIGHT: 93 LBS | HEIGHT: 58 IN

## 2024-10-17 VITALS
OXYGEN SATURATION: 93 % | SYSTOLIC BLOOD PRESSURE: 114 MMHG | RESPIRATION RATE: 16 BRPM | HEART RATE: 74 BPM | BODY MASS INDEX: 19.52 KG/M2 | DIASTOLIC BLOOD PRESSURE: 74 MMHG | HEIGHT: 58 IN | WEIGHT: 93 LBS

## 2024-10-17 DIAGNOSIS — Z91.09 ENVIRONMENTAL ALLERGIES: ICD-10-CM

## 2024-10-17 DIAGNOSIS — J44.89 ASTHMA-COPD OVERLAP SYNDROME (HCC): Chronic | ICD-10-CM

## 2024-10-17 DIAGNOSIS — Z23 NEED FOR VACCINATION: ICD-10-CM

## 2024-10-17 DIAGNOSIS — Z87.891 FORMER SMOKER: ICD-10-CM

## 2024-10-17 DIAGNOSIS — G47.34 NOCTURNAL HYPOXIA: ICD-10-CM

## 2024-10-17 DIAGNOSIS — F51.01 PRIMARY INSOMNIA: Chronic | ICD-10-CM

## 2024-10-17 PROBLEM — J96.11 CHRONIC HYPOXEMIC RESPIRATORY FAILURE (HCC): Status: RESOLVED | Noted: 2024-06-13 | Resolved: 2024-10-17

## 2024-10-17 PROCEDURE — 99213 OFFICE O/P EST LOW 20 MIN: CPT | Performed by: NURSE PRACTITIONER

## 2024-10-17 PROCEDURE — 3078F DIAST BP <80 MM HG: CPT | Performed by: NURSE PRACTITIONER

## 2024-10-17 PROCEDURE — 90471 IMMUNIZATION ADMIN: CPT

## 2024-10-17 PROCEDURE — 3074F SYST BP LT 130 MM HG: CPT | Performed by: NURSE PRACTITIONER

## 2024-10-17 PROCEDURE — 94060 EVALUATION OF WHEEZING: CPT | Performed by: NURSE PRACTITIONER

## 2024-10-17 PROCEDURE — 94060 EVALUATION OF WHEEZING: CPT | Mod: 26 | Performed by: STUDENT IN AN ORGANIZED HEALTH CARE EDUCATION/TRAINING PROGRAM

## 2024-10-17 RX ORDER — FLUTICASONE PROPIONATE AND SALMETEROL 250; 50 UG/1; UG/1
1 POWDER RESPIRATORY (INHALATION) 2 TIMES DAILY
Qty: 180 EACH | Refills: 3 | Status: SHIPPED | OUTPATIENT
Start: 2024-10-17

## 2024-10-17 RX ORDER — ALBUTEROL SULFATE 90 UG/1
2 INHALANT RESPIRATORY (INHALATION) EVERY 4 HOURS PRN
Qty: 54 G | Refills: 3 | Status: SHIPPED | OUTPATIENT
Start: 2024-10-17

## 2024-10-17 ASSESSMENT — FIBROSIS 4 INDEX
FIB4 SCORE: 1.33
FIB4 SCORE: 1.33

## 2024-10-18 ENCOUNTER — OFFICE VISIT (OUTPATIENT)
Dept: MEDICAL GROUP | Facility: MEDICAL CENTER | Age: 79
End: 2024-10-18
Payer: MEDICARE

## 2024-10-18 VITALS
TEMPERATURE: 97.4 F | BODY MASS INDEX: 19.73 KG/M2 | SYSTOLIC BLOOD PRESSURE: 122 MMHG | DIASTOLIC BLOOD PRESSURE: 70 MMHG | OXYGEN SATURATION: 94 % | HEART RATE: 80 BPM | HEIGHT: 58 IN | WEIGHT: 94 LBS

## 2024-10-18 DIAGNOSIS — E78.5 DYSLIPIDEMIA: ICD-10-CM

## 2024-10-18 DIAGNOSIS — Z12.31 ENCOUNTER FOR SCREENING MAMMOGRAM FOR MALIGNANT NEOPLASM OF BREAST: ICD-10-CM

## 2024-10-18 DIAGNOSIS — E03.8 OTHER SPECIFIED HYPOTHYROIDISM: ICD-10-CM

## 2024-10-18 DIAGNOSIS — Z71.89 ADVANCE CARE PLANNING: ICD-10-CM

## 2024-10-18 DIAGNOSIS — R73.9 HYPERGLYCEMIA: ICD-10-CM

## 2024-10-18 PROBLEM — I73.9 PERIPHERAL VASCULAR DISEASE, UNSPECIFIED (HCC): Chronic | Status: ACTIVE | Noted: 2022-04-06

## 2024-10-18 PROCEDURE — 3074F SYST BP LT 130 MM HG: CPT | Performed by: FAMILY MEDICINE

## 2024-10-18 PROCEDURE — 99213 OFFICE O/P EST LOW 20 MIN: CPT | Performed by: FAMILY MEDICINE

## 2024-10-18 PROCEDURE — 3078F DIAST BP <80 MM HG: CPT | Performed by: FAMILY MEDICINE

## 2024-10-18 ASSESSMENT — FIBROSIS 4 INDEX: FIB4 SCORE: 1.33

## 2024-11-06 ENCOUNTER — HOSPITAL ENCOUNTER (OUTPATIENT)
Dept: RADIOLOGY | Facility: MEDICAL CENTER | Age: 79
End: 2024-11-06
Attending: FAMILY MEDICINE
Payer: MEDICARE

## 2024-11-06 DIAGNOSIS — Z12.31 ENCOUNTER FOR SCREENING MAMMOGRAM FOR MALIGNANT NEOPLASM OF BREAST: ICD-10-CM

## 2024-11-06 PROCEDURE — 77067 SCR MAMMO BI INCL CAD: CPT

## 2024-11-21 ENCOUNTER — OUTPATIENT INFUSION SERVICES (OUTPATIENT)
Dept: ONCOLOGY | Facility: MEDICAL CENTER | Age: 79
End: 2024-11-21
Attending: INTERNAL MEDICINE
Payer: MEDICARE

## 2024-11-21 VITALS
BODY MASS INDEX: 20.55 KG/M2 | TEMPERATURE: 96.9 F | OXYGEN SATURATION: 90 % | HEIGHT: 57 IN | WEIGHT: 95.24 LBS | HEART RATE: 79 BPM | RESPIRATION RATE: 18 BRPM | DIASTOLIC BLOOD PRESSURE: 81 MMHG | SYSTOLIC BLOOD PRESSURE: 138 MMHG

## 2024-11-21 DIAGNOSIS — M81.0 AGE-RELATED OSTEOPOROSIS WITHOUT CURRENT PATHOLOGICAL FRACTURE: ICD-10-CM

## 2024-11-21 LAB
CA-I BLD ISE-SCNC: 1.44 MMOL/L (ref 1.1–1.3)
CREAT BLD-MCNC: 1.1 MG/DL (ref 0.5–1.4)

## 2024-11-21 PROCEDURE — 82565 ASSAY OF CREATININE: CPT

## 2024-11-21 PROCEDURE — 96372 THER/PROPH/DIAG INJ SC/IM: CPT

## 2024-11-21 PROCEDURE — 700111 HCHG RX REV CODE 636 W/ 250 OVERRIDE (IP): Mod: JZ,JG | Performed by: INTERNAL MEDICINE

## 2024-11-21 PROCEDURE — 82330 ASSAY OF CALCIUM: CPT

## 2024-11-21 PROCEDURE — 36415 COLL VENOUS BLD VENIPUNCTURE: CPT

## 2024-11-21 RX ORDER — EPINEPHRINE 1 MG/ML(1)
0.5 AMPUL (ML) INJECTION PRN
OUTPATIENT
Start: 2025-04-18

## 2024-11-21 RX ORDER — METHYLPREDNISOLONE SODIUM SUCCINATE 125 MG/2ML
125 INJECTION INTRAMUSCULAR; INTRAVENOUS PRN
OUTPATIENT
Start: 2025-04-18

## 2024-11-21 RX ORDER — DIPHENHYDRAMINE HYDROCHLORIDE 50 MG/ML
50 INJECTION INTRAMUSCULAR; INTRAVENOUS PRN
OUTPATIENT
Start: 2025-04-18

## 2024-11-21 RX ADMIN — DENOSUMAB 60 MG: 60 INJECTION SUBCUTANEOUS at 09:50

## 2024-11-21 ASSESSMENT — FIBROSIS 4 INDEX: FIB4 SCORE: 1.33

## 2024-11-21 NOTE — PROGRESS NOTES
Pt presented to [facility] for Prolia injection. POC discussed and pt verbalized understanding. Pt confirms taking VitD/calcium supplements, denies recent or planned dental/oral procedures in the last month or the next month, and denies s/s of hypocalcemia or infection today. Labs drawn from [facility] without difficulty; site covered with sterile gauze/coban and pt tolerated well. Labs reviewed by pharmacy and Prolia injection administered per MAR. Band-aid applied to site and pt tolerated well. No s/s of adverse reactions or complications noted.  emailed and pt confirms MyChart access. Pt discharged to self care in [facility].

## 2025-02-20 PROBLEM — J96.11 CHRONIC RESPIRATORY FAILURE WITH HYPOXIA (HCC): Status: ACTIVE | Noted: 2025-02-20

## 2025-03-10 ENCOUNTER — HOSPITAL ENCOUNTER (OUTPATIENT)
Dept: LAB | Facility: MEDICAL CENTER | Age: 80
End: 2025-03-10
Attending: INTERNAL MEDICINE
Payer: MEDICARE

## 2025-03-10 DIAGNOSIS — Z82.49 FAMILY HISTORY OF ATHEROSCLEROSIS: ICD-10-CM

## 2025-03-10 DIAGNOSIS — E78.5 DYSLIPIDEMIA: ICD-10-CM

## 2025-03-10 DIAGNOSIS — I25.10 ATHEROSCLEROSIS OF NATIVE CORONARY ARTERY OF NATIVE HEART WITHOUT ANGINA PECTORIS: ICD-10-CM

## 2025-03-10 LAB
ANION GAP SERPL CALC-SCNC: 12 MMOL/L (ref 7–16)
BUN SERPL-MCNC: 12 MG/DL (ref 8–22)
CALCIUM SERPL-MCNC: 9.6 MG/DL (ref 8.5–10.5)
CHLORIDE SERPL-SCNC: 108 MMOL/L (ref 96–112)
CHOLEST SERPL-MCNC: 156 MG/DL (ref 100–199)
CO2 SERPL-SCNC: 21 MMOL/L (ref 20–33)
CREAT SERPL-MCNC: 0.94 MG/DL (ref 0.5–1.4)
FASTING STATUS PATIENT QL REPORTED: NORMAL
GFR SERPLBLD CREATININE-BSD FMLA CKD-EPI: 61 ML/MIN/1.73 M 2
GLUCOSE SERPL-MCNC: 84 MG/DL (ref 65–99)
HDLC SERPL-MCNC: 67 MG/DL
LDLC SERPL CALC-MCNC: 59 MG/DL
POTASSIUM SERPL-SCNC: 4.5 MMOL/L (ref 3.6–5.5)
SODIUM SERPL-SCNC: 141 MMOL/L (ref 135–145)
TRIGL SERPL-MCNC: 150 MG/DL (ref 0–149)

## 2025-03-10 PROCEDURE — 80048 BASIC METABOLIC PNL TOTAL CA: CPT

## 2025-03-10 PROCEDURE — 80061 LIPID PANEL: CPT

## 2025-03-10 PROCEDURE — 36415 COLL VENOUS BLD VENIPUNCTURE: CPT

## 2025-03-10 PROCEDURE — 83695 ASSAY OF LIPOPROTEIN(A): CPT

## 2025-03-12 ENCOUNTER — RESULTS FOLLOW-UP (OUTPATIENT)
Dept: CARDIOLOGY | Facility: MEDICAL CENTER | Age: 80
End: 2025-03-12
Payer: MEDICARE

## 2025-03-12 LAB — LPA SERPL-MCNC: 175 MG/DL

## 2025-03-21 ENCOUNTER — OFFICE VISIT (OUTPATIENT)
Dept: MEDICAL GROUP | Facility: MEDICAL CENTER | Age: 80
End: 2025-03-21
Payer: MEDICARE

## 2025-03-21 VITALS
HEART RATE: 92 BPM | SYSTOLIC BLOOD PRESSURE: 128 MMHG | TEMPERATURE: 97.9 F | DIASTOLIC BLOOD PRESSURE: 70 MMHG | HEIGHT: 58 IN | BODY MASS INDEX: 19.82 KG/M2 | OXYGEN SATURATION: 95 % | WEIGHT: 94.4 LBS

## 2025-03-21 DIAGNOSIS — I10 PRIMARY HYPERTENSION: ICD-10-CM

## 2025-03-21 DIAGNOSIS — J44.89 ASTHMA-COPD OVERLAP SYNDROME (HCC): ICD-10-CM

## 2025-03-21 DIAGNOSIS — E78.5 DYSLIPIDEMIA: ICD-10-CM

## 2025-03-21 DIAGNOSIS — N18.2 CKD (CHRONIC KIDNEY DISEASE) STAGE 2, GFR 60-89 ML/MIN: ICD-10-CM

## 2025-03-21 DIAGNOSIS — E03.8 OTHER SPECIFIED HYPOTHYROIDISM: ICD-10-CM

## 2025-03-21 DIAGNOSIS — M81.0 AGE-RELATED OSTEOPOROSIS WITHOUT CURRENT PATHOLOGICAL FRACTURE: ICD-10-CM

## 2025-03-21 DIAGNOSIS — J96.11 CHRONIC RESPIRATORY FAILURE WITH HYPOXIA (HCC): ICD-10-CM

## 2025-03-21 DIAGNOSIS — R73.9 HYPERGLYCEMIA: ICD-10-CM

## 2025-03-21 PROBLEM — N18.31 STAGE 3A CHRONIC KIDNEY DISEASE: Chronic | Status: RESOLVED | Noted: 2022-08-26 | Resolved: 2025-03-21

## 2025-03-21 PROCEDURE — 3074F SYST BP LT 130 MM HG: CPT | Performed by: FAMILY MEDICINE

## 2025-03-21 PROCEDURE — 3078F DIAST BP <80 MM HG: CPT | Performed by: FAMILY MEDICINE

## 2025-03-21 PROCEDURE — 99214 OFFICE O/P EST MOD 30 MIN: CPT | Performed by: FAMILY MEDICINE

## 2025-03-21 ASSESSMENT — PATIENT HEALTH QUESTIONNAIRE - PHQ9: CLINICAL INTERPRETATION OF PHQ2 SCORE: 0

## 2025-03-21 ASSESSMENT — FIBROSIS 4 INDEX: FIB4 SCORE: 1.35

## 2025-03-21 NOTE — PROGRESS NOTES
"Verbal consent was acquired by the patient to use [employer]-locate ambient listening note generation during this visit    Subjective:     CC: \"chronic conditions, lab follow up\"    History of Present Illness  The patient presents for evaluation of asthma, COPD, chronic kidney disease, hypertension, hyperlipidemia, hypothyroidism, and osteoporosis.    She experienced a severe cold during the winter, which she managed with sinus medication and Tylenol. The cold lasted approximately 2 weeks. She did not seek medical attention during this period. She reports no significant chest discomfort. She has a history of lung issues, including asthma and COPD, and uses oxygen at night. She continues to use Spiriva daily and finds it beneficial. She also uses Advair twice daily and ensures to rinse her mouth afterward. She has not required albuterol for the past decade, even during her recent illness.    She underwent lab work on 03/10/2025 for her cardiologist. She maintains a healthy diet and engages in regular exercise, including walking and yard work. She reports no recent falls and takes precautions to prevent them, such as wearing appropriate footwear, ensuring adequate lighting, and keeping pathways clear. She has not received the COVID-19 vaccine recently. She does not require a mammogram until the end of the year.    She is currently on lisinopril 40 mg for blood pressure management.    She is on rosuvastatin 40 mg for cholesterol management.    She is on levothyroxine 50 mcg for thyroid management, with an increased dose of 75 mcg on Wednesdays and Sundays.    She is scheduled for her next Prolia injection in May 2025 for osteoporosis management.    FAMILY HISTORY  She has a family history of high cholesterol causing problems. Her sister passed away from a heart attack.    MEDICATIONS  Current: Spiriva, Advair, Tylenol, lisinopril, rosuvastatin, levothyroxine, metoprolol          Objective:     Exam:  /70 (BP Location: " "Left arm, Patient Position: Sitting, BP Cuff Size: Adult)   Pulse 92   Temp 36.6 °C (97.9 °F) (Temporal)   Ht 1.473 m (4' 10\")   Wt 42.8 kg (94 lb 6.4 oz)   SpO2 95%   BMI 19.73 kg/m²  Body mass index is 19.73 kg/m².    Physical Exam  Vitals reviewed.   Constitutional:       General: She is not in acute distress.     Appearance: Normal appearance.   HENT:      Head: Normocephalic and atraumatic.   Cardiovascular:      Rate and Rhythm: Normal rate and regular rhythm.      Heart sounds: Normal heart sounds.   Pulmonary:      Effort: Pulmonary effort is normal. No respiratory distress.      Breath sounds: Normal breath sounds.   Skin:     General: Skin is warm and dry.   Neurological:      Mental Status: She is alert. Mental status is at baseline.      Gait: Gait normal.   Psychiatric:         Mood and Affect: Mood normal.         Behavior: Behavior normal.               Results  Laboratory Studies  GFR was 61. Creatinine 0.94. BUN is 12. Fasting sugar was 84. Total cholesterol is 156. LDL cholesterol is 59. Triglycerides were 150. Lipoprotein a was 175.      Assessment & Plan:       1. Asthma-COPD overlap syndrome (HCC)  Chronic, stable, as based on today's assessment and impact on other conditions evaluated today. Continue with current treatment plan: Continue Advair 250-50 mcg twice daily with good oral hygiene,  Spiriva once financially able to.  Patient is also taking Singulair 10 mg nightly.  Follow-up with specialist as directed, but at least annually.  - CBC WITH DIFFERENTIAL; Future    2. Chronic respiratory failure with hypoxia (HCC)  Chronic, stable, as based on today's assessment and impact on other conditions evaluated today. Continue with current treatment plan: Continue nighttime oxygen as directed and follow-up with specialist as directed, but at least annually.  - CBC WITH DIFFERENTIAL; Future    3. CKD (chronic kidney disease) stage 2, GFR 60-89 ml/min  - Comp Metabolic Panel; " Future    4. Dyslipidemia  - Comp Metabolic Panel; Future  - Lipid Profile; Future    5. Primary hypertension  - Comp Metabolic Panel; Future  - TSH WITH REFLEX TO FT4; Future    6. Other specified hypothyroidism  - TSH WITH REFLEX TO FT4; Future    7. Age-related osteoporosis without current pathological fracture  - CBC WITH DIFFERENTIAL; Future  - Comp Metabolic Panel; Future  - TSH WITH REFLEX TO FT4; Future  - VITAMIN D,25 HYDROXY (DEFICIENCY); Future    8. Hyperglycemia  - Comp Metabolic Panel; Future  - HEMOGLOBIN A1C; Future      Assessment & Plan  1. Asthma.  Her asthma appears to be well-managed. She continues to use Spiriva once a day and Advair twice a day, ensuring to rinse her mouth after use. She has not needed albuterol for the past 10 years, even during recent illnesses. She will continue her current inhaler regimen.    2. Chronic Obstructive Pulmonary Disease (COPD).  Her COPD is well-controlled. She uses oxygen at night, though the exact amount is unclear. She will continue her current treatment plan, including Spiriva and Advair. She has a follow-up appointment with her pulmonologist on April 17.    3. Chronic Kidney Disease.  Her GFR was 61 on March 10, indicating good kidney function. Previous GFR readings were 64 in February 2024 and 71 in July 2023. Her creatinine level is 0.94, and BUN is 12. Electrolytes are within normal limits. She will continue taking lisinopril 40 mg daily for blood pressure control and kidney protection. She is advised to maintain adequate hydration and avoid medications like ibuprofen.    4. Hyperlipidemia.  Her total cholesterol has improved to 156, with an LDL of 59. Triglycerides are borderline at 150. Lipoprotein A is elevated at 175, indicating an increased familial risk for high cholesterol-related complications. She will continue taking rosuvastatin 40 mg daily.    5. Hypertension.  Her blood pressure is well-controlled at 128/70. She will continue taking  lisinopril 40 mg and metoprolol 50 mg daily.    6. Hypothyroidism.  She will continue taking levothyroxine 50 mcg daily, except on Wednesdays and Sundays when she will take 75 mcg. Thyroid function will be checked during her next lab work in the fall.    7. Osteoporosis.  She is scheduled to receive her next Prolia injection in May.    8. Health Maintenance.  She is advised to receive the COVID-19 vaccine at her pharmacy. A mammogram will be ordered at her next visit. Lab work will be conducted prior to her annual visit in the fall.    Follow-up  The patient will follow up in late September or early October for an annual visit.         Return in about 6 months (around 9/21/2025), or if symptoms worsen or fail to improve, for Annual Medicare, Lab F/U.      This note was created using voice recognition software (Dragon). The accuracy of the dictation is limited by the abilities of the software. I have reviewed the note prior to signing, however some errors in grammar and context are still possible. If you have any questions related to this note please do not hesitate to contact our office.

## 2025-03-25 ENCOUNTER — OFFICE VISIT (OUTPATIENT)
Dept: CARDIOLOGY | Facility: MEDICAL CENTER | Age: 80
End: 2025-03-25
Attending: INTERNAL MEDICINE
Payer: MEDICARE

## 2025-03-25 VITALS
WEIGHT: 94.6 LBS | DIASTOLIC BLOOD PRESSURE: 82 MMHG | OXYGEN SATURATION: 94 % | HEIGHT: 58 IN | RESPIRATION RATE: 16 BRPM | HEART RATE: 85 BPM | BODY MASS INDEX: 19.86 KG/M2 | SYSTOLIC BLOOD PRESSURE: 122 MMHG

## 2025-03-25 DIAGNOSIS — I35.0 MILD AORTIC STENOSIS: ICD-10-CM

## 2025-03-25 DIAGNOSIS — Z79.899 HIGH RISK MEDICATION USE: ICD-10-CM

## 2025-03-25 DIAGNOSIS — I25.10 ATHEROSCLEROSIS OF NATIVE CORONARY ARTERY OF NATIVE HEART WITHOUT ANGINA PECTORIS: ICD-10-CM

## 2025-03-25 DIAGNOSIS — I10 HTN (HYPERTENSION), MALIGNANT: ICD-10-CM

## 2025-03-25 DIAGNOSIS — Z82.49 FAMILY HISTORY OF ATHEROSCLEROSIS: ICD-10-CM

## 2025-03-25 DIAGNOSIS — E78.5 DYSLIPIDEMIA: ICD-10-CM

## 2025-03-25 PROCEDURE — 3074F SYST BP LT 130 MM HG: CPT | Performed by: INTERNAL MEDICINE

## 2025-03-25 PROCEDURE — 99213 OFFICE O/P EST LOW 20 MIN: CPT | Performed by: INTERNAL MEDICINE

## 2025-03-25 PROCEDURE — G2211 COMPLEX E/M VISIT ADD ON: HCPCS | Performed by: INTERNAL MEDICINE

## 2025-03-25 PROCEDURE — 3079F DIAST BP 80-89 MM HG: CPT | Performed by: INTERNAL MEDICINE

## 2025-03-25 PROCEDURE — 99214 OFFICE O/P EST MOD 30 MIN: CPT | Performed by: INTERNAL MEDICINE

## 2025-03-25 RX ORDER — ROSUVASTATIN CALCIUM 40 MG/1
40 TABLET, COATED ORAL DAILY
Qty: 100 TABLET | Refills: 4 | Status: SHIPPED | OUTPATIENT
Start: 2025-03-25

## 2025-03-25 RX ORDER — METOPROLOL SUCCINATE 50 MG/1
50 TABLET, EXTENDED RELEASE ORAL DAILY
Qty: 100 TABLET | Refills: 4 | Status: SHIPPED | OUTPATIENT
Start: 2025-03-25

## 2025-03-25 RX ORDER — LISINOPRIL 40 MG/1
40 TABLET ORAL DAILY
Qty: 100 TABLET | Refills: 4 | Status: SHIPPED | OUTPATIENT
Start: 2025-03-25

## 2025-03-25 ASSESSMENT — ENCOUNTER SYMPTOMS
PALPITATIONS: 0
DOUBLE VISION: 0
DEPRESSION: 0
EYE PAIN: 0
COUGH: 0
VOMITING: 0
DIZZINESS: 0
BLOOD IN STOOL: 0
FALLS: 0
FEVER: 0
SENSORY CHANGE: 0
BLURRED VISION: 0
CLAUDICATION: 0
NAUSEA: 0
PND: 0
HALLUCINATIONS: 0
BRUISES/BLEEDS EASILY: 0
LOSS OF CONSCIOUSNESS: 0
EYE DISCHARGE: 0
MYALGIAS: 0
HEADACHES: 0
ORTHOPNEA: 0
SHORTNESS OF BREATH: 0
WEIGHT LOSS: 0
SPEECH CHANGE: 0
ABDOMINAL PAIN: 0
CHILLS: 0

## 2025-03-25 ASSESSMENT — FIBROSIS 4 INDEX: FIB4 SCORE: 1.35

## 2025-03-25 NOTE — PROGRESS NOTES
Chief Complaint   Patient presents with    Hypertension     F/V Dx: HTN (hypertension), malignant    Aortic Stenosis    Follow-Up     F/V Dx: Atherosclerosis of native coronary artery of native heart without angina pectoris       Subjective:   Olga Rich is an 80 y.o. female who presents today for cardiac care and evaluation because of an abnormal calcium scan in the past. At the last visit, patient was deemed to be asymptomatic and no further cardiac workup was entertained. We optimized her medical therapy.      She was found to have aortic stenosis for which she underwent TTE and SANGEETA in 05/2017.    08/2023 I have independently interpreted and reviewed echocardiogram's actual images with patient which showed normal left ventricular systolic function. No wall motion abnormality. No evidence of pulmonary hypertension. Mild AS.    09/2024 I have independently interpreted and reviewed echocardiogram's actual images with patient which showed normal left ventricular systolic function. No wall motion abnormality. No evidence of pulmonary hypertension. Mild AS.    I have independently interpreted and reviewed blood tests results with patient in clinic which shows elevated LDL level 59 down from 86, triglycerides 150 up from 127, renal and liver function. GFR of 61. UACR of 54.    In the interim, patient has been doing well without having any worsening symptoms. Patient denies having chest pain, palpitation, presyncope, syncope episodes.      Past Medical History:   Diagnosis Date    Allergic rhinitis     Arthritis     fingers    Asthma in adult     Cataract 02/2012    Bilat     Dental disorder     partial upper    Dyslipidemia 04/26/2016    GERD (gastroesophageal reflux disease)     High cholesterol     Hypertension     Hypothyroid     Hypothyroid 04/18/2014    Hypothyroid 04/18/2014    Nonspecific abnormal function study, cardiovascular 08/19/2021    Osteoporosis, unspecified     Pneumonia 2009    SOB  (shortness of breath) 2017    IMO load 2020    Stage 3a chronic kidney disease 2022     Past Surgical History:   Procedure Laterality Date    CATARACT PHACO WITH IOL      Dr. Sam CASTRO, FRACTURE, FEMUR      titamium Dr. Mares.      Family History   Problem Relation Age of Onset    Other Mother 97        old age.     Heart Attack Father 56        MI    Heart Disease Father     Heart Disease Sister 53    Heart Attack Sister 85    No Known Problems Sister     No Known Problems Sister     No Known Problems Brother     Other Son         ETOH    Other Son         ETOH     Social History     Socioeconomic History    Marital status:      Spouse name: Not on file    Number of children: Not on file    Years of education: Not on file    Highest education level: Not on file   Occupational History    Not on file   Tobacco Use    Smoking status: Former     Current packs/day: 0.00     Average packs/day: 0.3 packs/day for 40.0 years (10.0 ttl pk-yrs)     Types: Cigarettes     Start date: 3/1/1974     Quit date: 3/1/2014     Years since quittin.0    Smokeless tobacco: Never   Vaping Use    Vaping status: Never Used   Substance and Sexual Activity    Alcohol use: No     Comment: very rarely    Drug use: Not Currently     Types: Marijuana, Inhaled     Comment: occasionally for glaucoma which runs in her family/ per pt no     Sexual activity: Never     Partners: Male   Other Topics Concern    Not on file   Social History Narrative    Not on file     Social Drivers of Health     Financial Resource Strain: Low Risk  (2024)    Overall Financial Resource Strain (CARDIA)     Difficulty of Paying Living Expenses: Not hard at all   Food Insecurity: No Food Insecurity (2024)    Hunger Vital Sign     Worried About Running Out of Food in the Last Year: Never true     Ran Out of Food in the Last Year: Never true   Transportation Needs: No Transportation Needs (2024)    PRAPARE -  Transportation     Lack of Transportation (Medical): No     Lack of Transportation (Non-Medical): No   Physical Activity: Not on file   Stress: Not on file   Social Connections: Not on file   Intimate Partner Violence: Not on file   Housing Stability: Low Risk  (6/14/2024)    Housing Stability Vital Sign     Unable to Pay for Housing in the Last Year: No     Number of Places Lived in the Last Year: 1     Unstable Housing in the Last Year: No     Allergies   Allergen Reactions    Meperidine Anaphylaxis     Outpatient Encounter Medications as of 3/25/2025   Medication Sig Dispense Refill    lisinopril (PRINIVIL) 40 MG tablet Take 1 Tablet by mouth every day. 100 Tablet 4    metoprolol SR (TOPROL XL) 50 MG TABLET SR 24 HR Take 1 Tablet by mouth every day. 100 Tablet 4    rosuvastatin (CRESTOR) 40 MG tablet Take 1 Tablet by mouth every day. 100 Tablet 4    albuterol 108 (90 Base) MCG/ACT Aero Soln inhalation aerosol Inhale 2 Puffs every four hours as needed for Shortness of Breath. 54 g 3    fluticasone-salmeterol (ADVAIR DISKUS) 250-50 MCG/ACT AEROSOL POWDER, BREATH ACTIVATED Inhale 1 Puff 2 times a day. Rinse mouth after each use. 180 Each 3    montelukast (SINGULAIR) 10 MG Tab TAKE 1 TABLET BY MOUTH EVERY DAY 90 Tablet 3    levothyroxine (SYNTHROID) 50 MCG Tab TAKE 1 TABLET BY MOUTH EVERY DAY IN THE MORNING ON AN EMPTY STOMACH 100 Tablet 3    tiotropium (SPIRIVA RESPIMAT) 2.5 mcg/Act Aero Soln Inhale 2 Inhalations every day. Assemble and prime. 4 g 11    magnesium oxide (MAG-OX) 400 MG Tab Take 400 mg by mouth every day.      Cholecalciferol (VITAMIN D) 2000 UNITS CAPS Take 2,000 Units by mouth every day.      calcium carbonate (OS-DARIEN 500) 1250 MG TABS Take 1,250 mg by mouth every day.      multivitamin (THERAGRAN) TABS Take 1 Tab by mouth every day.      [DISCONTINUED] metoprolol SR (TOPROL XL) 50 MG TABLET SR 24 HR Take 1 Tablet by mouth every day. 100 Tablet 4    [DISCONTINUED] rosuvastatin (CRESTOR) 40 MG tablet  "Take 1 Tablet by mouth every day. 100 Tablet 4    [DISCONTINUED] lisinopril (PRINIVIL) 40 MG tablet Take 1 Tablet by mouth every day. 100 Tablet 4     No facility-administered encounter medications on file as of 3/25/2025.     Review of Systems   Constitutional:  Negative for chills, fever, malaise/fatigue and weight loss.   HENT:  Negative for ear discharge, ear pain, hearing loss and nosebleeds.    Eyes:  Negative for blurred vision, double vision, pain and discharge.   Respiratory:  Negative for cough and shortness of breath.    Cardiovascular:  Negative for chest pain, palpitations, orthopnea, claudication, leg swelling and PND.   Gastrointestinal:  Negative for abdominal pain, blood in stool, melena, nausea and vomiting.   Genitourinary:  Negative for dysuria and hematuria.   Musculoskeletal:  Negative for falls, joint pain and myalgias.   Skin:  Negative for itching and rash.   Neurological:  Negative for dizziness, sensory change, speech change, loss of consciousness and headaches.   Endo/Heme/Allergies:  Negative for environmental allergies. Does not bruise/bleed easily.   Psychiatric/Behavioral:  Negative for depression, hallucinations and suicidal ideas.         Objective:   /82 (BP Location: Left arm, Patient Position: Sitting, BP Cuff Size: Adult)   Pulse 85   Resp 16   Ht 1.473 m (4' 9.99\")   Wt 42.9 kg (94 lb 9.6 oz)   SpO2 94%   BMI 19.78 kg/m²     Physical Exam  Vitals and nursing note reviewed.   Constitutional:       General: She is not in acute distress.     Appearance: She is not diaphoretic.   HENT:      Head: Normocephalic and atraumatic.      Right Ear: External ear normal.      Left Ear: External ear normal.      Nose: No congestion or rhinorrhea.   Eyes:      General:         Right eye: No discharge.         Left eye: No discharge.   Neck:      Thyroid: No thyromegaly.      Vascular: No JVD.   Cardiovascular:      Rate and Rhythm: Normal rate and regular rhythm.      Pulses: " Normal pulses.      Heart sounds: Murmur heard.   Pulmonary:      Effort: No respiratory distress.   Abdominal:      General: There is no distension.      Tenderness: There is no abdominal tenderness.   Musculoskeletal:         General: No swelling or tenderness.      Right lower leg: No edema.      Left lower leg: No edema.   Skin:     General: Skin is warm and dry.   Neurological:      Mental Status: She is alert and oriented to person, place, and time.      Cranial Nerves: No cranial nerve deficit.   Psychiatric:         Behavior: Behavior normal.         Assessment:     1. Mild aortic stenosis        2. HTN (hypertension), malignant  lisinopril (PRINIVIL) 40 MG tablet    metoprolol SR (TOPROL XL) 50 MG TABLET SR 24 HR      3. Dyslipidemia  rosuvastatin (CRESTOR) 40 MG tablet    Basic Metabolic Panel    LIPID PANEL    HEMOGLOBIN A1C      4. Family history of atherosclerosis        5. High risk medication use        6. Atherosclerosis of native coronary artery of native heart without angina pectoris  rosuvastatin (CRESTOR) 40 MG tablet              Medical Decision Making:  Today's Assessment / Status / Plan:   At this time patient is clinically stable in terms of her cardiac standpoint.  Today, based on physical examination findings, patient is euvolemic. No JVD, lungs are clear to auscultation, no pitting edema in bilateral lower extremities, no ascites.    Dry weight is 94 lbs.    Will continue rosuvastatin 40 mg p.o. once a day for better LDL control.    Blood pressure controlled.  Will continue lisinopril 40 mg daily, Toprol to 50 mg daily.    Does not want to be referred to valve program as she is feeling well.   She prefers to see me at this time.  Clinical monitor for AS. Repeat TTE in 2-3 years.    This visit encounter signifies the visit complexity inherent to evaluation and management associated with medical care services that serve as the continuing focal point for all needed health care services  and/or with medical care services that are part of ongoing care related to this patient's single, serious condition, complex cardiac condition.    Bruno Hood M.D.

## 2025-04-17 ENCOUNTER — OFFICE VISIT (OUTPATIENT)
Dept: SLEEP MEDICINE | Facility: MEDICAL CENTER | Age: 80
End: 2025-04-17
Attending: NURSE PRACTITIONER
Payer: MEDICARE

## 2025-04-17 VITALS
HEART RATE: 68 BPM | RESPIRATION RATE: 14 BRPM | WEIGHT: 94.9 LBS | SYSTOLIC BLOOD PRESSURE: 112 MMHG | OXYGEN SATURATION: 93 % | HEIGHT: 58 IN | DIASTOLIC BLOOD PRESSURE: 72 MMHG | BODY MASS INDEX: 19.92 KG/M2

## 2025-04-17 DIAGNOSIS — G47.34 NOCTURNAL HYPOXIA: ICD-10-CM

## 2025-04-17 DIAGNOSIS — J44.89 ASTHMA-COPD OVERLAP SYNDROME (HCC): Chronic | ICD-10-CM

## 2025-04-17 DIAGNOSIS — Z87.891 FORMER SMOKER: ICD-10-CM

## 2025-04-17 PROCEDURE — 3078F DIAST BP <80 MM HG: CPT | Performed by: NURSE PRACTITIONER

## 2025-04-17 PROCEDURE — 99212 OFFICE O/P EST SF 10 MIN: CPT | Performed by: NURSE PRACTITIONER

## 2025-04-17 PROCEDURE — 3074F SYST BP LT 130 MM HG: CPT | Performed by: NURSE PRACTITIONER

## 2025-04-17 ASSESSMENT — FIBROSIS 4 INDEX: FIB4 SCORE: 1.35

## 2025-04-17 NOTE — PROGRESS NOTES
Chief Complaint   Patient presents with    Follow-Up     Asthma-COPD overlap syndrome (HCC) 10/17/24 Christiane Chambers   nocturnal O2 at 2 L/min.       HPI:  Olga Rich is a 80 y.o. year old female here today for follow-up on asthma/COPD overlap, nocturnal hypoxia and chronic insomnia.   Last office visit 10/17/24.     [place] 10/17/24 severe airflow.    MMRC stGstrstastdstest:st st1st Exacerbations this year: 0    Currently using Advair 250/50mcg 1 puff twice daily, Spiriva Respimat 2.5mcg 2puffs once daily, levalbuterol HFA as needed and Singulair nightly.      Using 2 L/min O2 at night      Interval Events:  4/17/25: Doing well and breathing stable.  She had a cold in February that took 2 weeks to resolve with OTC meds.  No regular nebulizer or albuterol inhaler use.  She uses temazepam only as needed for insomnia.  Occasionally she will take a 20-minute nap in the afternoon but not on a daily basis.  She notes blowing her nose in the morning to clear her sinuses but no regular cough, phlegm, chest pain, chest tightness, wheezing, allergies, GERD, pedal edema or dysphagia.  No significant changes in health since last office visit.  She notes after May she can afford Spiriva for 90-day fills.  10/17/24: Breathing to be stable and shortness of breath only with exercise.  She denies cough, phlegm, chest pain, chest tightness or wheezing.  No seasonal allergies or GERD.  On spirometry her FVC has improved compared to last year's findings.  She does still have very severe COPD.  She continues oxygen at night feels overall she is sleeping better on average 6.5 hours at night.  Occasionally she will take a nap for 15 minutes but not every day.  She remains on temazepam prior to bedtime only utilizing 1-2 times a week and denies side effects if she does utilize it.  She would like to obtain a flu shot today.     PULM HX:  Former smoker, quit 2014 with 10-pack-year history.  PFT 10/31/2007 noted FVC 1.79 L or 79%, FEV1 1.01 L or  55%, FEV1/FVC ratio 57, %, %, and DLCO 99% predicted.  PFT FVC 1.14 L or 57%, FEV1 0.53 L or 34%, FEV1/FVC ratio 47, hyperinflation with %, TLC 4.95 L or 120% and DLCO 68% predicted.  Mild bronchodilator response noted.  This notes severe obstruction with hyperinflation.  Recommend adding Spiriva Respimat 2.5 migrans 2 puffs once daily to optimize therapy.      CNOX on room air 9/8/2023 noted basal SPO2 of 86.3% and less than 88% for 426.3 minutes of the night. Started on O2 2LPM at night.     She has a history of insomnia and uses temazepam 15 mg up to 2 times per week when having difficulty initiating sleep.     ROS: As per HPI and otherwise negative if not stated.    Past Medical History:   Diagnosis Date    Allergic rhinitis     Arthritis     fingers    Asthma in adult     Cataract 02/2012    Bilat     Dental disorder     partial upper    Dyslipidemia 04/26/2016    GERD (gastroesophageal reflux disease)     High cholesterol     Hypertension     Hypothyroid     Hypothyroid 04/18/2014    Hypothyroid 04/18/2014    Nonspecific abnormal function study, cardiovascular 08/19/2021    Osteoporosis, unspecified     Pneumonia 2009    SOB (shortness of breath) 08/18/2017    IMO load March 2020    Stage 3a chronic kidney disease 08/26/2022       Past Surgical History:   Procedure Laterality Date    CATARACT PHACO WITH IOL      Dr. Sam CASTRO, FRACTURE, FEMUR      titamium Dr. Mares.        Family History   Problem Relation Age of Onset    Other Mother 97        old age.     Heart Attack Father 56        MI    Heart Disease Father     Heart Disease Sister 53    Heart Attack Sister 85    No Known Problems Sister     No Known Problems Sister     No Known Problems Brother     Other Son         ETOH    Other Son         ETOH       Social History     Socioeconomic History    Marital status:      Spouse name: Not on file    Number of children: Not on file    Years of education: Not on file     "Highest education level: Not on file   Occupational History    Not on file   Tobacco Use    Smoking status: Former     Current packs/day: 0.00     Average packs/day: 0.3 packs/day for 40.0 years (10.0 ttl pk-yrs)     Types: Cigarettes     Start date: 3/1/1974     Quit date: 3/1/2014     Years since quittin.1    Smokeless tobacco: Never   Vaping Use    Vaping status: Never Used   Substance and Sexual Activity    Alcohol use: No     Comment: very rarely    Drug use: Not Currently     Types: Marijuana, Inhaled     Comment: occasionally for glaucoma which runs in her family/ per pt no     Sexual activity: Never     Partners: Male   Other Topics Concern    Not on file   Social History Narrative    Not on file     Social Drivers of Health     Financial Resource Strain: Low Risk  (2024)    Overall Financial Resource Strain (CARDIA)     Difficulty of Paying Living Expenses: Not hard at all   Food Insecurity: No Food Insecurity (2024)    Hunger Vital Sign     Worried About Running Out of Food in the Last Year: Never true     Ran Out of Food in the Last Year: Never true   Transportation Needs: No Transportation Needs (2024)    PRAPARE - Transportation     Lack of Transportation (Medical): No     Lack of Transportation (Non-Medical): No   Physical Activity: Not on file   Stress: Not on file   Social Connections: Not on file   Intimate Partner Violence: Not on file   Housing Stability: Low Risk  (2024)    Housing Stability Vital Sign     Unable to Pay for Housing in the Last Year: No     Number of Places Lived in the Last Year: 1     Unstable Housing in the Last Year: No       Allergies as of 2025 - Reviewed 2025   Allergen Reaction Noted    Meperidine Anaphylaxis 2022        Vitals:  /72 (BP Location: Left arm, Patient Position: Sitting, BP Cuff Size: Adult)   Pulse 68   Resp 14   Ht 1.473 m (4' 10\")   Wt 43 kg (94 lb 14.4 oz)   SpO2 93%     Current medications as of today "   Current Outpatient Medications   Medication Sig Dispense Refill    lisinopril (PRINIVIL) 40 MG tablet Take 1 Tablet by mouth every day. 100 Tablet 4    metoprolol SR (TOPROL XL) 50 MG TABLET SR 24 HR Take 1 Tablet by mouth every day. 100 Tablet 4    rosuvastatin (CRESTOR) 40 MG tablet Take 1 Tablet by mouth every day. 100 Tablet 4    albuterol 108 (90 Base) MCG/ACT Aero Soln inhalation aerosol Inhale 2 Puffs every four hours as needed for Shortness of Breath. 54 g 3    fluticasone-salmeterol (ADVAIR DISKUS) 250-50 MCG/ACT AEROSOL POWDER, BREATH ACTIVATED Inhale 1 Puff 2 times a day. Rinse mouth after each use. 180 Each 3    montelukast (SINGULAIR) 10 MG Tab TAKE 1 TABLET BY MOUTH EVERY DAY 90 Tablet 3    levothyroxine (SYNTHROID) 50 MCG Tab TAKE 1 TABLET BY MOUTH EVERY DAY IN THE MORNING ON AN EMPTY STOMACH 100 Tablet 3    tiotropium (SPIRIVA RESPIMAT) 2.5 mcg/Act Aero Soln Inhale 2 Inhalations every day. Assemble and prime. 4 g 11    magnesium oxide (MAG-OX) 400 MG Tab Take 400 mg by mouth every day.      Cholecalciferol (VITAMIN D) 2000 UNITS CAPS Take 2,000 Units by mouth every day.      calcium carbonate (OS-DARIEN 500) 1250 MG TABS Take 1,250 mg by mouth every day.      multivitamin (THERAGRAN) TABS Take 1 Tab by mouth every day.       No current facility-administered medications for this visit.         Physical Exam:   Gen:           Alert and oriented, No apparent distress. Mood and affect appropriate, normal interaction with examiner.  Eyes:          PERRL, EOM intact, sclere white, conjunctive moist.  Ears:          Not examined.   Hearing:     Grossly intact.  Nose:          Normal, no lesions or deformities.  Dentition:    Not examined.   Oropharynx:   Not examined.   Mallampati Classification: Not examined.   Neck:        Supple, trachea midline, no masses.  Respiratory Effort: No intercostal retractions or use of accessory muscles.   Lung Auscultation:      Clear to auscultation bilaterally but  diminished t/o; no rales, rhonchi or wheezing.  CV:            Regular rate and rhythm. No murmurs, rubs or gallops.  Abd:           Not examined.  Lymphadenopathy: Not examined.   Gait and Station: Normal.  Digits and Nails: No clubbing, cyanosis, petechiae, or nodes.   Cranial Nerves: II-XII grossly intact.  Skin:        No rashes, lesions or ulcers noted.               Ext:           No cyanosis or edema.      Assessment:  1. Asthma-COPD overlap syndrome (HCC)        2. Nocturnal hypoxia        3. Body mass index (BMI) 19.9 or less, adult        4. Former smoker                 Immunizations:    Flu:10/2024  Pneumovax 23:2015  Prevnar 13:2016  PCV 20: not due  COVID-19: 2023    Plan:  Asthma/COPD is clinically stable.  She will continue to benefit from current bronchodilators.  Patient will continue to benefit from nighttime oxygen therapy.  Encouraged ongoing exercise, healthy diet and activity for conditioning.  Follow-up in 1 year per patient preference to review symptoms, sooner if needed.  She will contact me sooner if she has any changes in health.    Please note that this dictation was created using voice recognition software. I have made every reasonable attempt to correct obvious errors, but it is possible there are errors of grammar and possibly content that I did not discover before finalizing the note.

## 2025-05-22 ENCOUNTER — OUTPATIENT INFUSION SERVICES (OUTPATIENT)
Dept: ONCOLOGY | Facility: MEDICAL CENTER | Age: 80
End: 2025-05-22
Attending: INTERNAL MEDICINE
Payer: MEDICARE

## 2025-05-22 VITALS
OXYGEN SATURATION: 95 % | HEART RATE: 84 BPM | HEIGHT: 58 IN | TEMPERATURE: 98 F | DIASTOLIC BLOOD PRESSURE: 79 MMHG | WEIGHT: 94.8 LBS | SYSTOLIC BLOOD PRESSURE: 143 MMHG | BODY MASS INDEX: 19.9 KG/M2 | RESPIRATION RATE: 18 BRPM

## 2025-05-22 DIAGNOSIS — M81.0 AGE-RELATED OSTEOPOROSIS WITHOUT CURRENT PATHOLOGICAL FRACTURE: Primary | ICD-10-CM

## 2025-05-22 LAB
CA-I BLD ISE-SCNC: 1.29 MMOL/L (ref 1.1–1.3)
CREAT BLD-MCNC: 1.1 MG/DL (ref 0.5–1.4)

## 2025-05-22 PROCEDURE — 36415 COLL VENOUS BLD VENIPUNCTURE: CPT

## 2025-05-22 PROCEDURE — 96372 THER/PROPH/DIAG INJ SC/IM: CPT

## 2025-05-22 PROCEDURE — 700111 HCHG RX REV CODE 636 W/ 250 OVERRIDE (IP): Mod: JZ,TB | Performed by: FAMILY MEDICINE

## 2025-05-22 PROCEDURE — 82330 ASSAY OF CALCIUM: CPT

## 2025-05-22 PROCEDURE — 82565 ASSAY OF CREATININE: CPT

## 2025-05-22 RX ORDER — METHYLPREDNISOLONE SODIUM SUCCINATE 125 MG/2ML
125 INJECTION, POWDER, LYOPHILIZED, FOR SOLUTION INTRAMUSCULAR; INTRAVENOUS PRN
Status: DISCONTINUED | OUTPATIENT
Start: 2025-05-22 | End: 2025-05-22 | Stop reason: HOSPADM

## 2025-05-22 RX ORDER — EPINEPHRINE 1 MG/ML(1)
0.5 AMPUL (ML) INJECTION PRN
OUTPATIENT
Start: 2025-11-16

## 2025-05-22 RX ORDER — DIPHENHYDRAMINE HYDROCHLORIDE 50 MG/ML
50 INJECTION, SOLUTION INTRAMUSCULAR; INTRAVENOUS PRN
OUTPATIENT
Start: 2025-11-16

## 2025-05-22 RX ORDER — METHYLPREDNISOLONE SODIUM SUCCINATE 125 MG/2ML
125 INJECTION INTRAMUSCULAR; INTRAVENOUS PRN
OUTPATIENT
Start: 2025-11-16

## 2025-05-22 RX ORDER — DIPHENHYDRAMINE HYDROCHLORIDE 50 MG/ML
50 INJECTION, SOLUTION INTRAMUSCULAR; INTRAVENOUS PRN
Status: DISCONTINUED | OUTPATIENT
Start: 2025-05-22 | End: 2025-05-22 | Stop reason: HOSPADM

## 2025-05-22 RX ORDER — EPINEPHRINE 1 MG/ML(1)
0.5 AMPUL (ML) INJECTION PRN
Status: DISCONTINUED | OUTPATIENT
Start: 2025-05-22 | End: 2025-05-22 | Stop reason: HOSPADM

## 2025-05-22 RX ADMIN — DENOSUMAB 60 MG: 60 INJECTION SUBCUTANEOUS at 09:55

## 2025-05-22 ASSESSMENT — FIBROSIS 4 INDEX: FIB4 SCORE: 1.35

## 2025-05-22 NOTE — PROGRESS NOTES
[facility]ga [facility] for a Prolia injection. Olga denied having any new complaints, acute infections, or dental procedures in the last month or scheduled for the next month. 23G butterfly needle used to draw blood from right AC, bleeding controlled with gauze and coban after. Ionized calcium/creatinine tested, WNL, pharmacist notified that Olga within parameters to treat. Olga aware to continue taking vitamin D and calcium supplements. Prolia injection given in the back of left arm SQ. Olga tolerated well, adhesive bandage applied to injection site. Message sent to Schedulers for future appointment. Discharged to self care; no apparent distress noted.

## 2025-06-27 DIAGNOSIS — J44.89 ASTHMA-COPD OVERLAP SYNDROME (HCC): Chronic | ICD-10-CM

## 2025-06-30 RX ORDER — TIOTROPIUM BROMIDE INHALATION SPRAY 3.12 UG/1
5 SPRAY, METERED RESPIRATORY (INHALATION) DAILY
Qty: 12 G | Refills: 3 | Status: SHIPPED | OUTPATIENT
Start: 2025-06-30

## 2025-06-30 NOTE — TELEPHONE ENCOUNTER
Have we ever prescribed this med? Yes.  If yes, what date? 08/20/24    Last OV: 04/17/25 dread Chambers APRN    Next OV: N/A    Associated Diagnoses: Asthma-COPD overlap syndrome    Requested Prescriptions     Pending Prescriptions Disp Refills    SPIRIVA RESPIMAT 2.5 MCG/ACT Aero Soln [Pharmacy Med Name: SPIRIVA RESPIMAT 2.5 MCG INH]  11     Sig: INHALE 2 INHALATIONS EVERY DAY. ASSEMBLE AND PRIME.

## 2025-07-26 DIAGNOSIS — R06.02 SOB (SHORTNESS OF BREATH): ICD-10-CM

## 2025-07-28 ENCOUNTER — TELEPHONE (OUTPATIENT)
Dept: SLEEP MEDICINE | Facility: MEDICAL CENTER | Age: 80
End: 2025-07-28
Payer: MEDICARE

## 2025-07-28 DIAGNOSIS — J44.89 ASTHMA-COPD OVERLAP SYNDROME (HCC): Chronic | ICD-10-CM

## 2025-07-28 RX ORDER — FLUTICASONE PROPIONATE AND SALMETEROL 250; 50 UG/1; UG/1
POWDER RESPIRATORY (INHALATION)
Qty: 180 EACH | Refills: 3 | Status: SHIPPED | OUTPATIENT
Start: 2025-07-28 | End: 2025-07-28 | Stop reason: SDUPTHER

## 2025-07-28 RX ORDER — FLUTICASONE PROPIONATE AND SALMETEROL 250; 50 UG/1; UG/1
1 POWDER RESPIRATORY (INHALATION) 2 TIMES DAILY
Qty: 180 EACH | Refills: 3 | Status: SHIPPED | OUTPATIENT
Start: 2025-07-28

## 2025-07-28 RX ORDER — LEVOTHYROXINE SODIUM 50 UG/1
50 TABLET ORAL
Qty: 100 TABLET | Refills: 3 | Status: SHIPPED | OUTPATIENT
Start: 2025-07-28

## 2025-07-28 RX ORDER — MONTELUKAST SODIUM 10 MG/1
10 TABLET ORAL
Qty: 90 TABLET | Refills: 3 | Status: SHIPPED | OUTPATIENT
Start: 2025-07-28

## 2025-07-28 NOTE — TELEPHONE ENCOUNTER
Have we ever prescribed this med? Yes, 10/17/24    Last OV: 4/17/25 w/ Judah Chambers APRN    Next OV: N/A    Associated Diagnoses: Asthma-COPD overlap syndrome      Requested Prescriptions     Pending Prescriptions Disp Refills    fluticasone-salmeterol (ADVAIR) 250-50 MCG/ACT AEROSOL POWDER, BREATH ACTIVATED [Pharmacy Med Name: WIXELA 250-50 INHUB] 180 Each 3     Sig: INHALE 1 PUFF BY MOUTH TWICE A DAY. RINSE MOUTH AFTER EACH USE

## 2025-07-28 NOTE — TELEPHONE ENCOUNTER
Received [facility] via [facility]  for   fluticasone-salmeterol (ADVAIR) 250-50 MCG/ACT AEROSOL . (Quantity:180, Day Supply:90)     Insurance: [employer] plus  Member ID:  [id number]  BIN: [id number]  PCN: [id number]  Group: [id number]     Ran Test claim via [place] & medication Pays for a 90 days for 24$ and $12 for 30 days  copay. Will outreach to patient to offer specialty pharmacy services and or release to preferred pharmacy    7/28- Left VM to onboard    Genesis Toussaint [facility]  Pulmonary Pharmacy Liaison (Rx Coordinator)  P: [phone]  7/28/2025 1:48 PM

## 2025-07-28 NOTE — TELEPHONE ENCOUNTER
Have we ever prescribed this med? Yes.  If yes, what date? 10/08/25    Last OV: 04/17/25 dread Chambers APRN    Next OV: N/A    Associated Diagnoses: SOB (shortness of breath)     Requested Prescriptions     Pending Prescriptions Disp Refills    montelukast (SINGULAIR) 10 MG Tab [Pharmacy Med Name: MONTELUKAST SOD 10 MG TABLET] 90 Tablet 3     Sig: TAKE 1 TABLET BY MOUTH EVERY DAY

## 2025-07-28 NOTE — TELEPHONE ENCOUNTER
Received request via: Patient    Was the patient seen in the last year in this department? Yes 4/17/25 Chritsiane Chambers     Does the patient have an active prescription (recently filled or refills available) for medication(s) requested? No    Pharmacy Name: [facility]    Does the patient have FPC Plus and need 100-day supply? (This applies to ALL medications) Yes, quantity updated to 100 days

## 2025-07-29 NOTE — TELEPHONE ENCOUNTER
Spoke with patient she needs to  her Advair and declined liaison service will release prescription to  [facility]/pharmacy #[id number] - [place], [place] - [address] [facility]  Pulmonary Pharmacy Liaison (Rx Coordinator)  P: [phone]  7/29/2025 1:28 PM

## 2025-07-29 NOTE — TELEPHONE ENCOUNTER
Caller: Olga Rich    Topic/issue: Patient returning phone call - please callback when available. She is inquiring about this medication and when it will be released to her pharmacy.    Callback Number: [phone]    Thank you,  Sil BATISTA